# Patient Record
Sex: MALE | Race: ASIAN | Employment: OTHER | URBAN - METROPOLITAN AREA
[De-identification: names, ages, dates, MRNs, and addresses within clinical notes are randomized per-mention and may not be internally consistent; named-entity substitution may affect disease eponyms.]

---

## 2020-03-15 ENCOUNTER — APPOINTMENT (OUTPATIENT)
Dept: CT IMAGING | Age: 80
End: 2020-03-15
Payer: MEDICAID

## 2020-03-15 ENCOUNTER — HOSPITAL ENCOUNTER (EMERGENCY)
Age: 80
Discharge: HOME | End: 2020-03-15
Attending: EMERGENCY MEDICINE
Payer: MEDICAID

## 2020-03-15 VITALS
OXYGEN SATURATION: 94 % | RESPIRATION RATE: 16 BRPM | SYSTOLIC BLOOD PRESSURE: 119 MMHG | TEMPERATURE: 98.6 F | WEIGHT: 145 LBS | DIASTOLIC BLOOD PRESSURE: 63 MMHG | HEART RATE: 68 BPM

## 2020-03-15 LAB
A/G RATIO: 1 (ref 1.1–2.2)
ALBUMIN SERPL-MCNC: 4 G/DL (ref 3.4–5)
ALP BLD-CCNC: 102 U/L (ref 40–129)
ALT SERPL-CCNC: 24 U/L (ref 10–40)
ANION GAP SERPL CALCULATED.3IONS-SCNC: 14 MMOL/L (ref 3–16)
AST SERPL-CCNC: 35 U/L (ref 15–37)
BASOPHILS ABSOLUTE: 0.1 K/UL (ref 0–0.2)
BASOPHILS RELATIVE PERCENT: 1.1 %
BILIRUB SERPL-MCNC: 0.4 MG/DL (ref 0–1)
BILIRUBIN URINE: NEGATIVE
BLOOD, URINE: NEGATIVE
BUN BLDV-MCNC: 12 MG/DL (ref 7–20)
CALCIUM SERPL-MCNC: 9.5 MG/DL (ref 8.3–10.6)
CHLORIDE BLD-SCNC: 98 MMOL/L (ref 99–110)
CLARITY: CLEAR
CO2: 23 MMOL/L (ref 21–32)
COLOR: YELLOW
CREAT SERPL-MCNC: 0.8 MG/DL (ref 0.8–1.3)
EOSINOPHILS ABSOLUTE: 0 K/UL (ref 0–0.6)
EOSINOPHILS RELATIVE PERCENT: 0.5 %
GFR AFRICAN AMERICAN: >60
GFR NON-AFRICAN AMERICAN: >60
GLOBULIN: 4.2 G/DL
GLUCOSE BLD-MCNC: 123 MG/DL (ref 70–99)
GLUCOSE URINE: NEGATIVE MG/DL
HCT VFR BLD CALC: 37.7 % (ref 40.5–52.5)
HEMOGLOBIN: 12.7 G/DL (ref 13.5–17.5)
KETONES, URINE: NEGATIVE MG/DL
LEUKOCYTE ESTERASE, URINE: ABNORMAL
LYMPHOCYTES ABSOLUTE: 2.2 K/UL (ref 1–5.1)
LYMPHOCYTES RELATIVE PERCENT: 28.5 %
MCH RBC QN AUTO: 29.7 PG (ref 26–34)
MCHC RBC AUTO-ENTMCNC: 33.8 G/DL (ref 31–36)
MCV RBC AUTO: 87.8 FL (ref 80–100)
MICROSCOPIC EXAMINATION: YES
MONOCYTES ABSOLUTE: 0.7 K/UL (ref 0–1.3)
MONOCYTES RELATIVE PERCENT: 9.2 %
NEUTROPHILS ABSOLUTE: 4.8 K/UL (ref 1.7–7.7)
NEUTROPHILS RELATIVE PERCENT: 60.7 %
NITRITE, URINE: NEGATIVE
PDW BLD-RTO: 12.5 % (ref 12.4–15.4)
PH UA: 6 (ref 5–8)
PLATELET # BLD: 312 K/UL (ref 135–450)
PMV BLD AUTO: 7.5 FL (ref 5–10.5)
POTASSIUM REFLEX MAGNESIUM: 5.1 MMOL/L (ref 3.5–5.1)
PROTEIN UA: NEGATIVE MG/DL
RAPID INFLUENZA  B AGN: NEGATIVE
RAPID INFLUENZA A AGN: NEGATIVE
RBC # BLD: 4.29 M/UL (ref 4.2–5.9)
RBC UA: ABNORMAL /HPF (ref 0–4)
SODIUM BLD-SCNC: 135 MMOL/L (ref 136–145)
SPECIFIC GRAVITY UA: 1 (ref 1–1.03)
TOTAL PROTEIN: 8.2 G/DL (ref 6.4–8.2)
TROPONIN: <0.01 NG/ML
URINE TYPE: ABNORMAL
UROBILINOGEN, URINE: 0.2 E.U./DL
WBC # BLD: 7.9 K/UL (ref 4–11)
WBC UA: ABNORMAL /HPF (ref 0–5)

## 2020-03-15 PROCEDURE — 6360000002 HC RX W HCPCS: Performed by: EMERGENCY MEDICINE

## 2020-03-15 PROCEDURE — 93005 ELECTROCARDIOGRAM TRACING: CPT | Performed by: EMERGENCY MEDICINE

## 2020-03-15 PROCEDURE — 36415 COLL VENOUS BLD VENIPUNCTURE: CPT

## 2020-03-15 PROCEDURE — 81001 URINALYSIS AUTO W/SCOPE: CPT

## 2020-03-15 PROCEDURE — 74177 CT ABD & PELVIS W/CONTRAST: CPT

## 2020-03-15 PROCEDURE — 80053 COMPREHEN METABOLIC PANEL: CPT

## 2020-03-15 PROCEDURE — 85025 COMPLETE CBC W/AUTO DIFF WBC: CPT

## 2020-03-15 PROCEDURE — 99283 EMERGENCY DEPT VISIT LOW MDM: CPT

## 2020-03-15 PROCEDURE — 87804 INFLUENZA ASSAY W/OPTIC: CPT

## 2020-03-15 PROCEDURE — 96375 TX/PRO/DX INJ NEW DRUG ADDON: CPT

## 2020-03-15 PROCEDURE — 74176 CT ABD & PELVIS W/O CONTRAST: CPT

## 2020-03-15 PROCEDURE — 96374 THER/PROPH/DIAG INJ IV PUSH: CPT

## 2020-03-15 PROCEDURE — 84484 ASSAY OF TROPONIN QUANT: CPT

## 2020-03-15 PROCEDURE — 6360000004 HC RX CONTRAST MEDICATION: Performed by: EMERGENCY MEDICINE

## 2020-03-15 RX ORDER — LEVOTHYROXINE SODIUM 0.1 MG/1
100 TABLET ORAL DAILY
COMMUNITY

## 2020-03-15 RX ORDER — LISINOPRIL 20 MG/1
20 TABLET ORAL DAILY
Status: ON HOLD | COMMUNITY
End: 2022-07-05 | Stop reason: HOSPADM

## 2020-03-15 RX ORDER — IBUPROFEN 800 MG/1
800 TABLET ORAL EVERY 6 HOURS PRN
Status: ON HOLD | COMMUNITY
End: 2022-07-05 | Stop reason: HOSPADM

## 2020-03-15 RX ORDER — OMEPRAZOLE 40 MG/1
40 CAPSULE, DELAYED RELEASE ORAL DAILY
COMMUNITY

## 2020-03-15 RX ORDER — KETOROLAC TROMETHAMINE 30 MG/ML
15 INJECTION, SOLUTION INTRAMUSCULAR; INTRAVENOUS ONCE
Status: COMPLETED | OUTPATIENT
Start: 2020-03-15 | End: 2020-03-15

## 2020-03-15 RX ORDER — ONDANSETRON 4 MG/1
4 TABLET, FILM COATED ORAL DAILY PRN
Qty: 30 TABLET | Refills: 0 | Status: ON HOLD | OUTPATIENT
Start: 2020-03-15 | End: 2022-06-17

## 2020-03-15 RX ORDER — HYDROCHLOROTHIAZIDE 25 MG/1
25 TABLET ORAL DAILY
Status: ON HOLD | COMMUNITY
End: 2022-07-05 | Stop reason: HOSPADM

## 2020-03-15 RX ORDER — PROCHLORPERAZINE MALEATE 10 MG
10 TABLET ORAL EVERY 6 HOURS PRN
Qty: 120 TABLET | Refills: 0 | Status: SHIPPED | OUTPATIENT
Start: 2020-03-15

## 2020-03-15 RX ORDER — TRAMADOL HYDROCHLORIDE 50 MG/1
50 TABLET ORAL EVERY 6 HOURS PRN
Status: ON HOLD | COMMUNITY
End: 2022-07-05 | Stop reason: HOSPADM

## 2020-03-15 RX ORDER — FENTANYL CITRATE 50 UG/ML
25 INJECTION, SOLUTION INTRAMUSCULAR; INTRAVENOUS ONCE
Status: COMPLETED | OUTPATIENT
Start: 2020-03-15 | End: 2020-03-15

## 2020-03-15 RX ADMIN — FENTANYL CITRATE 25 MCG: 50 INJECTION, SOLUTION INTRAMUSCULAR; INTRAVENOUS at 15:40

## 2020-03-15 RX ADMIN — KETOROLAC TROMETHAMINE 15 MG: 30 INJECTION, SOLUTION INTRAMUSCULAR at 15:40

## 2020-03-15 RX ADMIN — IOPAMIDOL 75 ML: 755 INJECTION, SOLUTION INTRAVENOUS at 18:30

## 2020-03-15 SDOH — HEALTH STABILITY: MENTAL HEALTH: HOW OFTEN DO YOU HAVE A DRINK CONTAINING ALCOHOL?: NEVER

## 2020-03-15 ASSESSMENT — PAIN SCALES - GENERAL: PAINLEVEL_OUTOF10: 8

## 2020-03-15 ASSESSMENT — PAIN DESCRIPTION - PAIN TYPE: TYPE: ACUTE PAIN

## 2020-03-15 ASSESSMENT — PAIN DESCRIPTION - LOCATION: LOCATION: THROAT

## 2020-03-15 NOTE — ED PROVIDER NOTES
and Sexual Activity    Alcohol use: Never     Frequency: Never    Drug use: Never    Sexual activity: Not on file   Lifestyle    Physical activity     Days per week: Not on file     Minutes per session: Not on file    Stress: Not on file   Relationships    Social connections     Talks on phone: Not on file     Gets together: Not on file     Attends Hindu service: Not on file     Active member of club or organization: Not on file     Attends meetings of clubs or organizations: Not on file     Relationship status: Not on file    Intimate partner violence     Fear of current or ex partner: Not on file     Emotionally abused: Not on file     Physically abused: Not on file     Forced sexual activity: Not on file   Other Topics Concern    Not on file   Social History Narrative    Not on file     No current facility-administered medications for this encounter. Current Outpatient Medications   Medication Sig Dispense Refill    lisinopril (PRINIVIL;ZESTRIL) 20 MG tablet Take 20 mg by mouth daily      ibuprofen (ADVIL;MOTRIN) 800 MG tablet Take 800 mg by mouth every 6 hours as needed for Pain      hydroCHLOROthiazide (HYDRODIURIL) 25 MG tablet Take 25 mg by mouth daily      metFORMIN (GLUCOPHAGE) 500 MG tablet Take 500 mg by mouth daily (with breakfast)      levothyroxine (SYNTHROID) 100 MCG tablet Take 100 mcg by mouth Daily      omeprazole (PRILOSEC) 20 MG delayed release capsule Take 40 mg by mouth daily      traMADol (ULTRAM) 50 MG tablet Take 50 mg by mouth every 6 hours as needed for Pain.       prochlorperazine (COMPAZINE) 10 MG tablet Take 1 tablet by mouth every 6 hours as needed (pain, nausea) 120 tablet 0    ondansetron (ZOFRAN) 4 MG tablet Take 1 tablet by mouth daily as needed for Nausea or Vomiting 30 tablet 0     No Known Allergies    Nursing Notes Reviewed    Physical Exam:  Triage VS:    ED Triage Vitals [03/15/20 1344]   Enc Vitals Group      BP (!) 136/44      Pulse 71      Resp 16 Temp 98.6 °F (37 °C)      Temp Source Oral      SpO2 97 %      Weight 145 lb (65.8 kg)      Height       Head Circumference       Peak Flow       Pain Score       Pain Loc       Pain Edu? Excl. in [address]? My pulse ox interpretation is - normal    General appearance:  No acute distress. Skin:  Warm. Dry. No rash. Neck:  Trachea midline. Heart:  Regular rate and rhythm, normal S1 & S2.    Perfusion:  intact  Respiratory:  Lungs clear to auscultation bilaterally. Respirations nonlabored. Abdominal:  diffuse tenderness to palpation, no rebound guarding or rigidity, neg Rendon's sign, neg McBurney's point tenderness to palpation, normal bowel sounds, no masses, no organomegaly, no pulsatile masses  Back:  No CVA TTP  Extremity:    normal ROM   Neurological:  Alert and oriented times 3. I have reviewed and interpreted all of the currently available lab results from this visit (if applicable):  Results for orders placed or performed during the hospital encounter of 03/15/20   Rapid influenza A/B antigens   Result Value Ref Range    Rapid Influenza A Ag Negative Negative    Rapid Influenza B Ag Negative Negative   CBC Auto Differential   Result Value Ref Range    WBC 7.9 4.0 - 11.0 K/uL    RBC 4.29 4. 20 - 5.90 M/uL    Hemoglobin 12.7 (L) 13.5 - 17.5 g/dL    Hematocrit 37.7 (L) 40.5 - 52.5 %    MCV 87.8 80.0 - 100.0 fL    MCH 29.7 26.0 - 34.0 pg    MCHC 33.8 31.0 - 36.0 g/dL    RDW 12.5 12.4 - 15.4 %    Platelets 336 311 - 098 K/uL    MPV 7.5 5.0 - 10.5 fL    Neutrophils % 60.7 %    Lymphocytes % 28.5 %    Monocytes % 9.2 %    Eosinophils % 0.5 %    Basophils % 1.1 %    Neutrophils Absolute 4.8 1.7 - 7.7 K/uL    Lymphocytes Absolute 2.2 1.0 - 5.1 K/uL    Monocytes Absolute 0.7 0.0 - 1.3 K/uL    Eosinophils Absolute 0.0 0.0 - 0.6 K/uL    Basophils Absolute 0.1 0.0 - 0.2 K/uL   Comprehensive Metabolic Panel w/ Reflex to MG   Result Value Ref Range    Sodium 135 (L) 136 - 145 mmol/L    Potassium reflex Magnesium 5.1 3.5 - 5.1 mmol/L    Chloride 98 (L) 99 - 110 mmol/L    CO2 23 21 - 32 mmol/L    Anion Gap 14 3 - 16    Glucose 123 (H) 70 - 99 mg/dL    BUN 12 7 - 20 mg/dL    CREATININE 0.8 0.8 - 1.3 mg/dL    GFR Non-African American >60 >60    GFR African American >60 >60    Calcium 9.5 8.3 - 10.6 mg/dL    Total Protein 8.2 6.4 - 8.2 g/dL    Alb 4.0 3.4 - 5.0 g/dL    Albumin/Globulin Ratio 1.0 (L) 1.1 - 2.2    Total Bilirubin 0.4 0.0 - 1.0 mg/dL    Alkaline Phosphatase 102 40 - 129 U/L    ALT 24 10 - 40 U/L    AST 35 15 - 37 U/L    Globulin 4.2 g/dL   Troponin   Result Value Ref Range    Troponin <0.01 <0.01 ng/mL   Urinalysis, reflex to microscopic   Result Value Ref Range    Color, UA YELLOW Straw/Yellow    Clarity, UA Clear Clear    Glucose, Ur Negative Negative mg/dL    Bilirubin Urine Negative Negative    Ketones, Urine Negative Negative mg/dL    Specific Gravity, UA 1.005 1.005 - 1.030    Blood, Urine Negative Negative    pH, UA 6.0 5.0 - 8.0    Protein, UA Negative Negative mg/dL    Urobilinogen, Urine 0.2 <2.0 E.U./dL    Nitrite, Urine Negative Negative    Leukocyte Esterase, Urine SMALL (A) Negative    Microscopic Examination YES     Urine Type NotGiven    Microscopic Urinalysis   Result Value Ref Range    WBC, UA 6-10 (A) 0 - 5 /HPF    RBC, UA None seen 0 - 4 /HPF   EKG 12 Lead   Result Value Ref Range    Ventricular Rate 64 BPM    Atrial Rate 64 BPM    P-R Interval 164 ms    QRS Duration 80 ms    Q-T Interval 390 ms    QTc Calculation (Bazett) 402 ms    P Axis 52 degrees    R Axis 51 degrees    T Axis 44 degrees    Diagnosis Normal sinus rhythmNormal ECG       Radiographs (if obtained):  Radiologist's Report Reviewed:  No results found. EKG (if obtained): (All EKG's are interpreted by myself in the absence of a cardiologist)      MDM:  Patient here with abdominal pain.  I estimate there is LOW risk for an acute emergent intraabdominal process including, but not limited to, acute appendicitis, needed (pain, nausea), Disp-120 tablet, R-0Print      ondansetron (ZOFRAN) 4 MG tablet Take 1 tablet by mouth daily as needed for Nausea or Vomiting, Disp-30 tablet, R-0Print             Comment: Please note this report has been produced using speech recognition software and may contain errors related to that system including errors in grammar, punctuation, and spelling, as well as words and phrases that may be inappropriate. Efforts were made to edit the dictations.      Pranav Connell MD  89/12/67 3658

## 2020-03-16 LAB
EKG ATRIAL RATE: 64 BPM
EKG DIAGNOSIS: NORMAL
EKG P AXIS: 52 DEGREES
EKG P-R INTERVAL: 164 MS
EKG Q-T INTERVAL: 390 MS
EKG QRS DURATION: 80 MS
EKG QTC CALCULATION (BAZETT): 402 MS
EKG R AXIS: 51 DEGREES
EKG T AXIS: 44 DEGREES
EKG VENTRICULAR RATE: 64 BPM

## 2020-03-16 PROCEDURE — 93010 ELECTROCARDIOGRAM REPORT: CPT | Performed by: INTERNAL MEDICINE

## 2020-04-28 ENCOUNTER — HOSPITAL ENCOUNTER (OUTPATIENT)
Dept: MRI IMAGING | Age: 80
Discharge: HOME | DRG: 281 | End: 2020-04-28
Payer: COMMERCIAL

## 2020-04-28 ENCOUNTER — HOSPITAL ENCOUNTER (OUTPATIENT)
Age: 80
Discharge: HOME | DRG: 281 | End: 2020-04-28
Payer: COMMERCIAL

## 2020-04-28 PROCEDURE — 74183 MRI ABD W/O CNTR FLWD CNTR: CPT

## 2020-04-28 PROCEDURE — 2580000003 HC RX 258: Performed by: INTERNAL MEDICINE

## 2020-04-28 PROCEDURE — A9579 GAD-BASE MR CONTRAST NOS,1ML: HCPCS | Performed by: INTERNAL MEDICINE

## 2020-04-28 PROCEDURE — 6360000004 HC RX CONTRAST MEDICATION: Performed by: INTERNAL MEDICINE

## 2020-04-28 RX ORDER — SODIUM CHLORIDE 9 MG/ML
INJECTION, SOLUTION INTRAVENOUS ONCE
Status: COMPLETED | OUTPATIENT
Start: 2020-04-28 | End: 2020-04-28

## 2020-04-28 RX ADMIN — SODIUM CHLORIDE: 9 INJECTION, SOLUTION INTRAVENOUS at 12:49

## 2020-04-28 RX ADMIN — GADOTERIDOL 12 ML: 279.3 INJECTION, SOLUTION INTRAVENOUS at 12:49

## 2020-04-30 ENCOUNTER — APPOINTMENT (OUTPATIENT)
Dept: GENERAL RADIOLOGY | Age: 80
DRG: 281 | End: 2020-04-30
Payer: COMMERCIAL

## 2020-04-30 ENCOUNTER — APPOINTMENT (OUTPATIENT)
Dept: ULTRASOUND IMAGING | Age: 80
DRG: 281 | End: 2020-04-30
Payer: COMMERCIAL

## 2020-04-30 ENCOUNTER — HOSPITAL ENCOUNTER (INPATIENT)
Age: 80
LOS: 5 days | Discharge: HOME | DRG: 281 | End: 2020-05-05
Attending: EMERGENCY MEDICINE | Admitting: HOSPITALIST
Payer: COMMERCIAL

## 2020-04-30 ENCOUNTER — APPOINTMENT (OUTPATIENT)
Dept: CT IMAGING | Age: 80
DRG: 281 | End: 2020-04-30
Payer: COMMERCIAL

## 2020-04-30 PROBLEM — R11.2 INTRACTABLE VOMITING WITH NAUSEA: Status: ACTIVE | Noted: 2020-04-30

## 2020-04-30 LAB
A/G RATIO: 1.3 (ref 1.1–2.2)
ALBUMIN SERPL-MCNC: 4.6 G/DL (ref 3.4–5)
ALP BLD-CCNC: 103 U/L (ref 40–129)
ALT SERPL-CCNC: 17 U/L (ref 10–40)
ANION GAP SERPL CALCULATED.3IONS-SCNC: 16 MMOL/L (ref 3–16)
AST SERPL-CCNC: 24 U/L (ref 15–37)
BASOPHILS ABSOLUTE: 0.1 K/UL (ref 0–0.2)
BASOPHILS RELATIVE PERCENT: 0.5 %
BILIRUB SERPL-MCNC: 0.6 MG/DL (ref 0–1)
BILIRUBIN URINE: NEGATIVE
BLOOD, URINE: NEGATIVE
BUN BLDV-MCNC: 14 MG/DL (ref 7–20)
CALCIUM SERPL-MCNC: 9.4 MG/DL (ref 8.3–10.6)
CHLORIDE BLD-SCNC: 102 MMOL/L (ref 99–110)
CLARITY: ABNORMAL
CO2: 21 MMOL/L (ref 21–32)
COLOR: YELLOW
CREAT SERPL-MCNC: 0.6 MG/DL (ref 0.8–1.3)
EOSINOPHILS ABSOLUTE: 0.1 K/UL (ref 0–0.6)
EOSINOPHILS RELATIVE PERCENT: 0.5 %
EPITHELIAL CELLS, UA: 1 /HPF (ref 0–5)
GFR AFRICAN AMERICAN: >60
GFR NON-AFRICAN AMERICAN: >60
GLOBULIN: 3.6 G/DL
GLUCOSE BLD-MCNC: 117 MG/DL (ref 70–99)
GLUCOSE BLD-MCNC: 206 MG/DL (ref 70–99)
GLUCOSE URINE: 250 MG/DL
HCT VFR BLD CALC: 42.4 % (ref 40.5–52.5)
HEMOGLOBIN: 13.8 G/DL (ref 13.5–17.5)
HYALINE CASTS: 0 /LPF (ref 0–8)
KETONES, URINE: NEGATIVE MG/DL
LEUKOCYTE ESTERASE, URINE: NEGATIVE
LIPASE: 28 U/L (ref 13–60)
LYMPHOCYTES ABSOLUTE: 4.9 K/UL (ref 1–5.1)
LYMPHOCYTES RELATIVE PERCENT: 34.3 %
MCH RBC QN AUTO: 29 PG (ref 26–34)
MCHC RBC AUTO-ENTMCNC: 32.5 G/DL (ref 31–36)
MCV RBC AUTO: 89.4 FL (ref 80–100)
MICROSCOPIC EXAMINATION: YES
MONOCYTES ABSOLUTE: 0.6 K/UL (ref 0–1.3)
MONOCYTES RELATIVE PERCENT: 4.5 %
NEUTROPHILS ABSOLUTE: 8.6 K/UL (ref 1.7–7.7)
NEUTROPHILS RELATIVE PERCENT: 60.2 %
NITRITE, URINE: NEGATIVE
PDW BLD-RTO: 13.8 % (ref 12.4–15.4)
PERFORMED ON: ABNORMAL
PH UA: 6 (ref 5–8)
PLATELET # BLD: 255 K/UL (ref 135–450)
PMV BLD AUTO: 7.8 FL (ref 5–10.5)
POTASSIUM SERPL-SCNC: 3.1 MMOL/L (ref 3.5–5.1)
PRO-BNP: 179 PG/ML (ref 0–449)
PROTEIN UA: 30 MG/DL
RBC # BLD: 4.74 M/UL (ref 4.2–5.9)
RBC UA: 3 /HPF (ref 0–4)
SODIUM BLD-SCNC: 139 MMOL/L (ref 136–145)
SPECIFIC GRAVITY UA: 1.01 (ref 1–1.03)
TOTAL PROTEIN: 8.2 G/DL (ref 6.4–8.2)
TROPONIN: <0.01 NG/ML
URINE REFLEX TO CULTURE: ABNORMAL
URINE TYPE: ABNORMAL
UROBILINOGEN, URINE: 0.2 E.U./DL
WBC # BLD: 14.3 K/UL (ref 4–11)
WBC UA: 3 /HPF (ref 0–5)

## 2020-04-30 PROCEDURE — 96376 TX/PRO/DX INJ SAME DRUG ADON: CPT

## 2020-04-30 PROCEDURE — 71045 X-RAY EXAM CHEST 1 VIEW: CPT

## 2020-04-30 PROCEDURE — 85025 COMPLETE CBC W/AUTO DIFF WBC: CPT

## 2020-04-30 PROCEDURE — 2580000003 HC RX 258: Performed by: NURSE PRACTITIONER

## 2020-04-30 PROCEDURE — 1200000000 HC SEMI PRIVATE

## 2020-04-30 PROCEDURE — 83690 ASSAY OF LIPASE: CPT

## 2020-04-30 PROCEDURE — 93005 ELECTROCARDIOGRAM TRACING: CPT | Performed by: EMERGENCY MEDICINE

## 2020-04-30 PROCEDURE — 81001 URINALYSIS AUTO W/SCOPE: CPT

## 2020-04-30 PROCEDURE — 6360000002 HC RX W HCPCS: Performed by: NURSE PRACTITIONER

## 2020-04-30 PROCEDURE — 99291 CRITICAL CARE FIRST HOUR: CPT

## 2020-04-30 PROCEDURE — 6360000002 HC RX W HCPCS

## 2020-04-30 PROCEDURE — 84484 ASSAY OF TROPONIN QUANT: CPT

## 2020-04-30 PROCEDURE — 74177 CT ABD & PELVIS W/CONTRAST: CPT

## 2020-04-30 PROCEDURE — 36415 COLL VENOUS BLD VENIPUNCTURE: CPT

## 2020-04-30 PROCEDURE — 2580000003 HC RX 258: Performed by: HOSPITALIST

## 2020-04-30 PROCEDURE — 83880 ASSAY OF NATRIURETIC PEPTIDE: CPT

## 2020-04-30 PROCEDURE — 96375 TX/PRO/DX INJ NEW DRUG ADDON: CPT

## 2020-04-30 PROCEDURE — 96365 THER/PROPH/DIAG IV INF INIT: CPT

## 2020-04-30 PROCEDURE — 80053 COMPREHEN METABOLIC PANEL: CPT

## 2020-04-30 PROCEDURE — 76705 ECHO EXAM OF ABDOMEN: CPT

## 2020-04-30 PROCEDURE — 6360000004 HC RX CONTRAST MEDICATION: Performed by: NURSE PRACTITIONER

## 2020-04-30 PROCEDURE — 83036 HEMOGLOBIN GLYCOSYLATED A1C: CPT

## 2020-04-30 RX ORDER — MORPHINE SULFATE 2 MG/ML
2 INJECTION, SOLUTION INTRAMUSCULAR; INTRAVENOUS EVERY 4 HOURS PRN
Status: DISCONTINUED | OUTPATIENT
Start: 2020-04-30 | End: 2020-05-05 | Stop reason: HOSPADM

## 2020-04-30 RX ORDER — SODIUM CHLORIDE 0.9 % (FLUSH) 0.9 %
10 SYRINGE (ML) INJECTION EVERY 12 HOURS SCHEDULED
Status: DISCONTINUED | OUTPATIENT
Start: 2020-04-30 | End: 2020-05-05 | Stop reason: HOSPADM

## 2020-04-30 RX ORDER — DEXTROSE MONOHYDRATE 50 MG/ML
100 INJECTION, SOLUTION INTRAVENOUS PRN
Status: DISCONTINUED | OUTPATIENT
Start: 2020-04-30 | End: 2020-05-05 | Stop reason: HOSPADM

## 2020-04-30 RX ORDER — ONDANSETRON 2 MG/ML
4 INJECTION INTRAMUSCULAR; INTRAVENOUS EVERY 30 MIN PRN
Status: COMPLETED | OUTPATIENT
Start: 2020-04-30 | End: 2020-04-30

## 2020-04-30 RX ORDER — ACETAMINOPHEN 325 MG/1
650 TABLET ORAL EVERY 6 HOURS PRN
Status: DISCONTINUED | OUTPATIENT
Start: 2020-04-30 | End: 2020-05-05 | Stop reason: HOSPADM

## 2020-04-30 RX ORDER — ONDANSETRON 2 MG/ML
4 INJECTION INTRAMUSCULAR; INTRAVENOUS EVERY 6 HOURS PRN
Status: DISCONTINUED | OUTPATIENT
Start: 2020-04-30 | End: 2020-05-05 | Stop reason: HOSPADM

## 2020-04-30 RX ORDER — SODIUM CHLORIDE 0.9 % (FLUSH) 0.9 %
10 SYRINGE (ML) INJECTION PRN
Status: DISCONTINUED | OUTPATIENT
Start: 2020-04-30 | End: 2020-05-05 | Stop reason: HOSPADM

## 2020-04-30 RX ORDER — POTASSIUM CHLORIDE 20MEQ/15ML
40 LIQUID (ML) ORAL DAILY
Status: DISCONTINUED | OUTPATIENT
Start: 2020-04-30 | End: 2020-04-30

## 2020-04-30 RX ORDER — SODIUM CHLORIDE 9 MG/ML
INJECTION, SOLUTION INTRAVENOUS CONTINUOUS
Status: DISCONTINUED | OUTPATIENT
Start: 2020-04-30 | End: 2020-05-03

## 2020-04-30 RX ORDER — PROMETHAZINE HYDROCHLORIDE 25 MG/1
12.5 TABLET ORAL EVERY 6 HOURS PRN
Status: DISCONTINUED | OUTPATIENT
Start: 2020-04-30 | End: 2020-05-05 | Stop reason: HOSPADM

## 2020-04-30 RX ORDER — LISINOPRIL 20 MG/1
20 TABLET ORAL DAILY
Status: DISCONTINUED | OUTPATIENT
Start: 2020-04-30 | End: 2020-05-05 | Stop reason: HOSPADM

## 2020-04-30 RX ORDER — INSULIN LISPRO 100 [IU]/ML
0-6 INJECTION, SOLUTION INTRAVENOUS; SUBCUTANEOUS EVERY 4 HOURS
Status: DISCONTINUED | OUTPATIENT
Start: 2020-04-30 | End: 2020-05-03

## 2020-04-30 RX ORDER — POLYETHYLENE GLYCOL 3350 17 G/17G
17 POWDER, FOR SOLUTION ORAL DAILY PRN
Status: DISCONTINUED | OUTPATIENT
Start: 2020-04-30 | End: 2020-05-05 | Stop reason: HOSPADM

## 2020-04-30 RX ORDER — ACETAMINOPHEN 650 MG/1
650 SUPPOSITORY RECTAL EVERY 6 HOURS PRN
Status: DISCONTINUED | OUTPATIENT
Start: 2020-04-30 | End: 2020-05-05 | Stop reason: HOSPADM

## 2020-04-30 RX ORDER — ONDANSETRON 2 MG/ML
INJECTION INTRAMUSCULAR; INTRAVENOUS
Status: COMPLETED
Start: 2020-04-30 | End: 2020-04-30

## 2020-04-30 RX ORDER — DEXTROSE MONOHYDRATE 25 G/50ML
12.5 INJECTION, SOLUTION INTRAVENOUS PRN
Status: DISCONTINUED | OUTPATIENT
Start: 2020-04-30 | End: 2020-05-05 | Stop reason: HOSPADM

## 2020-04-30 RX ORDER — NICOTINE POLACRILEX 4 MG
15 LOZENGE BUCCAL PRN
Status: DISCONTINUED | OUTPATIENT
Start: 2020-04-30 | End: 2020-05-05 | Stop reason: HOSPADM

## 2020-04-30 RX ORDER — POTASSIUM CHLORIDE 7.45 MG/ML
10 INJECTION INTRAVENOUS ONCE
Status: COMPLETED | OUTPATIENT
Start: 2020-04-30 | End: 2020-04-30

## 2020-04-30 RX ORDER — LEVOTHYROXINE SODIUM 0.1 MG/1
100 TABLET ORAL DAILY
Status: DISCONTINUED | OUTPATIENT
Start: 2020-05-01 | End: 2020-05-05 | Stop reason: HOSPADM

## 2020-04-30 RX ORDER — 0.9 % SODIUM CHLORIDE 0.9 %
1000 INTRAVENOUS SOLUTION INTRAVENOUS ONCE
Status: COMPLETED | OUTPATIENT
Start: 2020-04-30 | End: 2020-04-30

## 2020-04-30 RX ADMIN — ONDANSETRON 4 MG: 2 INJECTION INTRAMUSCULAR; INTRAVENOUS at 15:55

## 2020-04-30 RX ADMIN — POTASSIUM CHLORIDE 10 MEQ: 7.46 INJECTION, SOLUTION INTRAVENOUS at 16:35

## 2020-04-30 RX ADMIN — IOPAMIDOL 75 ML: 755 INJECTION, SOLUTION INTRAVENOUS at 14:17

## 2020-04-30 RX ADMIN — ONDANSETRON 4 MG: 2 INJECTION INTRAMUSCULAR; INTRAVENOUS at 14:14

## 2020-04-30 RX ADMIN — SODIUM CHLORIDE: 9 INJECTION, SOLUTION INTRAVENOUS at 22:39

## 2020-04-30 RX ADMIN — SODIUM CHLORIDE 1000 ML: 9 INJECTION, SOLUTION INTRAVENOUS at 13:19

## 2020-04-30 RX ADMIN — ONDANSETRON 4 MG: 2 INJECTION INTRAMUSCULAR; INTRAVENOUS at 13:20

## 2020-04-30 RX ADMIN — ONDANSETRON 4 MG: 2 INJECTION INTRAMUSCULAR; INTRAVENOUS at 16:35

## 2020-04-30 RX ADMIN — Medication 10 ML: at 22:39

## 2020-04-30 ASSESSMENT — PAIN SCALES - GENERAL
PAINLEVEL_OUTOF10: 5
PAINLEVEL_OUTOF10: 2

## 2020-04-30 ASSESSMENT — ENCOUNTER SYMPTOMS
SHORTNESS OF BREATH: 0
VOMITING: 1
CHEST TIGHTNESS: 0
NAUSEA: 1
ABDOMINAL PAIN: 0
DIARRHEA: 1

## 2020-04-30 ASSESSMENT — PAIN DESCRIPTION - LOCATION
LOCATION: ABDOMEN
LOCATION: THROAT

## 2020-04-30 ASSESSMENT — PAIN DESCRIPTION - ORIENTATION: ORIENTATION: UPPER

## 2020-04-30 ASSESSMENT — PAIN DESCRIPTION - PAIN TYPE: TYPE: ACUTE PAIN

## 2020-04-30 NOTE — ED PROVIDER NOTES
cholecystitis. Patient given multiple doses of antiemetics and continues to have intractable nausea and vomiting. Will admit patient for inability to take oral intake      Patient Referrals:  No follow-up provider specified. Discharge Medications:  New Prescriptions    No medications on file       FINAL IMPRESSION  1. Intractable vomiting with nausea, unspecified vomiting type    2. Calculus of gallbladder and bile duct without cholecystitis or obstruction    3. Liver metastasis (HCC)        Blood pressure 132/60, pulse 65, temperature 96.9 °F (36.1 °C), temperature source Infrared, resp. rate 19, height 4' 8\" (1.422 m), weight 120 lb (54.4 kg), SpO2 95 %. For further details of [facility] emergency department encounter, please see documentation by advanced practice provider, Ulysses Mullins.         Nisha Calabrese MD  04/30/20 3213

## 2020-04-30 NOTE — H&P
release capsule Take 40 mg by mouth daily    Historical Provider, MD   traMADol (ULTRAM) 50 MG tablet Take 50 mg by mouth every 6 hours as needed for Pain. Historical Provider, MD   prochlorperazine (COMPAZINE) 10 MG tablet Take 1 tablet by mouth every 6 hours as needed (pain, nausea) 6/87/95   Collette Paschal, MD   ondansetron [facility]) 4 MG tablet Take 1 tablet by mouth daily as needed for Nausea or Vomiting 6/23/22   Collette Paschal, MD       Allergies:  Patient has no known allergies. Social History:      The patient currently lives at home with son    TOBACCO:   reports that he has never smoked. He does not have any smokeless tobacco history on file. ETOH:   reports no history of alcohol use. Family History:       Reviewed in detail and negative for DM, CAD, Cancer, CVA. Positive as follows:    History reviewed. No pertinent family history. REVIEW OF SYSTEMS:   Pertinent positives as noted in the HPI. All other systems reviewed and negative. PHYSICAL EXAM PERFORMED:    BP (!) 121/51   Pulse 64   Temp 96.9 °F (36.1 °C) (Infrared)   Resp 17   Ht 4' 8\" (1.422 m)   Wt 120 lb (54.4 kg)   SpO2 94%   BMI 26.90 kg/m²     General appearance:  No apparent distress, appears stated age and cooperative. HEENT:  Normal cephalic, atraumatic without obvious deformity. Pupils equal, round, and reactive to light. Extra ocular muscles intact. Conjunctivae/corneas clear. Oral mucous membranes moist  Neck: Supple, with full range of motion. No jugular venous distention. Trachea midline. Respiratory:  Normal respiratory effort. Clear to auscultation, bilaterally without Rales/Wheezes/Rhonchi. Cardiovascular:  Regular rate and rhythm with normal S1/S2 without murmurs, rubs or gallops. Abdomen: Soft, non-tender, non-distended with normal bowel sounds. No hepatopslenomegaly  Musculoskeletal:  Full range of motion without deformity.   Skin: Skin color, texture, turgor normal.  No rashes or lesions. Neurologic:  Neurovascularly intact without any focal sensory/motor deficits. Cranial nerves: II-XII intact, grossly non-focal.  Psychiatric:  Alert and orientedx3, thought content appropriate, normal insight  Capillary Refill: Brisk,< 3 seconds   Extremities: Peripheral Pulses +2 palpable, equal bilaterally,  No clubbing, cyanosis or edema bilaterally    Labs:     Recent Labs     04/30/20  1255   WBC 14.3*   HGB 13.8   HCT 42.4        Recent Labs     04/30/20  1255      K 3.1*      CO2 21   BUN 14   CREATININE 0.6*   CALCIUM 9.4     Recent Labs     04/30/20  1255   AST 24   ALT 17   BILITOT 0.6   ALKPHOS 103       Recent Labs     04/30/20  1255   TROPONINI <0.01       Urinalysis:      Lab Results   Component Value Date    NITRU Negative 04/30/2020    WBCUA 3 04/30/2020    RBCUA 3 04/30/2020    BLOODU Negative 04/30/2020    SPECGRAV 1.011 04/30/2020    GLUCOSEU 250 04/30/2020       Radiology:         US GALLBLADDER RUQ   Final Result   1. Cholelithiases without evidence of acute cholecystitis. 2. Indeterminate hypoechoic hepatic masses concerning for malignancy. 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 This could represent low-density ki metastasis. However,foregut   duplication cyst or bronchogenic cyst could also have this appearance given   its low-density      Dominant mass posteriorly in the right hepatic lobe is seen. Multiple   additional smaller masses are seen throughout the liver, which appear   hypervascular. .  Consider metastatic disease, such as carcinoid in the   appropriate clinical scenario      Probable stone in the distal common duct         XR CHEST PORTABLE   Final Result   Right upper lobe consolidation.   This may be due to

## 2020-04-30 NOTE — ED PROVIDER NOTES
[address]        Pt Name: Dontae Patel  MRN: [id number]  [place] 1940  Date of evaluation: 4/30/2020  Provider: SUSY Byrne CNP  PCP: No primary care provider on file. I have seen and evaluated this patient with my supervising physician Rosalio Holliday       Chief Complaint   Patient presents with    Emesis     emesis started  this am, SOB. napal interperter       HISTORY OF PRESENT ILLNESS   (Location, Timing/Onset, Context/Setting, Quality, Duration, Modifying Factors, Severity, Associated Signs and Symptoms)  Note limiting factors. Dontae Patel is a 78 y.o. male presents to the emergency department with acute nausea vomiting diarrhea and chest pain since this morning. He reports the chest pain is associated with vomiting. Reports that the chest pain is burning. Rates as 5/10, present after vomiting. He denies any mitigating or exacerbating factors. States that it is intractable. Denies abdominal pain. Recently diagnosed with liver mass. Translation provided through son at bedside, declined . Denies any headache, fever, lightheadedness, dizziness, visual disturbances. No neck or back pain. No shortness of breath, cough, or congestion. No abdominal pain, constipation, or dysuria. No rash. Nursing Notes were all reviewed and agreed with or any disagreements were addressed in the HPI. REVIEW OF SYSTEMS    (2-9 systems for level 4, 10 or more for level 5)     Review of Systems   Constitutional: Negative for activity change, chills and fever. Respiratory: Negative for chest tightness and shortness of breath. Cardiovascular: Positive for chest pain. Gastrointestinal: Positive for diarrhea, nausea and vomiting. Negative for abdominal pain. Genitourinary: Negative for dysuria. All other systems reviewed and are negative. Positives and Pertinent negatives as per HPI. Except as noted above in the ROS, all other systems were reviewed and negative. PAST MEDICAL HISTORY     Past Medical History:   Diagnosis Date    Asthma     Diabetes mellitus ([facility] [address]     Hypertension     Thyroid disease          SURGICAL HISTORY   History reviewed. No pertinent surgical history. [address]       Current Discharge Medication List      CONTINUE these medications which have NOT CHANGED    Details   lisinopril (PRINIVIL;ZESTRIL) 20 MG tablet Take 20 mg by mouth daily      ibuprofen (ADVIL;MOTRIN) 800 MG tablet Take 800 mg by mouth every 6 hours as needed for Pain      hydroCHLOROthiazide (HYDRODIURIL) 25 MG tablet Take 25 mg by mouth daily      metFORMIN (GLUCOPHAGE) 500 MG tablet Take 500 mg by mouth daily (with breakfast)      levothyroxine (SYNTHROID) 100 MCG tablet Take 100 mcg by mouth Daily      omeprazole (PRILOSEC) 20 MG delayed release capsule Take 40 mg by mouth daily      traMADol (ULTRAM) 50 MG tablet Take 50 mg by mouth every 6 hours as needed for Pain.      prochlorperazine (COMPAZINE) 10 MG tablet Take 1 tablet by mouth every 6 hours as needed (pain, nausea)  Qty: 120 tablet, Refills: 0      ondansetron (ZOFRAN) 4 MG tablet Take 1 tablet by mouth daily as needed for Nausea or Vomiting  Qty: 30 tablet, Refills: 0               ALLERGIES     Patient has no known allergies. FAMILYHISTORY     History reviewed. No pertinent family history. SOCIAL HISTORY       Social History     Tobacco Use    Smoking status: Never Smoker   Substance Use Topics    Alcohol use: Never     Frequency: Never    Drug use: Never       SCREENINGS             PHYSICAL EXAM    (up to 7 for level 4, 8 or more for level 5)     ED Triage Vitals [04/30/20 1235]   BP Temp Temp Source Pulse Resp SpO2 Height Weight   (!) 151/71 96.9 °F (36.1 °C) Infrared 52 24 95 % 4' 8\" (1.422 m) 120 lb (54.4 kg)       Physical Exam  Vitals signs and nursing note reviewed.    Constitutional:       Appearance: CARDIOTHORACIC SURGERY  IP CONSULT TO GI      EMERGENCY DEPARTMENT COURSE and DIFFERENTIAL DIAGNOSIS/MDM:   Vitals:    Vitals:    04/30/20 1700 04/30/20 1715 04/30/20 1730 04/30/20 1850   BP: (!) 129/55 (!) 123/56 132/60 139/83   Pulse: 58 55 65 64   Resp: 17 15 19 16   Temp:    97.6 °F (36.4 °C)   TempSrc:    Oral   SpO2: 93% 93% 95% 95%   Weight:       Height:           Patient was given the following medications:  Medications   sodium chloride flush 0.9 % injection 10 mL (has no administration in time range)   sodium chloride flush 0.9 % injection 10 mL (has no administration in time range)   acetaminophen (TYLENOL) tablet 650 mg (has no administration in time range)     Or   acetaminophen (TYLENOL) suppository 650 mg (has no administration in time range)   polyethylene glycol (GLYCOLAX) packet 17 g (has no administration in time range)   promethazine (PHENERGAN) tablet 12.5 mg (has no administration in time range)     Or   ondansetron (ZOFRAN) injection 4 mg (has no administration in time range)   enoxaparin (LOVENOX) injection 40 mg (has no administration in time range)   0.9 % sodium chloride infusion (has no administration in time range)   glucose (GLUTOSE) 40 % oral gel 15 g (has no administration in time range)   dextrose 50 % IV solution (has no administration in time range)   glucagon (rDNA) injection 1 mg (has no administration in time range)   dextrose 5 % solution (has no administration in time range)   insulin lispro (1 Unit Dial) 0-6 Units (has no administration in time range)   morphine (PF) injection 2 mg (has no administration in time range)   lisinopril (PRINIVIL;ZESTRIL) tablet 20 mg (has no administration in time range)   levothyroxine (SYNTHROID) tablet 100 mcg (has no administration in time range)   ondansetron (ZOFRAN) injection 4 mg (4 mg Intravenous Given 4/30/20 1414)   0.9 % sodium chloride bolus (0 mLs Intravenous Stopped 4/30/20 1435)   iopamidol (ISOVUE-370) 76 % injection 75 mL (75

## 2020-05-01 ENCOUNTER — APPOINTMENT (OUTPATIENT)
Dept: CT IMAGING | Age: 80
DRG: 281 | End: 2020-05-01
Payer: COMMERCIAL

## 2020-05-01 ENCOUNTER — APPOINTMENT (OUTPATIENT)
Dept: NUCLEAR MEDICINE | Age: 80
DRG: 281 | End: 2020-05-01
Payer: COMMERCIAL

## 2020-05-01 LAB
A/G RATIO: 1.2 (ref 1.1–2.2)
ALBUMIN SERPL-MCNC: 3.8 G/DL (ref 3.4–5)
ALP BLD-CCNC: 73 U/L (ref 40–129)
ALT SERPL-CCNC: 13 U/L (ref 10–40)
ANION GAP SERPL CALCULATED.3IONS-SCNC: 10 MMOL/L (ref 3–16)
APTT: 32.9 SEC (ref 24.2–36.2)
AST SERPL-CCNC: 19 U/L (ref 15–37)
BASOPHILS ABSOLUTE: 0 K/UL (ref 0–0.2)
BASOPHILS RELATIVE PERCENT: 0.5 %
BILIRUB SERPL-MCNC: 0.8 MG/DL (ref 0–1)
BUN BLDV-MCNC: 14 MG/DL (ref 7–20)
CALCIUM SERPL-MCNC: 9.1 MG/DL (ref 8.3–10.6)
CHLORIDE BLD-SCNC: 107 MMOL/L (ref 99–110)
CO2: 24 MMOL/L (ref 21–32)
CREAT SERPL-MCNC: 0.6 MG/DL (ref 0.8–1.3)
EKG ATRIAL RATE: 52 BPM
EKG DIAGNOSIS: NORMAL
EKG P AXIS: 54 DEGREES
EKG P-R INTERVAL: 174 MS
EKG Q-T INTERVAL: 472 MS
EKG QRS DURATION: 92 MS
EKG QTC CALCULATION (BAZETT): 438 MS
EKG R AXIS: 47 DEGREES
EKG T AXIS: 56 DEGREES
EKG VENTRICULAR RATE: 52 BPM
EOSINOPHILS ABSOLUTE: 0 K/UL (ref 0–0.6)
EOSINOPHILS RELATIVE PERCENT: 0.2 %
ESTIMATED AVERAGE GLUCOSE: 128.4 MG/DL
GFR AFRICAN AMERICAN: >60
GFR NON-AFRICAN AMERICAN: >60
GLOBULIN: 3.2 G/DL
GLUCOSE BLD-MCNC: 104 MG/DL (ref 70–99)
GLUCOSE BLD-MCNC: 110 MG/DL (ref 70–99)
GLUCOSE BLD-MCNC: 119 MG/DL (ref 70–99)
GLUCOSE BLD-MCNC: 124 MG/DL (ref 70–99)
GLUCOSE BLD-MCNC: 129 MG/DL (ref 70–99)
GLUCOSE BLD-MCNC: 173 MG/DL (ref 70–99)
HBA1C MFR BLD: 6.1 %
HCT VFR BLD CALC: 35.7 % (ref 40.5–52.5)
HEMOGLOBIN: 12 G/DL (ref 13.5–17.5)
INR BLD: 1.09 (ref 0.86–1.14)
LYMPHOCYTES ABSOLUTE: 2.3 K/UL (ref 1–5.1)
LYMPHOCYTES RELATIVE PERCENT: 26.2 %
MAGNESIUM: 1.9 MG/DL (ref 1.8–2.4)
MCH RBC QN AUTO: 29.3 PG (ref 26–34)
MCHC RBC AUTO-ENTMCNC: 33.6 G/DL (ref 31–36)
MCV RBC AUTO: 87.2 FL (ref 80–100)
MONOCYTES ABSOLUTE: 0.5 K/UL (ref 0–1.3)
MONOCYTES RELATIVE PERCENT: 6 %
NEUTROPHILS ABSOLUTE: 5.8 K/UL (ref 1.7–7.7)
NEUTROPHILS RELATIVE PERCENT: 67.1 %
PDW BLD-RTO: 13.9 % (ref 12.4–15.4)
PERFORMED ON: ABNORMAL
PHOSPHORUS: 3.1 MG/DL (ref 2.5–4.9)
PLATELET # BLD: 222 K/UL (ref 135–450)
PMV BLD AUTO: 7.7 FL (ref 5–10.5)
POTASSIUM SERPL-SCNC: 3.6 MMOL/L (ref 3.5–5.1)
PREALBUMIN: 18.2 MG/DL (ref 20–40)
PROTHROMBIN TIME: 12.7 SEC (ref 10–13.2)
RBC # BLD: 4.09 M/UL (ref 4.2–5.9)
SODIUM BLD-SCNC: 141 MMOL/L (ref 136–145)
TOTAL PROTEIN: 7 G/DL (ref 6.4–8.2)
TRANSFERRIN: 164 MG/DL (ref 200–360)
WBC # BLD: 8.6 K/UL (ref 4–11)

## 2020-05-01 PROCEDURE — 88341 IMHCHEM/IMCYTCHM EA ADD ANTB: CPT

## 2020-05-01 PROCEDURE — 6360000002 HC RX W HCPCS: Performed by: HOSPITALIST

## 2020-05-01 PROCEDURE — 82378 CARCINOEMBRYONIC ANTIGEN: CPT

## 2020-05-01 PROCEDURE — 85025 COMPLETE CBC W/AUTO DIFF WBC: CPT

## 2020-05-01 PROCEDURE — 88342 IMHCHEM/IMCYTCHM 1ST ANTB: CPT

## 2020-05-01 PROCEDURE — 86301 IMMUNOASSAY TUMOR CA 19-9: CPT

## 2020-05-01 PROCEDURE — 99254 IP/OBS CNSLTJ NEW/EST MOD 60: CPT | Performed by: SURGERY

## 2020-05-01 PROCEDURE — 80053 COMPREHEN METABOLIC PANEL: CPT

## 2020-05-01 PROCEDURE — 84466 ASSAY OF TRANSFERRIN: CPT

## 2020-05-01 PROCEDURE — 6370000000 HC RX 637 (ALT 250 FOR IP): Performed by: HOSPITALIST

## 2020-05-01 PROCEDURE — 84100 ASSAY OF PHOSPHORUS: CPT

## 2020-05-01 PROCEDURE — A9537 TC99M MEBROFENIN: HCPCS | Performed by: NURSE PRACTITIONER

## 2020-05-01 PROCEDURE — APPNB30 APP NON BILLABLE TIME 0-30 MINS: Performed by: NURSE PRACTITIONER

## 2020-05-01 PROCEDURE — 1200000000 HC SEMI PRIVATE

## 2020-05-01 PROCEDURE — 85730 THROMBOPLASTIN TIME PARTIAL: CPT

## 2020-05-01 PROCEDURE — 82105 ALPHA-FETOPROTEIN SERUM: CPT

## 2020-05-01 PROCEDURE — 2580000003 HC RX 258: Performed by: HOSPITALIST

## 2020-05-01 PROCEDURE — 78226 HEPATOBILIARY SYSTEM IMAGING: CPT

## 2020-05-01 PROCEDURE — 88307 TISSUE EXAM BY PATHOLOGIST: CPT

## 2020-05-01 PROCEDURE — 85610 PROTHROMBIN TIME: CPT

## 2020-05-01 PROCEDURE — 47000 NEEDLE BIOPSY OF LIVER PERQ: CPT

## 2020-05-01 PROCEDURE — 2580000003 HC RX 258: Performed by: NURSE PRACTITIONER

## 2020-05-01 PROCEDURE — 6360000002 HC RX W HCPCS: Performed by: RADIOLOGY

## 2020-05-01 PROCEDURE — 0FB13ZX EXCISION OF RIGHT LOBE LIVER, PERCUTANEOUS APPROACH, DIAGNOSTIC: ICD-10-PCS | Performed by: RADIOLOGY

## 2020-05-01 PROCEDURE — 3430000000 HC RX DIAGNOSTIC RADIOPHARMACEUTICAL: Performed by: NURSE PRACTITIONER

## 2020-05-01 PROCEDURE — 83735 ASSAY OF MAGNESIUM: CPT

## 2020-05-01 PROCEDURE — 93010 ELECTROCARDIOGRAM REPORT: CPT | Performed by: INTERNAL MEDICINE

## 2020-05-01 PROCEDURE — 88313 SPECIAL STAINS GROUP 2: CPT

## 2020-05-01 PROCEDURE — APPSS60 APP SPLIT SHARED TIME 46-60 MINUTES: Performed by: NURSE PRACTITIONER

## 2020-05-01 PROCEDURE — 36415 COLL VENOUS BLD VENIPUNCTURE: CPT

## 2020-05-01 PROCEDURE — 84134 ASSAY OF PREALBUMIN: CPT

## 2020-05-01 RX ORDER — FENTANYL CITRATE 50 UG/ML
INJECTION, SOLUTION INTRAMUSCULAR; INTRAVENOUS
Status: COMPLETED | OUTPATIENT
Start: 2020-05-01 | End: 2020-05-01

## 2020-05-01 RX ORDER — SODIUM CHLORIDE 0.9 % (FLUSH) 0.9 %
10 SYRINGE (ML) INJECTION PRN
Status: DISCONTINUED | OUTPATIENT
Start: 2020-05-01 | End: 2020-05-05 | Stop reason: HOSPADM

## 2020-05-01 RX ORDER — MIDAZOLAM HYDROCHLORIDE 1 MG/ML
INJECTION INTRAMUSCULAR; INTRAVENOUS
Status: COMPLETED | OUTPATIENT
Start: 2020-05-01 | End: 2020-05-01

## 2020-05-01 RX ADMIN — MORPHINE SULFATE 2 MG: 2 INJECTION, SOLUTION INTRAMUSCULAR; INTRAVENOUS at 05:51

## 2020-05-01 RX ADMIN — Medication 4.73 MILLICURIE: at 17:33

## 2020-05-01 RX ADMIN — MIDAZOLAM 0.5 MG: 1 INJECTION INTRAMUSCULAR; INTRAVENOUS at 11:41

## 2020-05-01 RX ADMIN — SODIUM CHLORIDE: 9 INJECTION, SOLUTION INTRAVENOUS at 23:09

## 2020-05-01 RX ADMIN — MORPHINE SULFATE 2 MG: 2 INJECTION, SOLUTION INTRAMUSCULAR; INTRAVENOUS at 09:56

## 2020-05-01 RX ADMIN — FENTANYL CITRATE 25 MCG: 50 INJECTION, SOLUTION INTRAMUSCULAR; INTRAVENOUS at 11:35

## 2020-05-01 RX ADMIN — MIDAZOLAM 0.5 MG: 1 INJECTION INTRAMUSCULAR; INTRAVENOUS at 11:35

## 2020-05-01 RX ADMIN — FENTANYL CITRATE 25 MCG: 50 INJECTION, SOLUTION INTRAMUSCULAR; INTRAVENOUS at 11:41

## 2020-05-01 RX ADMIN — Medication 10 ML: at 17:33

## 2020-05-01 RX ADMIN — INSULIN LISPRO 1 UNITS: 100 INJECTION, SOLUTION INTRAVENOUS; SUBCUTANEOUS at 23:09

## 2020-05-01 ASSESSMENT — PAIN DESCRIPTION - LOCATION
LOCATION: ABDOMEN
LOCATION: ABDOMEN

## 2020-05-01 ASSESSMENT — ENCOUNTER SYMPTOMS
NAUSEA: 1
EYE DISCHARGE: 0
BACK PAIN: 0
VOMITING: 1
EYE ITCHING: 0
APNEA: 0
DIARRHEA: 1
CHEST TIGHTNESS: 0
ABDOMINAL PAIN: 1

## 2020-05-01 ASSESSMENT — PAIN DESCRIPTION - PAIN TYPE
TYPE: ACUTE PAIN
TYPE: ACUTE PAIN

## 2020-05-01 ASSESSMENT — PAIN SCALES - GENERAL
PAINLEVEL_OUTOF10: 7
PAINLEVEL_OUTOF10: 5
PAINLEVEL_OUTOF10: 0

## 2020-05-01 ASSESSMENT — PAIN DESCRIPTION - ORIENTATION
ORIENTATION: UPPER
ORIENTATION: UPPER

## 2020-05-01 NOTE — PROGRESS NOTES
Shift assessment complete, see flowsheet. Patient in bed, no s/s of distress, respirations even and unlabored, VSS, admission completed using language line, denies any pain at this time and nausea is controlled, son updated over the phone. The care plan and education has been reviewed and mutually agreed upon with the patient. All needs attended. Fall precautions in place, call light within reach. Will continue to monitor.

## 2020-05-01 NOTE — PRE SEDATION
Sedation Pre-Procedure Note    Patient Name: Radha Ash   YOB: 1940  Room/Bed: 1ZX-8292/0827-65  Medical Record Number: [id number]  Date: 5/1/2020   Time: 11:13 AM       Indication:  Liver mass      Consent: I have discussed with the patient and/or the patient representative the indication, alternatives, and the possible risks and/or complications of the planned procedure and the anesthesia methods. The patient and/or patient representative appear to understand and agree to proceed.  present throughout exam.    Vital Signs:   Vitals:    05/01/20 0930   BP: 124/62   Pulse: 60   Resp: 16   Temp: 97.5 °F (36.4 °C)   SpO2: 94%       Past Medical History:   has a past medical history of Asthma, Diabetes mellitus ([address] Hypertension, and Thyroid disease. Past Surgical History:   has no past surgical history on file. Medications:   Scheduled Meds:    sodium chloride flush  10 mL Intravenous 2 times per day    enoxaparin  40 mg Subcutaneous Daily    insulin lispro  0-6 Units Subcutaneous Q4H    lisinopril  20 mg Oral Daily    levothyroxine  100 mcg Oral Daily     Continuous Infusions:    sodium chloride 100 mL/hr at 04/30/20 2239    dextrose       PRN Meds: sodium chloride flush, acetaminophen **OR** acetaminophen, polyethylene glycol, promethazine **OR** ondansetron, glucose, dextrose, glucagon (rDNA), dextrose, morphine  Home Meds:   Prior to Admission medications    Medication Sig Start Date End Date Taking?  Authorizing Provider   lisinopril (PRINIVIL;ZESTRIL) 20 MG tablet Take 20 mg by mouth daily   Yes Historical Provider, MD   hydroCHLOROthiazide (HYDRODIURIL) 25 MG tablet Take 25 mg by mouth daily   Yes Historical Provider, MD   metFORMIN (GLUCOPHAGE) 500 MG tablet Take 500 mg by mouth daily (with breakfast)   Yes Historical Provider, MD   levothyroxine (SYNTHROID) 100 MCG tablet Take 100 mcg by mouth Daily   Yes Historical Provider, MD   omeprazole (PRILOSEC) 20 MG delayed release capsule Take 40 mg by mouth daily   Yes Historical Provider, MD   ibuprofen (ADVIL;MOTRIN) 800 MG tablet Take 800 mg by mouth every 6 hours as needed for Pain    Historical Provider, MD   traMADol (ULTRAM) 50 MG tablet Take 50 mg by mouth every 6 hours as needed for Pain. Historical Provider, MD   prochlorperazine (COMPAZINE) 10 MG tablet Take 1 tablet by mouth every 6 hours as needed (pain, nausea) 8/75/53   Neel Maurer MD   ondansetron [facility]) 4 MG tablet Take 1 tablet by mouth daily as needed for Nausea or Vomiting 5/81/22   Neel Maurer MD     Coumadin Use Last 7 Days:  no  Antiplatelet drug therapy use last 7 days: no  Other anticoagulant use last 7 days: no  Additional Medication Information:        Pre-Sedation Documentation and Exam:   I have reviewed the patient's history and review of systems.     Mallampati Airway Assessment:  Mallampati Class II - (soft palate, fauces & uvula are visible)    Prior History of Anesthesia Complications:   none    ASA Classification:  Class 2 - A normal healthy patient with mild systemic disease    Sedation/ Anesthesia Plan:   intravenous sedation    Medications Planned:   midazolam (Versed) intravenously and fentanyl intravenously    Patient is an appropriate candidate for plan of sedation: yes    Electronically signed by Thresa Sicard, MD on 5/1/2020 at 11:13 AM

## 2020-05-01 NOTE — PROGRESS NOTES
CALCIUM 9.1 05/01/2020    PROT 7.0 05/01/2020    LABALBU 3.8 05/01/2020    BILITOT 0.8 05/01/2020    ALKPHOS 73 05/01/2020    AST 19 05/01/2020    ALT 13 05/01/2020    LABGLOM >60 05/01/2020    GFRAA >60 05/01/2020    AGRATIO 1.2 05/01/2020    GLOB 3.2 05/01/2020       No results found for: PTINR      IMAGING:       Mri Abdomen W Wo Contrast    Result Date: 4/28/2020  EXAMINATION: MRI OF THE ABDOMEN WITHOUT AND WITH CONTRAST, 4/28/2020 11:09 am TECHNIQUE: Multiplanar multisequence MRI of the abdomen was performed without and with the administration of intravenous contrast. COMPARISON: CT abdomen and pelvis March 15, 2020. HISTORY: ORDERING SYSTEM PROVIDED HISTORY: Hepatomegaly TECHNOLOGIST PROVIDED HISTORY: ESSENTIAL PATIENT HAVE DONE, PLEASE DO NOT RESCHEDULE Reason for exam:->Liver mass Specify organ?->Liver Reason for Exam: Liver mass  inj 12 ml prohance gfr over 60 on 3- Called [facility] for protocol  Pt. Maltese speaking could not understand english Used language line  Obtained best possible images Acuity: Acute Type of Exam: Unknown FINDINGS: Examination is suboptimal due to motion. The liver appears normal in contour with diffuse hepatic steatosis noted. No intrahepatic bile duct dilatation. The previously identified mass involving segments 6 and 7 of the liver is once again demonstrated measuring approximately 4.9 x 4.0 x 4.0  Cm which appears heterogeneously and progressively enhance on postcontrast imaging. This lesion is predominantly T2 hyperintense with central areas of T2 hypointensity. This lesion appears to have restricted diffusion on diffusion-weighted imaging. There is a capsule surrounding this mass. On arterial phase postcontrast imaging, multiple ill-defined arterial enhancing areas are identified largest seen within segment 4 of the liver which appear isointense on more delayed imaging. No definitive T2 correlate is seen. Hepatic and portal veins appear patent.  Gallbladder appears unremarkable. No CBD dilatation. Spleen, pancreas and adrenal glands all appear unremarkable. No enhancing renal mass or hydronephrosis. Abdominal aorta appears normal in caliber. No bulky lymphadenopathy or ascites. No pleural effusion. 1. The previously identified mass involving segments 6 and 7 of the liver is once again demonstrated measuring approximately 4.9 x 4.0 x 4.0 cm. MRI characteristics are indeterminate and tissue sampling is recommended. If no tissue sampling is performed, repeat MRI in 6 months is recommended. 2. Ill-defined arterial enhancement within both lobes of the liver without definitive T2 correlate or on delayed postcontrast imaging. Findings are felt to be perfusion in nature. Attention on follow-up is suggested. 3. Hepatic steatosis. Us Gallbladder Ruq    Result Date: 4/30/2020  EXAMINATION: RIGHT UPPER QUADRANT ULTRASOUND 4/30/2020 3:47 pm COMPARISON: 04/30/2020 HISTORY: ORDERING SYSTEM PROVIDED HISTORY: RUQ PAIN TECHNOLOGIST PROVIDED HISTORY: Reason for exam:->RUQ PAIN FINDINGS: LIVER: The liver is diffusely low in attenuation consistent with hepatic steatosis. There is no ductal dilatation. There are 2 hypoechoic masses, the larger of which measures 5.4 x 5.7 cm in the right hepatic lobe. BILIARY SYSTEM: Cholelithiases are present in the neck of the gallbladder. The gallbladder wall is within normal limits. The common bile duct measures 7 mm. RIGHT KIDNEY: The right kidney is unremarkable measuring 9.1 cm. There is no renal mass or hydronephrosis. PANCREAS:  Visualized portions of the pancreas are unremarkable. OTHER: No evidence of right upper quadrant ascites. 1. Cholelithiases without evidence of acute cholecystitis. 2. Indeterminate hypoechoic hepatic masses concerning for malignancy. Xr Chest Portable    Result Date: 4/30/2020  EXAMINATION: ONE XRAY VIEW OF THE CHEST 4/30/2020 1:55 pm COMPARISON: None.  HISTORY: ORDERING SYSTEM PROVIDED HISTORY: sob

## 2020-05-01 NOTE — PROGRESS NOTES
Shift assessment and AM vitals complete. Pt NPO held AM meds. Pain meds given. Pt not having n/v this AM. Went down for liver biopsy. The care plan and education has been reviewed and mutually agreed upon with the patient.

## 2020-05-01 NOTE — PROGRESS NOTES
[id number]     Recent Labs     05/01/20  0618   INR 1.09     Recent Labs     04/30/20  1255   TROPONINI <0.01       Assessment/Plan:    Active Hospital Problems    Diagnosis Date Noted    Intractable vomiting with nausea [R11.2] 04/30/2020     Nausea and vomiting. Clinically improving. Liver mass with concerns for mets  Cholelithiasis without acute cholecystitis  S/p CT guide liver biopsy; pending results  GI and GS on board  MRCP?   Monitor and replace lytes    5.8cm saccular aortic aneurysm  CT surgery consulted for possible intervention    PMH of DM, hypothyroidism, htn    Diet: Diet NPO Effective Now  Code Status: Full Code    Dispo - Chiara Ward MD

## 2020-05-02 LAB
CEA: 1.6 NG/ML (ref 0–5)
GLUCOSE BLD-MCNC: 101 MG/DL (ref 70–99)
GLUCOSE BLD-MCNC: 105 MG/DL (ref 70–99)
GLUCOSE BLD-MCNC: 134 MG/DL (ref 70–99)
GLUCOSE BLD-MCNC: 177 MG/DL (ref 70–99)
GLUCOSE BLD-MCNC: 189 MG/DL (ref 70–99)
GLUCOSE BLD-MCNC: 82 MG/DL (ref 70–99)
PERFORMED ON: ABNORMAL
PERFORMED ON: NORMAL

## 2020-05-02 PROCEDURE — 99232 SBSQ HOSP IP/OBS MODERATE 35: CPT | Performed by: SURGERY

## 2020-05-02 PROCEDURE — 6370000000 HC RX 637 (ALT 250 FOR IP): Performed by: HOSPITALIST

## 2020-05-02 PROCEDURE — 2580000003 HC RX 258: Performed by: HOSPITALIST

## 2020-05-02 PROCEDURE — 94760 N-INVAS EAR/PLS OXIMETRY 1: CPT

## 2020-05-02 PROCEDURE — 1200000000 HC SEMI PRIVATE

## 2020-05-02 PROCEDURE — 6360000002 HC RX W HCPCS: Performed by: HOSPITALIST

## 2020-05-02 RX ADMIN — LISINOPRIL 20 MG: 20 TABLET ORAL at 07:44

## 2020-05-02 RX ADMIN — INSULIN LISPRO 1 UNITS: 100 INJECTION, SOLUTION INTRAVENOUS; SUBCUTANEOUS at 22:52

## 2020-05-02 RX ADMIN — Medication 10 ML: at 07:45

## 2020-05-02 RX ADMIN — LEVOTHYROXINE SODIUM 100 MCG: 0.1 TABLET ORAL at 06:00

## 2020-05-02 RX ADMIN — SODIUM CHLORIDE: 9 INJECTION, SOLUTION INTRAVENOUS at 14:33

## 2020-05-02 RX ADMIN — MORPHINE SULFATE 2 MG: 2 INJECTION, SOLUTION INTRAMUSCULAR; INTRAVENOUS at 14:30

## 2020-05-02 RX ADMIN — ACETAMINOPHEN 650 MG: 325 TABLET, FILM COATED ORAL at 22:51

## 2020-05-02 RX ADMIN — ENOXAPARIN SODIUM 40 MG: 40 INJECTION SUBCUTANEOUS at 07:44

## 2020-05-02 RX ADMIN — INSULIN LISPRO 1 UNITS: 100 INJECTION, SOLUTION INTRAVENOUS; SUBCUTANEOUS at 11:31

## 2020-05-02 ASSESSMENT — PAIN SCALES - GENERAL
PAINLEVEL_OUTOF10: 0
PAINLEVEL_OUTOF10: 3
PAINLEVEL_OUTOF10: 8
PAINLEVEL_OUTOF10: 4

## 2020-05-02 ASSESSMENT — PAIN DESCRIPTION - LOCATION: LOCATION: ABDOMEN

## 2020-05-02 ASSESSMENT — PAIN DESCRIPTION - PAIN TYPE: TYPE: ACUTE PAIN

## 2020-05-02 NOTE — PROGRESS NOTES
Family brought fruit for patient, patient is on [place] diet, fruit labeled and placed in the refrigerator. Ravin Kramer

## 2020-05-02 NOTE — CONSULTS
Anyi Tsai     CC-abdominal pain    HPI: 78year old [place] male admitted with complaints of abdominal pain associated with nausea, vomiting, diarrhea. The consult was conducted via the language line. The patient is a poor historian. The pain is located in the RUQ and has been present to some degree over the last 10 weeks. Associated symptoms include chills, headache chest pain and a sore throat. No reports of fevers or urinary symptoms. Past Medical History:   Diagnosis Date    Asthma     Diabetes mellitus ([address]     Hypertension     Thyroid disease        History reviewed. No pertinent surgical history.     Social History     Socioeconomic History    Marital status:      Spouse name: Not on file    Number of children: Not on file    Years of education: Not on file    Highest education level: Not on file   Occupational History    Not on file   Social Needs    Financial resource strain: Not on file    Food insecurity     Worry: Not on file     Inability: Not on file    Transportation needs     Medical: Not on file     Non-medical: Not on file   Tobacco Use    Smoking status: Never Smoker   Substance and Sexual Activity    Alcohol use: Never     Frequency: Never    Drug use: Never    Sexual activity: Not on file   Lifestyle    Physical activity     Days per week: Not on file     Minutes per session: Not on file    Stress: Not on file   Relationships    Social connections     Talks on phone: Not on file     Gets together: Not on file     Attends Sabianist service: Not on file     Active member of club or organization: Not on file     Attends meetings of clubs or organizations: Not on file     Relationship status: Not on file    Intimate partner violence     Fear of current or ex partner: Not on file     Emotionally abused: Not on file     Physically abused: Not on file     Forced sexual activity: Not on file   Other Topics Concern    Not on file   Social History Narrative    Not on
Department of Cardiovascular & Thoracic Surgery  Consultation          PCP: No primary care provider on file. Referring Physician: Elzbieta Flannery    DIAGNOSIS:  5.8 cm in size saccular aneurysm    CHIEF COMPLAINT:   Intractable vomiting    History Obtained From:  electronic medical record    HISTORY OF PRESENT ILLNESS:      The patient is a 78 y.o. male presented to [facility] with intractable vomiting and some diarrhea. He speaks Hebrew language his son at the bedside helping for translation he woke up this morning after light breakfast suddenly started having nausea and vomiting. Patient also described lower abdominal pain. CT of abdomen showed multiple liver masses considering metastatic disease, small filling defect in CBD, right upper quadrant ultrasound showed cholelithiasis in the neck of gallbladder no acute cholecystitis, also a  5.8 cm in size saccular aneurysm. We were consulted regarding recommendations on ascending aortic aneurysm. Past Medical History:        Diagnosis Date    Asthma     Diabetes mellitus ([address]     Hypertension     Thyroid disease        Past Surgical History:    History reviewed. No pertinent surgical history. Home Medications:  Prior to Admission medications    Medication Sig Start Date End Date Taking?  Authorizing Provider   lisinopril (PRINIVIL;ZESTRIL) 20 MG tablet Take 20 mg by mouth daily   Yes Historical Provider, MD   hydroCHLOROthiazide (HYDRODIURIL) 25 MG tablet Take 25 mg by mouth daily   Yes Historical Provider, MD   metFORMIN (GLUCOPHAGE) 500 MG tablet Take 500 mg by mouth daily (with breakfast)   Yes Historical Provider, MD   levothyroxine (SYNTHROID) 100 MCG tablet Take 100 mcg by mouth Daily   Yes Historical Provider, MD   omeprazole (PRILOSEC) 20 MG delayed release capsule Take 40 mg by mouth daily   Yes Historical Provider, MD   ibuprofen (ADVIL;MOTRIN) 800 MG tablet Take 800 mg by mouth every 6 hours as needed for Pain    Historical Provider,
by 4.6 cm       Subtle additional areas of hypervascularity are also seen throughout the   right hepatic lobe and left hepatic lobe.  The largest area of   hypervascularity in left hepatic lobe measures 3.5 cm x 2.1 cm       No peripancreatic inflammatory change       There is a small filling defect suspected in the extrahepatic common duct   measuring 6 mm       GI/Bowel: Mild stool load is seen in the colon.  Scattered colonic   diverticula are seen.  No bowel obstruction.  A few colonic diverticula are   seen       Pelvis: Prominent fat is seen in the inguinal canals.  Trace fluid seen in   the inguinal canals.       Peritoneum/Retroperitoneum: Atherosclerotic change seen in aorta.  No   aneurysm. .  Mild-to-moderate atherosclerotic changes seen in the proximal SMA.       Bones/Soft Tissues: Spurring is seen in the spine           Impression   Within the chest, there is an irregular saccular aneurysm projecting off of   the aortic arch to the left, measuring up to 5.8 cm in size       Focal hypodense nodule is seen in the paramediastinal region on the right. This could represent low-density ki metastasis.  However,foregut   duplication cyst or bronchogenic cyst could also have this appearance given   its low-density       Dominant mass posteriorly in the right hepatic lobe is seen.  Multiple   additional smaller masses are seen throughout the liver, which appear   hypervascular. .  Consider metastatic disease, such as carcinoid in the   appropriate clinical scenario       Probable stone in the distal common duct                      Assessment:     Active Problems:    Intractable vomiting with nausea    Intractable vomiting  Resolved Problems:    * No resolved hospital problems. *    Acute RUQ pain, Nausea with Vomiting - now resolved. May be from symptomatic cholelithiasis vs viral illness. Liver masses - likely metastatic disease.   DiffDx includes adenoCa of gut origin, carcinoid, etc.  Now s/p CT-guided

## 2020-05-02 NOTE — PLAN OF CARE
Problem: Falls - Risk of:  Goal: Will remain free from falls  Description: Will remain free from falls  5/2/2020 0130 by Alem Fuchs RN  Outcome: Ongoing  5/1/2020 1850 by Dustin Kahn RN  Outcome: Ongoing  Goal: Absence of physical injury  Description: Absence of physical injury  5/2/2020 0130 by Alem Fuchs RN  Outcome: Ongoing  5/1/2020 1850 by Dustin Kahn RN  Outcome: Ongoing     Problem: Pain:  Goal: Pain level will decrease  Description: Pain level will decrease  5/2/2020 0130 by Alem Fuchs RN  Outcome: Ongoing  5/1/2020 1850 by Dustin Kahn RN  Outcome: Ongoing  Goal: Control of acute pain  Description: Control of acute pain  5/2/2020 0130 by Alem Fuchs RN  Outcome: Ongoing  5/1/2020 1850 by Dustin Kahn RN  Outcome: Ongoing  Goal: Control of chronic pain  Description: Control of chronic pain  5/2/2020 0130 by Alem Fuchs RN  Outcome: Ongoing  5/1/2020 1850 by Dustin Kahn RN  Outcome: Ongoing     Problem: Fluid Volume - Deficit:  Goal: Absence of fluid volume deficit signs and symptoms  Description: Absence of fluid volume deficit signs and symptoms  5/2/2020 0130 by Alem Fuchs RN  Outcome: Ongoing  5/1/2020 1850 by Dustin Kahn RN  Outcome: Ongoing  Goal: Electrolytes within specified parameters  Description: Electrolytes within specified parameters  5/2/2020 0130 by Alem Fuchs RN  Outcome: Ongoing  5/1/2020 1850 by Dustin Kahn RN  Outcome: Ongoing     Problem: Skin Integrity - Impaired:  Goal: Will show no infection signs and symptoms  Description: Will show no infection signs and symptoms  5/2/2020 0130 by Alem Fuchs RN  Outcome: Ongoing  5/1/2020 1850 by Dustin Kahn RN  Outcome: Ongoing  Goal: Absence of new skin breakdown  Description: Absence of new skin breakdown  5/2/2020 0130 by Alem Fuchs RN  Outcome: Ongoing  5/1/2020 1850 by Dustin Kahn RN  Outcome: Ongoing     Problem:
Problem: Falls - Risk of:  Goal: Will remain free from falls  Description: Will remain free from falls  Outcome: Ongoing  Goal: Absence of physical injury  Description: Absence of physical injury  Outcome: Ongoing     Problem: Pain:  Goal: Pain level will decrease  Description: Pain level will decrease  Outcome: Ongoing  Goal: Control of acute pain  Description: Control of acute pain  Outcome: Ongoing  Goal: Control of chronic pain  Description: Control of chronic pain  Outcome: Ongoing     Problem: Fluid Volume - Deficit:  Goal: Absence of fluid volume deficit signs and symptoms  Description: Absence of fluid volume deficit signs and symptoms  Outcome: Ongoing  Goal: Electrolytes within specified parameters  Description: Electrolytes within specified parameters  Outcome: Ongoing     Problem: Skin Integrity - Impaired:  Goal: Will show no infection signs and symptoms  Description: Will show no infection signs and symptoms  Outcome: Ongoing  Goal: Absence of new skin breakdown  Description: Absence of new skin breakdown  Outcome: Ongoing     Problem: Nausea/Vomiting:  Goal: Absence of nausea/vomiting  Description: Absence of nausea/vomiting  Outcome: Ongoing  Goal: Able to drink  Description: Able to drink  Outcome: Ongoing  Goal: Able to eat  Description: Able to eat  Outcome: Ongoing  Goal: Ability to achieve adequate nutritional intake will improve  Description: Ability to achieve adequate nutritional intake will improve  Outcome: Ongoing
Problem: Falls - Risk of:  Goal: Will remain free from falls  Description: Will remain free from falls  Outcome: Ongoing  Goal: Absence of physical injury  Description: Absence of physical injury  Outcome: Ongoing     Problem: Pain:  Goal: Pain level will decrease  Description: Pain level will decrease  Outcome: Ongoing  Goal: Control of acute pain  Description: Control of acute pain  Outcome: Ongoing  Goal: Control of chronic pain  Description: Control of chronic pain  Outcome: Ongoing     Problem: Fluid Volume - Deficit:  Goal: Absence of fluid volume deficit signs and symptoms  Description: Absence of fluid volume deficit signs and symptoms  Outcome: Ongoing  Goal: Electrolytes within specified parameters  Description: Electrolytes within specified parameters  Outcome: Ongoing     Problem: Skin Integrity - Impaired:  Goal: Will show no infection signs and symptoms  Description: Will show no infection signs and symptoms  Outcome: Ongoing  Goal: Absence of new skin breakdown  Description: Absence of new skin breakdown  Outcome: Ongoing     Problem: Nausea/Vomiting:  Goal: Absence of nausea/vomiting  Description: Absence of nausea/vomiting  Outcome: Ongoing  Goal: Able to drink  Description: Able to drink  Outcome: Ongoing  Goal: Able to eat  Description: Able to eat  Outcome: Ongoing  Goal: Ability to achieve adequate nutritional intake will improve  Description: Ability to achieve adequate nutritional intake will improve  Outcome: Ongoing     The care plan and education has been reviewed and mutually agreed upon with the patient.
Lovenox  8. Management of medical comorbid etiologies per primary team and consulting services    EDUCATION:  Educated patient on plan of care and disease process--all questions answered. Plans discussed with patient and nursing. Reviewed and discussed with Dr. Alexandru Storm consult to follow.       Signed:  SUSY Blankenship - CNP  5/1/2020 3:48 PM

## 2020-05-03 LAB
A/G RATIO: 1.2 (ref 1.1–2.2)
ALBUMIN SERPL-MCNC: 4 G/DL (ref 3.4–5)
ALP BLD-CCNC: 99 U/L (ref 40–129)
ALT SERPL-CCNC: 82 U/L (ref 10–40)
ANION GAP SERPL CALCULATED.3IONS-SCNC: 11 MMOL/L (ref 3–16)
AST SERPL-CCNC: 80 U/L (ref 15–37)
BILIRUB SERPL-MCNC: 0.8 MG/DL (ref 0–1)
BUN BLDV-MCNC: 6 MG/DL (ref 7–20)
CALCIUM SERPL-MCNC: 9.2 MG/DL (ref 8.3–10.6)
CHLORIDE BLD-SCNC: 103 MMOL/L (ref 99–110)
CO2: 25 MMOL/L (ref 21–32)
CREAT SERPL-MCNC: 0.5 MG/DL (ref 0.8–1.3)
GFR AFRICAN AMERICAN: >60
GFR NON-AFRICAN AMERICAN: >60
GLOBULIN: 3.3 G/DL
GLUCOSE BLD-MCNC: 101 MG/DL (ref 70–99)
GLUCOSE BLD-MCNC: 103 MG/DL (ref 70–99)
GLUCOSE BLD-MCNC: 112 MG/DL (ref 70–99)
GLUCOSE BLD-MCNC: 145 MG/DL (ref 70–99)
GLUCOSE BLD-MCNC: 150 MG/DL (ref 70–99)
GLUCOSE BLD-MCNC: 179 MG/DL (ref 70–99)
GLUCOSE BLD-MCNC: 210 MG/DL (ref 70–99)
PERFORMED ON: ABNORMAL
POTASSIUM SERPL-SCNC: 3.5 MMOL/L (ref 3.5–5.1)
SODIUM BLD-SCNC: 139 MMOL/L (ref 136–145)
TOTAL PROTEIN: 7.3 G/DL (ref 6.4–8.2)

## 2020-05-03 PROCEDURE — 80053 COMPREHEN METABOLIC PANEL: CPT

## 2020-05-03 PROCEDURE — 36415 COLL VENOUS BLD VENIPUNCTURE: CPT

## 2020-05-03 PROCEDURE — 6360000002 HC RX W HCPCS: Performed by: HOSPITALIST

## 2020-05-03 PROCEDURE — 6370000000 HC RX 637 (ALT 250 FOR IP): Performed by: HOSPITALIST

## 2020-05-03 PROCEDURE — 1200000000 HC SEMI PRIVATE

## 2020-05-03 RX ORDER — INSULIN LISPRO 100 [IU]/ML
0-6 INJECTION, SOLUTION INTRAVENOUS; SUBCUTANEOUS
Status: DISCONTINUED | OUTPATIENT
Start: 2020-05-03 | End: 2020-05-05 | Stop reason: HOSPADM

## 2020-05-03 RX ADMIN — INSULIN LISPRO 1 UNITS: 100 INJECTION, SOLUTION INTRAVENOUS; SUBCUTANEOUS at 23:57

## 2020-05-03 RX ADMIN — LEVOTHYROXINE SODIUM 100 MCG: 0.1 TABLET ORAL at 05:30

## 2020-05-03 RX ADMIN — LISINOPRIL 20 MG: 20 TABLET ORAL at 09:43

## 2020-05-03 RX ADMIN — ENOXAPARIN SODIUM 40 MG: 40 INJECTION SUBCUTANEOUS at 09:43

## 2020-05-03 RX ADMIN — INSULIN LISPRO 1 UNITS: 100 INJECTION, SOLUTION INTRAVENOUS; SUBCUTANEOUS at 12:17

## 2020-05-03 ASSESSMENT — PAIN SCALES - WONG BAKER
WONGBAKER_NUMERICALRESPONSE: 0
WONGBAKER_NUMERICALRESPONSE: 0

## 2020-05-03 ASSESSMENT — PAIN SCALES - GENERAL
PAINLEVEL_OUTOF10: 0
PAINLEVEL_OUTOF10: 0

## 2020-05-04 PROBLEM — R10.11 RUQ PAIN: Status: ACTIVE | Noted: 2020-05-04

## 2020-05-04 PROBLEM — I71.22 AORTIC ARCH ANEURYSM: Status: ACTIVE | Noted: 2020-05-04

## 2020-05-04 PROBLEM — R19.7 NAUSEA VOMITING AND DIARRHEA: Status: ACTIVE | Noted: 2020-05-04

## 2020-05-04 PROBLEM — R11.2 NAUSEA VOMITING AND DIARRHEA: Status: ACTIVE | Noted: 2020-05-04

## 2020-05-04 PROBLEM — E03.9 HYPOTHYROID: Status: ACTIVE | Noted: 2020-05-04

## 2020-05-04 PROBLEM — E11.9 DM2 (DIABETES MELLITUS, TYPE 2) (HCC): Status: ACTIVE | Noted: 2020-05-04

## 2020-05-04 PROBLEM — R19.7 DIARRHEA: Status: ACTIVE | Noted: 2020-05-04

## 2020-05-04 PROBLEM — K80.20 CHOLELITHIASIS: Status: ACTIVE | Noted: 2020-05-04

## 2020-05-04 PROBLEM — I10 HTN (HYPERTENSION): Status: ACTIVE | Noted: 2020-05-04

## 2020-05-04 LAB
GLUCOSE BLD-MCNC: 117 MG/DL (ref 70–99)
GLUCOSE BLD-MCNC: 131 MG/DL (ref 70–99)
GLUCOSE BLD-MCNC: 147 MG/DL (ref 70–99)
GLUCOSE BLD-MCNC: 173 MG/DL (ref 70–99)
PERFORMED ON: ABNORMAL

## 2020-05-04 PROCEDURE — 6360000002 HC RX W HCPCS: Performed by: HOSPITALIST

## 2020-05-04 PROCEDURE — 2580000003 HC RX 258: Performed by: HOSPITALIST

## 2020-05-04 PROCEDURE — 1200000000 HC SEMI PRIVATE

## 2020-05-04 PROCEDURE — 6360000002 HC RX W HCPCS: Performed by: INTERNAL MEDICINE

## 2020-05-04 PROCEDURE — 6370000000 HC RX 637 (ALT 250 FOR IP): Performed by: HOSPITALIST

## 2020-05-04 RX ORDER — KETOROLAC TROMETHAMINE 15 MG/ML
15 INJECTION, SOLUTION INTRAMUSCULAR; INTRAVENOUS EVERY 6 HOURS PRN
Status: DISCONTINUED | OUTPATIENT
Start: 2020-05-04 | End: 2020-05-05 | Stop reason: HOSPADM

## 2020-05-04 RX ADMIN — ACETAMINOPHEN 650 MG: 325 TABLET, FILM COATED ORAL at 17:26

## 2020-05-04 RX ADMIN — Medication 10 ML: at 08:46

## 2020-05-04 RX ADMIN — KETOROLAC TROMETHAMINE 15 MG: 15 INJECTION, SOLUTION INTRAMUSCULAR; INTRAVENOUS at 23:02

## 2020-05-04 RX ADMIN — Medication 10 ML: at 00:07

## 2020-05-04 RX ADMIN — Medication 10 ML: at 23:02

## 2020-05-04 RX ADMIN — ENOXAPARIN SODIUM 40 MG: 40 INJECTION SUBCUTANEOUS at 10:08

## 2020-05-04 RX ADMIN — INSULIN LISPRO 1 UNITS: 100 INJECTION, SOLUTION INTRAVENOUS; SUBCUTANEOUS at 17:05

## 2020-05-04 RX ADMIN — LEVOTHYROXINE SODIUM 100 MCG: 0.1 TABLET ORAL at 08:46

## 2020-05-04 RX ADMIN — INSULIN LISPRO 1 UNITS: 100 INJECTION, SOLUTION INTRAVENOUS; SUBCUTANEOUS at 12:39

## 2020-05-04 RX ADMIN — ACETAMINOPHEN 650 MG: 325 TABLET, FILM COATED ORAL at 03:33

## 2020-05-04 RX ADMIN — LISINOPRIL 20 MG: 20 TABLET ORAL at 08:46

## 2020-05-04 ASSESSMENT — PAIN SCALES - WONG BAKER
WONGBAKER_NUMERICALRESPONSE: 0

## 2020-05-04 ASSESSMENT — PAIN SCALES - GENERAL
PAINLEVEL_OUTOF10: 4
PAINLEVEL_OUTOF10: 3
PAINLEVEL_OUTOF10: 4
PAINLEVEL_OUTOF10: 0
PAINLEVEL_OUTOF10: 6

## 2020-05-04 NOTE — PROGRESS NOTES
Pt in bed up most of the night. Lavon in room and helped with translation. BP elevated this am. Perfectserve sent to MD. Awaiting response. Electronically signed by Ashok Soria RN on 5/4/2020 at 7:06 AM

## 2020-05-04 NOTE — PROGRESS NOTES
Shift assessment and AM vitals complete. AM meds given. Pt doing well, not complaining of n/v. Still having pain in Upper right quadrant. Pt is also having diarrhea, got stool sample, but will send down the third one to see if its cdiff since it has been more than three days since the admit date. The care plan and education has been reviewed and mutually agreed upon with the patient.

## 2020-05-05 VITALS
HEART RATE: 62 BPM | DIASTOLIC BLOOD PRESSURE: 68 MMHG | OXYGEN SATURATION: 96 % | TEMPERATURE: 97.4 F | SYSTOLIC BLOOD PRESSURE: 171 MMHG | BODY MASS INDEX: 23.56 KG/M2 | WEIGHT: 120 LBS | RESPIRATION RATE: 16 BRPM | HEIGHT: 60 IN

## 2020-05-05 PROBLEM — C22.0 HEPATOCELLULAR CARCINOMA (HCC): Status: ACTIVE | Noted: 2020-05-05

## 2020-05-05 LAB
ANION GAP SERPL CALCULATED.3IONS-SCNC: 10 MMOL/L (ref 3–16)
BASOPHILS ABSOLUTE: 0 K/UL (ref 0–0.2)
BASOPHILS RELATIVE PERCENT: 0.5 %
BUN BLDV-MCNC: 7 MG/DL (ref 7–20)
CALCIUM SERPL-MCNC: 9.4 MG/DL (ref 8.3–10.6)
CHLORIDE BLD-SCNC: 104 MMOL/L (ref 99–110)
CO2: 26 MMOL/L (ref 21–32)
CREAT SERPL-MCNC: 0.6 MG/DL (ref 0.8–1.3)
EOSINOPHILS ABSOLUTE: 0 K/UL (ref 0–0.6)
EOSINOPHILS RELATIVE PERCENT: 0.8 %
GFR AFRICAN AMERICAN: >60
GFR NON-AFRICAN AMERICAN: >60
GLUCOSE BLD-MCNC: 111 MG/DL (ref 70–99)
GLUCOSE BLD-MCNC: 134 MG/DL (ref 70–99)
GLUCOSE BLD-MCNC: 214 MG/DL (ref 70–99)
HCT VFR BLD CALC: 36.1 % (ref 40.5–52.5)
HEMOGLOBIN: 12.1 G/DL (ref 13.5–17.5)
LYMPHOCYTES ABSOLUTE: 2.1 K/UL (ref 1–5.1)
LYMPHOCYTES RELATIVE PERCENT: 38.1 %
MCH RBC QN AUTO: 28.9 PG (ref 26–34)
MCHC RBC AUTO-ENTMCNC: 33.5 G/DL (ref 31–36)
MCV RBC AUTO: 86.4 FL (ref 80–100)
MONOCYTES ABSOLUTE: 0.6 K/UL (ref 0–1.3)
MONOCYTES RELATIVE PERCENT: 11.9 %
NEUTROPHILS ABSOLUTE: 2.6 K/UL (ref 1.7–7.7)
NEUTROPHILS RELATIVE PERCENT: 48.7 %
PDW BLD-RTO: 13.8 % (ref 12.4–15.4)
PERFORMED ON: ABNORMAL
PERFORMED ON: ABNORMAL
PLATELET # BLD: 199 K/UL (ref 135–450)
PMV BLD AUTO: 7.1 FL (ref 5–10.5)
POTASSIUM SERPL-SCNC: 4.1 MMOL/L (ref 3.5–5.1)
RBC # BLD: 4.18 M/UL (ref 4.2–5.9)
SODIUM BLD-SCNC: 140 MMOL/L (ref 136–145)
WBC # BLD: 5.4 K/UL (ref 4–11)

## 2020-05-05 PROCEDURE — 94760 N-INVAS EAR/PLS OXIMETRY 1: CPT

## 2020-05-05 PROCEDURE — 6370000000 HC RX 637 (ALT 250 FOR IP): Performed by: HOSPITALIST

## 2020-05-05 PROCEDURE — APPNB30 APP NON BILLABLE TIME 0-30 MINS: Performed by: NURSE PRACTITIONER

## 2020-05-05 PROCEDURE — APPSS15 APP SPLIT SHARED TIME 0-15 MINUTES: Performed by: NURSE PRACTITIONER

## 2020-05-05 PROCEDURE — 80048 BASIC METABOLIC PNL TOTAL CA: CPT

## 2020-05-05 PROCEDURE — 2580000003 HC RX 258: Performed by: HOSPITALIST

## 2020-05-05 PROCEDURE — 85025 COMPLETE CBC W/AUTO DIFF WBC: CPT

## 2020-05-05 PROCEDURE — 99231 SBSQ HOSP IP/OBS SF/LOW 25: CPT | Performed by: SURGERY

## 2020-05-05 PROCEDURE — 6360000002 HC RX W HCPCS: Performed by: HOSPITALIST

## 2020-05-05 RX ORDER — AMLODIPINE BESYLATE 2.5 MG/1
2.5 TABLET ORAL DAILY
Qty: 30 TABLET | Refills: 0 | Status: ON HOLD | OUTPATIENT
Start: 2020-05-05 | End: 2022-06-21 | Stop reason: HOSPADM

## 2020-05-05 RX ADMIN — ACETAMINOPHEN 650 MG: 325 TABLET, FILM COATED ORAL at 10:33

## 2020-05-05 RX ADMIN — ENOXAPARIN SODIUM 40 MG: 40 INJECTION SUBCUTANEOUS at 10:14

## 2020-05-05 RX ADMIN — INSULIN LISPRO 2 UNITS: 100 INJECTION, SOLUTION INTRAVENOUS; SUBCUTANEOUS at 12:21

## 2020-05-05 RX ADMIN — LEVOTHYROXINE SODIUM 100 MCG: 0.1 TABLET ORAL at 06:36

## 2020-05-05 RX ADMIN — Medication 10 ML: at 10:15

## 2020-05-05 RX ADMIN — LISINOPRIL 20 MG: 20 TABLET ORAL at 10:14

## 2020-05-05 ASSESSMENT — PAIN SCALES - WONG BAKER
WONGBAKER_NUMERICALRESPONSE: 0

## 2020-05-05 ASSESSMENT — PAIN SCALES - GENERAL: PAINLEVEL_OUTOF10: 4

## 2020-05-05 NOTE — PROGRESS NOTES
[facility]    5/5/2020 12:04 AM        Name: Priscila Frias . Admitted: 4/30/2020  Primary Care Provider: No primary care provider on file. (Tel: None)    Brief Course:  79 yo M with history of hypothyroidism, HTN came to ER with intractable nausea with vomiting and diarrhea. Patient admitted as inpatient for further management. CT Ab/P in ED with liver masses with small filling defect in CBD. RUQ US with cholelithiasis in GB neck without acute cholecystitis. Followed by GI, Surgery, Oncology and IR. Had IR liver biopsy on 5/1. HIDA negative on 5/1 for acute cholecystitis. CT Surgery saw patient for 5.8 cm aortic arch aneurysm, recommend BP control and Cardiology ischemic evaluation. CC:  Nausea vomiting diarrhea    Subjective:  . Patient with some loose stool. No CP, SOB, HA or abdominal pain. Tolerating FLD for days.     Reviewed interval ancillary notes    Current Medications  ketorolac (TORADOL) injection 15 mg, Q6H PRN  insulin lispro (1 Unit Dial) 0-6 Units, 4x Daily AC & HS  sodium chloride flush 0.9 % injection 10 mL, PRN  sodium chloride flush 0.9 % injection 10 mL, 2 times per day  sodium chloride flush 0.9 % injection 10 mL, PRN  acetaminophen (TYLENOL) tablet 650 mg, Q6H PRN    Or  acetaminophen (TYLENOL) suppository 650 mg, Q6H PRN  polyethylene glycol (GLYCOLAX) packet 17 g, Daily PRN  promethazine (PHENERGAN) tablet 12.5 mg, Q6H PRN    Or  ondansetron (ZOFRAN) injection 4 mg, Q6H PRN  enoxaparin (LOVENOX) injection 40 mg, Daily  glucose (GLUTOSE) 40 % oral gel 15 g, PRN  dextrose 50 % IV solution, PRN  glucagon (rDNA) injection 1 mg, PRN  dextrose 5 % solution, PRN  morphine (PF) injection 2 mg, Q4H PRN  lisinopril (PRINIVIL;ZESTRIL) tablet 20 mg, Daily  levothyroxine (SYNTHROID) tablet 100 mcg, Daily        Objective:  BP (!) 163/72   Pulse 62   Temp 97.2 °F (36.2 °C)

## 2020-05-05 NOTE — CARE COORDINATION
Aware of pt's d/c order for today. Called son yesterday to complete pt's assessment-no return call. Chart reviewed-no needs noted on d/c. Pt has no d/c needs per CM.   Electronically signed by TODD Arguello on 5/5/2020 at 12:03 PM

## 2020-05-05 NOTE — DISCHARGE SUMMARY
normal turgor  Extremity exam shows brisk capillary refill. Peripheral pulses are palpable in lower extremities     Labs: For convenience and continuity at follow-up the following most recent labs are provided:    Lab Results   Component Value Date    WBC 5.4 05/05/2020    HGB 12.1 05/05/2020    HCT 36.1 05/05/2020    MCV 86.4 05/05/2020     05/05/2020     05/05/2020    K 4.1 05/05/2020    K 5.1 03/15/2020     05/05/2020    CO2 26 05/05/2020    BUN 7 05/05/2020    CREATININE 0.6 05/05/2020    CALCIUM 9.4 05/05/2020    PHOS 3.1 05/01/2020    ALKPHOS 99 05/03/2020    ALT 82 05/03/2020    AST 80 05/03/2020    BILITOT 0.8 05/03/2020    LABALBU 4.0 05/03/2020     Lab Results   Component Value Date    INR 1.09 05/01/2020       Radiology:  Mri Abdomen W Wo Contrast    Result Date: 4/28/2020  EXAMINATION: MRI OF THE ABDOMEN WITHOUT AND WITH CONTRAST, 4/28/2020 11:09 am TECHNIQUE: Multiplanar multisequence MRI of the abdomen was performed without and with the administration of intravenous contrast. COMPARISON: CT abdomen and pelvis March 15, 2020. HISTORY: ORDERING SYSTEM PROVIDED HISTORY: Hepatomegaly TECHNOLOGIST PROVIDED HISTORY: ESSENTIAL PATIENT HAVE DONE, PLEASE DO NOT RESCHEDULE Reason for exam:->Liver mass Specify organ?->Liver Reason for Exam: Liver mass  inj 12 ml prohance gfr over 60 on 3- Called [facility] for protocol  Pt. Serbian speaking could not understand english Used language line  Obtained best possible images Acuity: Acute Type of Exam: Unknown FINDINGS: Examination is suboptimal due to motion. The liver appears normal in contour with diffuse hepatic steatosis noted. No intrahepatic bile duct dilatation. The previously identified mass involving segments 6 and 7 of the liver is once again demonstrated measuring approximately 4.9 x 4.0 x 4.0  Cm which appears heterogeneously and progressively enhance on postcontrast imaging.  This lesion is predominantly T2 hyperintense with central TECHNOLOGIST PROVIDED HISTORY: Reason for exam:->liver mass Reason for Exam: Liver Mass TECHNIQUE: Dose modulation, iterative reconstruction, and/or weight based adjustment of the mA/kV was utilized to reduce the radiation dose to as low as reasonably achievable. CONTRAST: None SEDATION: 1 mg Versed and 50 mcg fentanyl were titrated intravenously for moderate sedation monitored under my direction. Total intraservice time of sedation was 15 minutes. The patient's vital signs were monitored throughout the procedure and recorded in the patient's medical record by the nurse. EBL: Less than 10 cc FLUOROSCOPY DOSE AND TYPE OR TIME AND EXPOSURES: No fluoroscopy. 0 minutes. DESCRIPTION OF PROCEDURE: Informed consent was obtained after a detailed explanation of the procedure including risks, benefits, and alternatives. Universal protocol was observed. An  translated for consent. He also stayed throughout the procedure. Preliminary CT again reveals a 4 cm mass in the posterior inferior aspect of the right lobe of the liver. A right posterolateral, intercostal approach was chosen. The skin was sterilely prepared and infiltrated with 1% lidocaine. A 17 gauge guiding needle was advanced to the periphery of the mass. 218 gauge core biopsies were taken, yielding is specimens 1-1.5 cm in length. These were submitted to the pathology assistant. An autologous blood patch was applied as the guiding needle was withdrawn. The patient tolerated the procedure well. FINDINGS: Please see above. Successful CT-guided biopsy of the right lobe of the liver. Ct Chest Abdomen Pelvis W Contrast    Result Date: 4/30/2020  EXAMINATION: CT OF THE CHEST, ABDOMEN, AND PELVIS WITH CONTRAST 4/30/2020 2:05 pm TECHNIQUE: CT of the chest, abdomen and pelvis was performed with the administration of intravenous contrast. Multiplanar reformatted images are provided for review.  Dose modulation, iterative reconstruction, and/or diverticula are seen Pelvis: Prominent fat is seen in the inguinal canals. Trace fluid seen in the inguinal canals. Peritoneum/Retroperitoneum: Atherosclerotic change seen in aorta. No aneurysm. .  Mild-to-moderate atherosclerotic changes seen in the proximal SMA. Bones/Soft Tissues: Spurring is seen in the spine     Within the chest, there is an irregular saccular aneurysm projecting off of the aortic arch to the left, measuring up to 5.8 cm in size Focal hypodense nodule is seen in the paramediastinal region on the right. This could represent low-density ki metastasis. However,foregut duplication cyst or bronchogenic cyst could also have this appearance given its low-density Dominant mass posteriorly in the right hepatic lobe is seen. Multiple additional smaller masses are seen throughout the liver, which appear hypervascular. .  Consider metastatic disease, such as carcinoid in the appropriate clinical scenario Probable stone in the distal common duct       The patient was seen and examined on day of discharge and this discharge summary is in conjunction with any daily progress note from day of discharge. Time Spent on discharge is 45 minutes  in the examination, evaluation, counseling and review of medications and discharge plan. Note that more than 30 minutes was spent in preparing discharge papers, discussing discharge with patient, medication review, etc.       Signed:    Andreina Palm MD   5/5/2020      Thank you No primary care provider on file. for the opportunity to be involved in this patient's care.  If you have any questions or concerns please feel free to contact me at [address] [place] [address]

## 2020-05-05 NOTE — PROGRESS NOTES
[facility]   Progress Note      SUBJECTIVE:      Events noted  Has some discomfort in the right upper quadrant  Is loose bowel movements. OBJECTIVE    Physical  VITALS:  BP (!) 163/72   Pulse 62   Temp 97.2 °F (36.2 °C) (Oral)   Resp 16   Ht 4' 8\" (1.422 m)   Wt 120 lb (54.4 kg)   SpO2 97%   BMI 26.90 kg/m²   TEMPERATURE:  Current - Temp: 97.2 °F (36.2 °C); Max - Temp  Av.4 °F (36.3 °C)  Min: 97 °F (36.1 °C)  Max: 97.9 °F (36.6 °C)  BLOOD PRESSURE RANGE:  Systolic (42OFJ), BMW:272 , Min:154 , JUANCHO:202   ; Diastolic (69AJC), USO:68, Min:64, Max:77    24HR INTAKE/OUTPUT:  No intake or output data in the 24 hours ending 20 2156    Conscious alert and oriented. Respiratory efforts are normal.  Abdomen is slightly distended. No tenderness noted. Extremities no edema.     Data  Labs:  General Labs:  CBC with Differential:    Lab Results   Component Value Date    WBC 8.6 2020    RBC 4.09 2020    HGB 12.0 2020    HCT 35.7 2020     2020    MCV 87.2 2020    MCH 29.3 2020    MCHC 33.6 2020    RDW 13.9 2020    LYMPHOPCT 26.2 2020    MONOPCT 6.0 2020    BASOPCT 0.5 2020    MONOSABS 0.5 2020    LYMPHSABS 2.3 2020    EOSABS 0.0 2020    BASOSABS 0.0 2020     BMP:    Lab Results   Component Value Date     2020    K 3.5 2020    K 5.1 03/15/2020     2020    CO2 25 2020    BUN 6 2020    LABALBU 4.0 2020    CREATININE 0.5 2020    CALCIUM 9.2 2020    GFRAA >60 2020    LABGLOM >60 2020    GLUCOSE 210 2020     Hepatic Function Panel:    Lab Results   Component Value Date    ALKPHOS 99 2020    ALT 82 2020    AST 80 2020    PROT 7.3 2020    BILITOT 0.8 2020    LABALBU 4.0 2020     LDH:  No results found for: LDH  PT/INR:    Lab Results   Component Value Date    PROTIME 12.7 2020    INR 1.09 05/01/2020     PTT:    Lab Results   Component Value Date    APTT 32.9 05/01/2020   [APTT    Current Medications  Current Facility-Administered Medications: insulin lispro (1 Unit Dial) 0-6 Units, 0-6 Units, Subcutaneous, 4x Daily AC & HS  sodium chloride flush 0.9 % injection 10 mL, 10 mL, Intravenous, PRN  sodium chloride flush 0.9 % injection 10 mL, 10 mL, Intravenous, 2 times per day  sodium chloride flush 0.9 % injection 10 mL, 10 mL, Intravenous, PRN  acetaminophen (TYLENOL) tablet 650 mg, 650 mg, Oral, Q6H PRN **OR** acetaminophen (TYLENOL) suppository 650 mg, 650 mg, Rectal, Q6H PRN  polyethylene glycol (GLYCOLAX) packet 17 g, 17 g, Oral, Daily PRN  promethazine (PHENERGAN) tablet 12.5 mg, 12.5 mg, Oral, Q6H PRN **OR** ondansetron (ZOFRAN) injection 4 mg, 4 mg, Intravenous, Q6H PRN  enoxaparin (LOVENOX) injection 40 mg, 40 mg, Subcutaneous, Daily  glucose (GLUTOSE) 40 % oral gel 15 g, 15 g, Oral, PRN  dextrose 50 % IV solution, 12.5 g, Intravenous, PRN  glucagon (rDNA) injection 1 mg, 1 mg, Intramuscular, PRN  dextrose 5 % solution, 100 mL/hr, Intravenous, PRN  morphine (PF) injection 2 mg, 2 mg, Intravenous, Q4H PRN  lisinopril (PRINIVIL;ZESTRIL) tablet 20 mg, 20 mg, Oral, Daily  levothyroxine (SYNTHROID) tablet 100 mcg, 100 mcg, Oral, Daily    ASSESSMENT AND PLAN    1. Liver masses:    -CT-guided biopsy done and report is pending  -CEA is 1.6  - Treatment recommendations after pathology is back.       Marycruz Romero MD

## 2020-05-05 NOTE — PROGRESS NOTES
Went over discharge instructions with pt. Son will be here to get him soon. He will follow up with oncology about biopsy results and cardiology. Pt understands the d/c instruction. I also updated son and went over d/c instructions with him also and he understands them as well.

## 2020-05-05 NOTE — PROGRESS NOTES
tolerated  5. Management of medical comorbid etiologies per primary team and consulting services    EDUCATION:  Educated patient on plan of care and disease process--all questions answered. Christie Charter is stable from surgical standpoint. Will follow as needed. Please call with questions or concerns. Thank you for allowing us to participate in [facility] care. Plans discussed with patient and nursing. Reviewed and discussed with Dr. Alexis Crawford. Signed:  SUSY Schwab CNP  5/5/2020 10:59 AM     Surg Staff:   Pt seen and examined with NP  See full note above  Minimal RUQ / flank pain.  Bx site looks fine, path pending  No acute surgical intervention or cholecystectomy needed  We will see prn, thanks  Please call if port / access needed    Meera Alexis

## 2020-05-05 NOTE — PROGRESS NOTES
motor deficits  Psych: Normal affect. Alert and oriented in time, place and person  Skin: Warm, dry, normal turgor    Labs and Tests:  CBC:   Recent Labs     05/05/20  0600   WBC 5.4   HGB 12.1*        BMP:    Recent Labs     05/03/20  1336 05/05/20  0600    140   K 3.5 4.1    104   CO2 25 26   BUN 6* 7   CREATININE 0.5* 0.6*   GLUCOSE 210* 111*     Hepatic:   Recent Labs     05/03/20  1336   AST 80*   ALT 82*   BILITOT 0.8   ALKPHOS 99       ASSESSMENT AND PLAN    Principal Problem:    Intractable vomiting with nausea  Active Problems:    Intractable vomiting    Nausea vomiting and diarrhea    Aortic arch aneurysm (HCC)    HTN (hypertension)    RUQ pain    Cholelithiasis    DM2 (diabetes mellitus, type 2) (HCC)    Hypothyroid    Diarrhea  Resolved Problems:    * No resolved hospital problems. *      1. Liver masses:     -CT-guided biopsy done and report is pending  -CEA is 1.6  - Treatment recommendations after pathology is back.     Diarrhea  - sample sent for c diff testing 5/4/2020, results pending      Gilma Alvarado [facility]  [facility]    Pathology was not back when patient seen this morning will follow up on results and see as outpatient if discharged

## 2020-05-06 LAB
AFP: 3.5 UG/L
CA 19-9: <1 U/ML (ref 0–35)

## 2020-05-06 NOTE — PROGRESS NOTES
Shift assessment and AM vitals complete. AM meds given. Pt doing well will go home today. The care plan and education has been reviewed and mutually agreed upon with the patient.

## 2020-05-15 ENCOUNTER — OFFICE VISIT (OUTPATIENT)
Dept: PULMONOLOGY | Age: 80
End: 2020-05-15
Payer: COMMERCIAL

## 2020-05-15 VITALS
OXYGEN SATURATION: 98 % | SYSTOLIC BLOOD PRESSURE: 137 MMHG | WEIGHT: 145 LBS | DIASTOLIC BLOOD PRESSURE: 68 MMHG | HEIGHT: 60 IN | TEMPERATURE: 97.8 F | RESPIRATION RATE: 18 BRPM | BODY MASS INDEX: 28.47 KG/M2 | HEART RATE: 74 BPM

## 2020-05-15 PROCEDURE — G8417 CALC BMI ABV UP PARAM F/U: HCPCS | Performed by: INTERNAL MEDICINE

## 2020-05-15 PROCEDURE — G8427 DOCREV CUR MEDS BY ELIG CLIN: HCPCS | Performed by: INTERNAL MEDICINE

## 2020-05-15 PROCEDURE — 99244 OFF/OP CNSLTJ NEW/EST MOD 40: CPT | Performed by: INTERNAL MEDICINE

## 2020-05-15 ASSESSMENT — ENCOUNTER SYMPTOMS
APNEA: 0
DIARRHEA: 0
BLOOD IN STOOL: 0
SINUS PRESSURE: 0
WHEEZING: 0
BACK PAIN: 0
CHEST TIGHTNESS: 0
STRIDOR: 0
SORE THROAT: 0
CONSTIPATION: 0
ABDOMINAL DISTENTION: 0
COUGH: 0
SHORTNESS OF BREATH: 0
CHOKING: 0
ANAL BLEEDING: 0
RHINORRHEA: 0
ABDOMINAL PAIN: 0
VOICE CHANGE: 0

## 2020-05-15 NOTE — PROGRESS NOTES
Sarkis Sanders    YOB: 1940     Date of Service:  5/15/2020     Chief Complaint   Patient presents with    Abnormal CT     NPV - pt referred by Dr Jolie Campa for his lung mass         HPI French  was used for the encounter. Patient has been accompanied by his son Vonda Bautista. Referred for consultation by Dr. Kris Altamirano for evaluation of lung mass. Patient was hospitalized between 4/30 and 5/5 for evaluation of abdominal pain, nausea vomiting and diarrhea. Patient was noted to have liver mass of the right hepatic lobe along with right upper paratracheal mass, which appeared cystic in nature. CT-guided liver biopsy showed hepatocellular carcinoma. Pulmonary consultation has been requested for evaluation of the paratracheal mass. Originally seen by Dr. Jolie Campa, and apparently the care is now being turned over to Dr. Caryn Cat. Patient states that he has some chest pain, particularly in the right subcostal area. Denies any shortness of breath or cough. States that he was a former smoker, quit 40 years ago-had been using an inhaler when he was in [place] moved to [place] in March 2020]. No history of TB or contacts. Originally from [place], moved to the [address] [place] [address] [address] in 2011. No history of significant alcohol consumption. Denies any loss of appetite or weight loss. No Known Allergies  No outpatient medications have been marked as taking for the 5/15/20 encounter (Office Visit) with Reema Henriquez MD.         There is no immunization history on file for this patient. Past Medical History:   Diagnosis Date    Asthma     Diabetes mellitus ([facility] [address]     Hypertension     Thyroid disease      No past surgical history on file. No family history on file. Review of Systems:  Review of Systems   Constitutional: Negative for activity change, appetite change, fatigue and fever.    HENT: Negative for congestion, ear discharge, ear pain, postnasal drip, rhinorrhea, sinus

## 2020-05-20 ENCOUNTER — HOSPITAL ENCOUNTER (OUTPATIENT)
Age: 80
Discharge: HOME | End: 2020-05-20
Payer: COMMERCIAL

## 2020-05-20 ENCOUNTER — HOSPITAL ENCOUNTER (OUTPATIENT)
Dept: MRI IMAGING | Age: 80
Discharge: HOME | End: 2020-05-20
Payer: COMMERCIAL

## 2020-05-20 ENCOUNTER — APPOINTMENT (OUTPATIENT)
Dept: MRI IMAGING | Age: 80
End: 2020-05-20
Payer: COMMERCIAL

## 2020-05-20 ENCOUNTER — TELEPHONE (OUTPATIENT)
Dept: INTERNAL MEDICINE CLINIC | Age: 80
End: 2020-05-20

## 2020-05-20 ENCOUNTER — HOSPITAL ENCOUNTER (OUTPATIENT)
Dept: PET IMAGING | Age: 80
Discharge: HOME | End: 2020-05-20
Payer: COMMERCIAL

## 2020-05-20 VITALS — BODY MASS INDEX: 23.56 KG/M2 | HEIGHT: 60 IN | WEIGHT: 120 LBS

## 2020-05-20 PROCEDURE — 6360000004 HC RX CONTRAST MEDICATION: Performed by: INTERNAL MEDICINE

## 2020-05-20 PROCEDURE — 2580000003 HC RX 258: Performed by: INTERNAL MEDICINE

## 2020-05-20 PROCEDURE — 78815 PET IMAGE W/CT SKULL-THIGH: CPT

## 2020-05-20 PROCEDURE — A9577 INJ MULTIHANCE: HCPCS | Performed by: INTERNAL MEDICINE

## 2020-05-20 PROCEDURE — A9552 F18 FDG: HCPCS | Performed by: INTERNAL MEDICINE

## 2020-05-20 PROCEDURE — 70553 MRI BRAIN STEM W/O & W/DYE: CPT

## 2020-05-20 PROCEDURE — 3430000000 HC RX DIAGNOSTIC RADIOPHARMACEUTICAL: Performed by: INTERNAL MEDICINE

## 2020-05-20 RX ORDER — FLUDEOXYGLUCOSE F 18 200 MCI/ML
14.84 INJECTION, SOLUTION INTRAVENOUS
Status: COMPLETED | OUTPATIENT
Start: 2020-05-20 | End: 2020-05-20

## 2020-05-20 RX ORDER — 0.9 % SODIUM CHLORIDE 0.9 %
10 VIAL (ML) INJECTION
Status: COMPLETED | OUTPATIENT
Start: 2020-05-20 | End: 2020-05-20

## 2020-05-20 RX ADMIN — GADOBENATE DIMEGLUMINE 11 ML: 529 INJECTION, SOLUTION INTRAVENOUS at 14:50

## 2020-05-20 RX ADMIN — Medication 10 ML: at 14:50

## 2020-05-20 RX ADMIN — FLUDEOXYGLUCOSE F 18 14.84 MILLICURIE: 200 INJECTION, SOLUTION INTRAVENOUS at 12:09

## 2020-06-02 ENCOUNTER — OFFICE VISIT (OUTPATIENT)
Dept: PULMONOLOGY | Age: 80
End: 2020-06-02
Payer: COMMERCIAL

## 2020-06-02 VITALS
WEIGHT: 145 LBS | RESPIRATION RATE: 18 BRPM | HEART RATE: 68 BPM | SYSTOLIC BLOOD PRESSURE: 118 MMHG | HEIGHT: 60 IN | OXYGEN SATURATION: 98 % | DIASTOLIC BLOOD PRESSURE: 68 MMHG | BODY MASS INDEX: 28.47 KG/M2

## 2020-06-02 PROCEDURE — 1123F ACP DISCUSS/DSCN MKR DOCD: CPT | Performed by: INTERNAL MEDICINE

## 2020-06-02 PROCEDURE — 4040F PNEUMOC VAC/ADMIN/RCVD: CPT | Performed by: INTERNAL MEDICINE

## 2020-06-02 PROCEDURE — 1036F TOBACCO NON-USER: CPT | Performed by: INTERNAL MEDICINE

## 2020-06-02 PROCEDURE — 99214 OFFICE O/P EST MOD 30 MIN: CPT | Performed by: INTERNAL MEDICINE

## 2020-06-02 PROCEDURE — 3023F SPIROM DOC REV: CPT | Performed by: INTERNAL MEDICINE

## 2020-06-02 PROCEDURE — G8427 DOCREV CUR MEDS BY ELIG CLIN: HCPCS | Performed by: INTERNAL MEDICINE

## 2020-06-02 PROCEDURE — 1111F DSCHRG MED/CURRENT MED MERGE: CPT | Performed by: INTERNAL MEDICINE

## 2020-06-02 PROCEDURE — G8926 SPIRO NO PERF OR DOC: HCPCS | Performed by: INTERNAL MEDICINE

## 2020-06-02 PROCEDURE — G8417 CALC BMI ABV UP PARAM F/U: HCPCS | Performed by: INTERNAL MEDICINE

## 2020-06-02 RX ORDER — BUDESONIDE AND FORMOTEROL FUMARATE DIHYDRATE 160; 4.5 UG/1; UG/1
2 AEROSOL RESPIRATORY (INHALATION) 2 TIMES DAILY
Qty: 3 INHALER | Refills: 1 | Status: SHIPPED | OUTPATIENT
Start: 2020-06-02

## 2020-06-02 RX ORDER — ALBUTEROL SULFATE 90 UG/1
2 AEROSOL, METERED RESPIRATORY (INHALATION) 4 TIMES DAILY PRN
Qty: 3 INHALER | Refills: 1 | Status: SHIPPED | OUTPATIENT
Start: 2020-06-02

## 2020-06-02 ASSESSMENT — ENCOUNTER SYMPTOMS
SORE THROAT: 0
DIARRHEA: 0
CONSTIPATION: 0
BACK PAIN: 0
CHEST TIGHTNESS: 0
VOMITING: 1
WHEEZING: 0
RHINORRHEA: 0
VOICE CHANGE: 0
BLOOD IN STOOL: 0
CHOKING: 0
SINUS PRESSURE: 0
NAUSEA: 1
SHORTNESS OF BREATH: 1
ABDOMINAL DISTENTION: 0
STRIDOR: 0
APNEA: 0
COUGH: 1
ANAL BLEEDING: 0
ABDOMINAL PAIN: 0

## 2020-06-02 NOTE — PROGRESS NOTES
weakness, light-headedness and numbness. Hematological: Negative for adenopathy. Does not bruise/bleed easily. Psychiatric/Behavioral: Negative for sleep disturbance. Vitals:    06/02/20 1602   BP: 118/68   Pulse: 68   Resp: 18   SpO2: 98%   Weight: 145 lb (65.8 kg)   Height: 5' (1.524 m)     Body mass index is 28.32 kg/m². Wt Readings from Last 3 Encounters:   06/02/20 145 lb (65.8 kg)   05/20/20 120 lb (54.4 kg)   05/15/20 145 lb (65.8 kg)     BP Readings from Last 3 Encounters:   06/02/20 118/68   05/15/20 137/68   05/05/20 (!) 171/68         Physical Exam  Constitutional:       General: He is not in acute distress. Appearance: He is well-developed. He is not diaphoretic. HENT:      Mouth/Throat:      Pharynx: No oropharyngeal exudate. Cardiovascular:      Rate and Rhythm: Normal rate and regular rhythm. Heart sounds: Normal heart sounds. No murmur. Pulmonary:      Effort: No respiratory distress. Breath sounds: Normal breath sounds. No wheezing or rales. Chest:      Chest wall: No tenderness. Abdominal:      General: There is distension. Palpations: There is no mass. Tenderness: There is no abdominal tenderness. There is no guarding or rebound. Musculoskeletal:         General: No swelling, tenderness or deformity. Skin:     Coloration: Skin is not pale. Findings: No erythema or rash. Neurological:      Mental Status: He is alert and oriented to person, place, and time. Cranial Nerves: No cranial nerve deficit. Motor: No abnormal muscle tone.       Coordination: Coordination normal.      Deep Tendon Reflexes: Reflexes normal.             Health Maintenance   Topic Date Due    TSH testing  1940    DTaP/Tdap/Td vaccine (1 - Tdap) 07/01/1959    Shingles Vaccine (1 of 2) 07/01/1990    Pneumococcal 65+ yrs at Risk Vaccine (1 of 2 - PCV13) 07/01/2005    Flu vaccine (Season Ended) 09/01/2020    Potassium monitoring  05/05/2021    Creatinine monitoring  05/05/2021    Hepatitis A vaccine  Aged Out    Hib vaccine  Aged Out    Meningococcal (ACWY) vaccine  Aged Out          Assessment/Plan:    Reviewed patient's PET CT scan which shows mildly hypermetabolic left pulmonary hilum and palatine tonsils. 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  Patient also has hypermetabolic right hepatic lobe mass-related to biopsy-proven hepatocellular carcinoma. I have recently discussed the case with Dr. Flavio Barlow, who plans to start systemic chemotherapy since patient is not a good surgical candidate. No role for lung biopsy at this point, will only monitor with repeat imaging in future, since this would not alter current plan with chemotherapy. Prior history of dyspnea ? COPD, previously on Symbicort which I have prescribed again along with albuterol inhaler. Former smoker-quit several decades ago. Will obtain a complete PFT study. Follow-up in 3 months.

## 2020-09-22 ENCOUNTER — OFFICE VISIT (OUTPATIENT)
Dept: PULMONOLOGY | Age: 80
End: 2020-09-22
Payer: COMMERCIAL

## 2020-09-22 VITALS — OXYGEN SATURATION: 97 % | DIASTOLIC BLOOD PRESSURE: 62 MMHG | SYSTOLIC BLOOD PRESSURE: 126 MMHG | HEART RATE: 88 BPM

## 2020-09-22 PROCEDURE — G8417 CALC BMI ABV UP PARAM F/U: HCPCS | Performed by: INTERNAL MEDICINE

## 2020-09-22 PROCEDURE — G8427 DOCREV CUR MEDS BY ELIG CLIN: HCPCS | Performed by: INTERNAL MEDICINE

## 2020-09-22 PROCEDURE — 3023F SPIROM DOC REV: CPT | Performed by: INTERNAL MEDICINE

## 2020-09-22 PROCEDURE — 1123F ACP DISCUSS/DSCN MKR DOCD: CPT | Performed by: INTERNAL MEDICINE

## 2020-09-22 PROCEDURE — G8926 SPIRO NO PERF OR DOC: HCPCS | Performed by: INTERNAL MEDICINE

## 2020-09-22 PROCEDURE — 1036F TOBACCO NON-USER: CPT | Performed by: INTERNAL MEDICINE

## 2020-09-22 PROCEDURE — 4040F PNEUMOC VAC/ADMIN/RCVD: CPT | Performed by: INTERNAL MEDICINE

## 2020-09-22 PROCEDURE — 90471 IMMUNIZATION ADMIN: CPT | Performed by: INTERNAL MEDICINE

## 2020-09-22 PROCEDURE — 90653 IIV ADJUVANT VACCINE IM: CPT | Performed by: INTERNAL MEDICINE

## 2020-09-22 PROCEDURE — 99214 OFFICE O/P EST MOD 30 MIN: CPT | Performed by: INTERNAL MEDICINE

## 2020-09-22 ASSESSMENT — ENCOUNTER SYMPTOMS
VOICE CHANGE: 0
BLOOD IN STOOL: 0
CONSTIPATION: 0
CHEST TIGHTNESS: 0
SINUS PRESSURE: 0
SHORTNESS OF BREATH: 1
STRIDOR: 0
APNEA: 0
ABDOMINAL DISTENTION: 0
SORE THROAT: 0
COUGH: 1
ABDOMINAL PAIN: 0
CHOKING: 0
RHINORRHEA: 0
DIARRHEA: 0
WHEEZING: 0
ANAL BLEEDING: 0

## 2020-09-22 NOTE — PROGRESS NOTES
Hakeem Moreland    YOB: 1940     Date of Service:  9/22/2020     Chief Complaint   Patient presents with    Shortness of Breath     3 month f.u. C/o H/a's cramp in calf R lef and stomach issues Seeing Dr Keaton Alexander not getting any chemo just medications         HPI Lao  was used for the encounter. Patient has been accompanied by his grandson to our office today. Continues to have some dyspnea and cough with mucoid phlegm-states that this is his normal.  Continues on Symbicort and Ventolin. No new issues noted. Continues on immunotherapy-Lenvatinib for hepatocellular carcinoma. No Known Allergies  Outpatient Medications Marked as Taking for the 9/22/20 encounter (Office Visit) with Kat Fletcher MD   Medication Sig Dispense Refill    Lenvatinib Mesylate (LENVATINIB, 8 MG DAILY DOSE, PO) Take by mouth           There is no immunization history on file for this patient. Past Medical History:   Diagnosis Date    Asthma     Diabetes mellitus ([facility] [address]     Hypertension     Thyroid disease      History reviewed. No pertinent surgical history. History reviewed. No pertinent family history. Review of Systems:  Review of Systems   Constitutional: Positive for fatigue. Negative for activity change, appetite change and fever. HENT: Negative for congestion, ear discharge, ear pain, postnasal drip, rhinorrhea, sinus pressure, sneezing, sore throat, tinnitus and voice change. Respiratory: Positive for cough and shortness of breath. Negative for apnea, choking, chest tightness, wheezing and stridor. Cardiovascular: Negative for chest pain, palpitations and leg swelling. Gastrointestinal: Negative for abdominal distention, abdominal pain, anal bleeding, blood in stool, constipation and diarrhea. Skin: Negative for pallor and rash. Allergic/Immunologic: Negative for environmental allergies.    Neurological: Negative for dizziness, tremors, seizures, syncope, speech difficulty, weakness, light-headedness, numbness and headaches. Hematological: Negative for adenopathy. Does not bruise/bleed easily. Psychiatric/Behavioral: Negative for sleep disturbance. Vitals:    09/22/20 1139   BP: 126/62   Pulse: 88   SpO2: 97%     No flowsheet data found. There is no height or weight on file to calculate BMI. Wt Readings from Last 3 Encounters:   06/02/20 145 lb (65.8 kg)   05/20/20 120 lb (54.4 kg)   05/15/20 145 lb (65.8 kg)     BP Readings from Last 3 Encounters:   09/22/20 126/62   06/02/20 118/68   05/15/20 137/68         Physical Exam  Constitutional:       General: He is not in acute distress. Appearance: He is well-developed. He is not diaphoretic. HENT:      Mouth/Throat:      Pharynx: No oropharyngeal exudate. Cardiovascular:      Rate and Rhythm: Normal rate and regular rhythm. Heart sounds: Normal heart sounds. No murmur. Pulmonary:      Effort: No respiratory distress. Breath sounds: Rales present. No wheezing. Chest:      Chest wall: No tenderness. Abdominal:      General: There is no distension. Palpations: There is no mass. Tenderness: There is no abdominal tenderness. There is no guarding or rebound. Musculoskeletal:         General: No swelling, tenderness or deformity. Skin:     Coloration: Skin is not pale. Findings: No erythema or rash. Neurological:      Mental Status: He is alert and oriented to person, place, and time. Cranial Nerves: No cranial nerve deficit. Motor: No abnormal muscle tone.       Coordination: Coordination normal.      Deep Tendon Reflexes: Reflexes normal.             Health Maintenance   Topic Date Due    TSH testing  1940    DTaP/Tdap/Td vaccine (1 - Tdap) 07/01/1959    Shingles Vaccine (1 of 2) 07/01/1990    Pneumococcal 65+ yrs at Risk Vaccine (1 of 2 - PCV13) 07/01/2005    Flu vaccine (1) 09/01/2020    Potassium monitoring  05/05/2021    Creatinine monitoring

## 2020-09-30 ENCOUNTER — OFFICE VISIT (OUTPATIENT)
Dept: PRIMARY CARE CLINIC | Age: 80
End: 2020-09-30
Payer: COMMERCIAL

## 2020-09-30 PROCEDURE — 99211 OFF/OP EST MAY X REQ PHY/QHP: CPT | Performed by: NURSE PRACTITIONER

## 2020-09-30 PROCEDURE — G8428 CUR MEDS NOT DOCUMENT: HCPCS | Performed by: NURSE PRACTITIONER

## 2020-09-30 PROCEDURE — G8417 CALC BMI ABV UP PARAM F/U: HCPCS | Performed by: NURSE PRACTITIONER

## 2020-09-30 NOTE — PATIENT INSTRUCTIONS

## 2020-10-01 ENCOUNTER — TELEPHONE (OUTPATIENT)
Dept: PULMONOLOGY | Age: 80
End: 2020-10-01

## 2020-10-01 NOTE — TELEPHONE ENCOUNTER
Pricila Salinas with [facility] called in stating they received the oxygen fast fax but the start date and oxygen concentrator box were left empty. Pricila Salinas asked if these could be fixed, initial and dated and sent back.     Pricila Salinas # [phone]

## 2020-10-02 LAB — SARS-COV-2, NAA: NOT DETECTED

## 2020-10-21 ENCOUNTER — HOSPITAL ENCOUNTER (OUTPATIENT)
Dept: ULTRASOUND IMAGING | Age: 80
Discharge: HOME | End: 2020-10-21
Payer: COMMERCIAL

## 2020-10-21 VITALS
DIASTOLIC BLOOD PRESSURE: 71 MMHG | OXYGEN SATURATION: 98 % | RESPIRATION RATE: 18 BRPM | SYSTOLIC BLOOD PRESSURE: 115 MMHG | TEMPERATURE: 98 F | HEART RATE: 74 BPM

## 2020-10-21 LAB
APTT: 36 SEC (ref 24.2–36.2)
GLUCOSE BLD-MCNC: 105 MG/DL (ref 70–99)
INR BLD: 0.91 (ref 0.86–1.14)
PERFORMED ON: ABNORMAL
PROTHROMBIN TIME: 10.5 SEC (ref 10–13.2)

## 2020-10-21 PROCEDURE — 36415 COLL VENOUS BLD VENIPUNCTURE: CPT

## 2020-10-21 PROCEDURE — 76705 ECHO EXAM OF ABDOMEN: CPT

## 2020-10-21 PROCEDURE — 85610 PROTHROMBIN TIME: CPT

## 2020-10-21 PROCEDURE — 85730 THROMBOPLASTIN TIME PARTIAL: CPT

## 2020-10-21 NOTE — PROGRESS NOTES
Pt to preop area from home. Pt speaks Uzbek. Uzbek  with pt at this time. Family in car. Consent obtained for ultrasound guided paracentesis.

## 2020-10-30 ENCOUNTER — HOSPITAL ENCOUNTER (OUTPATIENT)
Dept: CT IMAGING | Age: 80
Discharge: HOME | End: 2020-10-30
Payer: COMMERCIAL

## 2020-10-30 PROCEDURE — 74177 CT ABD & PELVIS W/CONTRAST: CPT

## 2020-10-30 PROCEDURE — 6360000004 HC RX CONTRAST MEDICATION: Performed by: INTERNAL MEDICINE

## 2020-10-30 RX ADMIN — IOPAMIDOL 75 ML: 755 INJECTION, SOLUTION INTRAVENOUS at 14:00

## 2020-10-30 RX ADMIN — IOHEXOL 50 ML: 240 INJECTION, SOLUTION INTRATHECAL; INTRAVASCULAR; INTRAVENOUS; ORAL at 14:00

## 2020-12-10 ENCOUNTER — OFFICE VISIT (OUTPATIENT)
Dept: PULMONOLOGY | Age: 80
End: 2020-12-10
Payer: COMMERCIAL

## 2020-12-10 VITALS
SYSTOLIC BLOOD PRESSURE: 174 MMHG | DIASTOLIC BLOOD PRESSURE: 79 MMHG | WEIGHT: 145 LBS | HEART RATE: 70 BPM | HEIGHT: 60 IN | BODY MASS INDEX: 28.47 KG/M2 | OXYGEN SATURATION: 99 % | RESPIRATION RATE: 18 BRPM

## 2020-12-10 PROCEDURE — 99214 OFFICE O/P EST MOD 30 MIN: CPT | Performed by: INTERNAL MEDICINE

## 2020-12-10 PROCEDURE — 3023F SPIROM DOC REV: CPT | Performed by: INTERNAL MEDICINE

## 2020-12-10 PROCEDURE — G8427 DOCREV CUR MEDS BY ELIG CLIN: HCPCS | Performed by: INTERNAL MEDICINE

## 2020-12-10 PROCEDURE — G8417 CALC BMI ABV UP PARAM F/U: HCPCS | Performed by: INTERNAL MEDICINE

## 2020-12-10 PROCEDURE — G8926 SPIRO NO PERF OR DOC: HCPCS | Performed by: INTERNAL MEDICINE

## 2020-12-10 PROCEDURE — 4040F PNEUMOC VAC/ADMIN/RCVD: CPT | Performed by: INTERNAL MEDICINE

## 2020-12-10 PROCEDURE — 1036F TOBACCO NON-USER: CPT | Performed by: INTERNAL MEDICINE

## 2020-12-10 PROCEDURE — G8484 FLU IMMUNIZE NO ADMIN: HCPCS | Performed by: INTERNAL MEDICINE

## 2020-12-10 PROCEDURE — 1123F ACP DISCUSS/DSCN MKR DOCD: CPT | Performed by: INTERNAL MEDICINE

## 2020-12-10 ASSESSMENT — ENCOUNTER SYMPTOMS
CONSTIPATION: 0
BACK PAIN: 0
COUGH: 0
RHINORRHEA: 0
BLOOD IN STOOL: 0
ABDOMINAL PAIN: 0
DIARRHEA: 0
CHOKING: 0
SINUS PRESSURE: 0
WHEEZING: 0
CHEST TIGHTNESS: 0
ANAL BLEEDING: 0
SORE THROAT: 0
STRIDOR: 0
ABDOMINAL DISTENTION: 0
SHORTNESS OF BREATH: 0
APNEA: 0
VOICE CHANGE: 0

## 2020-12-10 NOTE — PROGRESS NOTES
Hematological: Negative for adenopathy. Does not bruise/bleed easily. Psychiatric/Behavioral: Negative for sleep disturbance. Vitals:    12/10/20 1508 12/10/20 1513   BP: (!) 187/88 (!) 174/79   Pulse: 74 70   Resp: 18    SpO2: 99%    Weight: 145 lb (65.8 kg)    Height: 5' (1.524 m)      No flowsheet data found. Body mass index is 28.32 kg/m². Wt Readings from Last 3 Encounters:   12/10/20 145 lb (65.8 kg)   06/02/20 145 lb (65.8 kg)   05/20/20 120 lb (54.4 kg)     BP Readings from Last 3 Encounters:   12/10/20 (!) 174/79   10/21/20 115/71   09/22/20 126/62         Physical Exam  Constitutional:       General: He is not in acute distress. Appearance: He is well-developed. He is not diaphoretic. HENT:      Mouth/Throat:      Pharynx: No oropharyngeal exudate. Cardiovascular:      Rate and Rhythm: Normal rate and regular rhythm. Heart sounds: Normal heart sounds. No murmur. Pulmonary:      Effort: No respiratory distress. Breath sounds: Normal breath sounds. No wheezing or rales. Chest:      Chest wall: No tenderness. Abdominal:      General: There is distension. Palpations: There is no mass. Tenderness: There is no abdominal tenderness. There is no guarding or rebound. Musculoskeletal:         General: No swelling, tenderness or deformity. Skin:     Coloration: Skin is not pale. Findings: No erythema or rash. Neurological:      Mental Status: He is alert and oriented to person, place, and time. Cranial Nerves: No cranial nerve deficit. Motor: No abnormal muscle tone.       Coordination: Coordination normal.      Deep Tendon Reflexes: Reflexes normal.             Health Maintenance   Topic Date Due    TSH testing  1940    DTaP/Tdap/Td vaccine (1 - Tdap) 07/01/1959    Shingles Vaccine (1 of 2) 07/01/1990    Pneumococcal 65+ yrs at Risk Vaccine (1 of 2 - PCV13) 07/01/2005    Potassium monitoring  05/05/2021    Creatinine monitoring 05/05/2021    Flu vaccine  Completed    Hepatitis A vaccine  Aged Out    Hib vaccine  Aged Out    Meningococcal (ACWY) vaccine  Aged Out          Assessment/Plan:    Hepatocellular carcinoma with progressive disease, currently on immunotherapy under care of of Dr. Catrachita Lomeli. Patient's prior PET scan from May suggestive of several hypermetabolic lymph nodes in the hilum and mediastinum-has chronic fibrotic changes over the right lung. Repeat CT chest showed no significant changes of the lungs, but slight progression noted of the hypervascular right hepatic mass. Right paratracheal cystic area appears stable. No discrete lung nodules or lymphadenopathy was actually appreciated. Will continue to monitor only. Imaging as recommended by Dr. Catrachita Lomeli. No PFT available-patient possibly has COPD based on prior smoking history. Continue with Symbicort 160 and albuterol. Return in about 4 months (around 4/10/2021).

## 2021-02-03 ENCOUNTER — OFFICE VISIT (OUTPATIENT)
Dept: ENT CLINIC | Age: 81
End: 2021-02-03
Payer: COMMERCIAL

## 2021-02-03 VITALS
HEART RATE: 76 BPM | BODY MASS INDEX: 28.47 KG/M2 | DIASTOLIC BLOOD PRESSURE: 88 MMHG | SYSTOLIC BLOOD PRESSURE: 173 MMHG | TEMPERATURE: 96.8 F | WEIGHT: 145 LBS | RESPIRATION RATE: 14 BRPM | HEIGHT: 60 IN

## 2021-02-03 DIAGNOSIS — H93.13 SUBJECTIVE TINNITUS OF BOTH EARS: Primary | ICD-10-CM

## 2021-02-03 DIAGNOSIS — M26.629 TMJ SYNDROME: ICD-10-CM

## 2021-02-03 DIAGNOSIS — H92.03 OTALGIA OF BOTH EARS: ICD-10-CM

## 2021-02-03 DIAGNOSIS — H91.93 BILATERAL HEARING LOSS, UNSPECIFIED HEARING LOSS TYPE: ICD-10-CM

## 2021-02-03 DIAGNOSIS — R07.0 BURNING SENSATION OF THROAT: ICD-10-CM

## 2021-02-03 PROCEDURE — 4040F PNEUMOC VAC/ADMIN/RCVD: CPT | Performed by: OTOLARYNGOLOGY

## 2021-02-03 PROCEDURE — 1123F ACP DISCUSS/DSCN MKR DOCD: CPT | Performed by: OTOLARYNGOLOGY

## 2021-02-03 PROCEDURE — 99203 OFFICE O/P NEW LOW 30 MIN: CPT | Performed by: OTOLARYNGOLOGY

## 2021-02-03 PROCEDURE — G8427 DOCREV CUR MEDS BY ELIG CLIN: HCPCS | Performed by: OTOLARYNGOLOGY

## 2021-02-03 PROCEDURE — G8484 FLU IMMUNIZE NO ADMIN: HCPCS | Performed by: OTOLARYNGOLOGY

## 2021-02-03 PROCEDURE — 1036F TOBACCO NON-USER: CPT | Performed by: OTOLARYNGOLOGY

## 2021-02-03 PROCEDURE — G8417 CALC BMI ABV UP PARAM F/U: HCPCS | Performed by: OTOLARYNGOLOGY

## 2021-02-03 ASSESSMENT — ENCOUNTER SYMPTOMS
RHINORRHEA: 0
VOICE CHANGE: 0
TROUBLE SWALLOWING: 0
SORE THROAT: 0

## 2021-02-03 NOTE — PROGRESS NOTES
Right Sinus: No maxillary sinus tenderness or frontal sinus tenderness. Left Sinus: No maxillary sinus tenderness or frontal sinus tenderness. Mouth/Throat:      Lips: Pink. No lesions. Mouth: Mucous membranes are moist. No oral lesions. Tongue: No lesions. Palate: No mass and lesions. Pharynx: Oropharynx is clear. Uvula midline. No oropharyngeal exudate or posterior oropharyngeal erythema. Tonsils: No tonsillar exudate or tonsillar abscesses. Neck:      Musculoskeletal: Normal range of motion and neck supple. No neck rigidity or muscular tenderness. Thyroid: No thyroid mass, thyromegaly or thyroid tenderness. Lymphadenopathy:      Cervical: No cervical adenopathy. Neurological:      Mental Status: He is alert. IMPRESSION / Bryan Jones / Matt Summers:       Asif Berger was seen today for otalgia and tinnitus. Diagnoses and all orders for this visit:    Subjective tinnitus of both ears  -     [place], [place]. D., Audiology, [facility]    Bilateral hearing loss, unspecified hearing loss type  -     [address] [place], [place]. GERBER, Audiology, [facility]    Otalgia of both ears    Burning sensation of throat    TMJ syndrome         RECOMMENDATIONS/PLAN:      1. Audiogram.  2. Return in about 3 weeks (around 2/24/2021) for flexible fiberoptic laryngoscopy and follow up hearing test.  .        Patient Instructions   INSTRUCTIONS FOR TMJ (TEMPOROMANDIBULAR JOINT DISORDER)  · Eat a soft diet, with no tough meat for ten days and then prn TMJ pain. · Do not chew gum. · Heating Pad Therapy:  Use a heating pad on medium heat to the TMJ area for about 30 to 45 minutes, with a one minute break every five minutes, three times daily. · Take Ibuprofen as recommended. · If ear pain remains uncontrolled, return to office, and we will consider other measures, including possible CT or MRI scans, endoscopy of the throat and possible biopsies, and/or referral to a dental TMJ specialist oral surgeon for bite splint. ADVERSE AND SIDE EFFECTS OF MEDICATIONS:    Please be aware of the following uncommon but possible adverse reactions, side effects, and complications of the following medications, including, but not limited to: rash or other allergic reaction, interactions with other medications, nausea, headache, diarrhea, persistent symptoms, failure to improve, and the following:     Ibuprofen:  hyperacidity, gastrointestinal bleeding, stomach upset and/or ulcer,  heartburn, excessive or increased bleeding (usually with prolonged use).      ~~~>>> Also please read all information given to you by the pharmacist.           [address]    # and complexity of problems addressed:  39722 - Moderate  2 or more stable chronic illneses  1 undiagnosed NEW problem with uncertain prognosis      Amount and/or Complexity of Data to be Reviewed and Analyzed  39514 - Straightforward  no data or only 1 test or document reviewed or ordered    Risk of Complications and /or Morbidity or Mortality of Patient Management  43245- Low

## 2021-02-22 ENCOUNTER — HOSPITAL ENCOUNTER (OUTPATIENT)
Dept: CT IMAGING | Age: 81
Discharge: HOME | End: 2021-02-22
Payer: COMMERCIAL

## 2021-02-22 DIAGNOSIS — C22.0 HEPATOCELLULAR CARCINOMA (HCC): ICD-10-CM

## 2021-02-22 PROCEDURE — 6360000004 HC RX CONTRAST MEDICATION: Performed by: SURGERY

## 2021-02-22 PROCEDURE — 74177 CT ABD & PELVIS W/CONTRAST: CPT

## 2021-02-22 RX ADMIN — IOPAMIDOL 75 ML: 755 INJECTION, SOLUTION INTRAVENOUS at 18:12

## 2021-02-22 RX ADMIN — IOHEXOL 50 ML: 240 INJECTION, SOLUTION INTRATHECAL; INTRAVASCULAR; INTRAVENOUS; ORAL at 18:12

## 2021-07-02 ENCOUNTER — HOSPITAL ENCOUNTER (OUTPATIENT)
Dept: CT IMAGING | Age: 81
Discharge: HOME | End: 2021-07-02
Payer: MEDICAID

## 2021-07-02 DIAGNOSIS — C22.0 HEPATOCELLULAR CARCINOMA (HCC): ICD-10-CM

## 2021-07-02 PROCEDURE — 6360000004 HC RX CONTRAST MEDICATION: Performed by: NURSE PRACTITIONER

## 2021-07-02 PROCEDURE — 74177 CT ABD & PELVIS W/CONTRAST: CPT

## 2021-07-02 RX ADMIN — IOHEXOL 50 ML: 240 INJECTION, SOLUTION INTRATHECAL; INTRAVASCULAR; INTRAVENOUS; ORAL at 13:09

## 2021-07-02 RX ADMIN — IOPAMIDOL 75 ML: 755 INJECTION, SOLUTION INTRAVENOUS at 13:10

## 2021-12-27 ENCOUNTER — APPOINTMENT (OUTPATIENT)
Dept: GENERAL RADIOLOGY | Age: 81
End: 2021-12-27
Payer: MEDICAID

## 2021-12-27 ENCOUNTER — APPOINTMENT (OUTPATIENT)
Dept: CT IMAGING | Age: 81
End: 2021-12-27
Payer: MEDICAID

## 2021-12-27 ENCOUNTER — HOSPITAL ENCOUNTER (EMERGENCY)
Age: 81
Discharge: HOME | End: 2021-12-28
Payer: MEDICAID

## 2021-12-27 VITALS
TEMPERATURE: 98 F | DIASTOLIC BLOOD PRESSURE: 74 MMHG | RESPIRATION RATE: 21 BRPM | SYSTOLIC BLOOD PRESSURE: 152 MMHG | OXYGEN SATURATION: 94 % | HEART RATE: 77 BPM

## 2021-12-27 DIAGNOSIS — R10.10 PAIN OF UPPER ABDOMEN: Primary | ICD-10-CM

## 2021-12-27 DIAGNOSIS — R07.81 RIB PAIN ON LEFT SIDE: ICD-10-CM

## 2021-12-27 DIAGNOSIS — R11.2 NON-INTRACTABLE VOMITING WITH NAUSEA, UNSPECIFIED VOMITING TYPE: ICD-10-CM

## 2021-12-27 DIAGNOSIS — Z85.05 HISTORY OF HEPATOCELLULAR CARCINOMA: ICD-10-CM

## 2021-12-27 LAB
A/G RATIO: 0.8 (ref 1.1–2.2)
ALBUMIN SERPL-MCNC: 3.7 G/DL (ref 3.4–5)
ALP BLD-CCNC: 163 U/L (ref 40–129)
ALT SERPL-CCNC: 15 U/L (ref 10–40)
ANION GAP SERPL CALCULATED.3IONS-SCNC: 13 MMOL/L (ref 3–16)
AST SERPL-CCNC: 25 U/L (ref 15–37)
BASOPHILS ABSOLUTE: 0.1 K/UL (ref 0–0.2)
BASOPHILS RELATIVE PERCENT: 0.7 %
BILIRUB SERPL-MCNC: 0.4 MG/DL (ref 0–1)
BUN BLDV-MCNC: 19 MG/DL (ref 7–20)
CALCIUM SERPL-MCNC: 9.4 MG/DL (ref 8.3–10.6)
CHLORIDE BLD-SCNC: 103 MMOL/L (ref 99–110)
CO2: 22 MMOL/L (ref 21–32)
CREAT SERPL-MCNC: 0.9 MG/DL (ref 0.8–1.3)
EKG ATRIAL RATE: 65 BPM
EKG DIAGNOSIS: NORMAL
EKG P AXIS: 46 DEGREES
EKG P-R INTERVAL: 172 MS
EKG Q-T INTERVAL: 468 MS
EKG QRS DURATION: 88 MS
EKG QTC CALCULATION (BAZETT): 486 MS
EKG R AXIS: 42 DEGREES
EKG T AXIS: 48 DEGREES
EKG VENTRICULAR RATE: 65 BPM
EOSINOPHILS ABSOLUTE: 0 K/UL (ref 0–0.6)
EOSINOPHILS RELATIVE PERCENT: 0 %
GFR AFRICAN AMERICAN: >60
GFR NON-AFRICAN AMERICAN: >60
GLUCOSE BLD-MCNC: 113 MG/DL (ref 70–99)
HCT VFR BLD CALC: 38.5 % (ref 40.5–52.5)
HEMOGLOBIN: 12.7 G/DL (ref 13.5–17.5)
LIPASE: 22 U/L (ref 13–60)
LYMPHOCYTES ABSOLUTE: 1.1 K/UL (ref 1–5.1)
LYMPHOCYTES RELATIVE PERCENT: 8.8 %
MCH RBC QN AUTO: 27.1 PG (ref 26–34)
MCHC RBC AUTO-ENTMCNC: 32.9 G/DL (ref 31–36)
MCV RBC AUTO: 82.3 FL (ref 80–100)
MONOCYTES ABSOLUTE: 0.3 K/UL (ref 0–1.3)
MONOCYTES RELATIVE PERCENT: 2.7 %
NEUTROPHILS ABSOLUTE: 10.5 K/UL (ref 1.7–7.7)
NEUTROPHILS RELATIVE PERCENT: 87.8 %
PDW BLD-RTO: 14.6 % (ref 12.4–15.4)
PLATELET # BLD: 363 K/UL (ref 135–450)
PMV BLD AUTO: 6.5 FL (ref 5–10.5)
POTASSIUM REFLEX MAGNESIUM: 3.9 MMOL/L (ref 3.5–5.1)
POTASSIUM SERPL-SCNC: 3.9 MMOL/L (ref 3.5–5.1)
RBC # BLD: 4.68 M/UL (ref 4.2–5.9)
REASON FOR REJECTION: NORMAL
REJECTED TEST: NORMAL
SARS-COV-2, NAAT: NOT DETECTED
SODIUM BLD-SCNC: 138 MMOL/L (ref 136–145)
TOTAL PROTEIN: 8.4 G/DL (ref 6.4–8.2)
WBC # BLD: 12 K/UL (ref 4–11)

## 2021-12-27 PROCEDURE — 80053 COMPREHEN METABOLIC PANEL: CPT

## 2021-12-27 PROCEDURE — 74177 CT ABD & PELVIS W/CONTRAST: CPT

## 2021-12-27 PROCEDURE — 71101 X-RAY EXAM UNILAT RIBS/CHEST: CPT

## 2021-12-27 PROCEDURE — 85025 COMPLETE CBC W/AUTO DIFF WBC: CPT

## 2021-12-27 PROCEDURE — 70450 CT HEAD/BRAIN W/O DYE: CPT

## 2021-12-27 PROCEDURE — 36415 COLL VENOUS BLD VENIPUNCTURE: CPT

## 2021-12-27 PROCEDURE — 99284 EMERGENCY DEPT VISIT MOD MDM: CPT

## 2021-12-27 PROCEDURE — 93010 ELECTROCARDIOGRAM REPORT: CPT | Performed by: INTERNAL MEDICINE

## 2021-12-27 PROCEDURE — 6360000004 HC RX CONTRAST MEDICATION: Performed by: PHYSICIAN ASSISTANT

## 2021-12-27 PROCEDURE — 87635 SARS-COV-2 COVID-19 AMP PRB: CPT

## 2021-12-27 PROCEDURE — 83690 ASSAY OF LIPASE: CPT

## 2021-12-27 PROCEDURE — 93005 ELECTROCARDIOGRAM TRACING: CPT | Performed by: EMERGENCY MEDICINE

## 2021-12-27 PROCEDURE — 6360000002 HC RX W HCPCS: Performed by: PHYSICIAN ASSISTANT

## 2021-12-27 PROCEDURE — 96374 THER/PROPH/DIAG INJ IV PUSH: CPT

## 2021-12-27 RX ORDER — ONDANSETRON 2 MG/ML
4 INJECTION INTRAMUSCULAR; INTRAVENOUS ONCE
Status: COMPLETED | OUTPATIENT
Start: 2021-12-27 | End: 2021-12-27

## 2021-12-27 RX ADMIN — ONDANSETRON 4 MG: 2 INJECTION INTRAMUSCULAR; INTRAVENOUS at 23:10

## 2021-12-27 RX ADMIN — IOPAMIDOL 75 ML: 755 INJECTION, SOLUTION INTRAVENOUS at 22:00

## 2021-12-27 ASSESSMENT — ENCOUNTER SYMPTOMS
SHORTNESS OF BREATH: 0
VOMITING: 1
COUGH: 0
SORE THROAT: 0
EYE PAIN: 0
BACK PAIN: 0
ABDOMINAL PAIN: 1
NAUSEA: 1

## 2021-12-27 ASSESSMENT — PAIN DESCRIPTION - LOCATION: LOCATION: ABDOMEN

## 2021-12-27 ASSESSMENT — PAIN DESCRIPTION - PAIN TYPE: TYPE: ACUTE PAIN

## 2021-12-27 ASSESSMENT — PAIN SCALES - GENERAL: PAINLEVEL_OUTOF10: 2

## 2021-12-28 RX ORDER — ONDANSETRON 4 MG/1
4-8 TABLET, ORALLY DISINTEGRATING ORAL EVERY 12 HOURS PRN
Qty: 12 TABLET | Refills: 0 | Status: SHIPPED | OUTPATIENT
Start: 2021-12-28

## 2021-12-28 RX ORDER — TRAMADOL HYDROCHLORIDE 50 MG/1
50 TABLET ORAL EVERY 6 HOURS PRN
Qty: 12 TABLET | Refills: 0 | Status: SHIPPED | OUTPATIENT
Start: 2021-12-28 | End: 2021-12-31

## 2021-12-28 ASSESSMENT — HEART SCORE: ECG: 1

## 2021-12-28 NOTE — ED PROVIDER NOTES
**ADVANCED PRACTICE PROVIDER, I HAVE EVALUATED THIS PATIENT**        [address]      Pt Name: Rohini Gandhi  UZK:[id number]  [place] 1940  Date of evaluation: 12/27/2021  Provider: Rebekah Swanson PA-C      Chief Complaint:    Chief Complaint   Patient presents with    Concern For COVID-19     N/V, dizzy. Had vaccine         Nursing Notes, Past Medical Hx, Past Surgical Hx, Social Hx, Allergies, and Family Hx were all reviewed and agreed with or any disagreements were addressed in the HPI.    HPI: (Location, Duration, Timing, Severity, Quality, Assoc Sx, Context, Modifying factors)    Chief Complaint of complaint of headache for 2 weeks. Also complaining of left rib pain. And abdominal pain for the last 2 days. This is a  80 y.o. male who presents to emergency room with complaint of headache. Patient has a history of headaches. State the pain at this point is 2 out of 10 earlier it was 8 out of 10. He was given Tylenol his son says. The patient did give permission for his son to interpret for him. Patient developed nausea and vomiting today. Complaint of left side rib pain no injuries. No shortness of breath, no cough. The son does state that he is not concerned for Covid. The patient has been vaccinated to vaccine. Did not get the booster. No fevers or chills, no lightheaded or dizziness. Does complain of abdominal pain. History of liver cancer-in remission. Son also tells me that he is noticed some weight loss. No extremity weakness. No other complaints. He sees Dr. Juwan Lazcano for his hepatocellular carcinoma. And Dr. Magda Osorio primary care provider    PastMedical/Surgical History:      Diagnosis Date    Asthma     Diabetes mellitus ([facility] [address]     Hypertension     Thyroid disease      History reviewed. No pertinent surgical history.     Medications:  Previous Medications    ALBUTEROL SULFATE HFA (VENTOLIN HFA) 108 (90 BASE) MCG/ACT INHALER    Inhale 2 puffs into the lungs 4 times daily as needed for Wheezing    AMLODIPINE (NORVASC) 2.5 MG TABLET    Take 1 tablet by mouth daily    BUDESONIDE-FORMOTEROL (SYMBICORT) 160-4.5 MCG/ACT AERO    Inhale 2 puffs into the lungs 2 times daily    HYDROCHLOROTHIAZIDE (HYDRODIURIL) 25 MG TABLET    Take 25 mg by mouth daily    IBUPROFEN (ADVIL;MOTRIN) 800 MG TABLET    Take 800 mg by mouth every 6 hours as needed for Pain    LENVATINIB MESYLATE (LENVATINIB, 8 MG DAILY DOSE, PO)    Take by mouth    LEVOTHYROXINE (SYNTHROID) 100 MCG TABLET    Take 100 mcg by mouth Daily    LISINOPRIL (PRINIVIL;ZESTRIL) 20 MG TABLET    Take 20 mg by mouth daily    METFORMIN (GLUCOPHAGE) 500 MG TABLET    Take 500 mg by mouth daily (with breakfast)    OMEPRAZOLE (PRILOSEC) 20 MG DELAYED RELEASE CAPSULE    Take 40 mg by mouth daily    ONDANSETRON (ZOFRAN) 4 MG TABLET    Take 1 tablet by mouth daily as needed for Nausea or Vomiting    PROCHLORPERAZINE (COMPAZINE) 10 MG TABLET    Take 1 tablet by mouth every 6 hours as needed (pain, nausea)    TRAMADOL (ULTRAM) 50 MG TABLET    Take 50 mg by mouth every 6 hours as needed for Pain. Review of Systems:  (2-9 systems needed)  Review of Systems   Constitutional: Negative for chills and fever. HENT: Negative for congestion and sore throat. Eyes: Negative for pain and visual disturbance. Respiratory: Negative for cough and shortness of breath. Cardiovascular: Positive for chest pain. Negative for leg swelling. Gastrointestinal: Positive for abdominal pain, nausea and vomiting. Genitourinary: Negative for dysuria and frequency. Musculoskeletal: Negative for back pain and neck pain. Skin: Negative for rash and wound. Neurological: Positive for headaches. Negative for dizziness and light-headedness. \"Positives and Pertinent negatives as per HPI\"    Physical Exam:  Physical Exam  Vitals and nursing note reviewed.    Constitutional:       Appearance: He is well-developed. He is not diaphoretic. HENT:      Head: Normocephalic and atraumatic. Nose: Nose normal.      Mouth/Throat:      Mouth: Mucous membranes are moist.      Pharynx: Oropharynx is clear. No oropharyngeal exudate or posterior oropharyngeal erythema. Eyes:      General:         Right eye: No discharge. Left eye: No discharge. Conjunctiva/sclera: Conjunctivae normal.      Pupils: Pupils are equal, round, and reactive to light. Cardiovascular:      Rate and Rhythm: Normal rate and regular rhythm. Heart sounds: Normal heart sounds. No murmur heard. No friction rub. No gallop. Pulmonary:      Effort: Pulmonary effort is normal. No respiratory distress. Breath sounds: Normal breath sounds. No wheezing or rales. Chest:      Chest wall: No tenderness. Abdominal:      General: Abdomen is flat. Bowel sounds are normal. There is no distension. Palpations: Abdomen is soft. There is no mass. Tenderness: There is abdominal tenderness. There is no guarding or rebound. Musculoskeletal:         General: Normal range of motion. Cervical back: Normal range of motion and neck supple. Skin:     General: Skin is warm and dry. Neurological:      General: No focal deficit present. Mental Status: He is alert and oriented to person, place, and time.    Psychiatric:         Behavior: Behavior normal.         MEDICAL DECISION MAKING    Vitals:    Vitals:    12/27/21 1945 12/27/21 2045 12/27/21 2115 12/27/21 2310   BP: (!) 181/79 (!) 158/71 (!) 153/72 (!) 152/74   Pulse: 82 81 75 77   Resp: 25 17 19 21   Temp: 98 °F (36.7 °C)      SpO2: 96% 97% 94% 94%       LABS:  Labs Reviewed   CBC WITH AUTO DIFFERENTIAL - Abnormal; Notable for the following components:       Result Value    WBC 12.0 (*)     Hemoglobin 12.7 (*)     Hematocrit 38.5 (*)     Neutrophils Absolute 10.5 (*)     All other components within normal limits    Narrative:     Performed at:  [facility]) [facility]  [address] [facility] [place] [email or web address] [address]   Phone [phone]   BASIC METABOLIC PANEL - Abnormal; Notable for the following components:    Glucose 113 (*)     All other components within normal limits    Narrative:     Performed at:  [facility]  [address] [facility] [place] [email or web address] [address]   Phone [phone]   COMPREHENSIVE METABOLIC PANEL W/ REFLEX TO MG FOR LOW K - Abnormal; Notable for the following components: Total Protein 8.4 (*)     Albumin/Globulin Ratio 0.8 (*)     Alkaline Phosphatase 163 (*)     All other components within normal limits    Narrative:     Performed at:  [facility]  [address], [place] [address] [address]   Phone [phone], RAPID    Narrative:     Performed at:  [facility]  [address] [facility] [address] [address]   Phone [phone]   SPECIMEN REJECTION    Narrative:     ORDER WAS CANCELLED 12/27/2021 15:28, Rejected: Specimen unlabeled. Performed at:  [facility]  [address], [place] [email or web address] [address]   Phone [phone]   LIPASE    Narrative:     Performed at:  [facility]  [address], [place] [facility] [address] [address]   Phone [phone] of labs reviewed and were negative at this time or not returned at the time of this note. RADIOLOGY:   Non-plain film images such as CT, Ultrasound and MRI are read by the radiologist. Daryl Suh PA-C have directly visualized the radiologic plain film image(s) with the below findings:      Interpretation per the Radiologist below, if available at the time of this note:    CT ABDOMEN PELVIS W IV CONTRAST Additional Contrast? None   Preliminary Result   1. Stable to mildly increased 6.2 cm right hepatic mass.    2. New 1 cm liver lesion adjacent to the falciform ligament worrisome for   disease progression. 3. No evidence of metastatic disease elsewhere. 4. No acute infectious or inflammatory process in the abdomen or pelvis. 5. 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 Suggest continued follow-up to assure long-term   stability. Moderate cortical atrophy and minimal chronic microischemic disease in the   deep white matter with no acute intracranial abnormality seen. RECOMMENDATIONS:   Unavailable         XR RIBS LEFT INCLUDE CHEST (MIN 3 VIEWS)   Final Result   No acute abnormality. No results found. MEDICAL DECISION MAKING / ED COURSE:      PROCEDURES:   Procedures    None    Patient was given:  Medications   iopamidol (ISOVUE-370) 76 % injection 75 mL (75 mLs IntraVENous Given 12/27/21 2200)   ondansetron (ZOFRAN) injection 4 mg (4 mg IntraVENous Given 12/27/21 2310)       Emergency room course: Patient on exam throat is clear. Nonerythematous no exudate. Cardiovascular regular rate rhythm, lungs are clear. No wheeze rales or rhonchi noted. Patient has some mild tenderness along the left lateral ribs with palpation. No step-off or crepitus noted. No flail chest noted. Abdomen shows some mild upper abdominal tenderness with palpation. No rebound or guarding noted. Nondistended. No CVA or flank tenderness. Bilateral lower extremities show no edema. Patient has full range of motion all extremity. No other motor or sensory deficit noted alert oriented x4. Does not appear to be in acute distress. Patient heart score is 4. Lab result from today shows:  CBC with a white count 12.0, RBC 4.68, hemoglobin 12.7, hematocrit 38.5. BMP unremarkable.     LFTs shows elevated alkaline phosphatase at 163. Normal ALT and AST. Lipase 22.0. Rapid Covid is not detected. X-ray of ribs show no acute abnormalities    CT of head shows as above. Chronic subdural hygromas. Represent continual follow-up to ensure long-term stability    CT of the abdomen and pelvis with IV contrast shows stable to mildly increased 6.2 cm right hepatic mass. A new 1 cm liver lesion adjacent to the falciform ligament worrisome for disease progression. No evidence of metastatic disease elsewhere. No acute infectious or inflammatory process in the abdomen or pelvis. See above. Did discuss patient results with his niece who is now in the room instead of his son to help interpret. Discussed following up with Dr. Gene Blackman his hepatocellular carcinoma doctor. As well as his primary care physician. I will put him on Ultram for pain. Return emergency room for any worsening. He was okay with this plan he will be discharged stable condition. The patient tolerated their visit well. I evaluated the patient. The physician was available for consultation as needed. The patient and / or the family were informed of the results of any tests, a time was given to answer questions, a plan was proposed and they agreed with plan. CLINICAL IMPRESSION:  1. Pain of upper abdomen    2. History of hepatocellular carcinoma    3. Non-intractable vomiting with nausea, unspecified vomiting type    4.  Rib pain on left side        DISPOSITION  DISPOSITION Decision To Discharge 12/27/2021 11:56:03 PM      PATIENT REFERRED TO:  Mk House MD  [address]  [phone]    Call in 1 day      [facility]  [address] [facility]  [phone]  Call   If symptoms worsen      DISCHARGE MEDICATIONS:  New Prescriptions    ONDANSETRON (ZOFRAN ODT) 4 MG DISINTEGRATING TABLET    Take 1-2 tablets by mouth every 12 hours as needed for Nausea    TRAMADOL (ULTRAM) 50 MG TABLET    Take 1 tablet by mouth every 6 hours as needed for Pain (MAY TAKE 2 TABS EVERY 6 HOURS FOR SEVERE PAIN) for up to 3 days.        DISCONTINUED MEDICATIONS:  Discontinued Medications    No medications on file              (Please note the MDM and HPI sections of this note were completed with a voice recognition program.  Efforts were made to edit the dictations but occasionally words are mis-transcribed.)    Electronically signed, Joyce Woo PA-C,          Joyce Woo PA-C  12/28/21 7082

## 2021-12-28 NOTE — ED TRIAGE NOTES
Pt arrived to dept via private vehicle with his youngest son. Pt speaks Lithuanian and son translates. Son states that the pt has been c/o head and left rib cage x 1 week. Pt's son states that the pt started with N/V this am. Hx of liver cancer that the pt's son states is in remission after he was in treatment for a year. Pt awake, alert and oriented x 3. Skin warm and dry/normal color for ethnicity. Resp easy and unlabored. Pt placed in gown and on cardiac monitor. Call light in reach. Will continue to monitor.

## 2021-12-28 NOTE — ED NOTES
.Pt discharged at this time. Discharge instructions and medications reviewed,  Questions were answered. PT verbalized understanding.      Follow up appointments were discussed with family and patient      Vilma Feliciano RN  12/28/21 9842

## 2022-06-03 ENCOUNTER — HOSPITAL ENCOUNTER (OUTPATIENT)
Dept: CT IMAGING | Age: 82
Discharge: HOME | End: 2022-06-03
Payer: MEDICAID

## 2022-06-03 DIAGNOSIS — C22.0 CARCINOMA OF LIVER (HCC): ICD-10-CM

## 2022-06-03 PROCEDURE — 74176 CT ABD & PELVIS W/O CONTRAST: CPT

## 2022-06-17 ENCOUNTER — HOSPITAL ENCOUNTER (INPATIENT)
Age: 82
LOS: 4 days | Discharge: HOME | DRG: 045 | End: 2022-06-21
Attending: EMERGENCY MEDICINE | Admitting: FAMILY MEDICINE
Payer: MEDICAID

## 2022-06-17 ENCOUNTER — APPOINTMENT (OUTPATIENT)
Dept: CT IMAGING | Age: 82
DRG: 045 | End: 2022-06-17
Payer: MEDICAID

## 2022-06-17 ENCOUNTER — APPOINTMENT (OUTPATIENT)
Dept: GENERAL RADIOLOGY | Age: 82
DRG: 045 | End: 2022-06-17
Payer: MEDICAID

## 2022-06-17 DIAGNOSIS — R93.0 ABNORMAL COMPUTED TOMOGRAPHY ANGIOGRAPHY OF HEAD: ICD-10-CM

## 2022-06-17 DIAGNOSIS — R29.810 FACIAL DROOP: Primary | ICD-10-CM

## 2022-06-17 DIAGNOSIS — E78.5 HYPERLIPIDEMIA, UNSPECIFIED HYPERLIPIDEMIA TYPE: ICD-10-CM

## 2022-06-17 PROBLEM — R20.2 PARESTHESIA: Status: ACTIVE | Noted: 2022-06-17

## 2022-06-17 LAB
A/G RATIO: 1.1 (ref 1.1–2.2)
ALBUMIN SERPL-MCNC: 4.1 G/DL (ref 3.4–5)
ALP BLD-CCNC: 133 U/L (ref 40–129)
ALT SERPL-CCNC: 15 U/L (ref 10–40)
ANION GAP SERPL CALCULATED.3IONS-SCNC: 11 MMOL/L (ref 3–16)
AST SERPL-CCNC: 26 U/L (ref 15–37)
BACTERIA: NORMAL /HPF
BASOPHILS ABSOLUTE: 0.1 K/UL (ref 0–0.2)
BASOPHILS RELATIVE PERCENT: 1.1 %
BILIRUB SERPL-MCNC: 0.3 MG/DL (ref 0–1)
BILIRUBIN URINE: NEGATIVE
BLOOD, URINE: NEGATIVE
BUN BLDV-MCNC: 20 MG/DL (ref 7–20)
CALCIUM SERPL-MCNC: 8.9 MG/DL (ref 8.3–10.6)
CHLORIDE BLD-SCNC: 102 MMOL/L (ref 99–110)
CLARITY: CLEAR
CO2: 22 MMOL/L (ref 21–32)
COLOR: YELLOW
CREAT SERPL-MCNC: 1.3 MG/DL (ref 0.8–1.3)
EKG ATRIAL RATE: 69 BPM
EKG DIAGNOSIS: NORMAL
EKG P AXIS: 54 DEGREES
EKG P-R INTERVAL: 182 MS
EKG Q-T INTERVAL: 416 MS
EKG QRS DURATION: 78 MS
EKG QTC CALCULATION (BAZETT): 445 MS
EKG R AXIS: 41 DEGREES
EKG T AXIS: 51 DEGREES
EKG VENTRICULAR RATE: 69 BPM
EOSINOPHILS ABSOLUTE: 0.1 K/UL (ref 0–0.6)
EOSINOPHILS RELATIVE PERCENT: 1 %
EPITHELIAL CELLS, UA: 1 /HPF (ref 0–5)
GFR AFRICAN AMERICAN: >60
GFR NON-AFRICAN AMERICAN: 53
GLUCOSE BLD-MCNC: 113 MG/DL (ref 70–99)
GLUCOSE URINE: NEGATIVE MG/DL
HCT VFR BLD CALC: 39.9 % (ref 40.5–52.5)
HEMOGLOBIN: 13.3 G/DL (ref 13.5–17.5)
HYALINE CASTS: 0 /LPF (ref 0–8)
KETONES, URINE: NEGATIVE MG/DL
LEUKOCYTE ESTERASE, URINE: NEGATIVE
LYMPHOCYTES ABSOLUTE: 2.8 K/UL (ref 1–5.1)
LYMPHOCYTES RELATIVE PERCENT: 32.2 %
MCH RBC QN AUTO: 28.4 PG (ref 26–34)
MCHC RBC AUTO-ENTMCNC: 33.2 G/DL (ref 31–36)
MCV RBC AUTO: 85.7 FL (ref 80–100)
MICROSCOPIC EXAMINATION: YES
MONOCYTES ABSOLUTE: 0.5 K/UL (ref 0–1.3)
MONOCYTES RELATIVE PERCENT: 6.2 %
NEUTROPHILS ABSOLUTE: 5.2 K/UL (ref 1.7–7.7)
NEUTROPHILS RELATIVE PERCENT: 59.5 %
NITRITE, URINE: NEGATIVE
PDW BLD-RTO: 12.8 % (ref 12.4–15.4)
PH UA: 6.5 (ref 5–8)
PLATELET # BLD: 280 K/UL (ref 135–450)
PMV BLD AUTO: 6.7 FL (ref 5–10.5)
POTASSIUM REFLEX MAGNESIUM: 4.6 MMOL/L (ref 3.5–5.1)
PROTEIN UA: 100 MG/DL
RBC # BLD: 4.66 M/UL (ref 4.2–5.9)
RBC UA: 1 /HPF (ref 0–4)
SODIUM BLD-SCNC: 135 MMOL/L (ref 136–145)
SPECIFIC GRAVITY UA: 1.02 (ref 1–1.03)
TOTAL PROTEIN: 7.9 G/DL (ref 6.4–8.2)
TROPONIN: <0.01 NG/ML
URINE REFLEX TO CULTURE: ABNORMAL
URINE TYPE: ABNORMAL
UROBILINOGEN, URINE: 0.2 E.U./DL
WBC # BLD: 8.8 K/UL (ref 4–11)
WBC UA: 1 /HPF (ref 0–5)

## 2022-06-17 PROCEDURE — 85025 COMPLETE CBC W/AUTO DIFF WBC: CPT

## 2022-06-17 PROCEDURE — 36415 COLL VENOUS BLD VENIPUNCTURE: CPT

## 2022-06-17 PROCEDURE — 6360000004 HC RX CONTRAST MEDICATION: Performed by: PHYSICIAN ASSISTANT

## 2022-06-17 PROCEDURE — 99285 EMERGENCY DEPT VISIT HI MDM: CPT

## 2022-06-17 PROCEDURE — 6370000000 HC RX 637 (ALT 250 FOR IP): Performed by: PHYSICIAN ASSISTANT

## 2022-06-17 PROCEDURE — 70498 CT ANGIOGRAPHY NECK: CPT

## 2022-06-17 PROCEDURE — 93005 ELECTROCARDIOGRAM TRACING: CPT | Performed by: PHYSICIAN ASSISTANT

## 2022-06-17 PROCEDURE — 2060000000 HC ICU INTERMEDIATE R&B

## 2022-06-17 PROCEDURE — 93010 ELECTROCARDIOGRAM REPORT: CPT | Performed by: INTERNAL MEDICINE

## 2022-06-17 PROCEDURE — 6370000000 HC RX 637 (ALT 250 FOR IP): Performed by: FAMILY MEDICINE

## 2022-06-17 PROCEDURE — 81001 URINALYSIS AUTO W/SCOPE: CPT

## 2022-06-17 PROCEDURE — 70450 CT HEAD/BRAIN W/O DYE: CPT

## 2022-06-17 PROCEDURE — 80053 COMPREHEN METABOLIC PANEL: CPT

## 2022-06-17 PROCEDURE — 71045 X-RAY EXAM CHEST 1 VIEW: CPT

## 2022-06-17 PROCEDURE — 84484 ASSAY OF TROPONIN QUANT: CPT

## 2022-06-17 RX ORDER — LABETALOL HYDROCHLORIDE 5 MG/ML
10 INJECTION, SOLUTION INTRAVENOUS EVERY 10 MIN PRN
Status: DISCONTINUED | OUTPATIENT
Start: 2022-06-17 | End: 2022-06-21 | Stop reason: HOSPADM

## 2022-06-17 RX ORDER — ONDANSETRON 4 MG/1
4 TABLET, ORALLY DISINTEGRATING ORAL EVERY 8 HOURS PRN
Status: DISCONTINUED | OUTPATIENT
Start: 2022-06-17 | End: 2022-06-21 | Stop reason: HOSPADM

## 2022-06-17 RX ORDER — ASPIRIN 81 MG/1
81 TABLET ORAL DAILY
COMMUNITY

## 2022-06-17 RX ORDER — ONDANSETRON 2 MG/ML
4 INJECTION INTRAMUSCULAR; INTRAVENOUS EVERY 6 HOURS PRN
Status: DISCONTINUED | OUTPATIENT
Start: 2022-06-17 | End: 2022-06-21 | Stop reason: HOSPADM

## 2022-06-17 RX ORDER — ROSUVASTATIN CALCIUM 40 MG/1
40 TABLET, COATED ORAL NIGHTLY
Status: DISCONTINUED | OUTPATIENT
Start: 2022-06-17 | End: 2022-06-21 | Stop reason: HOSPADM

## 2022-06-17 RX ORDER — LEVOTHYROXINE SODIUM 0.1 MG/1
100 TABLET ORAL DAILY
Status: DISCONTINUED | OUTPATIENT
Start: 2022-06-18 | End: 2022-06-21 | Stop reason: HOSPADM

## 2022-06-17 RX ORDER — HYDROXYZINE HYDROCHLORIDE 25 MG/1
25 TABLET, FILM COATED ORAL 3 TIMES DAILY
Status: ON HOLD | COMMUNITY
End: 2022-07-05 | Stop reason: HOSPADM

## 2022-06-17 RX ORDER — AMLODIPINE BESYLATE 5 MG/1
5 TABLET ORAL DAILY
Status: DISCONTINUED | OUTPATIENT
Start: 2022-06-18 | End: 2022-06-21 | Stop reason: HOSPADM

## 2022-06-17 RX ORDER — TRAMADOL HYDROCHLORIDE 50 MG/1
50 TABLET ORAL EVERY 6 HOURS PRN
Status: DISCONTINUED | OUTPATIENT
Start: 2022-06-17 | End: 2022-06-17

## 2022-06-17 RX ORDER — ASPIRIN 300 MG/1
300 SUPPOSITORY RECTAL DAILY
Status: DISCONTINUED | OUTPATIENT
Start: 2022-06-18 | End: 2022-06-21 | Stop reason: HOSPADM

## 2022-06-17 RX ORDER — OMEPRAZOLE 10 MG/1
40 CAPSULE, DELAYED RELEASE ORAL DAILY
Status: DISCONTINUED | OUTPATIENT
Start: 2022-06-18 | End: 2022-06-17 | Stop reason: CLARIF

## 2022-06-17 RX ORDER — BUDESONIDE AND FORMOTEROL FUMARATE DIHYDRATE 160; 4.5 UG/1; UG/1
2 AEROSOL RESPIRATORY (INHALATION) 2 TIMES DAILY
Status: DISCONTINUED | OUTPATIENT
Start: 2022-06-17 | End: 2022-06-17 | Stop reason: CLARIF

## 2022-06-17 RX ORDER — HYDRALAZINE HYDROCHLORIDE 20 MG/ML
10 INJECTION INTRAMUSCULAR; INTRAVENOUS EVERY 4 HOURS PRN
Status: DISCONTINUED | OUTPATIENT
Start: 2022-06-17 | End: 2022-06-21 | Stop reason: HOSPADM

## 2022-06-17 RX ORDER — PANTOPRAZOLE SODIUM 40 MG/1
40 TABLET, DELAYED RELEASE ORAL
Status: DISCONTINUED | OUTPATIENT
Start: 2022-06-18 | End: 2022-06-21 | Stop reason: HOSPADM

## 2022-06-17 RX ORDER — ASPIRIN 81 MG/1
324 TABLET, CHEWABLE ORAL ONCE
Status: COMPLETED | OUTPATIENT
Start: 2022-06-17 | End: 2022-06-17

## 2022-06-17 RX ORDER — POLYETHYLENE GLYCOL 3350 17 G/17G
17 POWDER, FOR SOLUTION ORAL DAILY PRN
Status: DISCONTINUED | OUTPATIENT
Start: 2022-06-17 | End: 2022-06-21 | Stop reason: HOSPADM

## 2022-06-17 RX ORDER — ENOXAPARIN SODIUM 100 MG/ML
40 INJECTION SUBCUTANEOUS DAILY
Status: DISCONTINUED | OUTPATIENT
Start: 2022-06-18 | End: 2022-06-21 | Stop reason: HOSPADM

## 2022-06-17 RX ORDER — HYDROCHLOROTHIAZIDE 25 MG/1
25 TABLET ORAL DAILY
Status: DISCONTINUED | OUTPATIENT
Start: 2022-06-18 | End: 2022-06-21 | Stop reason: HOSPADM

## 2022-06-17 RX ORDER — ASPIRIN 81 MG/1
81 TABLET ORAL DAILY
Status: DISCONTINUED | OUTPATIENT
Start: 2022-06-18 | End: 2022-06-21 | Stop reason: HOSPADM

## 2022-06-17 RX ADMIN — ROSUVASTATIN CALCIUM 40 MG: 40 TABLET, FILM COATED ORAL at 23:51

## 2022-06-17 RX ADMIN — ASPIRIN 324 MG: 81 TABLET, CHEWABLE ORAL at 18:25

## 2022-06-17 RX ADMIN — IOPAMIDOL 75 ML: 755 INJECTION, SOLUTION INTRAVENOUS at 15:51

## 2022-06-17 ASSESSMENT — LIFESTYLE VARIABLES: HOW OFTEN DO YOU HAVE A DRINK CONTAINING ALCOHOL: NEVER

## 2022-06-17 ASSESSMENT — ENCOUNTER SYMPTOMS
CHEST TIGHTNESS: 0
PHOTOPHOBIA: 0
BACK PAIN: 0
ABDOMINAL PAIN: 0
COUGH: 0
SHORTNESS OF BREATH: 0
CONSTIPATION: 0
RESPIRATORY NEGATIVE: 1
DIARRHEA: 0
NAUSEA: 0
VOMITING: 0

## 2022-06-17 NOTE — ED PROVIDER NOTES
more for level 5)     Review of Systems   Constitutional: Negative for activity change, appetite change, chills, diaphoresis, fatigue and fever. Eyes: Negative for photophobia and visual disturbance. Respiratory: Negative. Negative for cough, chest tightness and shortness of breath. Cardiovascular: Negative. Negative for chest pain, palpitations and leg swelling. Gastrointestinal: Negative for abdominal pain, constipation, diarrhea, nausea and vomiting. Genitourinary: Negative for decreased urine volume, difficulty urinating, dysuria, flank pain, frequency, hematuria and urgency. Musculoskeletal: Negative for arthralgias, back pain, myalgias, neck pain and neck stiffness. Neurological: Positive for facial asymmetry, speech difficulty, weakness and numbness. Negative for dizziness, tremors, seizures, syncope, light-headedness and headaches. Positives and Pertinent negatives as per HPI. Except as noted above in the ROS, all other systems were reviewed and negative. PAST MEDICAL HISTORY     Past Medical History:   Diagnosis Date    Asthma     Diabetes mellitus ([facility] [address]     Hypertension     Thyroid disease          SURGICAL HISTORY   History reviewed. No pertinent surgical history.       CURRENTMEDICATIONS       Previous Medications    ALBUTEROL SULFATE HFA (VENTOLIN HFA) 108 (90 BASE) MCG/ACT INHALER    Inhale 2 puffs into the lungs 4 times daily as needed for Wheezing    AMLODIPINE (NORVASC) 2.5 MG TABLET    Take 1 tablet by mouth daily    BUDESONIDE-FORMOTEROL (SYMBICORT) 160-4.5 MCG/ACT AERO    Inhale 2 puffs into the lungs 2 times daily    HYDROCHLOROTHIAZIDE (HYDRODIURIL) 25 MG TABLET    Take 25 mg by mouth daily    IBUPROFEN (ADVIL;MOTRIN) 800 MG TABLET    Take 800 mg by mouth every 6 hours as needed for Pain    LENVATINIB MESYLATE (LENVATINIB, 8 MG DAILY DOSE, PO)    Take by mouth    LEVOTHYROXINE (SYNTHROID) 100 MCG TABLET    Take 100 mcg by mouth Daily    LISINOPRIL (PRINIVIL;ZESTRIL) 20 MG TABLET    Take 20 mg by mouth daily    METFORMIN (GLUCOPHAGE) 500 MG TABLET    Take 500 mg by mouth daily (with breakfast)    OMEPRAZOLE (PRILOSEC) 20 MG DELAYED RELEASE CAPSULE    Take 40 mg by mouth daily    ONDANSETRON (ZOFRAN ODT) 4 MG DISINTEGRATING TABLET    Take 1-2 tablets by mouth every 12 hours as needed for Nausea    ONDANSETRON (ZOFRAN) 4 MG TABLET    Take 1 tablet by mouth daily as needed for Nausea or Vomiting    PROCHLORPERAZINE (COMPAZINE) 10 MG TABLET    Take 1 tablet by mouth every 6 hours as needed (pain, nausea)    TRAMADOL (ULTRAM) 50 MG TABLET    Take 50 mg by mouth every 6 hours as needed for Pain. ALLERGIES     Patient has no known allergies. FAMILYHISTORY     History reviewed. No pertinent family history. SOCIAL HISTORY       Social History     Tobacco Use    Smoking status: Former Smoker     Packs/day: 1.00     Years: 15.00     Pack years: 15.00     Types: Cigarettes     Quit date: 1980     Years since quittin.4    Smokeless tobacco: Never Used    Tobacco comment: smoked 3 cigaretts a day   Vaping Use    Vaping Use: Never used   Substance Use Topics    Alcohol use: Never    Drug use: Never       SCREENINGS    [place] Coma Scale  Eye Opening: Spontaneous  Best Verbal Response: Oriented  Best Motor Response: Obeys commands  [place] Coma Scale Score: 15        PHYSICAL EXAM    (up to 7 for level 4, 8 or more for level 5)     ED Triage Vitals [22 1249]   BP Temp Temp src Heart Rate Resp SpO2 Height Weight   (!) 159/83 97.8 °F (36.6 °C) -- 78 16 97 % -- --       Physical Exam  Constitutional:       General: He is not in acute distress. Appearance: Normal appearance. He is well-developed. He is not ill-appearing, toxic-appearing or diaphoretic. HENT:      Head: Normocephalic and atraumatic. Right Ear: External ear normal.      Left Ear: External ear normal.   Eyes:      General:         Right eye: No discharge.          Left eye: No discharge. Extraocular Movements: Extraocular movements intact. Conjunctiva/sclera: Conjunctivae normal.      Pupils: Pupils are equal, round, and reactive to light. Comments: No nystagmus. Negative test of skew. Cardiovascular:      Rate and Rhythm: Normal rate and regular rhythm. Pulses: Normal pulses. Heart sounds: Normal heart sounds. No murmur heard. No friction rub. No gallop. Comments: 2+ radial pulses bilaterally. No pedal edema. No calf tenderness. No JVD. Pulmonary:      Effort: Pulmonary effort is normal. No respiratory distress. Breath sounds: Normal breath sounds. No stridor. No wheezing, rhonchi or rales. Chest:      Chest wall: No tenderness. Abdominal:      General: Abdomen is flat. Bowel sounds are normal. There is no distension. Palpations: Abdomen is soft. There is no mass. Tenderness: There is no abdominal tenderness. There is no right CVA tenderness, left CVA tenderness, guarding or rebound. Hernia: No hernia is present. Musculoskeletal:         General: Normal range of motion. Cervical back: Normal range of motion and neck supple. Skin:     General: Skin is warm and dry. Coloration: Skin is not pale. Findings: No erythema. Neurological:      General: No focal deficit present. Mental Status: He is alert and oriented to person, place, and time. GCS: GCS eye subscore is 4. GCS verbal subscore is 5. GCS motor subscore is 6. Cranial Nerves: Facial asymmetry present. No cranial nerve deficit or dysarthria. Sensory: Sensation is intact. No sensory deficit. Motor: Motor function is intact. No weakness. Coordination: Coordination is intact. Heel to [facility] Test normal.      Gait: Gait is intact. Gait normal.      Comments: Patient is alert and oriented. He appears to have right-sided facial droop and inability to wrinkle the right-sided forehead.   Able to close eyes bilaterally without any weakness to the right eye with comparison to the left. EOMs intact. PERRLA. Speech is clear. There is no drooling on exam.  He has sensation intact to light touch to the face bilaterally and also to the upper and lower extremities bilaterally. There is full range of motion strength to the upper and lower extremities throughout without any focal weakness or deficit. He is able to ambulate here in the ED with his cane. He has normal heel-to-shin. There is not appear to be any ataxia. Psychiatric:         Behavior: Behavior normal.         DIAGNOSTIC RESULTS   LABS:    Labs Reviewed   CBC WITH AUTO DIFFERENTIAL - Abnormal; Notable for the following components:       Result Value    Hemoglobin 13.3 (*)     Hematocrit 39.9 (*)     All other components within normal limits   COMPREHENSIVE METABOLIC PANEL W/ REFLEX TO MG FOR LOW K - Abnormal; Notable for the following components:    Sodium 135 (*)     Glucose 113 (*)     GFR Non-African American 53 (*)     Alkaline Phosphatase 133 (*)     All other components within normal limits   URINALYSIS WITH REFLEX TO CULTURE - Abnormal; Notable for the following components:    Protein,  (*)     All other components within normal limits   TROPONIN   MICROSCOPIC URINALYSIS       When ordered only abnormal lab results are displayed. All other labs were within normal range or not returned as of this dictation. EKG: When ordered, EKG's are interpreted by the Emergency Department Physician in the absence of a cardiologist.  Please see their note for interpretation of EKG. RADIOLOGY:   Non-plain film images such as CT, Ultrasound and MRI are read by the radiologist. Plain radiographic images are visualized and preliminarily interpreted by the ED Provider with the below findings:        Interpretation per the Radiologist below, if available at the time of this note:     [facility]   Final Result   1. No acute intracranial abnormality.    2. 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 75% stenosis in the cavernous/supraclinoid segment of the left internal   carotid artery. 4. Right dominance of the vertebral arteries with hypoplastic intracranial   left vertebral artery. Superimposed occlusion in the mid to distal   intracranial left vertebral artery. 5. 80% stenosis in the proximal V4 segment of right vertebral artery. 6. 80% stenosis in the proximal P2 segment of the left PCA. 50% stenosis in   the proximal P2 segment of the right PCA. 7. 95% stenosis in the proximal right internal carotid artery. 8. 85% stenosis in the proximal left internal carotid artery. 9. 40% stenosis in the proximal left common carotid artery. 10. Right dominance of the vertebral arteries with severe stenosis in the   proximal V1 segment of the left vertebral artery. 11. 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 12. 65% stenosis in the mid left subclavian artery. 13. 40% stenosis in the distal right subclavian artery. 14. Airspace opacity in the right upper lobe with volume loss, likely related   to chronic scarring, grossly stable since July 2, 2021. Mild pericardial   effusion. CT HEAD WO CONTRAST   Final Result   1. No acute intracranial abnormality. 2. 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 75% stenosis in the cavernous/supraclinoid segment of the left internal   carotid artery. 4. Right dominance of the vertebral arteries with hypoplastic intracranial   left vertebral artery. Superimposed occlusion in the mid to distal   intracranial left vertebral artery. 5. 80% stenosis in the proximal V4 segment of right vertebral artery. 6. 80% stenosis in the proximal P2 segment of the left PCA. 50% stenosis in   the proximal P2 segment of the right PCA. 7. 95% stenosis in the proximal right internal carotid artery.    8. 85% stenosis in the proximal left internal carotid artery. 9. 40% stenosis in the proximal left common carotid artery. 10. Right dominance of the vertebral arteries with severe stenosis in the   proximal V1 segment of the left vertebral artery. 11. 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 12. 65% stenosis in the mid left subclavian artery. 13. 40% stenosis in the distal right subclavian artery. 14. Airspace opacity in the right upper lobe with volume loss, likely related   to chronic scarring, grossly stable since July 2, 2021. Mild pericardial   effusion. XR CHEST PORTABLE   Final Result   Stable scarring right apex      No acute cardiopulmonary process           No results found. PROCEDURES   Unless otherwise noted below, none     Procedures    CRITICAL CARE TIME       CONSULTS:  None      EMERGENCY DEPARTMENT COURSE and DIFFERENTIAL DIAGNOSIS/MDM:   Vitals:    Vitals:    06/17/22 1715 06/17/22 1730 06/17/22 1745 06/17/22 1800   BP: (!) 161/76 138/74 (!) 164/75 (!) 172/79   Pulse: 70 68 72 72   Resp: 12 14 15 16   Temp:       SpO2: 94% 94% 94% 95%       Patient was given the following medications:  Medications   aspirin chewable tablet 324 mg (has no administration in time range)   iopamidol (ISOVUE-370) 76 % injection 75 mL (75 mLs IntraVENous Given 6/17/22 1551)         Is this patient to be included in the SEP-1 Core Measure due to severe sepsis or septic shock? No   Exclusion criteria - the patient is NOT to be included for SEP-1 Core Measure due to: Infection is not suspected    Patient is an 69-year-old male who presents to the ED with complaint of left arm and leg weakness/numbness. He states he feels like his left face is weak. Upon examination he appears to have a right-sided facial droop. Appears to not be able to wrinkle of the right-sided forehead.   Concern for potential Bell's palsy but he states the left side of his face feels weak and also his left arm and leg. On exam other than the facial droop there does not appear to be any focal deficit on his neuro exam.  He is able to ambulate here in the ED without any ataxia with his cane. IV was established and blood work obtained. Urinalysis showed no signs of infection. CBC showed normal white count and platelets. Hemoglobin 13.3. CMP relatively unremarkable. Troponin was normal.  EKG inter by attending. Chest x-ray relatively unremarkable. CT of the head and CTA of the head/neck ordered at this time. There is severe stenosis noted to the proximal M2 branch of the left MCA. There does appear to be superimposed occlusion to the mid to distal intracranial left vertebral artery. Stenosis noted to multiple vessels on CTA. Given history and physical examination believe patient would benefit from admission for further evaluation and treatment. Concern for facial droop that could been to be due to Bell's palsy but cannot rule out CVA at this time especially given patient's complaint of the left arm and leg symptoms. Was given aspirin here in the ED. Case discussed with hospital service who graciously agreed accept patient for admission at this time. Stroke alert was not called given his ongoing symptoms for the past 4 days prior to arrival.    FINAL IMPRESSION      1. Facial droop    2. Abnormal computed tomography angiography of head          DISPOSITION/PLAN   DISPOSITION Admitted 06/17/2022 06:07:26 PM      PATIENT REFERRED TO:  No follow-up provider specified.     DISCHARGE MEDICATIONS:  New Prescriptions    No medications on file       DISCONTINUED MEDICATIONS:  Discontinued Medications    No medications on file              (Please note that portions of this note were completed with a voice recognition program.  Efforts were made to edit the dictations but occasionally words are mis-transcribed.)    EDEN Velasquez (electronically signed)         EDEN Ramirez  06/17/22 1828

## 2022-06-17 NOTE — ED NOTES
Pt. Refused to use urinal, family states that he walks fine, pt. Ambulatory to restroom, gait slow but steady. Placed back into bed without incident.      Zbigniew Grove RN  06/17/22 1640

## 2022-06-17 NOTE — H&P
HOSPITALISTS HISTORY AND PHYSICAL    6/17/2022 6:43 PM    Patient Information:  Daljit iWn is a 80 y.o. male [id number]  PCP:  No primary care provider on file. (Tel: None )    Chief complaint:    Chief Complaint   Patient presents with    Numbness     patty  [phone], numbness to L side of face and body x3 days    *    History of Present Illness:  Norah Fung is a 80 y.o. male with h.o  HTN, DM , Hypothyroidism, presented with c/o left sided facial numbness and weakness. Grand daughter at bedside translated. Reports occasional cramps in legs. Facial weakness has resolved . Denies slurred speech or difficulty swallowing. Denies h./o CVA  . CT head and CTA head and neck showed no acute hemorrhage or infarct. Showed severe stenosis of Left MCA and multiple other high grade stenosis involving Right and Left ICA, PCA, and vertebral arteries. REVIEW OF SYSTEMS:   Constitutional: Negative for fever,chills or night sweats  ENT: Negative for rhinorrhea, epistaxis, hoarseness, sore throat. Respiratory: Negative for shortness of breath,wheezing  Cardiovascular: Negative for chest pain, palpitations   Gastrointestinal: Negative for nausea, vomiting, diarrhea  Genitourinary: Negative for polyuria, dysuria   Hematologic/Lymphatic: Negative for bleeding tendency, easy bruising  Musculoskeletal: Negative for myalgias   +Ve arthralgias, +ve numbess   Neurologic: Negative for confusion,dysarthria. Skin: Negative for itching,rash  Psychiatric: Negative for depression,anxiety, agitation. Endocrine: Negative for polydipsia,polyuria,heat /cold intolerance. Past Medical History:   has a past medical history of Asthma, Diabetes mellitus ([address] Hypertension, and Thyroid disease. Past Surgical History:   has no past surgical history on file.      Medications:  No current facility-administered medications on file prior to encounter. Current Outpatient Medications on File Prior to Encounter   Medication Sig Dispense Refill    ondansetron (ZOFRAN ODT) 4 MG disintegrating tablet Take 1-2 tablets by mouth every 12 hours as needed for Nausea 12 tablet 0    Lenvatinib Mesylate (LENVATINIB, 8 MG DAILY DOSE, PO) Take by mouth      budesonide-formoterol (SYMBICORT) 160-4.5 MCG/ACT AERO Inhale 2 puffs into the lungs 2 times daily 3 Inhaler 1    albuterol sulfate HFA (VENTOLIN HFA) 108 (90 Base) MCG/ACT inhaler Inhale 2 puffs into the lungs 4 times daily as needed for Wheezing 3 Inhaler 1    amLODIPine (NORVASC) 2.5 MG tablet Take 1 tablet by mouth daily 30 tablet 0    lisinopril (PRINIVIL;ZESTRIL) 20 MG tablet Take 20 mg by mouth daily      ibuprofen (ADVIL;MOTRIN) 800 MG tablet Take 800 mg by mouth every 6 hours as needed for Pain      hydroCHLOROthiazide (HYDRODIURIL) 25 MG tablet Take 25 mg by mouth daily      metFORMIN (GLUCOPHAGE) 500 MG tablet Take 500 mg by mouth daily (with breakfast)      levothyroxine (SYNTHROID) 100 MCG tablet Take 100 mcg by mouth Daily      omeprazole (PRILOSEC) 20 MG delayed release capsule Take 40 mg by mouth daily      traMADol (ULTRAM) 50 MG tablet Take 50 mg by mouth every 6 hours as needed for Pain.  prochlorperazine (COMPAZINE) 10 MG tablet Take 1 tablet by mouth every 6 hours as needed (pain, nausea) 120 tablet 0    ondansetron (ZOFRAN) 4 MG tablet Take 1 tablet by mouth daily as needed for Nausea or Vomiting 30 tablet 0       Allergies:  No Known Allergies     Social History:  Patient Lives    reports that he quit smoking about 42 years ago. His smoking use included cigarettes. He has a 15.00 pack-year smoking history. He has never used smokeless tobacco. He reports that he does not drink alcohol and does not use drugs. Family History:  family history is not on file.  , family history reviewed not pertinent to current admission    Physical Exam:  BP (!) 172/79 Pulse 72   Temp 97.8 °F (36.6 °C)   Resp 16   SpO2 95%     General appearance:  Appears comfortable. Well nourished  Eyes: Sclera clear, pupils equal  ENT: Moist mucus membranes, no thrush. Trachea midline. Cardiovascular: Regular rhythm, normal S1, S2. No murmur, gallop, rub. No edema in lower extremities  Respiratory: Clear to auscultation bilaterally, no wheeze, good inspiratory effort  Gastrointestinal: Abdomen soft, non-tender, not distended, normal bowel sounds  Musculoskeletal: No cyanosis in digits, neck supple  Neurology: Cranial nerves grossly intact. Alert and oriented in time, place and person. No speech or motor deficits  Psychiatry: Appropriate affect. Not agitated  Skin: Warm, dry, normal turgor, no rash  Brisk capillary refill, peripheral pulses palpable   Labs:  CBC:   Lab Results   Component Value Date    WBC 8.8 06/17/2022    RBC 4.66 06/17/2022    HGB 13.3 06/17/2022    HCT 39.9 06/17/2022    MCV 85.7 06/17/2022    MCH 28.4 06/17/2022    MCHC 33.2 06/17/2022    RDW 12.8 06/17/2022     06/17/2022    MPV 6.7 06/17/2022     BMP:    Lab Results   Component Value Date     06/17/2022    K 4.6 06/17/2022     06/17/2022    CO2 22 06/17/2022    BUN 20 06/17/2022    CREATININE 1.3 06/17/2022    CALCIUM 8.9 06/17/2022    GFRAA >60 06/17/2022    LABGLOM 53 06/17/2022    GLUCOSE 113 06/17/2022     CTA HEAD NECK W CONTRAST   Final Result   1. No acute intracranial abnormality. 2. 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 75% stenosis in the cavernous/supraclinoid segment of the left internal   carotid artery. 4. Right dominance of the vertebral arteries with hypoplastic intracranial   left vertebral artery. Superimposed occlusion in the mid to distal   intracranial left vertebral artery. 5. 80% stenosis in the proximal V4 segment of right vertebral artery. 6. 80% stenosis in the proximal P2 segment of the left PCA.   50% stenosis in   the proximal P2 segment of the right PCA. 7. 95% stenosis in the proximal right internal carotid artery. 8. 85% stenosis in the proximal left internal carotid artery. 9. 40% stenosis in the proximal left common carotid artery. 10. Right dominance of the vertebral arteries with severe stenosis in the   proximal V1 segment of the left vertebral artery. 11. 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 12. 65% stenosis in the mid left subclavian artery. 13. 40% stenosis in the distal right subclavian artery. 14. Airspace opacity in the right upper lobe with volume loss, likely related   to chronic scarring, grossly stable since July 2, 2021. Mild pericardial   effusion. CT HEAD WO CONTRAST   Final Result   1. No acute intracranial abnormality. 2. 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 75% stenosis in the cavernous/supraclinoid segment of the left internal   carotid artery. 4. Right dominance of the vertebral arteries with hypoplastic intracranial   left vertebral artery. Superimposed occlusion in the mid to distal   intracranial left vertebral artery. 5. 80% stenosis in the proximal V4 segment of right vertebral artery. 6. 80% stenosis in the proximal P2 segment of the left PCA. 50% stenosis in   the proximal P2 segment of the right PCA. 7. 95% stenosis in the proximal right internal carotid artery. 8. 85% stenosis in the proximal left internal carotid artery. 9. 40% stenosis in the proximal left common carotid artery. 10. Right dominance of the vertebral arteries with severe stenosis in the   proximal V1 segment of the left vertebral artery. 11. 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 12. 65% stenosis in the mid left subclavian artery. 13. 40% stenosis in the distal right subclavian artery.    14. Airspace opacity in the right upper lobe with volume loss, likely related   to chronic scarring, grossly stable since July 2, 2021. Mild pericardial   effusion. XR CHEST PORTABLE   Final Result   Stable scarring right apex      No acute cardiopulmonary process           Chest Xray:   EKG:    I visualized CXR images and EKG strips    Discussed case  with     Problem List  Principal Problem:    Paresthesia  Resolved Problems:    * No resolved hospital problems. *        Assessment/Plan:   PAresthesias and facial droop admit to r/out CVA/ TIA  The following medications, tests and consults have been ordered;  - CT of the head is negative for acute findings   -MRI of the brain, EChocardiogram pending  -  A1C, lipid panel  Aspirin has been given  Started on Statins  - Keep NPO Speech and language therapy and PT/OT consulted   - Neurology consult in place  Permissive HTN       DVT prophylaxis   Code status   Diet   IV access   Moser Catheter    Admit as inpatien. I anticipate hospitalization spanning more than two midnights for investigation and treatment of the above medically necessary diagnoses. Please note that some part of this chart was generated using Dragon dictation software. Although every effort was made to ensure the accuracy of this automated transcription, some errors in transcription may have occurred inadvertently. If you may need any clarification, please do not hesitate to contact me through [facility].        Clarissa Caal MD    6/17/2022 6:43 PM

## 2022-06-17 NOTE — ED PROVIDER NOTES
This patient was seen by the Mid-Level Provider. I have seen and examined the patient, agree with the workup, evaluation, management and diagnosis. Care plan has been discussed. My assessment reveals an 79-year-old male who presents with numbness. This is an 79-year-old Marika male who is a poor historian who presents with numbness of the left side of his body. Patient stated symptoms for 3 or 4 days. He denies any other complaints. He denies any headaches. He denies any fevers or chills. Radiology results:    CTA HEAD NECK W CONTRAST   Final Result   1. No acute intracranial abnormality. 2. 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 75% stenosis in the cavernous/supraclinoid segment of the left internal   carotid artery. 4. Right dominance of the vertebral arteries with hypoplastic intracranial   left vertebral artery. Superimposed occlusion in the mid to distal   intracranial left vertebral artery. 5. 80% stenosis in the proximal V4 segment of right vertebral artery. 6. 80% stenosis in the proximal P2 segment of the left PCA. 50% stenosis in   the proximal P2 segment of the right PCA. 7. 95% stenosis in the proximal right internal carotid artery. 8. 85% stenosis in the proximal left internal carotid artery. 9. 40% stenosis in the proximal left common carotid artery. 10. Right dominance of the vertebral arteries with severe stenosis in the   proximal V1 segment of the left vertebral artery. 11. 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 12. 65% stenosis in the mid left subclavian artery. 13. 40% stenosis in the distal right subclavian artery. 14. Airspace opacity in the right upper lobe with volume loss, likely related   to chronic scarring, grossly stable since July 2, 2021. Mild pericardial   effusion. CT HEAD WO CONTRAST   Final Result   1.  No acute intracranial abnormality. 2. 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 75% stenosis in the cavernous/supraclinoid segment of the left internal   carotid artery. 4. Right dominance of the vertebral arteries with hypoplastic intracranial   left vertebral artery. Superimposed occlusion in the mid to distal   intracranial left vertebral artery. 5. 80% stenosis in the proximal V4 segment of right vertebral artery. 6. 80% stenosis in the proximal P2 segment of the left PCA. 50% stenosis in   the proximal P2 segment of the right PCA. 7. 95% stenosis in the proximal right internal carotid artery. 8. 85% stenosis in the proximal left internal carotid artery. 9. 40% stenosis in the proximal left common carotid artery. 10. Right dominance of the vertebral arteries with severe stenosis in the   proximal V1 segment of the left vertebral artery. 11. 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 12. 65% stenosis in the mid left subclavian artery. 13. 40% stenosis in the distal right subclavian artery. 14. Airspace opacity in the right upper lobe with volume loss, likely related   to chronic scarring, grossly stable since July 2, 2021. Mild pericardial   effusion.          XR CHEST PORTABLE   Final Result   Stable scarring right apex      No acute cardiopulmonary process               LABS:    Labs Reviewed   CBC WITH AUTO DIFFERENTIAL - Abnormal; Notable for the following components:       Result Value    Hemoglobin 13.3 (*)     Hematocrit 39.9 (*)     All other components within normal limits   COMPREHENSIVE METABOLIC PANEL W/ REFLEX TO MG FOR LOW K - Abnormal; Notable for the following components:    Sodium 135 (*)     Glucose 113 (*)     GFR Non-African American 53 (*)     Alkaline Phosphatase 133 (*)     All other components within normal limits   URINALYSIS WITH REFLEX TO CULTURE - Abnormal; Notable for the following components:    Protein,  (*)     All other components within normal limits   TROPONIN   MICROSCOPIC URINALYSIS           EKG:    Sinus rhythm at a rate of 69 beats a minute with no acute ST elevations or depressions or pathologic Q waves. Normal normal axis. Exam:    Well-nourished male in no acute distress. Interestingly, the patient appeared to have some mild weakness of the right side of his mouth and possibly forehead. He had good strength otherwise. He had no other focal motor or sensory deficits throughout. Medical decision makin-year-old male who presents with a complaint of numbness of the left side of his arm and leg. However, the patient's objective work-up is more consistent with his of possible weakness of the right side of his face. This appears to be more consistent with a possible Bell's palsy given the forehead involvement as well. However, the patient's work-up did include a stroke work-up and a stroke team was consulted. His work-up did show some occlusion of his left vertebral artery. However, this is 3 to 4 days out. We felt it was necessary to admit the patient for further care, MRI and further work-up. He will be admitted in stable condition. He does not have a new LVO eligible for treatment at this time. FINAL IMPRESSION:    1. Facial droop    2. Abnormal computed tomography angiography of head      Critical care time: 45 minutes of critical care time spent with this patient.      Ivone Montes MD  [id number]

## 2022-06-17 NOTE — ED NOTES
Patient states he started feeling weak on 6/15, saw his PCP who placed him on aspirin and told him to go to the ER if things gets worse.  Patient complaints of weakness and L side feeling numb x3 days     Cecelia Arshad RN  06/17/22 0371

## 2022-06-18 ENCOUNTER — APPOINTMENT (OUTPATIENT)
Dept: MRI IMAGING | Age: 82
DRG: 045 | End: 2022-06-18
Payer: MEDICAID

## 2022-06-18 LAB
ANION GAP SERPL CALCULATED.3IONS-SCNC: 8 MMOL/L (ref 3–16)
BUN BLDV-MCNC: 19 MG/DL (ref 7–20)
CALCIUM SERPL-MCNC: 9.3 MG/DL (ref 8.3–10.6)
CHLORIDE BLD-SCNC: 106 MMOL/L (ref 99–110)
CHOLESTEROL, TOTAL: 243 MG/DL (ref 0–199)
CO2: 24 MMOL/L (ref 21–32)
CREAT SERPL-MCNC: 1.3 MG/DL (ref 0.8–1.3)
ESTIMATED AVERAGE GLUCOSE: 128.4 MG/DL
GFR AFRICAN AMERICAN: >60
GFR NON-AFRICAN AMERICAN: 53
GLUCOSE BLD-MCNC: 123 MG/DL (ref 70–99)
GLUCOSE BLD-MCNC: 140 MG/DL (ref 70–99)
GLUCOSE BLD-MCNC: 179 MG/DL (ref 70–99)
GLUCOSE BLD-MCNC: 195 MG/DL (ref 70–99)
GLUCOSE BLD-MCNC: 387 MG/DL (ref 70–99)
GLUCOSE BLD-MCNC: 61 MG/DL (ref 70–99)
GLUCOSE BLD-MCNC: 63 MG/DL (ref 70–99)
GLUCOSE BLD-MCNC: 84 MG/DL (ref 70–99)
GLUCOSE BLD-MCNC: 89 MG/DL (ref 70–99)
HBA1C MFR BLD: 6.1 %
HCT VFR BLD CALC: 37.9 % (ref 40.5–52.5)
HDLC SERPL-MCNC: 42 MG/DL (ref 40–60)
HEMOGLOBIN: 12.5 G/DL (ref 13.5–17.5)
LDL CHOLESTEROL CALCULATED: 159 MG/DL
LV EF: 70 %
LVEF MODALITY: NORMAL
MCH RBC QN AUTO: 28.3 PG (ref 26–34)
MCHC RBC AUTO-ENTMCNC: 32.9 G/DL (ref 31–36)
MCV RBC AUTO: 86 FL (ref 80–100)
PDW BLD-RTO: 13 % (ref 12.4–15.4)
PERFORMED ON: ABNORMAL
PERFORMED ON: NORMAL
PLATELET # BLD: 275 K/UL (ref 135–450)
PMV BLD AUTO: 6.6 FL (ref 5–10.5)
POTASSIUM SERPL-SCNC: 4.7 MMOL/L (ref 3.5–5.1)
RBC # BLD: 4.4 M/UL (ref 4.2–5.9)
SODIUM BLD-SCNC: 138 MMOL/L (ref 136–145)
TRIGL SERPL-MCNC: 208 MG/DL (ref 0–150)
VLDLC SERPL CALC-MCNC: 42 MG/DL
WBC # BLD: 7.5 K/UL (ref 4–11)

## 2022-06-18 PROCEDURE — 6370000000 HC RX 637 (ALT 250 FOR IP): Performed by: FAMILY MEDICINE

## 2022-06-18 PROCEDURE — 70551 MRI BRAIN STEM W/O DYE: CPT

## 2022-06-18 PROCEDURE — 80048 BASIC METABOLIC PNL TOTAL CA: CPT

## 2022-06-18 PROCEDURE — 83036 HEMOGLOBIN GLYCOSYLATED A1C: CPT

## 2022-06-18 PROCEDURE — 92610 EVALUATE SWALLOWING FUNCTION: CPT

## 2022-06-18 PROCEDURE — 36415 COLL VENOUS BLD VENIPUNCTURE: CPT

## 2022-06-18 PROCEDURE — 99253 IP/OBS CNSLTJ NEW/EST LOW 45: CPT | Performed by: STUDENT IN AN ORGANIZED HEALTH CARE EDUCATION/TRAINING PROGRAM

## 2022-06-18 PROCEDURE — 97166 OT EVAL MOD COMPLEX 45 MIN: CPT

## 2022-06-18 PROCEDURE — 97116 GAIT TRAINING THERAPY: CPT

## 2022-06-18 PROCEDURE — 80061 LIPID PANEL: CPT

## 2022-06-18 PROCEDURE — 97530 THERAPEUTIC ACTIVITIES: CPT

## 2022-06-18 PROCEDURE — 85027 COMPLETE CBC AUTOMATED: CPT

## 2022-06-18 PROCEDURE — 93306 TTE W/DOPPLER COMPLETE: CPT

## 2022-06-18 PROCEDURE — 94761 N-INVAS EAR/PLS OXIMETRY MLT: CPT

## 2022-06-18 PROCEDURE — 97162 PT EVAL MOD COMPLEX 30 MIN: CPT

## 2022-06-18 PROCEDURE — 6370000000 HC RX 637 (ALT 250 FOR IP): Performed by: NURSE PRACTITIONER

## 2022-06-18 PROCEDURE — 94640 AIRWAY INHALATION TREATMENT: CPT

## 2022-06-18 PROCEDURE — 99222 1ST HOSP IP/OBS MODERATE 55: CPT | Performed by: NURSE PRACTITIONER

## 2022-06-18 PROCEDURE — 6360000002 HC RX W HCPCS: Performed by: FAMILY MEDICINE

## 2022-06-18 PROCEDURE — 2060000000 HC ICU INTERMEDIATE R&B

## 2022-06-18 PROCEDURE — 92523 SPEECH SOUND LANG COMPREHEN: CPT

## 2022-06-18 RX ORDER — ALBUTEROL SULFATE 2.5 MG/3ML
2.5 SOLUTION RESPIRATORY (INHALATION) EVERY 4 HOURS PRN
Status: DISCONTINUED | OUTPATIENT
Start: 2022-06-18 | End: 2022-06-19

## 2022-06-18 RX ORDER — INSULIN LISPRO 100 [IU]/ML
0-12 INJECTION, SOLUTION INTRAVENOUS; SUBCUTANEOUS
Status: DISCONTINUED | OUTPATIENT
Start: 2022-06-18 | End: 2022-06-19

## 2022-06-18 RX ORDER — INSULIN LISPRO 100 [IU]/ML
0-6 INJECTION, SOLUTION INTRAVENOUS; SUBCUTANEOUS NIGHTLY
Status: DISCONTINUED | OUTPATIENT
Start: 2022-06-18 | End: 2022-06-19

## 2022-06-18 RX ORDER — DEXTROSE MONOHYDRATE 50 MG/ML
100 INJECTION, SOLUTION INTRAVENOUS PRN
Status: DISCONTINUED | OUTPATIENT
Start: 2022-06-18 | End: 2022-06-21 | Stop reason: HOSPADM

## 2022-06-18 RX ORDER — NICOTINE POLACRILEX 4 MG
15 LOZENGE BUCCAL
Status: COMPLETED | OUTPATIENT
Start: 2022-06-18 | End: 2022-06-18

## 2022-06-18 RX ADMIN — Medication 2 PUFF: at 19:56

## 2022-06-18 RX ADMIN — ENOXAPARIN SODIUM 40 MG: 100 INJECTION SUBCUTANEOUS at 09:01

## 2022-06-18 RX ADMIN — DEXTROSE 15 G: 15 GEL ORAL at 17:05

## 2022-06-18 RX ADMIN — AMLODIPINE BESYLATE 5 MG: 5 TABLET ORAL at 09:01

## 2022-06-18 RX ADMIN — INSULIN LISPRO 10 UNITS: 100 INJECTION, SOLUTION INTRAVENOUS; SUBCUTANEOUS at 12:33

## 2022-06-18 RX ADMIN — ASPIRIN 81 MG: 81 TABLET, COATED ORAL at 09:01

## 2022-06-18 RX ADMIN — ROSUVASTATIN CALCIUM 40 MG: 40 TABLET, FILM COATED ORAL at 20:32

## 2022-06-18 RX ADMIN — HYDROCHLOROTHIAZIDE 25 MG: 25 TABLET ORAL at 09:01

## 2022-06-18 RX ADMIN — Medication 2 PUFF: at 08:43

## 2022-06-18 RX ADMIN — PANTOPRAZOLE SODIUM 40 MG: 40 TABLET, DELAYED RELEASE ORAL at 05:54

## 2022-06-18 RX ADMIN — LEVOTHYROXINE SODIUM 100 MCG: 0.1 TABLET ORAL at 05:54

## 2022-06-18 ASSESSMENT — ENCOUNTER SYMPTOMS: SHORTNESS OF BREATH: 0

## 2022-06-18 NOTE — PLAN OF CARE
Problem: Discharge Planning  Goal: Discharge to home or other facility with appropriate resources  Outcome: Progressing  Flowsheets  Taken 6/18/2022 0058  Discharge to home or other facility with appropriate resources: Identify barriers to discharge with patient and caregiver  Taken 6/17/2022 2318  Discharge to home or other facility with appropriate resources: Identify barriers to discharge with patient and caregiver

## 2022-06-18 NOTE — CONSULTS
In patient Neurology consult        [facility]      Yesenia Cardoza MD      Nick Party  1940    Date of Service: 2022    Referring Physician: Polo Tabares MD    Most of the history was obtained from detailed chart reviewing and discussion with the patient's nurse. The patient is currently confused and unable to provide me with accurate history. Reason for the consult and CC:  CVA symptoms    HPI:   The patient is a 80 y.o. male, with a past medical history of hypertension, diabetes type 2, hypothyroidism, who presented to [facility] with left-sided facial numbness and weakness for the past 3 days. Granddaughter at bedside provided as . Granddaughter notes that patient is confused, unable to answer questioning. He is able to follow simple commands. He tells me that he cannot smile when asked. Mild right facial droop is noted. He mainly keeps his eyes closed during exam. He denies fever, chills, headache, dizziness, vision changes, dysarthria, dysphagia, tinnitus. No family history of stroke. Surgical history: Reviewed.   No relevant surgical history for his current symptoms    Past Medical History:   Diagnosis Date    Asthma     Diabetes mellitus ([address]     Hypertension     Thyroid disease        Social History     Tobacco Use    Smoking status: Former Smoker     Packs/day: 1.00     Years: 15.00     Pack years: 15.00     Types: Cigarettes     Quit date: 1980     Years since quittin.4    Smokeless tobacco: Never Used    Tobacco comment: smoked 3 cigaretts a day   Vaping Use    Vaping Use: Never used   Substance Use Topics    Alcohol use: Never    Drug use: Never     No Known Allergies  Current Facility-Administered Medications   Medication Dose Route Frequency Provider Last Rate Last Admin    amLODIPine (NORVASC) tablet 5 mg  5 mg Oral Daily Marlen Arriaza MD   5 mg at 22 0901    hydroCHLOROthiazide (HYDRODIURIL) tablet 25 mg  25 mg Oral Daily Lindsey Nagy MD   25 mg at 06/18/22 0901    levothyroxine (SYNTHROID) tablet 100 mcg  100 mcg Oral Daily Lindsey Nagy MD   100 mcg at 06/18/22 0554    ondansetron (ZOFRAN-ODT) disintegrating tablet 4 mg  4 mg Oral Q8H PRN Lindsey Nagy MD        Or    ondansetron (ZOFRAN) injection 4 mg  4 mg IntraVENous Q6H PRN Lindsey Nagy MD        polyethylene glycol (GLYCOLAX) packet 17 g  17 g Oral Daily PRN Lindsey Nagy MD        enoxaparin (LOVENOX) injection 40 mg  40 mg SubCUTAneous Daily Marlen Arriaza MD   40 mg at 06/18/22 0901    aspirin EC tablet 81 mg  81 mg Oral Daily Lindsey Nagy MD   81 mg at 06/18/22 0901    Or    aspirin suppository 300 mg  300 mg Rectal Daily Lindsey Nagy MD        rosuvastatin (CRESTOR) tablet 40 mg  40 mg Oral Nightly Lindsey Nagy MD   40 mg at 06/17/22 2351    labetalol (NORMODYNE;TRANDATE) injection 10 mg  10 mg IntraVENous Q10 Min PRN Lindsey Nagy MD        hydrALAZINE (APRESOLINE) injection 10 mg  10 mg IntraVENous Q4H PRN Lindsey Nagy MD        mometasone-formoterol (DULERA) 200-5 MCG/ACT inhaler 2 puff  2 puff Inhalation BID Lindsey Nagy MD   2 puff at 06/18/22 0843    pantoprazole (PROTONIX) tablet 40 mg  40 mg Oral QAM AC Lindsey Nagy MD   40 mg at 06/18/22 0554       ROS: 10-14 system review, reviewed with patient's family and/or nurse was unremarkable except mentioned in HPI. Constitutional:   Vitals:    06/18/22 0008 06/18/22 0415 06/18/22 0730 06/18/22 0844   BP:  (!) 143/85 (!) 144/89    Pulse:  77 70    Resp:  18 18 18   Temp:  97.9 °F (36.6 °C) 98.2 °F (36.8 °C)    TempSrc:  Oral Oral    SpO2:  94% 90% 90%   Weight: 128 lb (58.1 kg)      Height: 4' 9\" (1.448 m)        General appearance: Awake, although keeps his eyes closed majority of exam.    Eye: Examination of conjunctiva and lids: No eye lid drooping, no redness or abnormal conjunctival coloration.   Examination of pupil and irises: Pupil round, regular and reactive bilaterally direct and consensual. Iris is symmetric. Neck: Neck is supple. No thyromegaly  Cardiovascular: No lower leg edema with good pulsation. No carotid bruit  Neurological: Cranial nerves: Pupil round regular and reactive, extraocular muscle intact, no gaze preference, uvula midline, tongue is midline. RIGHT facial droop  DTRs: Symmetric 2+ throughout arms and legs  Sensation: No sensory deficit or abnormal sensation  Plantars: Flexor bilaterally. Musculoskeletal:  The patient can move all 4 extremities, generalized weakness with poor effort. No apparent focal weakness. Normal tone and range of motion. Psychiatric: Poor judgment and insight. Poor orientation, waxing and waning. Easily distracted. Labile affect. Respiratory: Respiratory effort: Normal.  Palpation: No abnormal deformities. Skin: Inspection of the skin: Normal , no abnormal lesion. Palpation: No palpable nodules  ENT: No abnormalities with nasal mucosa. No abnormalities with oropharynx and palate is symmetric    Data:  LABS:   Lab Results   Component Value Date     06/18/2022    K 4.7 06/18/2022    K 4.6 06/17/2022     06/18/2022    CO2 24 06/18/2022    BUN 19 06/18/2022    CREATININE 1.3 06/18/2022    GFRAA >60 06/18/2022    LABGLOM 53 06/18/2022    GLUCOSE 84 06/18/2022    PHOS 3.1 05/01/2020    MG 1.90 05/01/2020    CALCIUM 9.3 06/18/2022     Lab Results   Component Value Date    WBC 7.5 06/18/2022    RBC 4.40 06/18/2022    HGB 12.5 06/18/2022    HCT 37.9 06/18/2022    MCV 86.0 06/18/2022    RDW 13.0 06/18/2022     06/18/2022     Lab Results   Component Value Date    INR 0.91 10/21/2020    PROTIME 10.5 10/21/2020       Neuroimaging were independently reviewed by me. Reviewed notes from different physicians  Reviewed lab and blood testing    Impression:    Acute right facial droop, expressive aphasia - likely due to left MCA stroke. CT of the head was negative.   CTA of head and neck showed significant stenosis of both carotid arteries. Severe stenosis was also noticed in the M2 branch of the left MCA. Occlusion of the left vertebral artery. Symptomatic left ICA stenosis 85% -vascular consulted and following  Asymptomatic right ICA stenosis - 95%  Hypertension  Diabetes type 2    Recommendation:     MRI of brain and ECHO ordered. Monitor on tele. Continue ASA, statin. Continue home BP meds. F/u A1c, lipid panel. Improved glycemic control. SSI coverage while inpatient. PT/OT/SLP  Agree with vascular consult. Will wait for MRI for further recs regarding intervention. Thank you for referring such patient. If you have any questions regarding my consult note, please don't hesitate to call me. Bunny Silveira, ISSAC    Attending Supervising [de-identified] Attestation Statement   I reviewed and agree with the findings and plan as documented in NP consult note  Patient was admitted for acute right-sided weakness and facial droop concerning for new ischemic stroke. Could be thromboembolic from left ICA stenosis. Awaiting further work-up  Aspirin  We will follow    Electronically signed by Jami Schrader MD on 6/20/22 at 2:19 PM EDT      This dictation was generated by voice recognition computer software.  Although all attempts are made to edit the dictation for accuracy, there may be errors in the  transcription that are not intended

## 2022-06-18 NOTE — CONSULTS
[facility]  Consultation Note    Chief Complaint / Reason for Consultation  ICA stenosis    History of Present Illness  Patient is a 80 y.o. male presenting with aphasia and subjective right facial and right sided weakness. However patient's granddaughter also states that the patient was experiencing some left sided weakness as well. Patient does not speak [place]. Interpretation was conducted via video  and granddaughter. The symptoms had been ongoing for a couple of days with some resolution. Patient is quite frail. General weakness in both lower extremities has also been an issue for some time. Underwent CT/CTA in ER which demonstrated no acute abnormalities but severe bilateral proximal ICA stenosis, atretic left VA and some subclavian disease as well as a stable saccular aortic arch aneurysm. Granddaughter unsure if patient would like to proceed with surgical interventions. Review of Systems  Review of Systems   Constitutional: Negative for chills and fever. HENT:        No neck surgery   Eyes: Negative for visual disturbance. Respiratory: Negative for shortness of breath. Cardiovascular: Negative for chest pain. Musculoskeletal: Positive for gait problem. Skin: Negative for wound. Neurological: Positive for facial asymmetry, speech difficulty and weakness. Hematological: Does not bruise/bleed easily. Psychiatric/Behavioral: Positive for confusion and decreased concentration. Past Medical History:   Diagnosis Date    Asthma     Diabetes mellitus ([facility] [address]     Hypertension     Thyroid disease        History reviewed. No pertinent surgical history.     No Known Allergies    Social History     Socioeconomic History    Marital status:      Spouse name: Not on file    Number of children: Not on file    Years of education: Not on file    Highest education level: Not on file   Occupational History    Not on file   Tobacco Use    Smoking status: Former Smoker     Packs/day: 1.00     Years: 15.00     Pack years: 15.00     Types: Cigarettes     Quit date: 1980     Years since quittin.4    Smokeless tobacco: Never Used    Tobacco comment: smoked 3 cigaretts a day   Vaping Use    Vaping Use: Never used   Substance and Sexual Activity    Alcohol use: Never    Drug use: Never    Sexual activity: Not on file   Other Topics Concern    Not on file   Social History Narrative    Not on file     Social Determinants of Health     Financial Resource Strain:     Difficulty of Paying Living Expenses: Not on file   Food Insecurity:     Worried About Running Out of Food in the Last Year: Not on file    Mati of Food in the Last Year: Not on file   Transportation Needs:     Lack of Transportation (Medical): Not on file    Lack of Transportation (Non-Medical): Not on file   Physical Activity:     Days of Exercise per Week: Not on file    Minutes of Exercise per Session: Not on file   Stress:     Feeling of Stress : Not on file   Social Connections:     Frequency of Communication with Friends and Family: Not on file    Frequency of Social Gatherings with Friends and Family: Not on file    Attends Congregational Services: Not on file    Active Member of [address] [place] [address] or Organizations: Not on file    Attends Club or Organization Meetings: Not on file    Marital Status: Not on file   Intimate Partner Violence:     Fear of Current or Ex-Partner: Not on file    Emotionally Abused: Not on file    Physically Abused: Not on file    Sexually Abused: Not on file   Housing Stability:     Unable to Pay for Housing in the Last Year: Not on file    Number of [place] in the Last Year: Not on file    Unstable Housing in the Last Year: Not on file       History reviewed.  No pertinent family history.  - No history of bleeding or clotting disorders    Vital Signs  Vitals:    22 0008 22 0415 22 0730 22 0844   BP:  (!) 143/85 (!) 144/89    Pulse:  77 70    Resp:  18 18 18   Temp:  97.9 °F (36.6 °C) 98.2 °F (36.8 °C)    TempSrc:  Oral Oral    SpO2:  94% 90% 90%   Weight: 128 lb (58.1 kg)      Height: 4' 9\" (1.448 m)          Physical Examination  General: No acute distress, frail and fatigued  Psychiatric: quiet, somnolent  Head/Eyes/Ears/Nose/Throat:  Atraumatic, hearing intact maybe some mild right sided facial droop, eyes closed  Neck:  supple  Chest/Lungs: no tachypnea, retractions or cyanosis noted  Cardiac:  Regular rate and rhythm  Abdomen: soft, nontender  Extremities: warm and well perfused  - bilateral upper extremity motorsensory intact  - bilateral lower extremity motorsensory intact  Vascular exam:  - Radial: palp bilaterally  Skin: no wounds  Neuro: CN grossly intact    Labs  Lab Results   Component Value Date    WBC 7.5 06/18/2022    HGB 12.5 06/18/2022    HCT 37.9 06/18/2022    MCV 86.0 06/18/2022     06/18/2022     Lab Results   Component Value Date     06/18/2022    K 4.7 06/18/2022    K 4.6 06/17/2022     06/18/2022    CO2 24 06/18/2022    PHOS 3.1 05/01/2020    BUN 19 06/18/2022    CREATININE 1.3 06/18/2022      No results found for: GLU    Imaging: CTA reviewed, soft plaque in both proximal ICAs, significant thrombus in left CCA as well appreciated, see full report for details    Assessment:   Symptomatic left ICA stenosis      Plan:  Patient is a 80 y.o. male presenting with signs suggestive of symptomatic carotid stenosis. Continue with routine stroke workup including MRI and Echocardiogram. Patient's granddaughter and patient hopefully to discuss if desiring intervention. Recommend aspirin and statin therapy. Will likely follow up Monday once all testing has been completed. Please call with any questions or concerns. Thank you for the consultation.     London Arambula DO, KAUSHIK, VU  [facility]  6/18/2022  10:00 AM     Greater than 55 minutes of my time was spent counseling the patient/family about the condition's pathology and proposed/planned treatment as well as reviewing radiologic imaging and other relevant patient data.

## 2022-06-18 NOTE — PROGRESS NOTES
[facility] [facility]   Phone: [phone]    Occupational Therapy    [x] Initial Evaluation            [] Daily Treatment Note         [] Discharge Summary      Patient: Bobby Bonilla   : 1940   MRN: [id number]   Date of Service:  2022    Admitting Diagnosis:  Paresthesia  Current Admission Summary: 80 y.o. male presenting with aphasia and subjective right facial and right sided weakness. However patient's granddaughter also states that the patient was experiencing some left sided weakness as well. Patient does not speak [place]. Interpretation was conducted via video  and granddaughter. The symptoms had been ongoing for a couple of days with some resolution. Patient is quite frail. General weakness in both lower extremities has also been an issue for some time. Underwent CT/CTA in ER which demonstrated no acute abnormalities but severe bilateral proximal ICA stenosis, atretic left VA and some subclavian disease as well as a stable saccular aortic arch aneurysm. Granddaughter unsure if patient would like to proceed with surgical interventions. Past Medical History:  has a past medical history of Asthma, Diabetes mellitus ([address] Hypertension, and Thyroid disease. Past Surgical History:  has no past surgical history on file. Discharge Recommendations: Bobby Bonilla scored a 14/24 on the AM-PAC ADL Inpatient form. Current research shows that an AM-PAC score of 18 or greater is typically associated with a discharge to the patient's home setting. Based on the patient's AM-PAC score, and their current ADL deficits, it is recommended that the patient have 2-3 sessions per week of Occupational Therapy at d/c to increase the patient's independence. At this time, this patient demonstrates the endurance and safety to discharge home with [address] [place] [address] (home vs OP services) and a follow up treatment frequency of 2-3x/wk.    Please see assessment section for further patient specific details. If patient discharges prior to next session this note will serve as a discharge summary. Please see below for the latest assessment towards goals. HOME HEALTH CARE: LEVEL 3 SAFETY     - Initial home health evaluation to occur within 24-48 hours, in patient home   - Therapy evaluations in home within 24-48 hours of discharge; including DME and home safety   - Frontload therapy 5 days, then 3x a week   - Therapy to evaluate if patient has [address] needs for personal care   -  evaluation within 24-48 hours, includes evaluation of resources and insurance to determine [place], [place], LTC, and Medicaid options       DME Required For Discharge: shower chair with back    Precautions/Restrictions: low fall risk, . Comment: C-Diff r/o  Weight Bearing Restrictions: no restrictions  [] Right Upper Extremity  [] Left Upper Extremity [] Right Lower Extremity  [] Left Lower Extremity     Required Braces/Orthotics: no braces required   [] Right  [] Left  Positional Restrictions:no positional restrictions    Pre-Admission Information   Lives With: family                Type of Home: house  Home Layout: one level  Home Access: level entry  Bathroom Layout: tub/shower unit  Toilet Height: standard height  Bathroom Equipment: . Comment: None. Home Equipment: single point cane  Transfer Assistance: Independent without use of device  Ambulation Assistance:Independent without use of device  ADL Assistance: independent with all ADL's  IADL Assistance: independent with homemaking tasks  Active :        []? Yes            [x]? No  Current Employment: retired. Occupation: Unknown. Hobbies: None.   Recent Falls: Unable to determine     Examination   Vision:   Vision Gross Assessment: WFL  Hearing:   hard of hearing (intermittent difficulty with interpretor)  Coordination Testing:   Coordination and Movement Description: (L) UE, gross motor impairments, ataxia, decreased speed, decreased accuracy (Patient holding LUE in extension, writhing movements throughout, verbalizes no N/T, no pain)  ROM:   (B) UE AROM WFL  Strength:   (B) UE strength grossly WFL    Decision Making: medium complexity  Clinical Presentation: unstable      Subjective  General: Patient supine in bed, agreeable to evaluation with maximal cues and use of interpretor  Pain: 0/10  Pain Interventions: not applicable        Activities of Daily Living  Basic Activities of Daily Living  General Comments: Attempted to direct patient to self care, unable. Granddaughter and interpretor encourage as well (inconsistent responses verbally towards interpretor, unable to be understood at times) Patient able to don hat when provided setup  Instrumental Activities of Daily Living  No IADL completed on this date. Functional Mobility  Bed Mobility  Supine to Sit: minimal assistance, . Comment increased time, maximal cues  Sit to Supine: moderate assistance  Scooting: contact guard assistance, minimal assistance  Comments:  Transfers  Sit to stand transfer:minimal assistance  Stand to sit transfer: minimal assistance  Comments:  Functional Mobility:  Functional Mobility: single point cane. contact guard assistance.   Activity: Increased time, shuffling gait (~15 feet, unable to direct patient towards functional tasks due to cognition and language barrier) Attempted RW, patient declined with gestures    Other Therapeutic Interventions    Functional Outcomes  AM-PAC Inpatient Daily Activity Raw Score: 14    Cognition  Overall Cognitive Status: Impaired  Following Commands: inconsistently follows commands  Memory: decreased recall of biographical information, decreased recall of recent events, decreased short term memory  Safety Judgement: decreased awareness of need for assistance, decreased awareness of need for safety  Problem Solving: assistance required to generate solutions, assistance required to implement solutions, decreased awareness of errors, assistance required to identify errors made, assistance required to correct errors made  Initiation: requires cues for some  Sequencing: requires cues for some  Orientation:    oriented to person  Command Following:   impaired     Education  Barriers To Learning: cognition and language  Patient Education: patient educated on goals, OT role and benefits, plan of care, discharge recommendations  Learning Assessment:  patient will require reinforcement due to cognitive deficits would benefit from family as co-learners    Assessment  Activity Tolerance: Tolerated fair (language, cognitive limitations)  Impairments Requiring Therapeutic Intervention: decreased functional mobility, decreased ADL status, decreased cognition, decreased endurance, decreased balance, decreased posture  Prognosis: fair  Clinical Assessment: Patient presents with the above deficits, which are below baseline, and would benefit from continued skilled OT to address  Safety Interventions: patient left in bed, bed alarm in place, call light within reach, patient at risk for falls and nurse notified    Plan  Frequency: 5-7 x/week  Current Treatment Recommendations: balance training, functional mobility training, transfer training, endurance training, patient/caregiver education, ADL/self-care training, safety education and equipment evaluation/education    Goals  Patient Goals:  To return home   Short Term Goals:  Time Frame: Upon discharge  Patient will complete lower body ADL at minimal assistance   Patient will complete toileting at minimal assistance   Patient will complete grooming at minimal assistance   Patient will complete functional transfers at stand by assistance   Patient will complete functional mobility at stand by assistance   Patient will complete bed mobility at supervision     Therapy Session Time     Individual Group Co-treatment   Time In    1202   Time Out    1240   Minutes    38        Timed Code Treatment Minutes:   23  Total Treatment Minutes:  38       Electronically Signed By: Sheila Curiel OTR/L [id number]

## 2022-06-18 NOTE — PROGRESS NOTES
Facility/Department: [address]  SLP Clinical Swallow Evaluation and Speech Language Cognitive Assessment     Patient: Obi Cleveland   : 1940   MRN: [id number]      Evaluation Date: 2022      Admitting Dx: Paresthesia [R20.2]  Facial droop [R29.810]  Abnormal computed tomography angiography of head [R93.0]  Pain: Unable to state                                  H&P:   Obi Cleveland is a 80 y.o. male with h.o  HTN, DM , Hypothyroidism, presented with c/o left sided facial numbness and weakness. Grand daughter at bedside translated. Reports occasional cramps in legs. Facial weakness has resolved . Denies slurred speech or difficulty swallowing. Denies h./o CVA  . CT head and CTA head and neck showed no acute hemorrhage or infarct. Showed severe stenosis of Left MCA and multiple other high grade stenosis involving Right and Left ICA, PCA, and vertebral arteries. Imaging:  Chest X-ray:  2022  Stable scarring right apex       No acute cardiopulmonary process         MRI: 2022   1. 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  No   evidence of hemorrhagic transformation. 2. Involutional parenchymal changes with mild microvascular ischemic disease. History/Prior Level of Function:   [facility]   Prior Dysphagia History: none noted upon chart review   Prior Speech History: none noted upon chart review   Reason for referral: SLP evaluation orders received due to CVA protocol . DYSPHAGIA BEDSIDE SWALLOW EVALUATION   Dysphagia Impressions/Dysphagia Diagnosis: Oropharyngeal Dysphagia   Pt with moderted right labial weakness and asymmetry. His responses to commands were limited. He had moderate drooling on the right side. His granddaughter reported worse speech and more coughing today. He accepted small amount of liquid at room temperature, all consistencies attempted, puree (applesauce) and textured puree (steamed white rice).    Pt demonstrated overt signs of premature bolus loss, limited oral control, anterior bolus loss, and pocketing in the right sulcus. Timing and strength of laryngeal elevation was moderately reduced. Cough response was weak and increased work of breathing was assessed as trials progressed. Pt presents at high risk for aspiration. Recommend dysphagia puree and moderately thick liquids with strict precautions and close pulmonary monitoring. Pt may benefit from instrumental assessment pending ongoing tolerance.      Recommended Diet and Intervention:  Diet Solids Recommendation:  Dysphagia I Puree  Liquid Consistency Recommendation:  Moderately (honey) thick liquids  Recommended form of Meds: Crushed as able in puree       Recommend completion of Modified Barium Swallow study to further assess pharyngeal phase of swallow     Dysphagia Therapeutic Intervention:  Bolus Control Exercise, Pharyngeal Exercise, Diet Tolerance Monitoring , Oral Motor Exercises , Patient/Family Education , Therapeutic Trials with SLP , Instrumental assessment of swallow function (Modified Barium Swallow Study)     Compensatory Swallowing Strategies:  Check for pocketing of food R, Upright as possible with all PO intake , No straws , Assist Feed , Small bites/sips , Eat/feed slowly    Oral Mechanism Exam:  []WFL []Mild   [x] Moderate  [x]Severe  []To be assessed  Labial Symmetry , Labial Strength, Labial Sensation , Labial Coordination , Lingual Symmetry , Lingual Strength , Lingual Sensation , Lingual Coordination     SHORT TERM DYSPHAGIA GOALS  Pt will functionally tolerate recommended diet with no overt clinical s/s of aspiration   Pt will demonstrate understanding of aspiration risk and precautions via education/demonstration with occasional prompting  Pt will advance to least restrictive diet as indicated   If clinical s/s of aspiration/penetration continue to be noted, Pt will participate in Modified Barium Swallow Study     Patient Positioning: Upright in bed SPEECH LANGUAGE COGNITIVE ASSESSMENT:     Speech Diagnosis:   Expressive Aphasia , Receptive Aphasia ,  Dysarthria , Apraxia of Speech , Cognitive-Linguistic Deficits     Impressions:   Evaluation was limited secondary to language barrier. Phone  services were utilized (no access to video interpretation) in combination with family report. Pt with right labial weakness and incoordination that impacted speech intelligibility (limited intelligibility per phone  report). Pt demonstrated some/limited communication with his granddaughter that was present. Pt with limited comprehension of environment and recognition of family members. Pt disoriented to self, place, time and situation. He was able to follow single step commands given visual cues. He was unable to complete automatic speech tasks (unknown error pattern, unintelligible per phone ).      COMPREHENSION  Auditory Comprehension: Moderate   Impaired Basic questions  Impaired Complex questions  Impaired Two step commands  Impaired Multi-step commands    EXPRESSION  Verbal Expression: Moderate   Impaired Automatic Speech  Impaired Confrontational Naming  Impaired Thought Organization   Comment:  Difficult to characterize errors due to unintelligible speech in primary language        MOTOR SPEECH  Motor Speech: Severe   Apraxia: Verbal , Inconsistent errors , Numerous attempts at the word and Visible/audible searching (groping)  Dysarthria: Decreased rate , Blended word boundaries , Imprecise articulation  and Decreased breath support    Decreased speech intelligibility       COGNITION    Overall Orientation : Moderate    Disoriented to time   Disoriented to situation   Disoriented to place   Disoriented to self     Attention: Unable to assess           Memory: Unable to assess         Problem Solving: Unable to assess         Safety/Judgement: Unable to assess         GOALS:  Short Term Speech/Language/Cognitive Goals:   Pt will improve speech intelligibility in connected speech via graded tasks to 80%   Pt will improve auditory processing/comprehension of commands and questions to 80%, via graded tasks   Pt will improve orientation and short term recall via graded tasks to 80%  Pt will improve verbal expression for functional expression via graded tasks to 80%    Plan of care: 3-5 times a week during acute care stay     Discharge Recommendations:  Recommend ongoing SLP for speech and dysphagia therapy upon discharge from hospital     EDUCATION:   Provided education regarding role of SLP, results of assessment, recommendations and general speech pathology plan of care. [] Pt verbalized understanding and agreement   [x] Pt requires ongoing learning   [x] No evidence of comprehension  - limited     If patient discharges prior to next visit, this note will serve as discharge. Time code minutes: 0    Total Time:  35     Electronically signed by:    Anali Guo M.A.  [facility]-SLP DORIAN [id number]  Speech-Language Pathologist   6/18/2022 2:21 PM

## 2022-06-18 NOTE — PROGRESS NOTES
[facility] [facility]   Phone: [phone]    Physical Therapy    [x] Initial Evaluation            [] Daily Treatment Note         [] Discharge Summary      Patient: Bobby Bonilla   : 1940   MRN: [id number]   Date of Service:  2022  Admitting Diagnosis: Paresthesia     Current Admission Summary: Bobby Bonilla is a 80 y.o. male with past medical history of asthma, diabetes, hypertension and thyroid disease who presents to the ED with complaint of numbness and facial weakness. Patient speaks [place] and  was utilized for history and physical examination. Currently for the past 3 days he states he has had some weakness to his left-sided face with associated drooling and weakness/numbness to the left arm and left leg. Apparently saw PCP a couple days ago was prescribed aspirin. He states he was told to come to the ED if symptoms worsen. Came to the ED for further evaluation and treatment. He denies any sick contacts or recent illness. Denies any headache, visual changes, other numbness/tingling, lightheadedness/dizziness at this time. States feels like he slurred speech. Has had drooling. Denies chest pain, shortness of breath, palpitations, blood thinners, abdominal pain, nausea/vomiting, urinary symptoms or changes in bowel movement. Denies fever or chills. Denies rashes or lesions. Denies any falls or injuries. Past Medical History:  has a past medical history of Asthma, Diabetes mellitus ([address] Hypertension, and Thyroid disease. Past Surgical History:  has no past surgical history on file. Discharge Recommendations: Bobby Bonilla scored a 18/24 on the AM-PAC short mobility form. Current research shows that an AM-PAC score of 18 or greater is typically associated with a discharge to the patient's home setting.  Based on the patient's AM-PAC score and their current functional mobility deficits, it is recommended that the patient have 2-3 sessions per week of Physical Therapy at d/c to increase the patient's independence. At this time, this patient demonstrates the endurance and safety to discharge home with home health PT and a follow up treatment frequency of 2-3x/wk. Please see assessment section for further patient specific details. If patient discharges prior to next session this note will serve as a discharge summary. Please see below for the latest assessment towards goals. HOME HEALTH CARE: LEVEL 3 SAFETY  - Initial home health evaluation to occur within 24-48 hours, in patient home   - Therapy evaluations in home within 24-48 hours of discharge; including DME and home safety   - Frontload therapy 5 days, then 3x a week   - Therapy to evaluate if patient has [address] needs for personal care   -  evaluation within 24-48 hours, includes evaluation of resources and insurance to determine AL, [place], LTC, and Medicaid options       DME Required For Discharge: no DME required at discharge  Precautions/Restrictions: high fall risk  Weight Bearing Restrictions: no restrictions  [] Right Upper Extremity  [] Left Upper Extremity [] Right Lower Extremity  [] Left Lower Extremity     Required Braces/Orthotics: no braces required   [] Right  [] Left  Positional Restrictions:no positional restrictions    Pre-Admission Information   Lives With: family    Type of Home: house  Home Layout: one level  Home Access: level entry  Bathroom Layout: tub/shower unit  Toilet Height: standard height  Bathroom Equipment: . Comment: None. Home Equipment: single point cane  Transfer Assistance: Independent without use of device  Ambulation Assistance:Independent without use of device  ADL Assistance: independent with all ADL's  IADL Assistance: independent with homemaking tasks  Active :        [] Yes  [x] No  Current Employment: retired. Occupation: Unknown. Hobbies: None. Recent Falls: Denies.     **Above information taken from patient's granddaughter as patient demonstrates cognitive deficits when using . Examination   Vision:   Vision Gross Assessment: WFL  Hearing:   WFL  Observation:   General Observation:  Patient supine in bed w/ granddaughter present. Posture:   WFL  Sensation:   WFL  Proprioception:    WFL  Tone:   Normotonic  Coordination Testing:   WFL    ROM:   (B) LE AROM WFL  Strength:   (B) LE strength grossly WFL  Decision Making: medium complexity  Clinical Presentation: evolving      Subjective  General: Patient speaks [place].  service utilized. Per , patient not always making sense. Pain: 0/10  Pain Interventions: repositioned        Functional Mobility  Bed Mobility  Supine to Sit: minimal assistance  Sit to Supine: moderate assistance  Comments:  Transfers  Sit to stand transfer: contact guard assistance  Stand to sit transfer: contact guard assistance  Comments:  Gait belt donned. Patient used straight cane for balance. Ambulation  Surface:level surface  Assistive Device: single point cane  Assistance: contact guard assistance  Distance: 15'  Gait Mechanics: shuffling, unsteady  Comments:  Patient trialed RW for a second but reported he didn't feel it helped him and would rather use cane. Stair Mobility  Stair mobility not completed on this date. Comments:  Wheelchair Mobility:  No w/c mobility completed on this date.   Comments:  Balance  Static Sitting Balance: good: independent with functional balance in unsupported position  Dynamic Sitting Balance: fair (+): maintains balance at SBA/supervision without use of UE support  Static Standing Balance: fair (-): maintains balance at CGA with use of UE support  Dynamic Standing Balance: fair (-): maintains balance at CGA with use of UE support  Comments:    Other Therapeutic Interventions    Functional Outcomes  AM-PAC Inpatient Mobility Raw Score : 18              Cognition  Overall Cognitive Status: Impaired  Arousal/Alterness: appropriate responses to stimuli  Following Commands: follows one step commands with repetition, follows one step commands with increased time, follows one step commands consistently, inconsistently follows commands  Attention Span: appears intact  Memory: decreased recall of biographical information, decreased recall of recent events, decreased short term memory  Safety Judgement: decreased awareness of need for assistance, decreased awareness of need for safety  Problem Solving: assistance required to generate solutions, assistance required to implement solutions  Insights: decreased awareness of deficits  Initiation: requires cues for some  Sequencing: requires cues for some  Orientation:    oriented to person, disoriented to place, disoriented to time  and disoriented to situation  Command Following:   impaired    Education  Barriers To Learning: cognition, language and cultural  Patient Education: patient educated on goals, PT role and benefits, plan of care, general safety, functional mobility training, orientation, family education, transfer training, discharge recommendations  Learning Assessment:  patient will require reinforcement due to cognitive deficits    Assessment  Activity Tolerance: Patient attempting to lay back down after ambulation for unknown reasons. Impairments Requiring Therapeutic Intervention: decreased functional mobility, decreased ADL status, decreased safety awareness, decreased cognition, decreased endurance, decreased balance  Prognosis: good  Clinical Assessment: Per family, patient was MOD I at home with all mobility and daily tasks and even would walk independently in the neighborhood with his cane. Presently, patient requires assistance for bed mobility, transfers, and ambulation and family reports his walking is not his normal.  Patient demonstrates balance/ambulation deficits and would benefit from ongoing therapy at d/c. This was discussed w/ family present.   Safety Interventions: patient left in bed, bed alarm in place, call light within reach, gait belt, patient at risk for falls, nurse notified and family/caregiver present    Plan  Frequency: 5-7 x/week  Current Treatment Recommendations: strengthening, ROM, balance training, functional mobility training, transfer training, gait training, patient/caregiver education, safety education and equipment evaluation/education    Goals  Patient Goals: None verbalized. Short Term Goals:  Time Frame: Discharge.   Patient will complete bed mobility at modified independent   Patient will complete transfers at [facility]   Patient will ambulate 50 ft with use of single point cane at [facility]    Therapy Session Time      Individual Group Co-treatment   Time In     1202   Time Out     1240   Minutes     38     Timed Code Treatment Minutes:  23 minutes  Total Treatment Minutes:  38 minutes       Electronically Signed By: Eloise Gallegos PT, DPT, [id number] [id number]

## 2022-06-18 NOTE — ED NOTES
Handoff report received from Yong Castanon, [facility], [address] [place] [address]  06/17/22 Shane Villarreal

## 2022-06-18 NOTE — PROGRESS NOTES
[facility]    6/18/2022 10:49 AM        Name: Americo Garcia . Admitted: 6/17/2022  Primary Care Provider: No primary care provider on file. (Tel: None)      Chief Complaint   Patient presents with    Numbness     neplai  [phone], numbness to L side of face and body x3 days     Brief History: Patient is an 81 yo [place] speaking male with hx COPD, DM2, HTN, thyroid disease. He presented to ER with left sided facial weakness and numbness. CT head with no acute findings. CTA head/neck with multiple high grade stenosis in multiple vessels. He was admitted to r/o CVA. Neuro and vascular consulted. Subjective:  Resting on stretcher. Just returned from testing. Family acting as . Patient offers little input, resting with eyes closed, follows commands. Offers no complaints, family concerned he is having difficulty swallowing. Speech eval pending.      Reviewed interval ancillary notes    Current Medications  amLODIPine (NORVASC) tablet 5 mg, Daily  hydroCHLOROthiazide (HYDRODIURIL) tablet 25 mg, Daily  levothyroxine (SYNTHROID) tablet 100 mcg, Daily  ondansetron (ZOFRAN-ODT) disintegrating tablet 4 mg, Q8H PRN   Or  ondansetron (ZOFRAN) injection 4 mg, Q6H PRN  polyethylene glycol (GLYCOLAX) packet 17 g, Daily PRN  enoxaparin (LOVENOX) injection 40 mg, Daily  aspirin EC tablet 81 mg, Daily   Or  aspirin suppository 300 mg, Daily  rosuvastatin (CRESTOR) tablet 40 mg, Nightly  labetalol (NORMODYNE;TRANDATE) injection 10 mg, Q10 Min PRN  hydrALAZINE (APRESOLINE) injection 10 mg, Q4H PRN  mometasone-formoterol (DULERA) 200-5 MCG/ACT inhaler 2 puff, BID  pantoprazole (PROTONIX) tablet 40 mg, QAM AC        Objective:  BP (!) 144/89   Pulse 70   Temp 98.2 °F (36.8 °C) (Oral)   Resp 18   Ht 4' 9\" (1.448 m)   Wt 128 lb (58.1 kg)   SpO2 90%   BMI 27.70 kg/m²   No intake or output data in the 24 hours ending 06/18/22 1049   Wt Readings from Last 3 Encounters:   06/18/22 128 lb (58.1 kg)   02/03/21 145 lb (65.8 kg)   12/10/20 145 lb (65.8 kg)       General appearance:  Appears comfortable  ENT: Moist oral mucosa. Cardiovascular: Regular rhythm, normal S1, S2. No murmur. No edema in lower extremities  Respiratory: Not using accessory muscles. Good inspiratory effort. Clear to auscultation bilaterally, no wheeze or crackles. GI: Abdomen soft, no tenderness, not distended, normal bowel sounds  Musculoskeletal: No cyanosis in digits, neck supple  Neurology: Right facial droop, moves all extremities  Skin: Warm, dry    Labs and Tests:  CBC:   Recent Labs     06/17/22  1348 06/18/22  0541   WBC 8.8 7.5   HGB 13.3* 12.5*    275     BMP:    Recent Labs     06/17/22  1348 06/18/22  0541   * 138   K 4.6 4.7    106   CO2 22 24   BUN 20 19   CREATININE 1.3 1.3   GLUCOSE 113* 84     Hepatic:   Recent Labs     06/17/22  1348   AST 26   ALT 15   BILITOT 0.3   ALKPHOS 133*       CXR 6/17/2022:  Stable scarring right apex       No acute cardiopulmonary process     CT Head / CTA Head and Neck 6/17/2022:  1. No acute intracranial abnormality. 2. 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 75% stenosis in the cavernous/supraclinoid segment of the left internal   carotid artery. 4. Right dominance of the vertebral arteries with hypoplastic intracranial   left vertebral artery.  Superimposed occlusion in the mid to distal   intracranial left vertebral artery. 5. 80% stenosis in the proximal V4 segment of right vertebral artery. 6. 80% stenosis in the proximal P2 segment of the left PCA.  50% stenosis in   the proximal P2 segment of the right PCA. 7. 95% stenosis in the proximal right internal carotid artery. 8. 85% stenosis in the proximal left internal carotid artery. 9. 40% stenosis in the proximal left common carotid artery.    10. Right dominance of the vertebral arteries with severe stenosis in the   proximal V1 segment of the left vertebral artery. 11. 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 12. 65% stenosis in the mid left subclavian artery. 13. 40% stenosis in the distal right subclavian artery. 14. Airspace opacity in the right upper lobe with volume loss, likely related   to chronic scarring, grossly stable since July 2, 2021.  Mild pericardial   effusion. Problem List  Principal Problem:    Paresthesia  Resolved Problems:    * No resolved hospital problems. *       Assessment & Plan:   1. Right facial droop and paresthesia. CT head with no acute findings. CTA head/neck with multiple high grade stenosis in multiple vessels. MRI brain results pending. A1c and lipid results pending. Neuro evaluated, recommend continue asa and statin. 2. Symptomatic left ICA stenosis. Evaluated by vascular, family and patient to discuss if intervention desired. Continue asa and statin. Vascular will follow up on Monday. 3. DM2. A1c results pending. Takes metformin at home, held on admission. Being covered with medium correction scale. Monitor BG values. 4. COPD. Stable, not in exacerbation. Continue Dulera. Add prn albuterol. 5. Hypothyroidism. Check TSH level. Continue levothyroxine. 6. Anemia. Hgb 13.3 on presentation appears baseline. Diet: ADULT DIET;  Regular; 4 carb choices (60 gm/meal)  Code:Full Code  DVT PPX: enoxaparin      SUSY Bermeo - CNP   6/18/2022 10:49 AM

## 2022-06-18 NOTE — PROGRESS NOTES
0900-Patient has water coming out right side of mouth when attempting to take oral medication. Rahul Saab at bedside states patient seems to be \"getting worse\" pertaining to strength and speech. Neuro consult ordered      1000-Patient off unit to Echo and MRI    1115-patient back from testing. Daughter at bedside. Bed in low position,call light in reach. Family instructed to all for assistance    1300-Speech at bedside. Patient did poorly . Diet changed to dysphagia pureed with thickened liquids. Speech discussed with granddaughter and was instructed that family must show any food from home to RN before patient can have      1630-Pt BG 61, Thickened OJ given. Granddaughter was attempting to give and RN saw all of OJ running down his neck and chest. Instructed gdaughter to have patient sit completely straight up in bed and allow him to do himself if able. Pt was able to drink without it spilling out of his mouth this way    1700-Glucose gel administered.   1800-BG now 123

## 2022-06-18 NOTE — PROGRESS NOTES
Pt admitted for stroke rule out.  [id number] interpreted admission questions. Pt exhibits some confusion as confirmed via orientation questions and per family in room. MRI checklist completed with patient and granddaughter. Granddaughter (23) signed checklist as patient has some confusion. Granddaughter is listed as emergency contact. Swallow screen completed/[passed and diet advanced. All questions answered. Oriented patient and family to the room.

## 2022-06-19 LAB
ANION GAP SERPL CALCULATED.3IONS-SCNC: 10 MMOL/L (ref 3–16)
BUN BLDV-MCNC: 21 MG/DL (ref 7–20)
CALCIUM SERPL-MCNC: 9.1 MG/DL (ref 8.3–10.6)
CHLORIDE BLD-SCNC: 103 MMOL/L (ref 99–110)
CO2: 23 MMOL/L (ref 21–32)
CREAT SERPL-MCNC: 1.4 MG/DL (ref 0.8–1.3)
GFR AFRICAN AMERICAN: 59
GFR NON-AFRICAN AMERICAN: 49
GLUCOSE BLD-MCNC: 138 MG/DL (ref 70–99)
GLUCOSE BLD-MCNC: 142 MG/DL (ref 70–99)
GLUCOSE BLD-MCNC: 145 MG/DL (ref 70–99)
GLUCOSE BLD-MCNC: 149 MG/DL (ref 70–99)
GLUCOSE BLD-MCNC: 212 MG/DL (ref 70–99)
HCT VFR BLD CALC: 38.7 % (ref 40.5–52.5)
HEMOGLOBIN: 12.7 G/DL (ref 13.5–17.5)
MCH RBC QN AUTO: 28.4 PG (ref 26–34)
MCHC RBC AUTO-ENTMCNC: 33 G/DL (ref 31–36)
MCV RBC AUTO: 86.2 FL (ref 80–100)
PDW BLD-RTO: 13.1 % (ref 12.4–15.4)
PERFORMED ON: ABNORMAL
PLATELET # BLD: 305 K/UL (ref 135–450)
PMV BLD AUTO: 6.6 FL (ref 5–10.5)
POTASSIUM SERPL-SCNC: 4.7 MMOL/L (ref 3.5–5.1)
RBC # BLD: 4.49 M/UL (ref 4.2–5.9)
SODIUM BLD-SCNC: 136 MMOL/L (ref 136–145)
TSH REFLEX: 1.69 UIU/ML (ref 0.27–4.2)
WBC # BLD: 9.9 K/UL (ref 4–11)

## 2022-06-19 PROCEDURE — 97530 THERAPEUTIC ACTIVITIES: CPT

## 2022-06-19 PROCEDURE — 6360000002 HC RX W HCPCS: Performed by: FAMILY MEDICINE

## 2022-06-19 PROCEDURE — 84443 ASSAY THYROID STIM HORMONE: CPT

## 2022-06-19 PROCEDURE — 6360000002 HC RX W HCPCS: Performed by: INTERNAL MEDICINE

## 2022-06-19 PROCEDURE — 94640 AIRWAY INHALATION TREATMENT: CPT

## 2022-06-19 PROCEDURE — 36415 COLL VENOUS BLD VENIPUNCTURE: CPT

## 2022-06-19 PROCEDURE — 92526 ORAL FUNCTION THERAPY: CPT

## 2022-06-19 PROCEDURE — 2060000000 HC ICU INTERMEDIATE R&B

## 2022-06-19 PROCEDURE — 80048 BASIC METABOLIC PNL TOTAL CA: CPT

## 2022-06-19 PROCEDURE — 6370000000 HC RX 637 (ALT 250 FOR IP): Performed by: NURSE PRACTITIONER

## 2022-06-19 PROCEDURE — 85027 COMPLETE CBC AUTOMATED: CPT

## 2022-06-19 PROCEDURE — 97535 SELF CARE MNGMENT TRAINING: CPT

## 2022-06-19 PROCEDURE — 6370000000 HC RX 637 (ALT 250 FOR IP): Performed by: FAMILY MEDICINE

## 2022-06-19 PROCEDURE — 94761 N-INVAS EAR/PLS OXIMETRY MLT: CPT

## 2022-06-19 PROCEDURE — 97116 GAIT TRAINING THERAPY: CPT

## 2022-06-19 RX ORDER — ALBUTEROL SULFATE 2.5 MG/3ML
2.5 SOLUTION RESPIRATORY (INHALATION) EVERY 4 HOURS PRN
Status: DISCONTINUED | OUTPATIENT
Start: 2022-06-19 | End: 2022-06-21 | Stop reason: HOSPADM

## 2022-06-19 RX ORDER — ALBUTEROL SULFATE 2.5 MG/3ML
2.5 SOLUTION RESPIRATORY (INHALATION) 2 TIMES DAILY
Status: DISCONTINUED | OUTPATIENT
Start: 2022-06-19 | End: 2022-06-21 | Stop reason: HOSPADM

## 2022-06-19 RX ORDER — ALBUTEROL SULFATE 2.5 MG/3ML
2.5 SOLUTION RESPIRATORY (INHALATION) 4 TIMES DAILY
Status: DISCONTINUED | OUTPATIENT
Start: 2022-06-19 | End: 2022-06-19

## 2022-06-19 RX ORDER — INSULIN LISPRO 100 [IU]/ML
0-3 INJECTION, SOLUTION INTRAVENOUS; SUBCUTANEOUS NIGHTLY
Status: DISCONTINUED | OUTPATIENT
Start: 2022-06-19 | End: 2022-06-21 | Stop reason: HOSPADM

## 2022-06-19 RX ORDER — INSULIN LISPRO 100 [IU]/ML
0-6 INJECTION, SOLUTION INTRAVENOUS; SUBCUTANEOUS
Status: DISCONTINUED | OUTPATIENT
Start: 2022-06-19 | End: 2022-06-21 | Stop reason: HOSPADM

## 2022-06-19 RX ADMIN — Medication 2 PUFF: at 19:36

## 2022-06-19 RX ADMIN — Medication 2 PUFF: at 08:22

## 2022-06-19 RX ADMIN — INSULIN LISPRO 1 UNITS: 100 INJECTION, SOLUTION INTRAVENOUS; SUBCUTANEOUS at 20:47

## 2022-06-19 RX ADMIN — ENOXAPARIN SODIUM 40 MG: 100 INJECTION SUBCUTANEOUS at 09:34

## 2022-06-19 RX ADMIN — INSULIN LISPRO 2 UNITS: 100 INJECTION, SOLUTION INTRAVENOUS; SUBCUTANEOUS at 11:48

## 2022-06-19 RX ADMIN — LEVOTHYROXINE SODIUM 100 MCG: 0.1 TABLET ORAL at 06:28

## 2022-06-19 RX ADMIN — PANTOPRAZOLE SODIUM 40 MG: 40 TABLET, DELAYED RELEASE ORAL at 06:28

## 2022-06-19 RX ADMIN — ASPIRIN 81 MG: 81 TABLET, COATED ORAL at 09:34

## 2022-06-19 RX ADMIN — HYDROCHLOROTHIAZIDE 25 MG: 25 TABLET ORAL at 09:34

## 2022-06-19 RX ADMIN — AMLODIPINE BESYLATE 5 MG: 5 TABLET ORAL at 09:34

## 2022-06-19 RX ADMIN — ROSUVASTATIN CALCIUM 40 MG: 40 TABLET, FILM COATED ORAL at 20:47

## 2022-06-19 RX ADMIN — ALBUTEROL SULFATE 2.5 MG: 2.5 SOLUTION RESPIRATORY (INHALATION) at 19:36

## 2022-06-19 NOTE — PROGRESS NOTES
06/19/22 0933   RT Protocol   History Pulmonary Disease 2   Respiratory pattern 0   Breath sounds 4   Cough 0   Bronchodilator Assessment Score 6

## 2022-06-19 NOTE — PLAN OF CARE
Problem: Discharge Planning  Goal: Discharge to home or other facility with appropriate resources  Outcome: Progressing  Flowsheets (Taken 6/18/2022 2030)  Discharge to home or other facility with appropriate resources: Identify barriers to discharge with patient and caregiver     Problem: Skin/Tissue Integrity  Goal: Absence of new skin breakdown  Description: 1. Monitor for areas of redness and/or skin breakdown  2. Assess vascular access sites hourly  3. Every 4-6 hours minimum:  Change oxygen saturation probe site  4. Every 4-6 hours:  If on nasal continuous positive airway pressure, respiratory therapy assess nares and determine need for appliance change or resting period.   Outcome: Progressing     Problem: ABCDS Injury Assessment  Goal: Absence of physical injury  Outcome: Progressing  Flowsheets (Taken 6/18/2022 1715 by Primitivo Sneed RN)  Absence of Physical Injury: Implement safety measures based on patient assessment     Problem: Safety - Adult  Goal: Free from fall injury  Outcome: Progressing

## 2022-06-19 NOTE — RT PROTOCOL NOTE
RT Inhaler-Nebulizer Bronchodilator Protocol Note    There is a bronchodilator order in the chart from a provider indicating to follow the RT Bronchodilator Protocol and there is an Initiate RT Inhaler-Nebulizer Bronchodilator Protocol order as well (see protocol at bottom of note). CXR Findings:  XR CHEST PORTABLE    Result Date: 6/17/2022  Stable scarring right apex No acute cardiopulmonary process       The findings from the last RT Protocol Assessment were as follows:   History Pulmonary Disease: Chronic pulmonary disease  Respiratory Pattern: Regular pattern and RR 12-20 bpm  Breath Sounds: Intermittent or unilateral wheezes  Cough: Strong, spontaneous, non-productive  Indication for Bronchodilator Therapy: Decreased or absent breath sounds  Bronchodilator Assessment Score: 6    Aerosolized bronchodilator medication orders have been revised according to the RT Inhaler-Nebulizer Bronchodilator Protocol below. Respiratory Therapist to perform RT Therapy Protocol Assessment initially then follow the protocol. Repeat RT Therapy Protocol Assessment PRN for score 0-3 or on second treatment, BID, and PRN for scores above 3. No Indications - adjust the frequency to every 6 hours PRN wheezing or bronchospasm, if no treatments needed after 48 hours then discontinue using Per Protocol order mode. If indication present, adjust the RT bronchodilator orders based on the Bronchodilator Assessment Score as indicated below. Use Inhaler orders unless patient has one or more of the following: on home nebulizer, not able to hold breath for 10 seconds, is not alert and oriented, cannot activate and use MDI correctly, or respiratory rate 25 breaths per minute or more, then use the equivalent nebulizer order(s) with same Frequency and PRN reasons based on the score. If a patient is on this medication at home then do not decrease Frequency below that used at home.     0-3 - enter or revise RT bronchodilator order(s) to equivalent RT Bronchodilator order with Frequency of every 4 hours PRN for wheezing or increased work of breathing using Per Protocol order mode. 4-6 - enter or revise RT Bronchodilator order(s) to two equivalent RT bronchodilator orders with one order with BID Frequency and one order with Frequency of every 4 hours PRN wheezing or increased work of breathing using Per Protocol order mode. 7-10 - enter or revise RT Bronchodilator order(s) to two equivalent RT bronchodilator orders with one order with TID Frequency and one order with Frequency of every 4 hours PRN wheezing or increased work of breathing using Per Protocol order mode. 11-13 - enter or revise RT Bronchodilator order(s) to one equivalent RT bronchodilator order with QID Frequency and an Albuterol order with Frequency of every 4 hours PRN wheezing or increased work of breathing using Per Protocol order mode. Greater than 13 - enter or revise RT Bronchodilator order(s) to one equivalent RT bronchodilator order with every 4 hours Frequency and an Albuterol order with Frequency of every 2 hours PRN wheezing or increased work of breathing using Per Protocol order mode. RT to enter RT Home Evaluation for COPD & MDI Assessment order using Per Protocol order mode.     Electronically signed by Jesse Fields RCP on 6/19/2022 at 9:33 AM

## 2022-06-19 NOTE — RT PROTOCOL NOTE
RT Inhaler-Nebulizer Bronchodilator Protocol Note    There is a bronchodilator order in the chart from a provider indicating to follow the RT Bronchodilator Protocol and there is an Initiate RT Inhaler-Nebulizer Bronchodilator Protocol order as well (see protocol at bottom of note). CXR Findings:  XR CHEST PORTABLE    Result Date: 6/17/2022  Stable scarring right apex No acute cardiopulmonary process       The findings from the last RT Protocol Assessment were as follows:   History Pulmonary Disease: Smoker 15 pack years or more  Respiratory Pattern: Regular pattern and RR 12-20 bpm  Breath Sounds: Clear breath sounds  Cough: Strong, spontaneous, non-productive  Indication for Bronchodilator Therapy: Decreased or absent breath sounds  Bronchodilator Assessment Score: 1    Aerosolized bronchodilator medication orders have been revised according to the RT Inhaler-Nebulizer Bronchodilator Protocol below. Respiratory Therapist to perform RT Therapy Protocol Assessment initially then follow the protocol. Repeat RT Therapy Protocol Assessment PRN for score 0-3 or on second treatment, BID, and PRN for scores above 3. No Indications - adjust the frequency to every 6 hours PRN wheezing or bronchospasm, if no treatments needed after 48 hours then discontinue using Per Protocol order mode. If indication present, adjust the RT bronchodilator orders based on the Bronchodilator Assessment Score as indicated below. Use Inhaler orders unless patient has one or more of the following: on home nebulizer, not able to hold breath for 10 seconds, is not alert and oriented, cannot activate and use MDI correctly, or respiratory rate 25 breaths per minute or more, then use the equivalent nebulizer order(s) with same Frequency and PRN reasons based on the score. If a patient is on this medication at home then do not decrease Frequency below that used at home.     0-3 - enter or revise RT bronchodilator order(s) to equivalent RT Bronchodilator order with Frequency of every 4 hours PRN for wheezing or increased work of breathing using Per Protocol order mode. 4-6 - enter or revise RT Bronchodilator order(s) to two equivalent RT bronchodilator orders with one order with BID Frequency and one order with Frequency of every 4 hours PRN wheezing or increased work of breathing using Per Protocol order mode. 7-10 - enter or revise RT Bronchodilator order(s) to two equivalent RT bronchodilator orders with one order with TID Frequency and one order with Frequency of every 4 hours PRN wheezing or increased work of breathing using Per Protocol order mode. 11-13 - enter or revise RT Bronchodilator order(s) to one equivalent RT bronchodilator order with QID Frequency and an Albuterol order with Frequency of every 4 hours PRN wheezing or increased work of breathing using Per Protocol order mode. Greater than 13 - enter or revise RT Bronchodilator order(s) to one equivalent RT bronchodilator order with every 4 hours Frequency and an Albuterol order with Frequency of every 2 hours PRN wheezing or increased work of breathing using Per Protocol order mode. RT to enter RT Home Evaluation for COPD & MDI Assessment order using Per Protocol order mode.     Electronically signed by iMchael Pardo RCP on 6/18/2022 at 9:10 PM

## 2022-06-19 NOTE — PROGRESS NOTES
Using [facility] , NIHSS was assessed with score of 10. Stroke alert called. Dr. Leyla Alcantar assessed and determined no additional intervention at this time. MRI completed this morning.

## 2022-06-19 NOTE — PROGRESS NOTES
Stroke alert was called on this patient earlier. Patient admitted with neurological symptoms to rule out CVA. Reported that on presentation she had had left-sided weakness with facial droop. According to the granddaughter at bedside, this early afternoon patient had new right-sided weakness. This was addressed during the day by day provider. RN taking care of the patient called stroke alert on the patient as right-sided symptoms were new to her. However this seemed to have been ongoing during the day and have been addressed. On my evaluation, patient with no drift, normal , normal strength of both upper and lower extremities. No exhibited facial droop that was appreciable by myself. I doubt new stroke at this time. Will continue to monitor. No further testing required at this time.

## 2022-06-19 NOTE — PROGRESS NOTES
[facility]    6/19/2022 8:49 AM        Name: Ximena Corcoran . Admitted: 6/17/2022  Primary Care Provider: No primary care provider on file. (Tel: None)      Chief Complaint   Patient presents with    Numbness     neplai  [phone], numbness to L side of face and body x3 days     Brief History: Patient is an 79 yo [place] speaking male with hx COPD, DM2, HTN, thyroid disease. He presented to ER with left sided facial weakness and numbness. CT head with no acute findings. CTA head/neck with multiple high grade stenosis in multiple vessels. He was admitted to r/o CVA. Neuro and vascular consulted. Subjective:  Resting in bed, grand daughter at bedside acting as [place] . Patient more conversant today. Offers no new complaints other than being hungry, he is on dysphagia diet. Continue to note right facial droop. Moves all extremities, mild weakness to LLE. Denieschest pain, shortness of breath, HA, dizziness.       Reviewed interval ancillary notes    Current Medications  insulin lispro (HUMALOG) injection vial 0-12 Units, TID WC  insulin lispro (HUMALOG) injection vial 0-6 Units, Nightly  dextrose bolus 10% 125 mL, PRN   Or  dextrose bolus 10% 250 mL, PRN  glucagon (rDNA) injection 1 mg, PRN  dextrose 5 % solution, PRN  albuterol (PROVENTIL) nebulizer solution 2.5 mg, Q4H PRN  amLODIPine (NORVASC) tablet 5 mg, Daily  hydroCHLOROthiazide (HYDRODIURIL) tablet 25 mg, Daily  levothyroxine (SYNTHROID) tablet 100 mcg, Daily  ondansetron (ZOFRAN-ODT) disintegrating tablet 4 mg, Q8H PRN   Or  ondansetron (ZOFRAN) injection 4 mg, Q6H PRN  polyethylene glycol (GLYCOLAX) packet 17 g, Daily PRN  enoxaparin (LOVENOX) injection 40 mg, Daily  aspirin EC tablet 81 mg, Daily   Or  aspirin suppository 300 mg, Daily  rosuvastatin (CRESTOR) tablet 40 mg, Nightly  labetalol (NORMODYNE;TRANDATE) injection 10 mg, Q10 Min PRN  hydrALAZINE (APRESOLINE) injection 10 mg, Q4H PRN  mometasone-formoterol (DULERA) 200-5 MCG/ACT inhaler 2 puff, BID  pantoprazole (PROTONIX) tablet 40 mg, QAM AC        Objective:  BP (!) 154/84   Pulse 82   Temp 97.7 °F (36.5 °C) (Axillary)   Resp 16   Ht 4' 9\" (1.448 m)   Wt 126 lb 12.8 oz (57.5 kg)   SpO2 93%   BMI 27.44 kg/m²     Intake/Output Summary (Last 24 hours) at 6/19/2022 0849  Last data filed at 6/18/2022 1830  Gross per 24 hour   Intake 720 ml   Output --   Net 720 ml      Wt Readings from Last 3 Encounters:   06/19/22 126 lb 12.8 oz (57.5 kg)   02/03/21 145 lb (65.8 kg)   12/10/20 145 lb (65.8 kg)     General:  Awake, alert, oriented in NAD  Skin:  Warm and dry. No unusual bruising or rash  Neck:  Supple. No JVD appreciated  Chest:  Normal effort. Scattered wheezes on exam  Cardiovascular:  RRR, normal S1/S2, no murmur/gallop/rub  Abdomen:  Soft, nontender, +bowel sounds  Extremities:  No edema  Neurological: Right facial droop, mild weakness left leg  Psychological: Normal mood and affect      Labs and Tests:  CBC:   Recent Labs     06/17/22  1348 06/18/22  0541 06/19/22  0510   WBC 8.8 7.5 9.9   HGB 13.3* 12.5* 12.7*    275 305     BMP:    Recent Labs     06/17/22  1348 06/18/22  0541 06/19/22  0510   * 138 136   K 4.6 4.7 4.7    106 103   CO2 22 24 23   BUN 20 19 21*   CREATININE 1.3 1.3 1.4*   GLUCOSE 113* 84 138*     Hepatic:   Recent Labs     06/17/22  1348   AST 26   ALT 15   BILITOT 0.3   ALKPHOS 133*     Results for Lonne Skiff (MRN [id number]) as of 6/19/2022 09:12   Ref. Range 6/18/2022 16:50 6/18/2022 17:55 6/18/2022 20:11 6/18/2022 21:13 6/19/2022 07:48   POC Glucose Latest Ref Range: 70 - 99 mg/dl 63 (L) 123 (H) 179 (H) 140 (H) 145 (H)       CXR 6/17/2022:  Stable scarring right apex       No acute cardiopulmonary process     CT Head / CTA Head and Neck 6/17/2022:  1. No acute intracranial abnormality.    2. 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 75% stenosis in the cavernous/supraclinoid segment of the left internal   carotid artery. 4. Right dominance of the vertebral arteries with hypoplastic intracranial   left vertebral artery.  Superimposed occlusion in the mid to distal   intracranial left vertebral artery. 5. 80% stenosis in the proximal V4 segment of right vertebral artery. 6. 80% stenosis in the proximal P2 segment of the left PCA.  50% stenosis in   the proximal P2 segment of the right PCA. 7. 95% stenosis in the proximal right internal carotid artery. 8. 85% stenosis in the proximal left internal carotid artery. 9. 40% stenosis in the proximal left common carotid artery. 10. Right dominance of the vertebral arteries with severe stenosis in the   proximal V1 segment of the left vertebral artery. 11. 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 12. 65% stenosis in the mid left subclavian artery. 13. 40% stenosis in the distal right subclavian artery. 14. Airspace opacity in the right upper lobe with volume loss, likely related   to chronic scarring, grossly stable since July 2, 2021.  Mild pericardial   effusion. MRI Brain 6/18/2022:  1. 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  No   evidence of hemorrhagic transformation. 2. Involutional parenchymal changes with mild microvascular ischemic disease. Problem List  Principal Problem:    Paresthesia  Active Problems:    Facial droop  Resolved Problems:    * No resolved hospital problems. *       Assessment & Plan:   1. Acute CVA. Presented with facial droop and paresthesias. CT head with no acute findings. CTA head/neck with multiple high grade stenosis in multiple vessels. MRI brain shows acute infarcts of left corona and subinsular regions, no evidence of hemorrhagic transformation. A1c 6.1 and lipid with , .  Continue asa and statin. Neuro on board. 2. Symptomatic left ICA stenosis. MRI shows acute infarcts of left corona and subinsular regions. Evaluated by vascular, family and patient to discuss if intervention desired. Continue asa and statin. Vascular on board. 3. DM2. A1c 6.1. Takes metformin at home, held on admission. Being covered with medium correction scale. BG yesterday dropped to 63 and he was given glucose. Transition to low dose correction, continue to follow. 4. COPD. Stable, not in exacerbation. Continue Dulera. Few wheezes on exam. Change albuterol to scheduled and prn.   5. Hypothyroidism. Check TSH level. Continue levothyroxine. 6. Anemia. Hgb 13.3 on presentation appears baseline. Stable. Disposition: PT/OT evaluated and recommend home with home care. Will await family decision regarding left CEA intervention. Diet: ADULT DIET; Dysphagia - Pureed;  Moderately Thick (Honey)  Code:Full Code  DVT PPX: enoxaparin      Álvaro Bryant, SUSY - CNP   6/19/2022 8:49 AM

## 2022-06-19 NOTE — PROGRESS NOTES
[facility]   Phone: [phone]    Physical Therapy    [] Initial Evaluation            [x] Daily Treatment Note         [] Discharge Summary      Patient: Shameka Liz   : 1940   MRN: [id number]   Date of Service:  2022  Admitting Diagnosis: Paresthesia     Current Admission Summary: Shameka Liz is a 80 y.o. male with past medical history of asthma, diabetes, hypertension and thyroid disease who presents to the ED with complaint of numbness and facial weakness. Patient speaks [place] and  was utilized for history and physical examination. Currently for the past 3 days he states he has had some weakness to his left-sided face with associated drooling and weakness/numbness to the left arm and left leg. Apparently saw PCP a couple days ago was prescribed aspirin. He states he was told to come to the ED if symptoms worsen. Came to the ED for further evaluation and treatment. He denies any sick contacts or recent illness. Denies any headache, visual changes, other numbness/tingling, lightheadedness/dizziness at this time. States feels like he slurred speech. Has had drooling. Denies chest pain, shortness of breath, palpitations, blood thinners, abdominal pain, nausea/vomiting, urinary symptoms or changes in bowel movement. Denies fever or chills. Denies rashes or lesions. Denies any falls or injuries. Past Medical History:  has a past medical history of Asthma, Diabetes mellitus ([address] Hypertension, and Thyroid disease. Past Surgical History:  has no past surgical history on file. Discharge Recommendations: Shameka Liz scored a 19/24 on the AM-PAC short mobility form. Current research shows that an AM-PAC score of 18 or greater is typically associated with a discharge to the patient's home setting.  Based on the patient's AM-PAC score and their current functional mobility deficits, it is recommended that the patient have 2-3 sessions per week of Physical Therapy at d/c to increase the patient's independence. At this time, this patient demonstrates the endurance and safety to discharge home with home health PT and a follow up treatment frequency of 2-3x/wk. Please see assessment section for further patient specific details. If patient discharges prior to next session this note will serve as a discharge summary. Please see below for the latest assessment towards goals. HOME HEALTH CARE: LEVEL 1 STANDARD  - Initial home health evaluation to occur within 24-48 hours, in patient home   - Therapy to evaluate with goal of regaining prior level of functioning   - Therapy to evaluate if patient has [address] needs for personal care      DME Required For Discharge: no DME required at discharge  Precautions/Restrictions: high fall risk  Weight Bearing Restrictions: no restrictions  [] Right Upper Extremity  [] Left Upper Extremity [] Right Lower Extremity  [] Left Lower Extremity     Required Braces/Orthotics: no braces required   [] Right  [] Left  Positional Restrictions:no positional restrictions    Pre-Admission Information   Lives With: family    Type of Home: house  Home Layout: one level  Home Access: level entry  Bathroom Layout: tub/shower unit  Toilet Height: standard height  Bathroom Equipment: . Comment: None. Home Equipment: single point cane  Transfer Assistance: Independent without use of device  Ambulation Assistance:Independent without use of device  ADL Assistance: independent with all ADL's  IADL Assistance: independent with homemaking tasks  Active :        [] Yes  [x] No  Current Employment: retired. Occupation: Unknown. Hobbies: None. Recent Falls: Denies. **Above information taken from patient's granddaughter as patient demonstrates cognitive deficits when using .     Examination   Vision:   Vision Gross Assessment: WFL  Hearing:   WFL  Observation:   General Observation:  Patient supine in bed w/ granddaughter present. Posture:   WFL  Sensation:   WFL  Proprioception:    WFL  Tone:   Normotonic  Coordination Testing:   WFL    ROM:   (B) LE AROM WFL  Strength:   (B) LE strength grossly WFL  Decision Making: medium complexity  Clinical Presentation: evolving      Subjective  General: Patient speaks [place]. Granddaughter present to interpret. Used granddaughter to interpret rather than  this session and  last session had a difficult time understanding patient d/t aphasia while granddaughter seemed to comprehend. She reports he is understandable some of the time but not at his normal.  Pain: 0/10  Pain Interventions: repositioned        Functional Mobility  Bed Mobility  Supine to Sit: supervision  Sit to Supine: supervision  Comments:  Increased time to complete w/ use of bedrail and elevated HOB. Patient crawled into bed moving forward onto knees and then laying on his stomach. Transfers  Sit to stand transfer: contact guard assistance  Stand to sit transfer: contact guard assistance  Comments:  Gait belt donned. Patient used straight cane for balance in left hand but appeared to hold cane off ground for a majority of the time. Trudell Organ appears too tall for patient. Ambulation  Surface:level surface  Assistive Device: single point cane  Assistance: contact guard assistance  Distance: 120', 80'  Gait Mechanics: shuffling, unsteady  Comments:  Patient holds cane off ground in left hand, would benefit from having cane lowered but patient does not appear open to adjustment. Stair Mobility  Stair mobility not completed on this date. Comments:  Wheelchair Mobility:  No w/c mobility completed on this date.   Comments:  Balance  Static Sitting Balance: good: independent with functional balance in unsupported position  Dynamic Sitting Balance: fair (+): maintains balance at SBA/supervision without use of UE support  Static Standing Balance: fair (-): maintains balance at CGA with use of UE support  Dynamic Standing Balance: fair (-): maintains balance at CGA with use of UE support  Comments:    Other Therapeutic Interventions    Functional Outcomes  AM-PAC Inpatient Mobility Raw Score : 19              Cognition  Overall Cognitive Status: Impaired  Arousal/Alterness: appropriate responses to stimuli  Following Commands: follows one step commands with repetition, follows one step commands with increased time, follows one step commands consistently, inconsistently follows commands  Attention Span: appears intact  Memory: decreased recall of biographical information, decreased recall of recent events, decreased short term memory  Safety Judgement: decreased awareness of need for assistance, decreased awareness of need for safety  Problem Solving: assistance required to generate solutions, assistance required to implement solutions  Insights: decreased awareness of deficits  Initiation: requires cues for some  Sequencing: requires cues for some  Orientation:    oriented to person, disoriented to place, disoriented to time  and disoriented to situation  Command Following:   impaired    Education  Barriers To Learning: cognition, language and cultural  Patient Education: patient educated on goals, PT role and benefits, plan of care, general safety, functional mobility training, orientation, family education, transfer training, discharge recommendations  Learning Assessment:  patient will require reinforcement due to cognitive deficits    Assessment  Activity Tolerance: Patient fatigues with long distance ambulation. Impairments Requiring Therapeutic Intervention: decreased functional mobility, decreased ADL status, decreased safety awareness, decreased cognition, decreased endurance, decreased balance  Prognosis: good  Clinical Assessment: Patient demonstrates balance and ambulation deficits as well as impaired safety awareness, cognitive deficits, and impulsivity.   He is in need of 24 hour assistance and would benefit from continued therapy at d/c. Patient has multiple family members at home to assist him. Safety Interventions: patient left in bed, bed alarm in place, call light within reach, gait belt, patient at risk for falls, nurse notified and family/caregiver present    Plan  Frequency: 5-7 x/week  Current Treatment Recommendations: strengthening, ROM, balance training, functional mobility training, transfer training, gait training, patient/caregiver education, safety education and equipment evaluation/education    Goals  Patient Goals: None verbalized. Short Term Goals:  Time Frame: Discharge. Patient will complete bed mobility at modified independent   Patient will complete transfers at [facility]   Patient will ambulate 50 ft with use of single point cane at modified independent   No goals met this session.   Therapy Session Time      Individual Group Co-treatment   Time In     1318   Time Out     1344   Minutes     26     Timed Code Treatment Minutes:  26 minutes  Total Treatment Minutes:  26 minutes       Electronically Signed By: Nancie Paul, PT, DPT, [id number]

## 2022-06-19 NOTE — PROGRESS NOTES
[facility] [facility]   Phone: [phone]    Occupational Therapy    [] Initial Evaluation            [x] Daily Treatment Note         [] Discharge Summary      Patient: Atif Lewis   : 1940   MRN: [id number]   Date of Service:  2022    Admitting Diagnosis:  Paresthesia  Current Admission Summary: 80 y.o. male presenting with aphasia and subjective right facial and right sided weakness. However patient's granddaughter also states that the patient was experiencing some left sided weakness as well. Patient does not speak [place]. Interpretation was conducted via video  and granddaughter. The symptoms had been ongoing for a couple of days with some resolution. Patient is quite frail. General weakness in both lower extremities has also been an issue for some time. Underwent CT/CTA in ER which demonstrated no acute abnormalities but severe bilateral proximal ICA stenosis, atretic left VA and some subclavian disease as well as a stable saccular aortic arch aneurysm. Granddaughter unsure if patient would like to proceed with surgical interventions. Past Medical History:  has a past medical history of Asthma, Diabetes mellitus ([address] Hypertension, and Thyroid disease. Past Surgical History:  has no past surgical history on file. Discharge Recommendations: Atif Lewis scored a 13/24 on the AM-PAC ADL Inpatient form. Current research shows that an AM-PAC score of 18 or greater is typically associated with a discharge to the patient's home setting. Based on the patient's AM-PAC score, and their current ADL deficits, it is recommended that the patient have 2-3 sessions per week of Occupational Therapy at d/c to increase the patient's independence. At this time, this patient demonstrates the endurance and safety to discharge home with [address] [place] [address] (home vs OP services) and a follow up treatment frequency of 2-3x/wk.    Please see assessment section for further patient specific details. If patient discharges prior to next session this note will serve as a discharge summary. Please see below for the latest assessment towards goals. HOME HEALTH CARE: LEVEL 1 STANDARD    - Initial home health evaluation to occur within 24-48 hours, in patient home   - Therapy to evaluate with goal of regaining prior level of functioning   - Therapy to evaluate if patient has [address] needs for personal care      DME Required For Discharge: shower chair with back    Precautions/Restrictions: low fall risk, . Comment: C-Diff r/o  Weight Bearing Restrictions: no restrictions  [] Right Upper Extremity  [] Left Upper Extremity [] Right Lower Extremity  [] Left Lower Extremity     Required Braces/Orthotics: no braces required   [] Right  [] Left  Positional Restrictions:no positional restrictions    Pre-Admission Information   Lives With: family                Type of Home: house  Home Layout: one level  Home Access: level entry  Bathroom Layout: tub/shower unit  Toilet Height: standard height  Bathroom Equipment: . Comment: None. Home Equipment: single point cane  Transfer Assistance: Independent without use of device  Ambulation Assistance:Independent without use of device  ADL Assistance: independent with all ADL's  IADL Assistance: independent with homemaking tasks  Active :        []? Yes            [x]? No  Current Employment: retired. Occupation: Unknown. Hobbies: None. Recent Falls: Unable to determine       Subjective  General: Patient supine in bed, agreeable to OT/PT cotx. Patient consented to use of granddaughter present in room to interpret d/t aphasia and  unable to translate. Pt denies pn.    Pain: 0/10  Pain Interventions: not applicable        Activities of Daily Living  Basic Activities of Daily Living  Toileting: contact guard assistance.   Equipment: none  General Comments: Pt preferred male therapist for CGA during toileting vs this female therapist Patient able to don hat when provided setup  Instrumental Activities of Daily Living  No IADL completed on this date. Functional Mobility  Bed Mobility  Supine to Sit: supervision  Sit to Supine: supervision  Scooting: moderate assistance  Comments: Pt demonstrated impulsivity during supine>sit, crawling into bed on hands and knees and curling into fetal position at lower half of bed, requiring ModA to scoot up in bed d/t language barrier. Transfers  Sit to stand transfer:contact guard assistance  Stand to sit transfer: contact guard assistance  Comments:  Functional Mobility:  Functional Mobility: single point cane. contact guard assistance. Activity: Increased time, shuffling gait (~120 feet before seated rest break in viramontes, then ambulated remaining 80 feet back to room. Use of gestures needed for direction d/t language barrier.      Other Therapeutic Interventions    Functional Outcomes    Cognition  Overall Cognitive Status: Impaired  Following Commands: inconsistently follows commands  Memory: decreased recall of biographical information, decreased recall of recent events, decreased short term memory  Safety Judgement: decreased awareness of need for assistance, decreased awareness of need for safety  Problem Solving: assistance required to generate solutions, assistance required to implement solutions, decreased awareness of errors, assistance required to identify errors made, assistance required to correct errors made  Initiation: requires cues for some  Sequencing: requires cues for some  Orientation:    oriented to person Comment: Difficult to assess d/t language barrier  Command Following:   impaired     Education  Barriers To Learning: cognition and language  Patient Education: patient educated on goals, OT role and benefits, plan of care, discharge recommendations  Learning Assessment:  patient will require reinforcement due to cognitive deficits would benefit from family as co-learners    Assessment  Activity Tolerance: Tolerated fair (language, cognitive limitations)  Impairments Requiring Therapeutic Intervention: decreased functional mobility, decreased ADL status, decreased cognition, decreased endurance, decreased balance, decreased posture  Prognosis: fair  Clinical Assessment: Patient presents with the above deficits, which are below baseline, Pt tolerated treatment well this date, however cognition, endurance and safety awareness are still below baseline. Pt would benefit from continued skilled OT treatment to facilitate return to PLOF. Safety Interventions: patient left in bed, bed alarm in place, call light within reach, patient at risk for falls and nurse notified    Plan  Frequency: 5-7 x/week  Current Treatment Recommendations: balance training, functional mobility training, transfer training, endurance training, patient/caregiver education, ADL/self-care training, safety education and equipment evaluation/education    Goals  Patient Goals:  To return home   Short Term Goals:  Time Frame: Upon discharge Continue Goals (6/19)  Patient will complete lower body ADL at minimal assistance   Patient will complete toileting at minimal assistance   Patient will complete grooming at minimal assistance   Patient will complete functional transfers at stand by assistance   Patient will complete functional mobility at stand by assistance   Patient will complete bed mobility at supervision     Therapy Session Time     Individual Group Co-treatment   Time In    1318   Time Out    1344   Minutes    26        Timed Code Treatment Minutes:   26 Minutes  Total Treatment Minutes:  26 Minutes       Electronically Signed By: CRUZ [id number]

## 2022-06-19 NOTE — PROGRESS NOTES
Facility/Department: [address]  Dysphagia Treatment and SLP/cognitive treatment    Patient: Melinda Louis   : 1940   MRN: [id number]      Evaluation Date: 2022      Admitting Dx: Paresthesia [R20.2]  Facial droop [R29.810]  Abnormal computed tomography angiography of head [R93.0]  Pain: Unable to state                                  H&P:   Melinda Louis is a 80 y.o. male with h.o  HTN, DM , Hypothyroidism, presented with c/o left sided facial numbness and weakness. Grand daughter at bedside translated. Reports occasional cramps in legs. Facial weakness has resolved . Denies slurred speech or difficulty swallowing. Denies h./o CVA  . CT head and CTA head and neck showed no acute hemorrhage or infarct. Showed severe stenosis of Left MCA and multiple other high grade stenosis involving Right and Left ICA, PCA, and vertebral arteries. Imaging:  Chest X-ray:  2022  Stable scarring right apex       No acute cardiopulmonary process         MRI: 2022   1. 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  No   evidence of hemorrhagic transformation. 2. Involutional parenchymal changes with mild microvascular ischemic disease. 2022: SLP and BRYANNA evaluations completed and diet was initiated    History/Prior Level of Function:   [facility]   Prior Dysphagia History: none noted upon chart review   Prior Speech History: none noted upon chart review   Reason for referral: SLP evaluation orders received due to CVA protocol . DYSPHAGIA TREATMENT  Dysphagia Impressions/Dysphagia Diagnosis: Oropharyngeal Dysphagia   1. Pt was awake in bed with  family members. Pt oriented to self and agreeable to po trials. Pt [place] and per RN has not been tolerating  due to reports of difficult to understand pt. Family at bedside.     2. Residual Oropharyngeal dysphagia with right oral motor muscular weakness which can impact labial seal and bolus control/manipulation A-P; delayed initiation of swallow and potential reduced laryngeal rom/pharyngeal clearance. · Pt did achieve labial seal for cup presentations without anterior loss  · Pt can be impulsive with cup presentations  · No overt clinical s/s post swallow of moderately thick liquids  · Tsp presentations of puree tolerated with incomplete clearance of tsp and minimal coating of residue right labial.  · Lingual mashing A-P of puree  · No post swallow overt clinical s/s swallow  · Treatment trial for right labial/buccal/lingual rom/strengthening (against resistance); clearance swallows. Concern for right intra oral sensory deficits. Max models/tactile cues. Recommend   · continue dysphagia puree and moderately thick liquids with strict precautions and close pulmonary monitoring. ·  Pt may benefit from instrumental assessment pending ongoing tolerance.      Recommended Diet and Intervention:  Diet Solids Recommendation:  Dysphagia I Puree  Liquid Consistency Recommendation:  Moderately (honey) thick liquids  Recommended form of Meds: Crushed as able in puree       Recommend completion of Modified Barium Swallow study to further assess pharyngeal phase of swallow     Dysphagia Therapeutic Intervention:  Bolus Control Exercise, Pharyngeal Exercise, Diet Tolerance Monitoring , Oral Motor Exercises , Patient/Family Education , Therapeutic Trials with SLP , Instrumental assessment of swallow function (Modified Barium Swallow Study)     Compensatory Swallowing Strategies:  Check for pocketing of food R, Upright as possible with all PO intake , No straws , Assist Feed , Small bites/sips , Eat/feed slowly    Oral Mechanism Exam:  []WFL []Mild   [x] Moderate  [x]Severe  []To be assessed  Labial Symmetry , Labial Strength, Labial Sensation , Labial Coordination , Lingual Symmetry , Lingual Strength , Lingual Sensation , Lingual Coordination     SHORT TERM DYSPHAGIA GOALS  Pt will functionally tolerate dysphagia puree with ;moderately thick liquid diet with no overt clinical s/s of aspiration : continue goal  Pt will demonstrate understanding of aspiration risk and precautions via education/demonstration with occasional prompting: continue goal  Pt will advance to least restrictive diet as indicated : held today 6/19/2022  If clinical s/s of aspiration/penetration continue to be noted, Pt will participate in Modified Barium Swallow Study : recommended    Patient Positioning: Upright in bed       SPEECH LANGUAGE COGNITIVE TREATMENT:     Speech Diagnosis:   Expressive Aphasia , Receptive Aphasia ,  Dysarthria , Apraxia of Speech , Cognitive-Linguistic Deficits     · Not targeted directly in therapy this date/time    GOALS:  Short Term Speech/Language/Cognitive Goals:   Pt will improve speech intelligibility in connected speech via graded tasks to 80% not targeted 6/19/2022  Pt will improve auditory processing/comprehension of commands and questions to 80%, via graded tasks not targeted 6/19/2022  Pt will improve orientation and short term recall via graded tasks to 80%not targeted 6/19/2022  Pt will improve verbal expression for functional expression via graded tasks to 80% not targeted 6/19/2022      Plan of care: 3-5 times a week during acute care stay     Discharge Recommendations:  Recommend ongoing SLP for speech and dysphagia therapy upon discharge from hospital     EDUCATION:   Provided education regarding role of SLP, results of assessment, recommendations and general speech pathology plan of care. [] Pt verbalized understanding and agreement   [x] Pt requires ongoing learning   [x] No evidence of comprehension  - concern for comp     If patient discharges prior to next visit, this note will serve as discharge. Time code minutes: 0    Total Time:  20    Electronically signed by: Carli Arnold MS,CCC,SLP [id number]  Speech and Language Pathologist  6/19/2022 12:00 PM

## 2022-06-20 ENCOUNTER — APPOINTMENT (OUTPATIENT)
Dept: GENERAL RADIOLOGY | Age: 82
DRG: 045 | End: 2022-06-20
Payer: MEDICAID

## 2022-06-20 LAB
ANION GAP SERPL CALCULATED.3IONS-SCNC: 13 MMOL/L (ref 3–16)
BUN BLDV-MCNC: 24 MG/DL (ref 7–20)
CALCIUM SERPL-MCNC: 9.2 MG/DL (ref 8.3–10.6)
CHLORIDE BLD-SCNC: 104 MMOL/L (ref 99–110)
CO2: 20 MMOL/L (ref 21–32)
CREAT SERPL-MCNC: 1.2 MG/DL (ref 0.8–1.3)
GFR AFRICAN AMERICAN: >60
GFR NON-AFRICAN AMERICAN: 58
GLUCOSE BLD-MCNC: 110 MG/DL (ref 70–99)
GLUCOSE BLD-MCNC: 126 MG/DL (ref 70–99)
GLUCOSE BLD-MCNC: 141 MG/DL (ref 70–99)
GLUCOSE BLD-MCNC: 175 MG/DL (ref 70–99)
GLUCOSE BLD-MCNC: 204 MG/DL (ref 70–99)
HCT VFR BLD CALC: 39.3 % (ref 40.5–52.5)
HEMOGLOBIN: 12.8 G/DL (ref 13.5–17.5)
MCH RBC QN AUTO: 28.3 PG (ref 26–34)
MCHC RBC AUTO-ENTMCNC: 32.7 G/DL (ref 31–36)
MCV RBC AUTO: 86.5 FL (ref 80–100)
PDW BLD-RTO: 12.9 % (ref 12.4–15.4)
PERFORMED ON: ABNORMAL
PLATELET # BLD: 286 K/UL (ref 135–450)
PMV BLD AUTO: 6.6 FL (ref 5–10.5)
POTASSIUM SERPL-SCNC: 4.3 MMOL/L (ref 3.5–5.1)
RBC # BLD: 4.54 M/UL (ref 4.2–5.9)
SODIUM BLD-SCNC: 137 MMOL/L (ref 136–145)
WBC # BLD: 9.8 K/UL (ref 4–11)

## 2022-06-20 PROCEDURE — 85027 COMPLETE CBC AUTOMATED: CPT

## 2022-06-20 PROCEDURE — 6360000002 HC RX W HCPCS: Performed by: FAMILY MEDICINE

## 2022-06-20 PROCEDURE — 36415 COLL VENOUS BLD VENIPUNCTURE: CPT

## 2022-06-20 PROCEDURE — 93880 EXTRACRANIAL BILAT STUDY: CPT

## 2022-06-20 PROCEDURE — 80048 BASIC METABOLIC PNL TOTAL CA: CPT

## 2022-06-20 PROCEDURE — 74230 X-RAY XM SWLNG FUNCJ C+: CPT

## 2022-06-20 PROCEDURE — 94640 AIRWAY INHALATION TREATMENT: CPT

## 2022-06-20 PROCEDURE — 6360000002 HC RX W HCPCS: Performed by: INTERNAL MEDICINE

## 2022-06-20 PROCEDURE — 92526 ORAL FUNCTION THERAPY: CPT

## 2022-06-20 PROCEDURE — 94761 N-INVAS EAR/PLS OXIMETRY MLT: CPT

## 2022-06-20 PROCEDURE — 6370000000 HC RX 637 (ALT 250 FOR IP): Performed by: NURSE PRACTITIONER

## 2022-06-20 PROCEDURE — 99232 SBSQ HOSP IP/OBS MODERATE 35: CPT | Performed by: SURGERY

## 2022-06-20 PROCEDURE — 92611 MOTION FLUOROSCOPY/SWALLOW: CPT

## 2022-06-20 PROCEDURE — 6370000000 HC RX 637 (ALT 250 FOR IP): Performed by: FAMILY MEDICINE

## 2022-06-20 PROCEDURE — 2060000000 HC ICU INTERMEDIATE R&B

## 2022-06-20 RX ORDER — POLYVINYL ALCOHOL 14 MG/ML
1 SOLUTION/ DROPS OPHTHALMIC PRN
Status: DISCONTINUED | OUTPATIENT
Start: 2022-06-20 | End: 2022-06-21 | Stop reason: HOSPADM

## 2022-06-20 RX ADMIN — ENOXAPARIN SODIUM 40 MG: 100 INJECTION SUBCUTANEOUS at 08:31

## 2022-06-20 RX ADMIN — INSULIN LISPRO 1 UNITS: 100 INJECTION, SOLUTION INTRAVENOUS; SUBCUTANEOUS at 21:50

## 2022-06-20 RX ADMIN — ALBUTEROL SULFATE 2.5 MG: 2.5 SOLUTION RESPIRATORY (INHALATION) at 20:45

## 2022-06-20 RX ADMIN — HYDROCHLOROTHIAZIDE 25 MG: 25 TABLET ORAL at 08:24

## 2022-06-20 RX ADMIN — PANTOPRAZOLE SODIUM 40 MG: 40 TABLET, DELAYED RELEASE ORAL at 08:24

## 2022-06-20 RX ADMIN — ASPIRIN 81 MG: 81 TABLET, COATED ORAL at 08:24

## 2022-06-20 RX ADMIN — AMLODIPINE BESYLATE 5 MG: 5 TABLET ORAL at 08:24

## 2022-06-20 RX ADMIN — ALBUTEROL SULFATE 2.5 MG: 2.5 SOLUTION RESPIRATORY (INHALATION) at 08:26

## 2022-06-20 RX ADMIN — ROSUVASTATIN CALCIUM 40 MG: 40 TABLET, FILM COATED ORAL at 21:49

## 2022-06-20 RX ADMIN — Medication 2 PUFF: at 20:45

## 2022-06-20 RX ADMIN — LEVOTHYROXINE SODIUM 100 MCG: 0.1 TABLET ORAL at 08:24

## 2022-06-20 RX ADMIN — Medication 2 PUFF: at 08:25

## 2022-06-20 RX ADMIN — POLYVINYL ALCOHOL 1 DROP: 14 SOLUTION/ DROPS OPHTHALMIC at 21:49

## 2022-06-20 NOTE — PROGRESS NOTES
Physical Therapy  Rickie Tsai  PT treatment attempted this afternoon. Pt is currently off the floor for MBS per nursing. Will follow up as schedule allows.   Thanks, Carmela Jennings, PT

## 2022-06-20 NOTE — PROCEDURES
Facility/Department: [address]  MODIFIED BARIUM SWALLOW EVALUATION    Patient: Atif Lewis   : 1940   MRN: [id number]      Evaluation Date: 2022   Admitting Diagnosis: Paresthesia [R20.2]  Facial droop [R29.810]  Abnormal computed tomography angiography of head [R93.0]  Treatment Diagnosis: Dysphagia   Pain: denies                          Radiologist: Dr. Velma Song   Date of Evaluation: 2022  Type of Study: Modified Barium Swallowing Study (MBS)  Diet Prior to Study:  Dysphagia I Pureed with Moderately Thick (honey) Liquids      Impression:  Modified Barium Swallow evaluation completed on 2022. Video interpretor was used for the study. Patient presents with oropharyngeal dysphagia secondary to impaired mastication, intermittent oral holding, impaired bolus propulsion, pharyngeal pooling with delayed swallow initiation and decreased laryngeal elevation complicated by new CVA resulting in observed one episode of self clearing laryngeal penetration with thin liquids and inability to masticate solids. With soft solid pt demonstrated minimal anterior mastication with eventual expulsion of unchewed bolus. With puree pt demonstrated prolonged/disorganized A-P propulsion for oral clearing. One episode of laryngeal penetration was noted with thin liquids, no aspiration was viewed. Pt was able to self feed with minimal assistance. Recommend Dysphagia I Pureed with thin liquids, meds with puree or crushed as able with puree, assistance with intake and oral care following meals. Aspiration/Penetration Risk:  Minimal     Recommendations:    Diet Level:   Dysphagia I Puree  with Thin liquids   Medication: Meds in puree  or Crushed as able in puree     Strategies:  Alternate solids/liquids , Check for pocketing of food L, Check for pocketing of food R, Upright as possible with all PO intake , Small bites/sips , Eat/feed slowly, Remain upright 30-45 min     Treatments: Ongoing dysphagia therapy with

## 2022-06-20 NOTE — PROGRESS NOTES
VASCULAR    Seen in f/u from L hemispheric TIA and carotid disease. Through granddaughter as  pt feels nearly back to baseline. No weakness or aphasia. VSS afeb  Neuro - symmetric with lateralizing weakness. Per GD speech baseline. 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 Also L MCA stenosis which may have been causative. 2) Asymptomatic ADAMA stenosis (>80%)   Continue maximal medical therapy. Will get CDS to better define LICA stenosis by standard velocity criteria. Will follow and discuss options/plans with family as we gather more info.      Gia Chaparro

## 2022-06-20 NOTE — PROGRESS NOTES
Occupational [facility]    Patient currently off the floor for testing. Will attempt later as schedule allows. Thank you,  Carissa Slade.  [address]

## 2022-06-20 NOTE — PROGRESS NOTES
Came several times to see this patient, went down for different testing  Discussed with family  Reviewed his MRI which showed left hemispheric CVA. Will follow tomorrow regarding final recommendation for his left ICA stenosis.

## 2022-06-20 NOTE — PROGRESS NOTES
Facility/Department: [address]  Speech Language Pathology   Dysphagia Treatment Note    Patient: Brenda Velasco   : 1940   MRN: [id number]      Evaluation Date: 2022      Admitting Dx: Paresthesia [R20.2]  Facial droop [R29.810]  Abnormal computed tomography angiography of head [R93.0]  Treatment Diagnosis: Oropharyngeal Dysphagia   Pain: Denies                                              Diet and Treatment Recommendations 2022:  Diet Solids Recommendation:  Dysphagia I Puree  Liquid Consistency Recommendation:  Moderately (honey) thick liquids  Recommended form of Meds: Meds in puree  or Crushed as able in puree      Recommend completion of Modified Barium Swallow study to further assess pharyngeal phase of swallow     Compensatory strategies: Alternate solids/liquids , Upright as possible with all PO intake , Swallow 2 times per bite , Eat/feed slowly, Remain upright 30-45 min     Assessment of Texture Tolerance:  Diet level prior to treatment: Dysphagia I Puree , Moderately (honey) thick liquids   Tolerance of Current Diet Level: RN reported that pt has demonstrated intermittent coughing, appeared to tolerate meds with puree, RN reported family may     Impressions: Pt was positioned Upright in bed , awake and alert. Currently on room air. Trials of thin liquids and puree  were provided to assess swallow function. Family was present to assist with translation. They reported pt has been coughing. Continues to demonstrate slurred speech. Limited trials revealed prolonged A-P propulsion, questionably delayed swallow initiation and suspected minimally decreased laryngeal elevation. As trials progressed pt demonstrated weak/congested coughing, unclear if this was due to penetration/aspiration vs uncleared pharyngeal residue. Recommend completion of Modified Barium Swallow to further assess pharyngeal phase of swallow.  Pt demonstrates increased risk for aspiration due to cognitive state and new CVA. Based on today's assessment recommend Dysphagia I Puree  with Moderately (honey) thick liquids , Meds in puree  or Crushed as able in puree . Dysphagia Goals:   Pt will functionally tolerate recommended diet with no overt clinical s/s of aspiration (Ongoing 06/20/22)  Pt will demonstrate understanding of aspiration risk and precautions via education/demonstration with occasional prompting (Ongoing 06/20/22)  Pt will advance to least restrictive diet as indicated (Ongoing 06/20/22)  If clinical s/s of aspiration/penetration continue to be noted, Pt will participate in Modified Barium Swallow Study (Ongoing 06/20/22)    Plan:   3-5 times per week during acute care stay. Patient/Family Education:  Provided education regarding role of SLP, recommendations and general speech pathology plan of care. [] Pt verbalized understanding and agreement   [x] Pt requires ongoing learning   [x] No evidence of comprehension     Discharge Recommendations:    Recommend ongoing SLP for dysphagia therapy upon discharge from hospital     Timed Code Treatment:  0 minutes     Total Treatment Time: 15 minutes     If patient discharges prior to next session this note will serve as a discharge summary.        Signature: Edgardo Hand [address]  Speech Language Pathologist

## 2022-06-20 NOTE — DISCHARGE INSTR - COC
Continuity of Care Form    Patient Name: Austin Wheatley   :  1940  MRN:  [id number]    Admit date:  2022  Discharge date:  ***    Code Status Order: Full Code   Advance Directives:      Admitting Physician:  Narendra Garcia MD  PCP: No primary care provider on file. Discharging Nurse: [facility] Unit/Room#: 5UN-[id number]  Discharging Unit Phone Number: ***    Emergency Contact:   Extended Emergency Contact Information  Primary Emergency Contact: [address], [facility] Phone: [phone]  Mobile Phone: [phone]  Relation: Grandchild  Secondary Emergency Contact: Allegra Botello Phone: [phone]  Relation: Child    Past Surgical History:  History reviewed. No pertinent surgical history.     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    HTN (hypertension) I10    RUQ pain R10.11    Cholelithiasis K80.20    DM2 (diabetes mellitus, type 2) ([facility] [address] E11.9    Hypothyroid E03.9    Diarrhea R19.7    Hepatocellular carcinoma ([facility] [address] C22.0    Paresthesia R20.2    Facial droop R29.810       Isolation/Infection:   Isolation            No Isolation          Patient Infection Status       Infection Onset Added Last Indicated Last Indicated By Review Planned Expiration Resolved Resolved By    None active    Resolved    COVID-19 (Rule Out) 21 COVID-19, Rapid (Ordered)   21 Rule-Out Test Resulted    C-diff Rule Out 20 Clostridium difficile toxin/antigen (Ordered)   22 Lorri Swift RN    From previous admission in             Nurse Assessment:  Last Vital Signs: BP (!) 156/78   Pulse 88   Temp 97.7 °F (36.5 °C) (Oral)   Resp 16   Ht 4' 9\" (1.448 m)   Wt 126 lb 12.8 oz (57.5 kg)   SpO2 92%   BMI 27.44 kg/m²     Last documented pain score (0-10 scale):    Last Weight:   Wt Readings from Last 1 Encounters:   22 126 lb 12.8 oz (57.5 kg)     Mental Status:  {IP PT MENTAL STATUS:77101}    IV Access:  { MERON IV ACCESS:066410317}    Nursing Mobility/ADLs:  Walking   {CHP DME GOWF:692312935}  Transfer  {CHP DME QJQH:823017942}  Bathing  {CHP DME ZMEL:944112451}  Dressing  {CHP DME ODMS:728129338}  Toileting  {CHP DME MHWT:095654376}  Feeding  {CHP DME YOJU:375301233}  Med Admin  {CHP DME PKRN:014269881}  Med Delivery   { MERON MED Delivery:170337936}    Wound Care Documentation and Therapy:        Elimination:  Continence: Bowel: {YES / HS:66552}  Bladder: {YES / LT:81304}  Urinary Catheter: {Urinary Catheter:896806183}   Colostomy/Ileostomy/Ileal Conduit: {YES / }       Date of Last BM: ***  No intake or output data in the 24 hours ending 22 0922  No intake/output data recorded.     Safety Concerns:     [address] [employer] Safety Concerns:635996795}    Impairments/Disabilities:      [address] [employer] Impairments/Disabilities:411506388}    Nutrition Therapy:  Current Nutrition Therapy:   [address] [employer] Diet List:743817128}    Routes of Feeding: {CHP DME Other Feedings:771369579}  Liquids: {Slp liquid thickness:16568}  Daily Fluid Restriction: {CHP DME Yes amt example:042747480}  Last Modified Barium Swallow with Video (Video Swallowing Test): {Done Not Done CPDT:793959053}    Treatments at the Time of Hospital Discharge:   Respiratory Treatments: ***  Oxygen Therapy:  {Therapy; copd oxygen:54427}  Ventilator:    { CC Vent YJTD:816302302}    Rehab Therapies: {THERAPEUTIC INTERVENTION:4913488241}  Weight Bearing Status/Restrictions: [address] Sarah  Weight Bearin}  Other Medical Equipment (for information only, NOT a DME order):  {EQUIPMENT:347884103}  Other Treatments: ***    Patient's personal belongings (please select all that are sent with patient):  {SANKET DME Belongings:921087539}    RN SIGNATURE:  {Esignature:986172684}    CASE MANAGEMENT/SOCIAL WORK SECTION    Inpatient Status Date: ***    Readmission Risk Assessment Score:  Readmission Risk              Risk of Unplanned Readmission:  17           Discharging to Facility/ Agency   Name:   Address:  Phone:  Fax:    Dialysis Facility (if applicable)   Name:  Address:  Dialysis Schedule:  Phone:  Fax:    / signature: {Esignature:913145354}    PHYSICIAN SECTION    Prognosis: Fair    Condition at Discharge: Stable    Rehab Potential (if transferring to Rehab): N/A    Recommended Labs or Other Treatments After Discharge: PT/OT/ST/Skilled nursing    Physician Certification: I certify the above information and transfer of Maxi Simms  is necessary for the continuing treatment of the diagnosis listed and that he requires Home Care for greater 30 days.      Update Admission H&P: Changes in H&P as follows - imaging studies revealed acute CVA    PHYSICIAN SIGNATURE:  Electronically signed by SUSY Ryder CNP on 6/20/22 at 9:23 AM EDT

## 2022-06-20 NOTE — PLAN OF CARE
Problem: Discharge Planning  Goal: Discharge to home or other facility with appropriate resources  Outcome: Progressing     Problem: Skin/Tissue Integrity  Goal: Absence of new skin breakdown  Description: 1. Monitor for areas of redness and/or skin breakdown  2. Assess vascular access sites hourly  3. Every 4-6 hours minimum:  Change oxygen saturation probe site  4. Every 4-6 hours:  If on nasal continuous positive airway pressure, respiratory therapy assess nares and determine need for appliance change or resting period.   Outcome: Progressing     Problem: ABCDS Injury Assessment  Goal: Absence of physical injury  Outcome: Progressing  Flowsheets (Taken 6/19/2022 1541 by Cielo Horn RN)  Absence of Physical Injury: Implement safety measures based on patient assessment     Problem: Safety - Adult  Goal: Free from fall injury  Outcome: Progressing  Flowsheets (Taken 6/19/2022 1541 by Cielo Horn RN)  Free From Fall Injury:   Instruct family/caregiver on patient safety   Based on caregiver fall risk screen, instruct family/caregiver to ask for assistance with transferring infant if caregiver noted to have fall risk factors

## 2022-06-20 NOTE — PROGRESS NOTES
[facility] PROGRESS NOTE    6/20/2022 9:02 AM        Name: Matteo Reyes . Admitted: 6/17/2022  Primary Care Provider: No primary care provider on file. (Tel: None)      Chief Complaint   Patient presents with    Numbness     neplai  [phone], numbness to L side of face and body x3 days     Brief History: Patient is an 81 yo [place] speaking male with hx COPD, DM2, HTN, thyroid disease. He presented to ER with left sided facial weakness and numbness. CT head with no acute findings. CTA head/neck with multiple high grade stenosis in multiple vessels. He was admitted to r/o CVA. Neuro and vascular consulted. Subjective:  Resting in bed, grand daughter at bedside acting as [place] . Overall improved but per grand daughter speech and gait not back to baseline yet. Offers no new complaints. Denies chest pain, shortness of breath, HA, dizziness.  .      Reviewed interval ancillary notes    Current Medications  insulin lispro (HUMALOG) injection vial 0-6 Units, TID WC  insulin lispro (HUMALOG) injection vial 0-3 Units, Nightly  albuterol (PROVENTIL) nebulizer solution 2.5 mg, BID  albuterol (PROVENTIL) nebulizer solution 2.5 mg, Q4H PRN  dextrose bolus 10% 125 mL, PRN   Or  dextrose bolus 10% 250 mL, PRN  glucagon (rDNA) injection 1 mg, PRN  dextrose 5 % solution, PRN  amLODIPine (NORVASC) tablet 5 mg, Daily  hydroCHLOROthiazide (HYDRODIURIL) tablet 25 mg, Daily  levothyroxine (SYNTHROID) tablet 100 mcg, Daily  ondansetron (ZOFRAN-ODT) disintegrating tablet 4 mg, Q8H PRN   Or  ondansetron (ZOFRAN) injection 4 mg, Q6H PRN  polyethylene glycol (GLYCOLAX) packet 17 g, Daily PRN  enoxaparin (LOVENOX) injection 40 mg, Daily  aspirin EC tablet 81 mg, Daily   Or  aspirin suppository 300 mg, Daily  rosuvastatin (CRESTOR) tablet 40 mg, Nightly  labetalol (NORMODYNE;TRANDATE) injection 10 mg, Q10 Min PRN  hydrALAZINE (APRESOLINE) injection 10 mg, Q4H PRN  mometasone-formoterol (DULERA) 200-5 MCG/ACT inhaler 2 puff, BID  pantoprazole (PROTONIX) tablet 40 mg, QAM AC        Objective:  BP (!) 156/78   Pulse 88   Temp 97.7 °F (36.5 °C) (Oral)   Resp 16   Ht 4' 9\" (1.448 m)   Wt 126 lb 12.8 oz (57.5 kg)   SpO2 92%   BMI 27.44 kg/m²   No intake or output data in the 24 hours ending 06/20/22 0902   Wt Readings from Last 3 Encounters:   06/19/22 126 lb 12.8 oz (57.5 kg)   02/03/21 145 lb (65.8 kg)   12/10/20 145 lb (65.8 kg)     General:  Awake, alert, oriented in NAD  Skin:  Warm and dry. No unusual bruising or rash  Neck:  Supple. No JVD appreciated  Chest:  Normal effort. Clear to auscultation, no wheezes/rhonchi/rales  Cardiovascular:  RRR, normal S1/S2, no murmur/gallop/rub  Abdomen:  Soft, nontender, +bowel sounds  Extremities:  No edema  Neurological: Weakness in LLE  Psychological: Normal mood and affect      Labs and Tests:  CBC:   Recent Labs     06/18/22  0541 06/19/22  0510 06/20/22  0536   WBC 7.5 9.9 9.8   HGB 12.5* 12.7* 12.8*    305 286     BMP:    Recent Labs     06/18/22  0541 06/19/22  0510 06/20/22  0536    136 137   K 4.7 4.7 4.3    103 104   CO2 24 23 20*   BUN 19 21* 24*   CREATININE 1.3 1.4* 1.2   GLUCOSE 84 138* 126*     Hepatic:   Recent Labs     06/17/22  1348   AST 26   ALT 15   BILITOT 0.3   ALKPHOS 133*       Results for Pramod Jon (MRN [id number]) as of 6/20/2022 09:11   Ref. Range 6/19/2022 07:48 6/19/2022 11:26 6/19/2022 16:13 6/19/2022 20:34 6/20/2022 07:22   POC Glucose Latest Ref Range: 70 - 99 mg/dl 145 (H) 212 (H) 149 (H) 142 (H) 110 (H)       CXR 6/17/2022:  Stable scarring right apex       No acute cardiopulmonary process     CT Head / CTA Head and Neck 6/17/2022:  1. No acute intracranial abnormality.    2. 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 75% stenosis in the cavernous/supraclinoid segment of the left internal   carotid artery. 4. Right dominance of the vertebral arteries with hypoplastic intracranial   left vertebral artery.  Superimposed occlusion in the mid to distal   intracranial left vertebral artery. 5. 80% stenosis in the proximal V4 segment of right vertebral artery. 6. 80% stenosis in the proximal P2 segment of the left PCA.  50% stenosis in   the proximal P2 segment of the right PCA. 7. 95% stenosis in the proximal right internal carotid artery. 8. 85% stenosis in the proximal left internal carotid artery. 9. 40% stenosis in the proximal left common carotid artery. 10. Right dominance of the vertebral arteries with severe stenosis in the   proximal V1 segment of the left vertebral artery. 11. 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 12. 65% stenosis in the mid left subclavian artery. 13. 40% stenosis in the distal right subclavian artery. 14. Airspace opacity in the right upper lobe with volume loss, likely related   to chronic scarring, grossly stable since July 2, 2021.  Mild pericardial   effusion. MRI Brain 6/18/2022:  1. 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  No   evidence of hemorrhagic transformation. 2. Involutional parenchymal changes with mild microvascular ischemic disease. Problem List  Principal Problem:    Paresthesia  Active Problems:    Facial droop  Resolved Problems:    * No resolved hospital problems. *       Assessment & Plan:   1. Acute CVA. Presented with facial droop and paresthesias. CT head with no acute findings. CTA head/neck with multiple high grade stenosis in multiple vessels. MRI brain shows acute infarcts of left corona and subinsular regions, no evidence of hemorrhagic transformation. A1c 6.1 and lipid shows , . Continue asa and statin. Neuro on board. 2. Symptomatic left ICA stenosis.  MRI shows acute infarcts of left corona and subinsular regions. Evaluated by vascular, significant LICA stenosis, L MCA stenosis suspected to be causative vessel and asymptomatic ADAMA stenosis. Carotid doppler studies pending to better define LICA stenosis. Continue asa and statin. Vascular on board. 3. DM2. A1c 6.1. Takes metformin at home, held on admission. Being covered with low correction scale. BG values controlled. 4. COPD. Stable, not in exacerbation. Continue Dulera, albuterol. 5. Hypothyroidism. TSH 1.69. Continue levothyroxine. 6. Anemia. Hgb 13.3 on presentation appears baseline. Stable. Disposition: PT/OT evaluated and recommend continued therapy with home care to address ADL and functional mobility deficits. [address] daughter agreeable. Orders placed 6/20. Diet: ADULT DIET; Dysphagia - Pureed;  Moderately Thick (Honey)  Code:Full Code  DVT PPX: enoxaparin      SUSY Miner - CNP   6/20/2022 9:02 AM

## 2022-06-20 NOTE — PROGRESS NOTES
Per policy and protocol for stroke pt can only have 1 assessment a shift at this point and patient is hard to get to follow directions at time this may be due to his confusion.

## 2022-06-21 VITALS
OXYGEN SATURATION: 94 % | HEART RATE: 94 BPM | RESPIRATION RATE: 18 BRPM | WEIGHT: 126.8 LBS | HEIGHT: 60 IN | TEMPERATURE: 98 F | DIASTOLIC BLOOD PRESSURE: 72 MMHG | SYSTOLIC BLOOD PRESSURE: 148 MMHG | BODY MASS INDEX: 24.9 KG/M2

## 2022-06-21 LAB
ANION GAP SERPL CALCULATED.3IONS-SCNC: 12 MMOL/L (ref 3–16)
BUN BLDV-MCNC: 32 MG/DL (ref 7–20)
CALCIUM SERPL-MCNC: 9.4 MG/DL (ref 8.3–10.6)
CHLORIDE BLD-SCNC: 103 MMOL/L (ref 99–110)
CO2: 24 MMOL/L (ref 21–32)
CREAT SERPL-MCNC: 1.3 MG/DL (ref 0.8–1.3)
GFR AFRICAN AMERICAN: >60
GFR NON-AFRICAN AMERICAN: 53
GLUCOSE BLD-MCNC: 133 MG/DL (ref 70–99)
GLUCOSE BLD-MCNC: 134 MG/DL (ref 70–99)
GLUCOSE BLD-MCNC: 178 MG/DL (ref 70–99)
HCT VFR BLD CALC: 41.7 % (ref 40.5–52.5)
HEMOGLOBIN: 13.5 G/DL (ref 13.5–17.5)
MCH RBC QN AUTO: 27.9 PG (ref 26–34)
MCHC RBC AUTO-ENTMCNC: 32.4 G/DL (ref 31–36)
MCV RBC AUTO: 86.2 FL (ref 80–100)
PDW BLD-RTO: 13.1 % (ref 12.4–15.4)
PERFORMED ON: ABNORMAL
PERFORMED ON: ABNORMAL
PLATELET # BLD: 331 K/UL (ref 135–450)
PMV BLD AUTO: 6.6 FL (ref 5–10.5)
POTASSIUM SERPL-SCNC: 4.5 MMOL/L (ref 3.5–5.1)
RBC # BLD: 4.84 M/UL (ref 4.2–5.9)
SODIUM BLD-SCNC: 139 MMOL/L (ref 136–145)
WBC # BLD: 12.1 K/UL (ref 4–11)

## 2022-06-21 PROCEDURE — 94761 N-INVAS EAR/PLS OXIMETRY MLT: CPT

## 2022-06-21 PROCEDURE — 92526 ORAL FUNCTION THERAPY: CPT

## 2022-06-21 PROCEDURE — 6360000002 HC RX W HCPCS: Performed by: FAMILY MEDICINE

## 2022-06-21 PROCEDURE — 97116 GAIT TRAINING THERAPY: CPT

## 2022-06-21 PROCEDURE — 36415 COLL VENOUS BLD VENIPUNCTURE: CPT

## 2022-06-21 PROCEDURE — 99232 SBSQ HOSP IP/OBS MODERATE 35: CPT | Performed by: SURGERY

## 2022-06-21 PROCEDURE — 94640 AIRWAY INHALATION TREATMENT: CPT

## 2022-06-21 PROCEDURE — 97530 THERAPEUTIC ACTIVITIES: CPT

## 2022-06-21 PROCEDURE — 80048 BASIC METABOLIC PNL TOTAL CA: CPT

## 2022-06-21 PROCEDURE — 85027 COMPLETE CBC AUTOMATED: CPT

## 2022-06-21 PROCEDURE — 6370000000 HC RX 637 (ALT 250 FOR IP): Performed by: FAMILY MEDICINE

## 2022-06-21 PROCEDURE — 6360000002 HC RX W HCPCS: Performed by: INTERNAL MEDICINE

## 2022-06-21 PROCEDURE — 99233 SBSQ HOSP IP/OBS HIGH 50: CPT | Performed by: PSYCHIATRY & NEUROLOGY

## 2022-06-21 RX ORDER — ROSUVASTATIN CALCIUM 40 MG/1
40 TABLET, COATED ORAL NIGHTLY
Qty: 30 TABLET | Refills: 3 | Status: SHIPPED | OUTPATIENT
Start: 2022-06-21

## 2022-06-21 RX ORDER — AMLODIPINE BESYLATE 5 MG/1
5 TABLET ORAL DAILY
Qty: 30 TABLET | Refills: 3 | Status: SHIPPED | OUTPATIENT
Start: 2022-06-22

## 2022-06-21 RX ADMIN — ASPIRIN 81 MG: 81 TABLET, COATED ORAL at 09:44

## 2022-06-21 RX ADMIN — AMLODIPINE BESYLATE 5 MG: 5 TABLET ORAL at 09:44

## 2022-06-21 RX ADMIN — ENOXAPARIN SODIUM 40 MG: 100 INJECTION SUBCUTANEOUS at 09:44

## 2022-06-21 RX ADMIN — LEVOTHYROXINE SODIUM 100 MCG: 0.1 TABLET ORAL at 08:20

## 2022-06-21 RX ADMIN — PANTOPRAZOLE SODIUM 40 MG: 40 TABLET, DELAYED RELEASE ORAL at 08:20

## 2022-06-21 RX ADMIN — ALBUTEROL SULFATE 2.5 MG: 2.5 SOLUTION RESPIRATORY (INHALATION) at 08:04

## 2022-06-21 RX ADMIN — Medication 2 PUFF: at 08:04

## 2022-06-21 RX ADMIN — HYDROCHLOROTHIAZIDE 25 MG: 25 TABLET ORAL at 09:44

## 2022-06-21 NOTE — PROGRESS NOTES
Facility/Department: [address]  Speech Language Pathology   Dysphagia Treatment Note    Patient: Bobby Bonilla   : 1940   MRN: [id number]      Evaluation Date: 2022      Admitting Dx: Paresthesia [R20.2]  Facial droop [R29.810]  Abnormal computed tomography angiography of head [R93.0]  Treatment Diagnosis: Oropharyngeal Dysphagia   Pain: Did not state                                              Diet and Treatment Recommendations 2022:  Diet Solids Recommendation:  Dysphagia I Puree  Liquid Consistency Recommendation: Thin liquids  Recommended form of Meds: Meds in puree           Compensatory strategies: Alternate solids/liquids , Check for pocketing of food L, Check for pocketing of food R, Upright as possible with all PO intake , Small bites/sips , Eat/feed slowly, Remain upright 30-45 min     Modified Barium Swallow   \"Modified Barium Swallow evaluation completed on 2022. Video interpretor was used for the study. Patient presents with oropharyngeal dysphagia secondary to impaired mastication, intermittent oral holding, impaired bolus propulsion, pharyngeal pooling with delayed swallow initiation and decreased laryngeal elevation complicated by new CVA resulting in observed one episode of self clearing laryngeal penetration with thin liquids and inability to masticate solids. With soft solid pt demonstrated minimal anterior mastication with eventual expulsion of unchewed bolus. With puree pt demonstrated prolonged/disorganized A-P propulsion for oral clearing. One episode of laryngeal penetration was noted with thin liquids, no aspiration was viewed. Pt was able to self feed with minimal assistance. Recommend Dysphagia I Pureed with thin liquids, meds with puree or crushed as able with puree, assistance with intake and oral care following meals. \"    Assessment of Texture Tolerance:  Diet level prior to treatment: Dysphagia I Puree , Thin liquids   Tolerance of Current Diet Level:Per chart, no noted difficulty with current diet level  RN reported pt appears to be tolerating current diet level     Impressions: Pt was positioned Upright in bed , awake and alert. Currently on room air. Trials of thin liquids and puree  were provided to assess swallow function. Pt was able to self feed thin liquids and puree. Minimal anterior loss was noted. Pharyngeal pooling was suspected with delayed swallow initiation. No overt clinical s/s of aspiration/penetration assessed this date. Mildly increased oral motor movement noted however, pt declined all offered solid PO trials. Pt demonstrates increased risk for aspiration due to recent CVA. Based on today's assessment recommend Dysphagia I Puree  with Thin liquids , Meds in puree . Dysphagia Goals:   Pt will functionally tolerate recommended diet level with use of recommended compensatory strategies with no s/s of aspiration/penetration (ongoing 6/21/2022)    Pt/family will demonstrate understanding of results Modified Barium Swallow Study, risk for aspiration, rationale for diet level and compensatory strategies (ongoing 6/21/2022)   Pt will demonstrate improved sensory motor function via targeted exercises and appropriate treatment modalities (ongoing 6/21/2022)     Plan:   3-5 times per week during acute care stay. Patient/Family Education:  Provided education regarding role of SLP, recommendations and general speech pathology plan of care. [] Pt verbalized understanding and agreement   [x] Pt requires ongoing learning   [x] No evidence of comprehension     Discharge Recommendations:    Recommend ongoing SLP for dysphagia therapy upon discharge from hospital     Timed Code Treatment: 0 minutes     Total Treatment Time: 15 minutes     If patient discharges prior to next session this note will serve as a discharge summary.      Marina Menendez, [facility]  Speech Language Pathologist

## 2022-06-21 NOTE — CARE COORDINATION
The Plan for Transition of Care is related to the following treatment goals: home care    The Patient and/or patient representative Brennon maldonado [phone]  was provided with a choice of provider and agrees   with the discharge plan. [x] Yes [] No    Freedom of choice list was provided with basic dialogue that supports the patient's individualized plan of care/goals, treatment preferences and shares the quality data associated with the providers. [x] Yes [] No  Multiple referrals initiated in search of available home care.

## 2022-06-21 NOTE — PLAN OF CARE
Problem: Discharge Planning  Goal: Discharge to home or other facility with appropriate resources  6/21/2022 0116 by Luis Reynolds RN  Outcome: Progressing  6/20/2022 1836 by Joey Schrader RN  Outcome: Progressing     Problem: Skin/Tissue Integrity  Goal: Absence of new skin breakdown  Description: 1. Monitor for areas of redness and/or skin breakdown  2. Assess vascular access sites hourly  3. Every 4-6 hours minimum:  Change oxygen saturation probe site  4. Every 4-6 hours:  If on nasal continuous positive airway pressure, respiratory therapy assess nares and determine need for appliance change or resting period.   6/21/2022 0116 by Luis Reynolds RN  Outcome: Progressing  6/20/2022 1836 by Joey Schrader RN  Outcome: Progressing     Problem: ABCDS Injury Assessment  Goal: Absence of physical injury  6/21/2022 0116 by Luis Reynolds RN  Outcome: Progressing  6/20/2022 1836 by Joey Schrader RN  Outcome: Progressing     Problem: Safety - Adult  Goal: Free from fall injury  6/21/2022 0116 by Luis Reynolds RN  Outcome: Progressing  6/20/2022 1836 by Joey Schrader RN  Outcome: Progressing     Problem: Chronic Conditions and Co-morbidities  Goal: Patient's chronic conditions and co-morbidity symptoms are monitored and maintained or improved  6/21/2022 0116 by Luis Reynolds RN  Outcome: Progressing  6/20/2022 1836 by Joey Schrader RN  Outcome: Progressing

## 2022-06-21 NOTE — PROGRESS NOTES
[facility]   Phone: [phone]    Physical Therapy    [] Initial Evaluation            [x] Daily Treatment Note         [] Discharge Summary      Patient: Austin Wheatley   : 1940   MRN: [id number]   Date of Service:  2022  Admitting Diagnosis: Paresthesia     Current Admission Summary: Austin Wheatley is a 80 y.o. male with past medical history of asthma, diabetes, hypertension and thyroid disease who presents to the ED with complaint of numbness and facial weakness. Patient speaks English and  was utilized for history and physical examination. Currently for the past 3 days he states he has had some weakness to his left-sided face with associated drooling and weakness/numbness to the left arm and left leg. Apparently saw PCP a couple days ago was prescribed aspirin. He states he was told to come to the ED if symptoms worsen. Came to the ED for further evaluation and treatment. He denies any sick contacts or recent illness. Denies any headache, visual changes, other numbness/tingling, lightheadedness/dizziness at this time. States feels like he slurred speech. Has had drooling. Denies chest pain, shortness of breath, palpitations, blood thinners, abdominal pain, nausea/vomiting, urinary symptoms or changes in bowel movement. Denies fever or chills. Denies rashes or lesions. Denies any falls or injuries. Past Medical History:  has a past medical history of Asthma, Diabetes mellitus ([address] Hypertension, and Thyroid disease. Past Surgical History:  has no past surgical history on file. Discharge Recommendations: Austin Wheatley scored a 19/24 on the AM-PAC short mobility form. Current research shows that an AM-PAC score of 18 or greater is typically associated with a discharge to the patient's home setting.  Based on the patient's AM-PAC score and their current functional mobility deficits, it is recommended that the patient have 2-3 sessions per week of Physical Therapy at d/c to increase the patient's independence. At this time, this patient demonstrates the endurance and safety to discharge home with home health PT and a follow up treatment frequency of 2-3x/wk. Please see assessment section for further patient specific details. If patient discharges prior to next session this note will serve as a discharge summary. Please see below for the latest assessment towards goals. HOME HEALTH CARE: LEVEL 1 STANDARD  - Initial home health evaluation to occur within 24-48 hours, in patient home   - Therapy to evaluate with goal of regaining prior level of functioning   - Therapy to evaluate if patient has [id number] needs for personal care      DME Required For Discharge: no DME required at discharge  Precautions/Restrictions: high fall risk  Weight Bearing Restrictions: no restrictions  [] Right Upper Extremity  [] Left Upper Extremity [] Right Lower Extremity  [] Left Lower Extremity     Required Braces/Orthotics: no braces required   [] Right  [] Left  Positional Restrictions:no positional restrictions    Pre-Admission Information   Lives With: family    Type of Home: house  Home Layout: one level  Home Access: level entry  Bathroom Layout: tub/shower unit  Toilet Height: standard height  Bathroom Equipment: . Comment: None. Home Equipment: single point cane  Transfer Assistance: Independent without use of device  Ambulation Assistance:Independent without use of device  ADL Assistance: independent with all ADL's  IADL Assistance: independent with homemaking tasks  Active :        [] Yes  [x] No  Current Employment: retired. Occupation: Unknown. Hobbies: None. Recent Falls: Denies. **Above information taken from patient's granddaughter as patient demonstrates cognitive deficits when using . Subjective  General: Patient speaks [place].   Grandson present to interpret; 2 other family members present but did not speak [place]. He reports he is understandable some of the time but not at his normal.  Pain: 0/10  Pain Interventions: repositioned        Functional Mobility  Bed Mobility  Supine to Sit: Independent  Sit to Supine: Independent  Comments:  Increased time to complete w/ use of bedrail and elevated HOB. Transfers  Sit to stand transfer: supervision  Stand to sit transfer: supervision  Comments:  Gait belt donned. Patient used straight cane for balance in right hand but appeared to hold cane off ground for a majority of the time. [employer] appears too tall for patient. Ambulation  Surface:level surface  Assistive Device: single point cane  Assistance: stand by assistance  Distance: 120'  Gait Mechanics: decreased stride length and bilateral foot clearance in addition to decreased gait velocity  Comments:  Patient holds cane off ground in left hand, would benefit from having cane lowered but patient does not appear open to adjustment. Stair Mobility  Stair mobility not completed on this date. Comments:  Wheelchair Mobility:  No w/c mobility completed on this date.   Comments:  Balance  Static Sitting Balance: good: independent with functional balance in unsupported position  Dynamic Sitting Balance: fair (+): maintains balance at SBA/supervision without use of UE support  Static Standing Balance: fair (-): maintains balance at CGA with use of UE support  Dynamic Standing Balance: fair (-): maintains balance at CGA with use of UE support  Comments:    Other Therapeutic Interventions    Functional Outcomes  AM-PAC Inpatient Mobility Raw Score : 19              Cognition  Overall Cognitive Status: Impaired  Arousal/Alterness: appropriate responses to stimuli  Following Commands: follows one step commands with repetition, follows one step commands with increased time, follows one step commands consistently, inconsistently follows commands  Attention Span: appears intact  Memory: decreased recall of biographical information, decreased recall of recent events, decreased short term memory  Safety Judgement: decreased awareness of need for assistance, decreased awareness of need for safety  Problem Solving: assistance required to generate solutions, assistance required to implement solutions  Insights: decreased awareness of deficits  Initiation: requires cues for some  Sequencing: requires cues for some  Orientation:    oriented to person, disoriented to place, disoriented to time  and disoriented to situation  Command Following:   impaired    Education  Barriers To Learning: cognition, language and cultural  Patient Education: patient educated on goals, PT role and benefits, plan of care, general safety, functional mobility training, orientation, family education, transfer training, discharge recommendations  Learning Assessment:  patient will require reinforcement due to cognitive deficits    Assessment  Activity Tolerance: Patient fatigues with long distance ambulation. Impairments Requiring Therapeutic Intervention: decreased functional mobility, decreased ADL status, decreased safety awareness, decreased cognition, decreased endurance, decreased balance  Prognosis: good  Clinical Assessment: Patient demonstrates balance and ambulation deficits as well as impaired safety awareness, cognitive deficits, and impulsivity. He is in need of 24 hour assistance and would benefit from continued therapy at d/c. Patient has multiple family members at home to assist him. Safety Interventions: patient left in bed, bed alarm in place, call light within reach, gait belt, patient at risk for falls, nurse notified and family/caregiver present    Plan  Frequency: 5-7 x/week  Current Treatment Recommendations: strengthening, ROM, balance training, functional mobility training, transfer training, gait training, patient/caregiver education, safety education and equipment evaluation/education    Goals  Patient Goals: None verbalized. Short Term Goals:  Time Frame: Discharge.   Patient will complete bed mobility at modified independent - goal met 6/21/22  Patient will complete transfers at modified independent   Patient will ambulate 50 ft with use of single point cane at modified independent     Therapy Session Time      Individual Group Co-treatment   Time In     1318   Time Out     1344   Minutes     26     Timed Code Treatment Minutes:  26 minutes  Total Treatment Minutes:  26 minutes       Electronically Signed By: Kev Chavez PT

## 2022-06-21 NOTE — PROGRESS NOTES
Celia Grant  Neurology Follow-up  [facility] Neurology    Date of Service: 6/21/2022    Subjective:   CC: Follow up today regarding: Acute ischemic stroke    Events noted. Chart and lab reviewed. The patient was seen today discussed in detail with the patient through  the results of his MRI and CTA. Seen by vascular plan for conservative approach and follow-up outpatient. No new symptoms today. No chest pain or dysphagia. The same mild right-sided weakness. No headache or neck pain. Other review of system was unremarkable. ROS : A 10-12 system review obtained and updated today and is unremarkable except as mentioned  in my interval history.      Family history: Reviewed the patient, noncontributory    Past Medical History:   Diagnosis Date    Asthma     Diabetes mellitus ([address]     Hypertension     Thyroid disease      Current Facility-Administered Medications   Medication Dose Route Frequency Provider Last Rate Last Admin    polyvinyl alcohol (LIQUIFILM TEARS) 1.4 % ophthalmic solution 1 drop  1 drop Both Eyes PRN SUSY Guzmán CNP   1 drop at 06/20/22 2149    insulin lispro (HUMALOG) injection vial 0-6 Units  0-6 Units SubCUTAneous TID WC SUSY Knox CNP   2 Units at 06/19/22 1148    insulin lispro (HUMALOG) injection vial 0-3 Units  0-3 Units SubCUTAneous Nightly SUSY Knox CNP   1 Units at 06/20/22 2150    albuterol (PROVENTIL) nebulizer solution 2.5 mg  2.5 mg Nebulization BID Cornelius Saldivar MD   2.5 mg at 06/21/22 0804    albuterol (PROVENTIL) nebulizer solution 2.5 mg  2.5 mg Nebulization Q4H PRN Cornelius Saldivar MD        dextrose bolus 10% 125 mL  125 mL IntraVENous PRN SUSY Knox CNP        Or    dextrose bolus 10% 250 mL  250 mL IntraVENous PRN SUSY Knox CNP        glucagon (rDNA) injection 1 mg  1 mg IntraMUSCular PRN SUSY Knox CNP        dextrose 5 % solution  100 mL/hr IntraVENous PRN Dorise Sor, APRN - CNP        amLODIPine (NORVASC) tablet 5 mg  5 mg Oral Daily Marlen Arriaza MD   5 mg at 06/21/22 0944    hydroCHLOROthiazide (HYDRODIURIL) tablet 25 mg  25 mg Oral Daily Jacki Mata MD   25 mg at 06/21/22 0944    levothyroxine (SYNTHROID) tablet 100 mcg  100 mcg Oral Daily Jacki Mata MD   100 mcg at 06/21/22 0820    ondansetron (ZOFRAN-ODT) disintegrating tablet 4 mg  4 mg Oral Q8H PRN Jacki Mata MD        Or    ondansetron (ZOFRAN) injection 4 mg  4 mg IntraVENous Q6H PRN Jacki Mata MD        polyethylene glycol (GLYCOLAX) packet 17 g  17 g Oral Daily PRN Jacki Mata MD        enoxaparin (LOVENOX) injection 40 mg  40 mg SubCUTAneous Daily Marlen Arriaza MD   40 mg at 06/21/22 0944    aspirin EC tablet 81 mg  81 mg Oral Daily Jacki Mata MD   81 mg at 06/21/22 8494    Or    aspirin suppository 300 mg  300 mg Rectal Daily Jacki Mata MD        rosuvastatin (CRESTOR) tablet 40 mg  40 mg Oral Nightly Jacki Mata MD   40 mg at 06/20/22 2149    labetalol (NORMODYNE;TRANDATE) injection 10 mg  10 mg IntraVENous Q10 Min PRN Jacki Mata MD        hydrALAZINE (APRESOLINE) injection 10 mg  10 mg IntraVENous Q4H PRN Jacki Mata MD        mometasone-formoterol (DULERA) 200-5 MCG/ACT inhaler 2 puff  2 puff Inhalation BID Jacki Mata MD   2 puff at 06/21/22 0804    pantoprazole (PROTONIX) tablet 40 mg  40 mg Oral QAM AC Jacki Mata MD   40 mg at 06/21/22 0820     No Known Allergies   reports that he quit smoking about 42 years ago. His smoking use included cigarettes. He has a 15.00 pack-year smoking history. He has never used smokeless tobacco. He reports that he does not drink alcohol and does not use drugs.        Objective:  Exam:   Constitutional:   Vitals:    06/21/22 0700 06/21/22 0815 06/21/22 0940 06/21/22 1145   BP: 129/79  (!) 145/83 (!) 148/72   Pulse: 91 91 89 94   Resp: 16 18  18   Temp: 97.9 °F (36.6 °C)   98 °F (36.7 °C)   TempSrc: Oral   Oral   SpO2: 92% 93%  94%   Weight:       Height:         General appearance:  Normal development and appear in no acute distress. Mental Status:   Oriented to person, place, problem, and time. Memory: Poor immediate recall. Intact remote memory  Normal attention span and concentration. Language: intact naming, repeating and fluency   Poor fund of Knowledge. Cranial Nerves:   II: Visual fields: Full. Pupils: equal, round, reactive to light  III,IV,VI: Extra Ocular Movements are intact. No nystagmus  V: Facial sensation is intact  VII: Facial strength and movements: Right facial asymmetry IX: Palate elevation is symmetric  XI: Shoulder shrug is intact  XII: Tongue movements are normal  Musculoskeletal: 5/5 in in the left-sided mild right-sided weakness 4/5   Normal tone  Normal sensation  No tremors    Data:  LABS:   Lab Results   Component Value Date     06/21/2022    K 4.5 06/21/2022    K 4.6 06/17/2022     06/21/2022    CO2 24 06/21/2022    BUN 32 06/21/2022    CREATININE 1.3 06/21/2022    GFRAA >60 06/21/2022    LABGLOM 53 06/21/2022    GLUCOSE 134 06/21/2022    PHOS 3.1 05/01/2020    MG 1.90 05/01/2020    CALCIUM 9.4 06/21/2022     Lab Results   Component Value Date    WBC 12.1 06/21/2022    RBC 4.84 06/21/2022    HGB 13.5 06/21/2022    HCT 41.7 06/21/2022    MCV 86.2 06/21/2022    RDW 13.1 06/21/2022     06/21/2022     Lab Results   Component Value Date    INR 0.91 10/21/2020    PROTIME 10.5 10/21/2020       Neuroimaging was independently reviewed by me and discussed results with the patient  I reviewed blood testing and other test results and discussed results with the patient      Impression:  Acute right side weakness secondary to ischemic left hemispheric CVA.   Could be thromboembolic ICA stenosis  Right ICA severe stenosis  Hypertension  Diabetes        Recommendation  Seen by vascular plan for conservative measures and follow-up outpatient for possible right CEA  Advised the patient to reconsider his left ICA stenosis since he is symptomatic. He voiced understanding and he will follow-up earlier if new symptoms arise. Discussed risk of strokes with ICA stenosis  Discussed risk of low blood pressure and need to monitor blood pressure at home  Continue aspirin  Statin  Blood sugar control closely with insulin sliding scale  Continue current blood pressure medications  PT, OT and speech  Can be discharged from neurology once medically stable     MDM: High due to high risk for stroke recurrence with bilateral ICA stenosis. Zen Haji MD   [phone]      This dictation was generated by voice recognition computer software. Although all attempts are made to edit the dictation for accuracy, there may be errors in the transcription that are not intended.

## 2022-06-21 NOTE — PROGRESS NOTES
CLINICAL PHARMACY NOTE: MEDS TO BEDS    Total # of Prescriptions Filled: 2   The following medications were delivered to the patient:  · Rosuvastatin 40 mg  · Amlodipine 5 mg    Additional Documentation:    Delivered to Patient grandson=Signed  Ok to be delivered per [facility] Desean Matthews CPhT

## 2022-06-21 NOTE — PROGRESS NOTES
VASCULAR    Seen for aphasia, MRI documented L CVA and carotid stenoses. son at bedside - interpreted and understands. VSS afeb  Continued speech difficulties per GS - garbled & difficult to understand. Motor without apparent defects. Carotid duplex reviewed personally - good images; LICA 12-98% by velocity/ratio criteria  Carotid Right Measurements   +---------------+----+----+-----+----+   ! Location       !PSV ! EDV ! Angle! RI  !   +---------------+----+----+-----+----+   ! Prox CCA       !69. 3!10. 3! 60   !0.85!   +---------------+----+----+-----+----+   ! Mid CCA       [id number]. 9!16. 5! 60   !0.83!   +---------------+----+----+-----+----+   ! Dist CCA       !68.6!12. 6! 60   !0.82! +---------------+----+----+-----+----+   ! Prox ICA       !29. 4!6.66! 60   !0.77!   +---------------+----+----+-----+----+   ! Mid ICA        !576 !260 ! 60   !0.55!   +---------------+----+----+-----+----+   ! Dist ICA       !125 !41. 8!22   !0.67!   +---------------+----+----+-----+----+   ! Prox ECA       !169 !    !60   !    !   +---------------+----+----+-----+----+   ! Vertebral      !118 !    !60   !    !   +---------------+----+----+-----+----+   ! Prox Subclavian! 124 !    !60   !    !   +---------------+----+----+-----+----+         - There is antegrade vertebral flow noted on the right side.         - Additional Measurements:ICAPSV/CCAPSV 6.07. ICAEDV/CCAEDV 25.24.       Carotid Left Measurements   +---------------+----+----+-----+----+   ! Location       !PSV ! EDV ! Angle! RI  !   +---------------+----+----+-----+----+   ! Prox CCA       !107 !17. 2! 60   !0.84!   +---------------+----+----+-----+----+   ! Mid CCA       [id number]. 2!13. 7! 60   !0.85!   +---------------+----+----+-----+----+   ! Dist CCA       !54. 4!8.56!53   !0.84!   +---------------+----+----+-----+----+   ! Prox ICA       !76. 2!22. 1! 60   !0.71! +---------------+----+----+-----+----+   ! Mid ICA        !227 !70. 2! 60   !0.69!   +---------------+----+----+-----+----+   ! Dist ICA       ! 60. 6! 14  !0    !0.77!   +---------------+----+----+-----+----+   ! Prox ECA       !122 !    !60   !    !   +---------------+----+----+-----+----+   ! Vertebral      !47. 4!    !60   !    !   +---------------+----+----+-----+----+   ! Prox Subclavian!94.9!    !60   !    !   +---------------+----+----+-----+----+         - There is antegrade vertebral flow noted on the left side.         - Additional Measurements:ICAPSV/CCAPSV 2.44. ICAEDV/CCAEDV 4.08.           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 Chantale recurrent symptoms would warrant reconsideration. Consider R CEA in the future electively for long term stroke prevention due to severity after recovery. Agree with maximal medical therapy - ASA + statin and good BP control. Discussed in detail with grandson - he understands. All questions answered. Will follow.         Donnalee Moritz

## 2022-06-21 NOTE — PLAN OF CARE
Problem: Discharge Planning  Goal: Discharge to home or other facility with appropriate resources  6/21/2022 1029 by Pennie Joseph RN  Outcome: Progressing     Problem: Skin/Tissue Integrity  Goal: Absence of new skin breakdown  Description: 1. Monitor for areas of redness and/or skin breakdown  2. Assess vascular access sites hourly  3. Every 4-6 hours minimum:  Change oxygen saturation probe site  4. Every 4-6 hours:  If on nasal continuous positive airway pressure, respiratory therapy assess nares and determine need for appliance change or resting period.   6/21/2022 1029 by Pennie Joseph RN  Outcome: Progressing     Problem: Safety - Adult  Goal: Free from fall injury  6/21/2022 1029 by Pennie Joseph RN  Outcome: Progressing     Problem: Chronic Conditions and Co-morbidities  Goal: Patient's chronic conditions and co-morbidity symptoms are monitored and maintained or improved  6/21/2022 1029 by Pennie Joseph RN  Outcome: Progressing

## 2022-06-21 NOTE — PROGRESS NOTES
[address] [address] [address]   Phone: [phone]    Occupational Therapy    [] Initial Evaluation            [x] Daily Treatment Note         [] Discharge Summary      Patient: Shameka Liz   : 1940   MRN: [id number]   Date of Service:  2022    Admitting Diagnosis:  Paresthesia  Current Admission Summary: 80 y.o. male presenting with aphasia and subjective right facial and right sided weakness. However patient's granddaughter also states that the patient was experiencing some left sided weakness as well. Patient does not speak [place] Alfred[place]. Interpretation was conducted via video  and granddaughter. The symptoms had been ongoing for a couple of days with some resolution. Patient is quite frail. General weakness in both lower extremities has also been an issue for some time. Underwent CT/CTA in ER which demonstrated no acute abnormalities but severe bilateral proximal ICA stenosis, atretic left VA and some subclavian disease as well as a stable saccular aortic arch aneurysm. Granddaughter unsure if patient would like to proceed with surgical interventions. Past Medical History:  has a past medical history of Asthma, Diabetes mellitus ([address] Hypertension, and Thyroid disease. Past Surgical History:  has no past surgical history on file. Discharge Recommendations: Shameka Liz scored a 19/24 on the AM-PAC ADL Inpatient form. Current research shows that an AM-PAC score of 18 or greater is typically associated with a discharge to the patient's home setting. Based on the patient's AM-PAC score, and their current ADL deficits, it is recommended that the patient have 2-3 sessions per week of Occupational Therapy at d/c to increase the patient's independence. At this time, this patient demonstrates the endurance and safety to discharge home with [facility] (home vs OP services) and a follow up treatment frequency of 2-3x/wk.    Please see assessment section for further patient specific details. If patient discharges prior to next session this note will serve as a discharge summary. Please see below for the latest assessment towards goals. HOME HEALTH CARE: LEVEL 1 STANDARD    - Initial home health evaluation to occur within 24-48 hours, in patient home   - Therapy to evaluate with goal of regaining prior level of functioning   - Therapy to evaluate if patient has [id number] needs for personal care      DME Required For Discharge: shower chair with back    Precautions/Restrictions: low fall risk  Weight Bearing Restrictions: no restrictions  [] Right Upper Extremity  [] Left Upper Extremity [] Right Lower Extremity  [] Left Lower Extremity     Required Braces/Orthotics: no braces required   [] Right  [] Left  Positional Restrictions:no positional restrictions    Pre-Admission Information   Lives With: family                Type of Home: house  Home Layout: one level  Home Access: level entry  Bathroom Layout: tub/shower unit  Toilet Height: standard height  Bathroom Equipment: . Comment: None. Home Equipment: single point cane  Transfer Assistance: Independent without use of device  Ambulation Assistance:Independent without use of device  ADL Assistance: independent with all ADL's  IADL Assistance: independent with homemaking tasks  Active :        []? Yes            [x]? No  Current Employment: retired. Occupation: Unknown. Hobbies: None. Recent Falls: Unable to determine       Subjective  General: Patient supine in bed, agreeable to OT/PT cotx. Patient consented to use of grandson present in room to interpret d/t aphasia and  unable to translate. Pt denies pn.    Pain: 0/10  Pain Interventions: not applicable        Activities of Daily Living  Basic Activities of Daily Living  LB dressing: donning socks while sitting side of bed   Instrumental Activities of Daily Living  No IADL completed on this date.     Functional Mobility  Bed Mobility  Supine to Sit: Independent  Sit to Supine: Independent  Scooting: Independent    Transfers  Sit to stand transfer:stand by assistance  Stand to sit transfer: stand by assistance  Bed / Chair Transfer: stand by assistance. Mobility technique: ambulating. Equipment utilized: no device- hand held assistance per pt's request  Comments:  Functional Mobility:  Functional Mobility: single point cane. SBA . Activity: Increased time, shuffling gait ~150ft  Other Therapeutic Interventions    Functional Outcomes    Cognition  Overall Cognitive Status: Impaired  Following Commands: inconsistently follows commands  Memory: decreased recall of biographical information, decreased recall of recent events, decreased short term memory  Safety Judgement: decreased awareness of need for assistance, decreased awareness of need for safety  Problem Solving: assistance required to generate solutions, assistance required to implement solutions, decreased awareness of errors, assistance required to identify errors made, assistance required to correct errors made  Initiation: requires cues for some  Sequencing: requires cues for some  Orientation:    oriented to person   Command Following:   impaired     Education  Barriers To Learning: cognition and language  Patient Education: patient educated on goals, OT role and benefits, plan of care, discharge recommendations  Learning Assessment:  patient will require reinforcement due to cognitive deficits would benefit from family as co-learners    Assessment  Activity Tolerance:  Tolerated fair (language, cognitive limitations)  Impairments Requiring Therapeutic Intervention: decreased functional mobility, decreased ADL status, decreased cognition, decreased endurance, decreased balance, decreased posture  Prognosis: fair  Clinical Assessment: Patient presents with the above deficits, which are below baseline, Pt tolerated treatment well this date, however cognition, endurance and safety awareness are

## 2022-06-21 NOTE — DISCHARGE SUMMARY
Hospital Medicine Discharge Summary    Patient ID: Austin Wheatley      Patient's PCP: No primary care provider on file. Admit Date: 6/17/2022     Discharge Date: 6/21/2022      Admitting Physician: Narendra Garcia MD     Discharge Physician: SUSY Joshi - CNP     Discharge Diagnoses  Acute CVA  Symptomatic left ICA stenosis  Right ICA stenosis  Type 2 diabetes  COPD   hypothyroidism  Anemia      Hospital Course: Patient is an 79 yo [place] speaking male with hx COPD, DM2, HTN, thyroid disease. He presented to ER with left sided facial weakness and numbness. CT head with no acute findings. CTA head/neck with multiple high grade stenosis in multiple vessels. He was admitted to r/o CVA. Neuro and vascular consulted.        1. Acute CVA. Presented with facial droop and paresthesias. CT head with no acute findings. CTA head/neck with multiple high grade stenosis in multiple vessels. MRI brain shows acute infarcts of left corona and subinsular regions, no evidence of hemorrhagic transformation. A1c 6.1 and lipid shows , . Continue asa and statin. Neuro on board. 2. Symptomatic left ICA stenosis. MRI shows acute infarcts of left corona and subinsular regions. Evaluated by vascular, significant LICA stenosis, L MCA stenosis suspected to be causative vessel and asymptomatic ADAMA stenosis. Vascular noted no further intervention for left carotid stenosis, can consider right CEA in future electively. Follow-up outpatient. Continue asa and statin. 3. DM2. A1c 6.1. Takes metformin at home, resume  4. COPD. Stable, not in exacerbation. Continue Dulera, albuterol. 5. Hypothyroidism. TSH 1.69. Continue levothyroxine. 6. Anemia. Hgb 13.3 on presentation appears baseline. Stable.         Patient stated they felt well and wanted to go home. Reviewed plan of care with patient and son who provided translation, verbalized understanding and agreement.   Denied further questions or MRI brain without contrast   Final Result   1. 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 No   evidence of hemorrhagic transformation. 2. Involutional parenchymal changes with mild microvascular ischemic disease. CTA HEAD NECK W CONTRAST   Final Result   1. No acute intracranial abnormality. 2. 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 75% stenosis in the cavernous/supraclinoid segment of the left internal   carotid artery. 4. Right dominance of the vertebral arteries with hypoplastic intracranial   left vertebral artery. Superimposed occlusion in the mid to distal   intracranial left vertebral artery. 5. 80% stenosis in the proximal V4 segment of right vertebral artery. 6. 80% stenosis in the proximal P2 segment of the left PCA. 50% stenosis in   the proximal P2 segment of the right PCA. 7. 95% stenosis in the proximal right internal carotid artery. 8. 85% stenosis in the proximal left internal carotid artery. 9. 40% stenosis in the proximal left common carotid artery. 10. Right dominance of the vertebral arteries with severe stenosis in the   proximal V1 segment of the left vertebral artery. 11. 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 12. 65% stenosis in the mid left subclavian artery. 13. 40% stenosis in the distal right subclavian artery. 14. Airspace opacity in the right upper lobe with volume loss, likely related   to chronic scarring, grossly stable since July 2, 2021. Mild pericardial   effusion. CT HEAD WO CONTRAST   Final Result   1. No acute intracranial abnormality. 2. 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 75% stenosis in the cavernous/supraclinoid segment of the left internal   carotid artery. 4. Right dominance of the vertebral arteries with hypoplastic intracranial   left vertebral artery. Details   aspirin 81 MG EC tablet Take 81 mg by mouth dailyHistorical Med      hydrOXYzine HCl (ATARAX) 25 MG tablet Take 25 mg by mouth 3 times dailyHistorical Med      ondansetron (ZOFRAN ODT) 4 MG disintegrating tablet Take 1-2 tablets by mouth every 12 hours as needed for Nausea, Disp-12 tablet, R-0Print      Lenvatinib Mesylate (LENVATINIB, 8 MG DAILY DOSE, PO) Take by mouthHistorical Med      budesonide-formoterol (SYMBICORT) 160-4.5 MCG/ACT AERO Inhale 2 puffs into the lungs 2 times daily, Disp-3 Inhaler, R-1Normal      albuterol sulfate HFA (VENTOLIN HFA) 108 (90 Base) MCG/ACT inhaler Inhale 2 puffs into the lungs 4 times daily as needed for Wheezing, Disp-3 Inhaler, R-1Normal      lisinopril (PRINIVIL;ZESTRIL) 20 MG tablet Take 20 mg by mouth dailyHistorical Med      ibuprofen (ADVIL;MOTRIN) 800 MG tablet Take 800 mg by mouth every 6 hours as needed for PainHistorical Med      hydroCHLOROthiazide (HYDRODIURIL) 25 MG tablet Take 25 mg by mouth dailyHistorical Med      metFORMIN (GLUCOPHAGE) 500 MG tablet Take 500 mg by mouth daily (with breakfast) Historical Med      levothyroxine (SYNTHROID) 100 MCG tablet Take 100 mcg by mouth DailyHistorical Med      omeprazole (PRILOSEC) 20 MG delayed release capsule Take 40 mg by mouth dailyHistorical Med      traMADol (ULTRAM) 50 MG tablet Take 50 mg by mouth every 6 hours as needed for Pain. Historical Med      prochlorperazine (COMPAZINE) 10 MG tablet Take 1 tablet by mouth every 6 hours as needed (pain, nausea), Disp-120 tablet, R-0Print             Time Spent on discharge is more than 30 minutes in the examination, evaluation, counseling and review of medications and discharge plan. Signed: SUSY Bush CNP   6/21/2022    The patient was seen and examined on day of discharge and this discharge summary is in conjunction with any daily progress note from day of discharge. Thank you No primary care provider on file.  for the opportunity to be involved in this patient's care. If you have any questions or concerns please feel free to contact me at [phone]. NOTE:  This report was transcribed using voice recognition software. Every effort was made to ensure accuracy; however, inadvertent computerized transcription errors may be present.

## 2022-06-21 NOTE — PROGRESS NOTES
Data- discharge order received, pt verbalized agreement to discharge, disposition to previous residence, no needs for HHC/DME. Action- discharge instructions prepared and given to patient and family member, pt verbalized understanding. Medication information packet given r/t NEW and/or CHANGED prescriptions emphasizing name/purpose/side effects, pt verbalized understanding. Discharge instruction summary: Diet- carb control, Activity- as tolerated, No primary care doctor on file. List of PCP given to patient, f/u appointment within 1 week, prescription medications filled at outpatient pharmacy. 1. WEIGHT: Admit Weight: 129 lb 12.8 oz (58.9 kg) (06/17/22 2315)        Today  Weight: 126 lb 12.8 oz (57.5 kg) (06/19/22 0813)       2. O2 SAT.: SpO2: 94 % (06/21/22 1145)    Response- Pt belongings gathered, IV removed. Disposition is home (no HHC/DME needs), transported with personal belingings, taken to lobby via w/c, no complications.

## 2022-06-22 ENCOUNTER — APPOINTMENT (OUTPATIENT)
Dept: CT IMAGING | Age: 82
DRG: 720 | End: 2022-06-22
Payer: MEDICAID

## 2022-06-22 ENCOUNTER — APPOINTMENT (OUTPATIENT)
Dept: GENERAL RADIOLOGY | Age: 82
DRG: 720 | End: 2022-06-22
Payer: MEDICAID

## 2022-06-22 ENCOUNTER — HOSPITAL ENCOUNTER (INPATIENT)
Age: 82
LOS: 13 days | Discharge: HOSPICE | DRG: 720 | End: 2022-07-05
Attending: EMERGENCY MEDICINE | Admitting: INTERNAL MEDICINE
Payer: MEDICAID

## 2022-06-22 DIAGNOSIS — R55 SYNCOPE AND COLLAPSE: Primary | ICD-10-CM

## 2022-06-22 DIAGNOSIS — J96.00 ACUTE RESPIRATORY FAILURE, UNSPECIFIED WHETHER WITH HYPOXIA OR HYPERCAPNIA (HCC): ICD-10-CM

## 2022-06-22 DIAGNOSIS — J69.0 ASPIRATION PNEUMONIA, UNSPECIFIED ASPIRATION PNEUMONIA TYPE, UNSPECIFIED LATERALITY, UNSPECIFIED PART OF LUNG (HCC): ICD-10-CM

## 2022-06-22 DIAGNOSIS — R65.20 SEVERE SEPSIS (HCC): ICD-10-CM

## 2022-06-22 DIAGNOSIS — Z51.5 HOSPICE CARE PATIENT: ICD-10-CM

## 2022-06-22 DIAGNOSIS — A41.9 SEVERE SEPSIS (HCC): ICD-10-CM

## 2022-06-22 DIAGNOSIS — Z86.73 HISTORY OF CVA (CEREBROVASCULAR ACCIDENT): ICD-10-CM

## 2022-06-22 DIAGNOSIS — G93.40 ACUTE ENCEPHALOPATHY: ICD-10-CM

## 2022-06-22 PROBLEM — I63.9 ACUTE CEREBROVASCULAR ACCIDENT (CVA) (HCC): Status: ACTIVE | Noted: 2022-06-22

## 2022-06-22 PROBLEM — R41.82 AMS (ALTERED MENTAL STATUS): Status: ACTIVE | Noted: 2022-06-22

## 2022-06-22 LAB
A/G RATIO: 1.2 (ref 1.1–2.2)
ALBUMIN SERPL-MCNC: 4.4 G/DL (ref 3.4–5)
ALP BLD-CCNC: 150 U/L (ref 40–129)
ALT SERPL-CCNC: 30 U/L (ref 10–40)
ANION GAP SERPL CALCULATED.3IONS-SCNC: 19 MMOL/L (ref 3–16)
AST SERPL-CCNC: 63 U/L (ref 15–37)
BACTERIA: ABNORMAL /HPF
BASE EXCESS ARTERIAL: -4.6 MMOL/L (ref -3–3)
BASE EXCESS ARTERIAL: -5.1 MMOL/L (ref -3–3)
BILIRUB SERPL-MCNC: 0.9 MG/DL (ref 0–1)
BILIRUBIN URINE: NEGATIVE
BLOOD, URINE: ABNORMAL
BUN BLDV-MCNC: 28 MG/DL (ref 7–20)
CALCIUM SERPL-MCNC: 9.6 MG/DL (ref 8.3–10.6)
CARBOXYHEMOGLOBIN ARTERIAL: 0 % (ref 0–1.5)
CARBOXYHEMOGLOBIN ARTERIAL: 0 % (ref 0–1.5)
CHLORIDE BLD-SCNC: 98 MMOL/L (ref 99–110)
CLARITY: CLEAR
CO2: 20 MMOL/L (ref 21–32)
COLOR: YELLOW
COMMENT UA: ABNORMAL
CREAT SERPL-MCNC: 1.3 MG/DL (ref 0.8–1.3)
EPITHELIAL CELLS, UA: 1 /HPF (ref 0–5)
GFR AFRICAN AMERICAN: >60
GFR NON-AFRICAN AMERICAN: 53
GLUCOSE BLD-MCNC: 184 MG/DL (ref 70–99)
GLUCOSE BLD-MCNC: 254 MG/DL (ref 70–99)
GLUCOSE BLD-MCNC: 261 MG/DL (ref 70–99)
GLUCOSE URINE: NEGATIVE MG/DL
HCO3 ARTERIAL: 21.4 MMOL/L (ref 21–29)
HCO3 ARTERIAL: 21.7 MMOL/L (ref 21–29)
HEMOGLOBIN, ART, EXTENDED: 13.4 G/DL (ref 13.5–17.5)
HEMOGLOBIN, ART, EXTENDED: 14.4 G/DL (ref 13.5–17.5)
HYALINE CASTS: ABNORMAL /LPF (ref 0–2)
KETONES, URINE: NEGATIVE MG/DL
LACTIC ACID, SEPSIS: 2.5 MMOL/L (ref 0.4–1.9)
LACTIC ACID, SEPSIS: 3.9 MMOL/L (ref 0.4–1.9)
LACTIC ACID: 2.3 MMOL/L (ref 0.4–2)
LEUKOCYTE ESTERASE, URINE: NEGATIVE
MAGNESIUM: 2.2 MG/DL (ref 1.8–2.4)
METHEMOGLOBIN ARTERIAL: 0.5 %
METHEMOGLOBIN ARTERIAL: 0.7 %
MICROSCOPIC EXAMINATION: YES
NITRITE, URINE: NEGATIVE
O2 SAT, ARTERIAL: 95.5 %
O2 SAT, ARTERIAL: 98.5 %
O2 THERAPY: ABNORMAL
O2 THERAPY: ABNORMAL
PCO2 ARTERIAL: 42 MMHG (ref 35–45)
PCO2 ARTERIAL: 45.7 MMHG (ref 35–45)
PERFORMED ON: ABNORMAL
PERFORMED ON: ABNORMAL
PH ARTERIAL: 7.28 (ref 7.35–7.45)
PH ARTERIAL: 7.32 (ref 7.35–7.45)
PH UA: 6.5 (ref 5–8)
PO2 ARTERIAL: 94.8 MMHG (ref 75–108)
PO2 ARTERIAL: >488 MMHG (ref 75–108)
POTASSIUM REFLEX MAGNESIUM: 3.7 MMOL/L (ref 3.5–5.1)
PRO-BNP: 91 PG/ML (ref 0–449)
PROCALCITONIN: 2.25 NG/ML (ref 0–0.15)
PROTEIN UA: >=1000 MG/DL
RBC UA: 2 /HPF (ref 0–4)
REASON FOR REJECTION: NORMAL
REJECTED TEST: NORMAL
SODIUM BLD-SCNC: 137 MMOL/L (ref 136–145)
SPECIFIC GRAVITY UA: 1.02 (ref 1–1.03)
TCO2 ARTERIAL: 22.7 MMOL/L
TCO2 ARTERIAL: 23.1 MMOL/L
TOTAL PROTEIN: 8 G/DL (ref 6.4–8.2)
TROPONIN: <0.01 NG/ML
URINE REFLEX TO CULTURE: ABNORMAL
URINE TYPE: ABNORMAL
UROBILINOGEN, URINE: 1 E.U./DL
WBC UA: 2 /HPF (ref 0–5)

## 2022-06-22 PROCEDURE — 81001 URINALYSIS AUTO W/SCOPE: CPT

## 2022-06-22 PROCEDURE — 96376 TX/PRO/DX INJ SAME DRUG ADON: CPT

## 2022-06-22 PROCEDURE — 71045 X-RAY EXAM CHEST 1 VIEW: CPT

## 2022-06-22 PROCEDURE — 2500000003 HC RX 250 WO HCPCS: Performed by: INTERNAL MEDICINE

## 2022-06-22 PROCEDURE — 87040 BLOOD CULTURE FOR BACTERIA: CPT

## 2022-06-22 PROCEDURE — 6370000000 HC RX 637 (ALT 250 FOR IP): Performed by: INTERNAL MEDICINE

## 2022-06-22 PROCEDURE — 5A1955Z RESPIRATORY VENTILATION, GREATER THAN 96 CONSECUTIVE HOURS: ICD-10-PCS | Performed by: EMERGENCY MEDICINE

## 2022-06-22 PROCEDURE — 71260 CT THORAX DX C+: CPT

## 2022-06-22 PROCEDURE — 84145 PROCALCITONIN (PCT): CPT

## 2022-06-22 PROCEDURE — 2580000003 HC RX 258: Performed by: EMERGENCY MEDICINE

## 2022-06-22 PROCEDURE — 6360000002 HC RX W HCPCS: Performed by: EMERGENCY MEDICINE

## 2022-06-22 PROCEDURE — 85025 COMPLETE CBC W/AUTO DIFF WBC: CPT

## 2022-06-22 PROCEDURE — 94002 VENT MGMT INPAT INIT DAY: CPT

## 2022-06-22 PROCEDURE — 93005 ELECTROCARDIOGRAM TRACING: CPT | Performed by: EMERGENCY MEDICINE

## 2022-06-22 PROCEDURE — 94640 AIRWAY INHALATION TREATMENT: CPT

## 2022-06-22 PROCEDURE — 6360000002 HC RX W HCPCS: Performed by: INTERNAL MEDICINE

## 2022-06-22 PROCEDURE — 6360000004 HC RX CONTRAST MEDICATION: Performed by: EMERGENCY MEDICINE

## 2022-06-22 PROCEDURE — 6370000000 HC RX 637 (ALT 250 FOR IP): Performed by: EMERGENCY MEDICINE

## 2022-06-22 PROCEDURE — 96374 THER/PROPH/DIAG INJ IV PUSH: CPT

## 2022-06-22 PROCEDURE — 99285 EMERGENCY DEPT VISIT HI MDM: CPT

## 2022-06-22 PROCEDURE — 6360000002 HC RX W HCPCS: Performed by: NURSE PRACTITIONER

## 2022-06-22 PROCEDURE — 83880 ASSAY OF NATRIURETIC PEPTIDE: CPT

## 2022-06-22 PROCEDURE — 83735 ASSAY OF MAGNESIUM: CPT

## 2022-06-22 PROCEDURE — 94761 N-INVAS EAR/PLS OXIMETRY MLT: CPT

## 2022-06-22 PROCEDURE — 2580000003 HC RX 258: Performed by: INTERNAL MEDICINE

## 2022-06-22 PROCEDURE — 2000000000 HC ICU R&B

## 2022-06-22 PROCEDURE — 80053 COMPREHEN METABOLIC PANEL: CPT

## 2022-06-22 PROCEDURE — 70450 CT HEAD/BRAIN W/O DYE: CPT

## 2022-06-22 PROCEDURE — 83605 ASSAY OF LACTIC ACID: CPT

## 2022-06-22 PROCEDURE — 2500000003 HC RX 250 WO HCPCS: Performed by: EMERGENCY MEDICINE

## 2022-06-22 PROCEDURE — 0BH17EZ INSERTION OF ENDOTRACHEAL AIRWAY INTO TRACHEA, VIA NATURAL OR ARTIFICIAL OPENING: ICD-10-PCS | Performed by: EMERGENCY MEDICINE

## 2022-06-22 PROCEDURE — 82803 BLOOD GASES ANY COMBINATION: CPT

## 2022-06-22 PROCEDURE — 6360000002 HC RX W HCPCS: Performed by: PHYSICIAN ASSISTANT

## 2022-06-22 PROCEDURE — 36600 WITHDRAWAL OF ARTERIAL BLOOD: CPT

## 2022-06-22 PROCEDURE — 84484 ASSAY OF TROPONIN QUANT: CPT

## 2022-06-22 PROCEDURE — 36415 COLL VENOUS BLD VENIPUNCTURE: CPT

## 2022-06-22 PROCEDURE — 96375 TX/PRO/DX INJ NEW DRUG ADDON: CPT

## 2022-06-22 PROCEDURE — 2700000000 HC OXYGEN THERAPY PER DAY

## 2022-06-22 PROCEDURE — 51701 INSERT BLADDER CATHETER: CPT

## 2022-06-22 RX ORDER — IPRATROPIUM BROMIDE AND ALBUTEROL SULFATE 2.5; .5 MG/3ML; MG/3ML
1 SOLUTION RESPIRATORY (INHALATION) ONCE
Status: COMPLETED | OUTPATIENT
Start: 2022-06-22 | End: 2022-06-22

## 2022-06-22 RX ORDER — MIDAZOLAM HYDROCHLORIDE 1 MG/ML
2 INJECTION INTRAMUSCULAR; INTRAVENOUS ONCE
Status: COMPLETED | OUTPATIENT
Start: 2022-06-22 | End: 2022-06-22

## 2022-06-22 RX ORDER — METHYLPREDNISOLONE SODIUM SUCCINATE 125 MG/2ML
60 INJECTION, POWDER, LYOPHILIZED, FOR SOLUTION INTRAMUSCULAR; INTRAVENOUS ONCE
Status: COMPLETED | OUTPATIENT
Start: 2022-06-22 | End: 2022-06-22

## 2022-06-22 RX ORDER — MAGNESIUM SULFATE IN WATER 40 MG/ML
2000 INJECTION, SOLUTION INTRAVENOUS PRN
Status: DISCONTINUED | OUTPATIENT
Start: 2022-06-22 | End: 2022-06-23

## 2022-06-22 RX ORDER — SODIUM CHLORIDE 0.9 % (FLUSH) 0.9 %
10 SYRINGE (ML) INJECTION PRN
Status: DISCONTINUED | OUTPATIENT
Start: 2022-06-22 | End: 2022-07-05 | Stop reason: HOSPADM

## 2022-06-22 RX ORDER — LEVETIRACETAM 10 MG/ML
1000 INJECTION INTRAVASCULAR ONCE
Status: COMPLETED | OUTPATIENT
Start: 2022-06-22 | End: 2022-06-22

## 2022-06-22 RX ORDER — HEPARIN SODIUM 5000 [USP'U]/ML
5000 INJECTION, SOLUTION INTRAVENOUS; SUBCUTANEOUS EVERY 8 HOURS SCHEDULED
Status: DISCONTINUED | OUTPATIENT
Start: 2022-06-22 | End: 2022-06-22

## 2022-06-22 RX ORDER — ROCURONIUM BROMIDE 10 MG/ML
60 INJECTION, SOLUTION INTRAVENOUS ONCE
Status: COMPLETED | OUTPATIENT
Start: 2022-06-22 | End: 2022-06-22

## 2022-06-22 RX ORDER — PROPOFOL 10 MG/ML
5-50 INJECTION, EMULSION INTRAVENOUS CONTINUOUS
Status: DISCONTINUED | OUTPATIENT
Start: 2022-06-22 | End: 2022-06-23

## 2022-06-22 RX ORDER — ONDANSETRON 2 MG/ML
4 INJECTION INTRAMUSCULAR; INTRAVENOUS EVERY 6 HOURS PRN
Status: DISCONTINUED | OUTPATIENT
Start: 2022-06-22 | End: 2022-07-05 | Stop reason: HOSPADM

## 2022-06-22 RX ORDER — SODIUM CHLORIDE 0.9 % (FLUSH) 0.9 %
10 SYRINGE (ML) INJECTION EVERY 12 HOURS SCHEDULED
Status: DISCONTINUED | OUTPATIENT
Start: 2022-06-22 | End: 2022-07-05 | Stop reason: HOSPADM

## 2022-06-22 RX ORDER — SODIUM CHLORIDE, SODIUM LACTATE, POTASSIUM CHLORIDE, AND CALCIUM CHLORIDE .6; .31; .03; .02 G/100ML; G/100ML; G/100ML; G/100ML
30 INJECTION, SOLUTION INTRAVENOUS ONCE
Status: COMPLETED | OUTPATIENT
Start: 2022-06-22 | End: 2022-06-22

## 2022-06-22 RX ORDER — ETOMIDATE 2 MG/ML
20 INJECTION INTRAVENOUS ONCE
Status: COMPLETED | OUTPATIENT
Start: 2022-06-22 | End: 2022-06-22

## 2022-06-22 RX ORDER — PROMETHAZINE HYDROCHLORIDE 25 MG/1
12.5 TABLET ORAL EVERY 6 HOURS PRN
Status: DISCONTINUED | OUTPATIENT
Start: 2022-06-22 | End: 2022-07-05 | Stop reason: HOSPADM

## 2022-06-22 RX ORDER — ACETAMINOPHEN 325 MG/1
650 TABLET ORAL EVERY 6 HOURS PRN
Status: DISCONTINUED | OUTPATIENT
Start: 2022-06-22 | End: 2022-07-05 | Stop reason: HOSPADM

## 2022-06-22 RX ORDER — ENOXAPARIN SODIUM 100 MG/ML
40 INJECTION SUBCUTANEOUS NIGHTLY
Status: DISCONTINUED | OUTPATIENT
Start: 2022-06-22 | End: 2022-06-23

## 2022-06-22 RX ORDER — ACETAMINOPHEN 650 MG/1
650 SUPPOSITORY RECTAL EVERY 6 HOURS PRN
Status: DISCONTINUED | OUTPATIENT
Start: 2022-06-22 | End: 2022-07-05 | Stop reason: HOSPADM

## 2022-06-22 RX ORDER — FENTANYL CITRATE-0.9 % NACL/PF 10 MCG/ML
25-200 PLASTIC BAG, INJECTION (ML) INTRAVENOUS CONTINUOUS
Status: DISCONTINUED | OUTPATIENT
Start: 2022-06-22 | End: 2022-06-23

## 2022-06-22 RX ORDER — MIDAZOLAM HYDROCHLORIDE 1 MG/ML
1-10 INJECTION, SOLUTION INTRAVENOUS CONTINUOUS
Status: DISCONTINUED | OUTPATIENT
Start: 2022-06-22 | End: 2022-06-23

## 2022-06-22 RX ORDER — SODIUM CHLORIDE 9 MG/ML
INJECTION, SOLUTION INTRAVENOUS PRN
Status: DISCONTINUED | OUTPATIENT
Start: 2022-06-22 | End: 2022-07-05 | Stop reason: HOSPADM

## 2022-06-22 RX ORDER — POLYETHYLENE GLYCOL 3350 17 G/17G
17 POWDER, FOR SOLUTION ORAL DAILY PRN
Status: DISCONTINUED | OUTPATIENT
Start: 2022-06-22 | End: 2022-07-05 | Stop reason: HOSPADM

## 2022-06-22 RX ORDER — METRONIDAZOLE 500 MG/100ML
500 INJECTION, SOLUTION INTRAVENOUS EVERY 8 HOURS
Status: DISCONTINUED | OUTPATIENT
Start: 2022-06-22 | End: 2022-06-26

## 2022-06-22 RX ADMIN — ETOMIDATE 20 MG: 2 INJECTION INTRAVENOUS at 14:30

## 2022-06-22 RX ADMIN — MIDAZOLAM HYDROCHLORIDE 2 MG/HR: 1 INJECTION, SOLUTION INTRAVENOUS at 15:03

## 2022-06-22 RX ADMIN — PROPOFOL 20 MCG/KG/MIN: 10 INJECTION, EMULSION INTRAVENOUS at 18:52

## 2022-06-22 RX ADMIN — MIDAZOLAM 2 MG: 1 INJECTION INTRAMUSCULAR; INTRAVENOUS at 14:34

## 2022-06-22 RX ADMIN — IOPAMIDOL 75 ML: 755 INJECTION, SOLUTION INTRAVENOUS at 17:47

## 2022-06-22 RX ADMIN — PIPERACILLIN AND TAZOBACTAM 4500 MG: 4; .5 INJECTION, POWDER, LYOPHILIZED, FOR SOLUTION INTRAVENOUS at 17:00

## 2022-06-22 RX ADMIN — Medication 25 MCG/HR: at 14:58

## 2022-06-22 RX ADMIN — SODIUM CHLORIDE, POTASSIUM CHLORIDE, SODIUM LACTATE AND CALCIUM CHLORIDE 1731 ML: 600; 310; 30; 20 INJECTION, SOLUTION INTRAVENOUS at 15:55

## 2022-06-22 RX ADMIN — METRONIDAZOLE 500 MG: 500 INJECTION, SOLUTION INTRAVENOUS at 22:25

## 2022-06-22 RX ADMIN — LEVETIRACETAM 1000 MG: 10 INJECTION INTRAVENOUS at 16:53

## 2022-06-22 RX ADMIN — IPRATROPIUM BROMIDE AND ALBUTEROL SULFATE 1 AMPULE: .5; 3 SOLUTION RESPIRATORY (INHALATION) at 15:13

## 2022-06-22 RX ADMIN — VANCOMYCIN HYDROCHLORIDE 1000 MG: 1 INJECTION, POWDER, LYOPHILIZED, FOR SOLUTION INTRAVENOUS at 23:25

## 2022-06-22 RX ADMIN — Medication 10 MCG/MIN: at 15:51

## 2022-06-22 RX ADMIN — ROCURONIUM BROMIDE 60 MG: 10 INJECTION INTRAVENOUS at 14:30

## 2022-06-22 RX ADMIN — ENOXAPARIN SODIUM 40 MG: 100 INJECTION SUBCUTANEOUS at 20:31

## 2022-06-22 RX ADMIN — METHYLPREDNISOLONE SODIUM SUCCINATE 60 MG: 125 INJECTION, POWDER, FOR SOLUTION INTRAMUSCULAR; INTRAVENOUS at 15:30

## 2022-06-22 RX ADMIN — SODIUM CHLORIDE, PRESERVATIVE FREE 10 ML: 5 INJECTION INTRAVENOUS at 20:45

## 2022-06-22 RX ADMIN — ACETAMINOPHEN 650 MG: 325 TABLET ORAL at 20:31

## 2022-06-22 ASSESSMENT — PULMONARY FUNCTION TESTS
PIF_VALUE: 33
PIF_VALUE: 24
PIF_VALUE: 23
PIF_VALUE: 23
PIF_VALUE: 14
PIF_VALUE: 13
PIF_VALUE: 23
PIF_VALUE: 23
PIF_VALUE: 24
PIF_VALUE: 24
PIF_VALUE: 14
PIF_VALUE: 23
PIF_VALUE: 23
PIF_VALUE: 22
PIF_VALUE: 14
PIF_VALUE: 23
PIF_VALUE: 24
PIF_VALUE: 25
PIF_VALUE: 24
PIF_VALUE: 23
PIF_VALUE: 24
PIF_VALUE: 18
PIF_VALUE: 23
PIF_VALUE: 11
PIF_VALUE: 24
PIF_VALUE: 22
PIF_VALUE: 22
PIF_VALUE: 24
PIF_VALUE: 23

## 2022-06-22 NOTE — PROGRESS NOTES
Pt arrived to the floor around 1800. [facility] Pt intubated with fentanyl, versed, and levo infusing. Order obtained from SUSY Edwards to initiate propofol. Pt and pt's family speaks [place]. Pt's grandson able to speak english and was given update at bedside. All questions answered.

## 2022-06-22 NOTE — ED NOTES
ABG sent to lab. Lab called RN to state that they had \"Errors\" with the ABG but they wouldn't state what that meant. Lab asked to draw another ABG. Will do as soon as possible.

## 2022-06-22 NOTE — PROGRESS NOTES
Clinical Pharmacy Note  Subcutaneous Anticoagulant Adjustment     Heparin has been adjusted to Lovenox 40 mg daily based on [facility] policy. Recent Labs     06/21/22  0614 06/22/22  1422   CREATININE 1.3 1.3     Recent Labs     06/22/22  1422   HGB 13.5   HCT 40.8        Estimated Creatinine Clearance: 30 mL/min (based on SCr of 1.3 mg/dL). Pharmacist Review of Appropriate Use and Automatic Dose Adjustment of Subcutaneous Anticoagulants (Adult)    The guidance below is to provide initial recommendations for dosing. If recommended dose does not align well with patient's current clinical picture, communications with the care team will occur to determine most appropriate medication and dose. TABLE 1. ENOXAPARIN ROUTINE PROPHYLAXIS DOSING (Medically ill, routine surgery)   Patient Weight (kg)     50.9 and below 51 - 100.9 101 - 150.9 151 - 174.9 175 or greater         Estimated CrCl  (ml/min) 30 or greater   30 mg SUBQ daily   40 mg SUBQ daily 30 mg SUBQ BID  40 mg SUBQ BID 60mg SUBQ BID      15-29 UFH 5000 units SUBQ BID   30 mg SUBQ daily 30 mg SUBQ daily 40 mg SUBQ daily   60 mg SUBQ daily      Less than 15 or Dialysis UFH 5000 units SUBQ BID   UFH 5000 units SUBQ TID UFH 7500 units SUBQ TID       TABLE 2. ENOXAPARIN TREATMENT DOSING   (Based on 1mg/kg BID for DVT/PE/AFib)   Patient Weight (kg)     50.9 and below .9 151-189.9 190 or greater         Estimated CrCl  (ml/min) 30 or greater Recommend [facility] standardized UFH infusion, apixaban or rivaroxaban 1mg/kg SUBQ BID 1mg/kg SUBQ BID if anti-Xa levels are feasible per institution. Alternatively,  recommend switch to [facility] standardized UFH infusion     Recommend switch to [facility] standardized UFH infusion. 15-29 Recommend [facility] standardized UFH infusion or apixaban 1mg/kg SUBQ daily Recommend switch to [facility] standardized UFH infusion     Less than 15 or Dialysis Recommend switch to [facility] standardized UFH infusion.      [address] [facility] 6/22/2022 6:29 PM

## 2022-06-22 NOTE — ED NOTES
MD at bedside. Pt desat. Suctioned. Dr. Alma Rosa Gr at bedside.       Marti Palacios, RN  06/22/22 1818

## 2022-06-23 ENCOUNTER — APPOINTMENT (OUTPATIENT)
Dept: MRI IMAGING | Age: 82
DRG: 720 | End: 2022-06-23
Payer: MEDICAID

## 2022-06-23 LAB
A/G RATIO: 1.1 (ref 1.1–2.2)
ALBUMIN SERPL-MCNC: 3.3 G/DL (ref 3.4–5)
ALP BLD-CCNC: 122 U/L (ref 40–129)
ALT SERPL-CCNC: 67 U/L (ref 10–40)
ANION GAP SERPL CALCULATED.3IONS-SCNC: 15 MMOL/L (ref 3–16)
ANISOCYTOSIS: ABNORMAL
AST SERPL-CCNC: 110 U/L (ref 15–37)
ATYPICAL LYMPHOCYTE RELATIVE PERCENT: 2 % (ref 0–6)
BANDED NEUTROPHILS RELATIVE PERCENT: 2 % (ref 0–7)
BASE EXCESS ARTERIAL: -2.6 MMOL/L (ref -3–3)
BASOPHILS ABSOLUTE: 0 K/UL (ref 0–0.2)
BASOPHILS RELATIVE PERCENT: 0 %
BILIRUB SERPL-MCNC: 1.1 MG/DL (ref 0–1)
BUN BLDV-MCNC: 30 MG/DL (ref 7–20)
CALCIUM SERPL-MCNC: 8.6 MG/DL (ref 8.3–10.6)
CARBOXYHEMOGLOBIN ARTERIAL: 0 % (ref 0–1.5)
CHLORIDE BLD-SCNC: 99 MMOL/L (ref 99–110)
CO2: 20 MMOL/L (ref 21–32)
CREAT SERPL-MCNC: 1.8 MG/DL (ref 0.8–1.3)
CREATININE URINE: <4.2 MG/DL (ref 39–259)
EKG ATRIAL RATE: 68 BPM
EKG DIAGNOSIS: NORMAL
EKG P AXIS: 60 DEGREES
EKG P-R INTERVAL: 162 MS
EKG Q-T INTERVAL: 428 MS
EKG QRS DURATION: 82 MS
EKG QTC CALCULATION (BAZETT): 455 MS
EKG R AXIS: 44 DEGREES
EKG T AXIS: 25 DEGREES
EKG VENTRICULAR RATE: 68 BPM
EOSINOPHILS ABSOLUTE: 0 K/UL (ref 0–0.6)
EOSINOPHILS RELATIVE PERCENT: 0 %
GFR AFRICAN AMERICAN: 44
GFR NON-AFRICAN AMERICAN: 36
GLUCOSE BLD-MCNC: 101 MG/DL (ref 70–99)
GLUCOSE BLD-MCNC: 147 MG/DL (ref 70–99)
GLUCOSE BLD-MCNC: 176 MG/DL (ref 70–99)
GLUCOSE BLD-MCNC: 217 MG/DL (ref 70–99)
GLUCOSE BLD-MCNC: 233 MG/DL (ref 70–99)
GLUCOSE BLD-MCNC: 93 MG/DL (ref 70–99)
HCO3 ARTERIAL: 21.8 MMOL/L (ref 21–29)
HCT VFR BLD CALC: 40.8 % (ref 40.5–52.5)
HEMATOLOGY PATH CONSULT: NORMAL
HEMATOLOGY PATH CONSULT: YES
HEMOGLOBIN, ART, EXTENDED: 12.9 G/DL (ref 13.5–17.5)
HEMOGLOBIN: 13.5 G/DL (ref 13.5–17.5)
LACTIC ACID: 1.1 MMOL/L (ref 0.4–2)
LYMPHOCYTES ABSOLUTE: 5.3 K/UL (ref 1–5.1)
LYMPHOCYTES RELATIVE PERCENT: 29 %
MAGNESIUM: 1.8 MG/DL (ref 1.8–2.4)
MCH RBC QN AUTO: 28.3 PG (ref 26–34)
MCHC RBC AUTO-ENTMCNC: 33.2 G/DL (ref 31–36)
MCV RBC AUTO: 85.5 FL (ref 80–100)
METHEMOGLOBIN ARTERIAL: 0.6 %
MONOCYTES ABSOLUTE: 1.7 K/UL (ref 0–1.3)
MONOCYTES RELATIVE PERCENT: 10 %
MRSA SCREEN RT-PCR: NORMAL
NEUTROPHILS ABSOLUTE: 10.1 K/UL (ref 1.7–7.7)
NEUTROPHILS RELATIVE PERCENT: 57 %
O2 SAT, ARTERIAL: 97.4 %
O2 THERAPY: ABNORMAL
PCO2 ARTERIAL: 35.9 MMHG (ref 35–45)
PDW BLD-RTO: 12.7 % (ref 12.4–15.4)
PERFORMED ON: ABNORMAL
PERFORMED ON: NORMAL
PH ARTERIAL: 7.39 (ref 7.35–7.45)
PHOSPHORUS: 3.4 MG/DL (ref 2.5–4.9)
PLATELET # BLD: 334 K/UL (ref 135–450)
PLATELET SLIDE REVIEW: ADEQUATE
PMV BLD AUTO: 7.1 FL (ref 5–10.5)
PO2 ARTERIAL: 124 MMHG (ref 75–108)
POTASSIUM REFLEX MAGNESIUM: 4.6 MMOL/L (ref 3.5–5.1)
PROTEIN PROTEIN: 0.22 G/DL
PROTEIN PROTEIN: 223 MG/DL
PROTEIN PROTEIN: <4 MG/DL
PROTEIN/CREAT RATIO: ABNORMAL MG/DL
RBC # BLD: 4.77 M/UL (ref 4.2–5.9)
SLIDE REVIEW: ABNORMAL
SMUDGE CELLS: PRESENT
SODIUM BLD-SCNC: 134 MMOL/L (ref 136–145)
TCO2 ARTERIAL: 22.9 MMOL/L
TOTAL PROTEIN: 6.2 G/DL (ref 6.4–8.2)
TROPONIN: <0.01 NG/ML
TROPONIN: <0.01 NG/ML
VANCOMYCIN RANDOM: 12.9 UG/ML
WBC # BLD: 17.1 K/UL (ref 4–11)

## 2022-06-23 PROCEDURE — 70551 MRI BRAIN STEM W/O DYE: CPT

## 2022-06-23 PROCEDURE — 70544 MR ANGIOGRAPHY HEAD W/O DYE: CPT

## 2022-06-23 PROCEDURE — 6370000000 HC RX 637 (ALT 250 FOR IP): Performed by: INTERNAL MEDICINE

## 2022-06-23 PROCEDURE — 84156 ASSAY OF PROTEIN URINE: CPT

## 2022-06-23 PROCEDURE — 82803 BLOOD GASES ANY COMBINATION: CPT

## 2022-06-23 PROCEDURE — 36415 COLL VENOUS BLD VENIPUNCTURE: CPT

## 2022-06-23 PROCEDURE — 6370000000 HC RX 637 (ALT 250 FOR IP): Performed by: NURSE PRACTITIONER

## 2022-06-23 PROCEDURE — 36600 WITHDRAWAL OF ARTERIAL BLOOD: CPT

## 2022-06-23 PROCEDURE — 80053 COMPREHEN METABOLIC PANEL: CPT

## 2022-06-23 PROCEDURE — 6360000002 HC RX W HCPCS: Performed by: INTERNAL MEDICINE

## 2022-06-23 PROCEDURE — 70547 MR ANGIOGRAPHY NECK W/O DYE: CPT

## 2022-06-23 PROCEDURE — 2500000003 HC RX 250 WO HCPCS: Performed by: INTERNAL MEDICINE

## 2022-06-23 PROCEDURE — 2580000003 HC RX 258: Performed by: INTERNAL MEDICINE

## 2022-06-23 PROCEDURE — 84166 PROTEIN E-PHORESIS/URINE/CSF: CPT

## 2022-06-23 PROCEDURE — 94761 N-INVAS EAR/PLS OXIMETRY MLT: CPT

## 2022-06-23 PROCEDURE — 84484 ASSAY OF TROPONIN QUANT: CPT

## 2022-06-23 PROCEDURE — 87205 SMEAR GRAM STAIN: CPT

## 2022-06-23 PROCEDURE — 84100 ASSAY OF PHOSPHORUS: CPT

## 2022-06-23 PROCEDURE — C9113 INJ PANTOPRAZOLE SODIUM, VIA: HCPCS | Performed by: NURSE PRACTITIONER

## 2022-06-23 PROCEDURE — 6360000002 HC RX W HCPCS: Performed by: NURSE PRACTITIONER

## 2022-06-23 PROCEDURE — 95819 EEG AWAKE AND ASLEEP: CPT

## 2022-06-23 PROCEDURE — 2700000000 HC OXYGEN THERAPY PER DAY

## 2022-06-23 PROCEDURE — 82570 ASSAY OF URINE CREATININE: CPT

## 2022-06-23 PROCEDURE — APPNB15 APP NON BILLABLE TIME 0-15 MINS: Performed by: NURSE PRACTITIONER

## 2022-06-23 PROCEDURE — 94003 VENT MGMT INPAT SUBQ DAY: CPT

## 2022-06-23 PROCEDURE — 2000000000 HC ICU R&B

## 2022-06-23 PROCEDURE — 80202 ASSAY OF VANCOMYCIN: CPT

## 2022-06-23 PROCEDURE — 83735 ASSAY OF MAGNESIUM: CPT

## 2022-06-23 PROCEDURE — 2580000003 HC RX 258: Performed by: NURSE PRACTITIONER

## 2022-06-23 PROCEDURE — 83605 ASSAY OF LACTIC ACID: CPT

## 2022-06-23 PROCEDURE — 99291 CRITICAL CARE FIRST HOUR: CPT | Performed by: INTERNAL MEDICINE

## 2022-06-23 PROCEDURE — 87070 CULTURE OTHR SPECIMN AEROBIC: CPT

## 2022-06-23 PROCEDURE — 87641 MR-STAPH DNA AMP PROBE: CPT

## 2022-06-23 PROCEDURE — 87186 SC STD MICRODIL/AGAR DIL: CPT

## 2022-06-23 PROCEDURE — 93010 ELECTROCARDIOGRAM REPORT: CPT | Performed by: INTERNAL MEDICINE

## 2022-06-23 PROCEDURE — 87077 CULTURE AEROBIC IDENTIFY: CPT

## 2022-06-23 RX ORDER — INSULIN LISPRO 100 [IU]/ML
0-12 INJECTION, SOLUTION INTRAVENOUS; SUBCUTANEOUS
Status: DISCONTINUED | OUTPATIENT
Start: 2022-06-23 | End: 2022-06-23

## 2022-06-23 RX ORDER — INSULIN LISPRO 100 [IU]/ML
0-18 INJECTION, SOLUTION INTRAVENOUS; SUBCUTANEOUS EVERY 4 HOURS
Status: DISCONTINUED | OUTPATIENT
Start: 2022-06-23 | End: 2022-07-02

## 2022-06-23 RX ORDER — MAGNESIUM SULFATE IN WATER 40 MG/ML
2000 INJECTION, SOLUTION INTRAVENOUS ONCE
Status: COMPLETED | OUTPATIENT
Start: 2022-06-23 | End: 2022-06-23

## 2022-06-23 RX ORDER — FENTANYL CITRATE-0.9 % NACL/PF 10 MCG/ML
25-200 PLASTIC BAG, INJECTION (ML) INTRAVENOUS CONTINUOUS
Status: DISCONTINUED | OUTPATIENT
Start: 2022-06-23 | End: 2022-06-23

## 2022-06-23 RX ORDER — DEXTROSE MONOHYDRATE 50 MG/ML
100 INJECTION, SOLUTION INTRAVENOUS PRN
Status: DISCONTINUED | OUTPATIENT
Start: 2022-06-23 | End: 2022-07-05 | Stop reason: HOSPADM

## 2022-06-23 RX ORDER — CHLORHEXIDINE GLUCONATE 0.12 MG/ML
15 RINSE ORAL 2 TIMES DAILY
Status: DISCONTINUED | OUTPATIENT
Start: 2022-06-23 | End: 2022-06-27

## 2022-06-23 RX ORDER — ASPIRIN 300 MG/1
300 SUPPOSITORY RECTAL DAILY
Status: DISCONTINUED | OUTPATIENT
Start: 2022-06-23 | End: 2022-07-05 | Stop reason: HOSPADM

## 2022-06-23 RX ORDER — SODIUM CHLORIDE 9 MG/ML
INJECTION, SOLUTION INTRAVENOUS CONTINUOUS
Status: DISCONTINUED | OUTPATIENT
Start: 2022-06-23 | End: 2022-06-25

## 2022-06-23 RX ORDER — ENOXAPARIN SODIUM 100 MG/ML
30 INJECTION SUBCUTANEOUS NIGHTLY
Status: DISCONTINUED | OUTPATIENT
Start: 2022-06-23 | End: 2022-06-26

## 2022-06-23 RX ORDER — PROPOFOL 10 MG/ML
5-50 INJECTION, EMULSION INTRAVENOUS CONTINUOUS
Status: DISCONTINUED | OUTPATIENT
Start: 2022-06-23 | End: 2022-06-23

## 2022-06-23 RX ADMIN — METRONIDAZOLE 500 MG: 500 INJECTION, SOLUTION INTRAVENOUS at 22:52

## 2022-06-23 RX ADMIN — METRONIDAZOLE 500 MG: 500 INJECTION, SOLUTION INTRAVENOUS at 06:36

## 2022-06-23 RX ADMIN — INSULIN LISPRO 4 UNITS: 100 INJECTION, SOLUTION INTRAVENOUS; SUBCUTANEOUS at 04:23

## 2022-06-23 RX ADMIN — CHLORHEXIDINE GLUCONATE 0.12% ORAL RINSE 15 ML: 1.2 LIQUID ORAL at 08:56

## 2022-06-23 RX ADMIN — SODIUM CHLORIDE, PRESERVATIVE FREE 10 ML: 5 INJECTION INTRAVENOUS at 08:52

## 2022-06-23 RX ADMIN — INSULIN LISPRO 3 UNITS: 100 INJECTION, SOLUTION INTRAVENOUS; SUBCUTANEOUS at 16:56

## 2022-06-23 RX ADMIN — INSULIN LISPRO 6 UNITS: 100 INJECTION, SOLUTION INTRAVENOUS; SUBCUTANEOUS at 00:15

## 2022-06-23 RX ADMIN — CEFEPIME HYDROCHLORIDE 2000 MG: 2 INJECTION, POWDER, FOR SOLUTION INTRAVENOUS at 00:28

## 2022-06-23 RX ADMIN — Medication 40 MG: at 08:56

## 2022-06-23 RX ADMIN — SODIUM CHLORIDE: 9 INJECTION, SOLUTION INTRAVENOUS at 15:13

## 2022-06-23 RX ADMIN — CHLORHEXIDINE GLUCONATE 0.12% ORAL RINSE 15 ML: 1.2 LIQUID ORAL at 20:39

## 2022-06-23 RX ADMIN — METRONIDAZOLE 500 MG: 500 INJECTION, SOLUTION INTRAVENOUS at 15:10

## 2022-06-23 RX ADMIN — SODIUM CHLORIDE, PRESERVATIVE FREE 10 ML: 5 INJECTION INTRAVENOUS at 20:40

## 2022-06-23 RX ADMIN — CEFEPIME HYDROCHLORIDE 1000 MG: 1 INJECTION, POWDER, FOR SOLUTION INTRAMUSCULAR; INTRAVENOUS at 12:07

## 2022-06-23 RX ADMIN — ENOXAPARIN SODIUM 30 MG: 100 INJECTION SUBCUTANEOUS at 20:39

## 2022-06-23 RX ADMIN — MAGNESIUM SULFATE HEPTAHYDRATE 2000 MG: 40 INJECTION, SOLUTION INTRAVENOUS at 08:55

## 2022-06-23 RX ADMIN — INSULIN LISPRO 3 UNITS: 100 INJECTION, SOLUTION INTRAVENOUS; SUBCUTANEOUS at 08:55

## 2022-06-23 RX ADMIN — ASPIRIN 300 MG: 300 SUPPOSITORY RECTAL at 12:08

## 2022-06-23 ASSESSMENT — PULMONARY FUNCTION TESTS
PIF_VALUE: 27
PIF_VALUE: 23
PIF_VALUE: 24
PIF_VALUE: 22
PIF_VALUE: 23
PIF_VALUE: 24
PIF_VALUE: 23
PIF_VALUE: 22
PIF_VALUE: 24
PIF_VALUE: 20
PIF_VALUE: 25
PIF_VALUE: 22
PIF_VALUE: 24
PIF_VALUE: 20
PIF_VALUE: 25
PIF_VALUE: 23
PIF_VALUE: 24
PIF_VALUE: 25
PIF_VALUE: 22
PIF_VALUE: 23
PIF_VALUE: 34
PIF_VALUE: 24
PIF_VALUE: 22
PIF_VALUE: 22
PIF_VALUE: 23
PIF_VALUE: 22
PIF_VALUE: 23
PIF_VALUE: 26
PIF_VALUE: 24
PIF_VALUE: 22
PIF_VALUE: 25
PIF_VALUE: 24
PIF_VALUE: 24
PIF_VALUE: 23
PIF_VALUE: 22
PIF_VALUE: 27
PIF_VALUE: 24
PIF_VALUE: 22
PIF_VALUE: 24
PIF_VALUE: 23
PIF_VALUE: 24
PIF_VALUE: 23
PIF_VALUE: 24
PIF_VALUE: 22
PIF_VALUE: 23
PIF_VALUE: 23
PIF_VALUE: 24
PIF_VALUE: 25
PIF_VALUE: 20
PIF_VALUE: 22
PIF_VALUE: 24
PIF_VALUE: 28
PIF_VALUE: 22
PIF_VALUE: 23
PIF_VALUE: 22
PIF_VALUE: 23
PIF_VALUE: 22
PIF_VALUE: 25
PIF_VALUE: 24
PIF_VALUE: 25
PIF_VALUE: 26
PIF_VALUE: 23
PIF_VALUE: 22
PIF_VALUE: 27
PIF_VALUE: 24
PIF_VALUE: 22
PIF_VALUE: 24
PIF_VALUE: 26
PIF_VALUE: 24
PIF_VALUE: 22
PIF_VALUE: 33
PIF_VALUE: 23
PIF_VALUE: 24
PIF_VALUE: 32
PIF_VALUE: 26
PIF_VALUE: 24
PIF_VALUE: 24
PIF_VALUE: 22
PIF_VALUE: 24
PIF_VALUE: 25
PIF_VALUE: 24
PIF_VALUE: 23
PIF_VALUE: 24
PIF_VALUE: 23
PIF_VALUE: 22
PIF_VALUE: 24
PIF_VALUE: 20
PIF_VALUE: 23
PIF_VALUE: 27
PIF_VALUE: 24
PIF_VALUE: 22
PIF_VALUE: 23
PIF_VALUE: 23
PIF_VALUE: 24
PIF_VALUE: 20
PIF_VALUE: 22

## 2022-06-23 NOTE — CONSULTS
Patient Name: Felisha Casarez                                                    Primary Physician: Remberto Faustin MD  Admitting Dx: Syncope and collapse [R55]  Acute respiratory failure, unspecified whether with hypoxia or hypercapnia ([facility] [address] [J96.00]  Cerebrovascular accident (CVA), unspecified mechanism ([facility] [I63.9]  AMS (altered mental status) [R41.82]  Acute cerebrovascular accident (CVA) [facility]) [I63.9]    Nephrology [facility]  [email or web address]                                          Assessment / Plan: LUL w/ CKD? · Baseline SCr of 0.7 in 2020 and then 0.9 in 2021 and comes in now at 1.3 with rapid increse to 1.8 on 6/23/22. CT w/ contrast for PE done on 6/22/22 and likely account for recent LUL as well. · Etiology is ATN with hypotension and SBP still in 90s  · LIkely CKD related to DM and HTN since 2012. · Zosyn and Vanc ordered but now Zosyn has been discontinued with combination being nephrotoxic now on Cefepime. · Prevoius CT Abd / Pelvis with conrast showed hepatic mass with no nmets, moderate stenosis of SMA and 50% narrowing. No mention of renal arteries or stenosis. CT abd without contrast on 6/3/22 showed normal kidneys and no hydro, mass, or stones. · Patient started on IVF NS at 100 cc/hr given contrast and hypotension. Prefer plasmalyte, but no available in [employer]. · NO need for immunologic workup at this time or repeat imaging. · Urine did show significant proteinuria and will check Pr/Cr ratio with previous SPEP -ve. Please call our office at [phone] or [facility] with any questions or contact me directly. Subjective:     CC / Reason for Consult:  LUL    HPI / PMHx:  This is a consult for Felisha Chaneyjimmyelliott  requested by Remberto Faustin MD for the reason of  LUL . Felisha Casarez is a 80 y.o. male admitted for Syncope with Dyspena and CVA w/ PMHx of Liver Mass, T2DM, HTN, Asthma, Thyroid Dz.    Patient was found unresponsive at home and is not able to provide history due to condition, obtained from patient's chart. Patient was recently discharged from the rehab facility after he was diagnosed with CVA. Patient was covered in vomiting on arrival of EMS. Patient admitted with SCr of 1.3 on 6/17/22 and previous SCr of 0.9 on 12/27/21. Has been fairly stable since admission until bumped to 1.8 on 6/23/22. SCr was 0.7 on 3/18/20. He has been hypotensive since admission but has dropped into the 60C systolic for a period with Levophed ordered. I thank Dr. Edwige Guerra MD for consulting [facility] Nephrology in the care of your patient. Please call with any questions or concerns. [phone]. Demetrius Velarde    Objective:     SocHx: Quit smoking 1980.  15 pk years. Denies alcohol use. FamHx: Nx hx of kidney disease    Current Medications:  Scheduled Medications:  chlorhexidine, 15 mL, BID  pantoprazole (PROTONIX) 40 mg injection, 40 mg, Daily  insulin lispro, 0-18 Units, Q4H  cefepime, 1,000 mg, Q12H  enoxaparin, 30 mg, Nightly  aspirin, 300 mg, Daily  sodium chloride flush, 10 mL, 2 times per day  metroNIDAZOLE, 500 mg, Q8H  vancomycin (VANCOCIN) intermittent dosing (placeholder), , RX Placeholder       IV Meds:  dextrose  norepinephrine, Last Rate: 3 mcg/min (06/23/22 0931)  sodium chloride       PRN Medications:  glucose, 4 tablet, PRN  dextrose bolus, 125 mL, PRN   Or  dextrose bolus, 250 mL, PRN  glucagon (rDNA), 1 mg, PRN  dextrose, 100 mL/hr, PRN  sodium chloride flush, 10 mL, PRN  sodium chloride, , PRN  promethazine, 12.5 mg, Q6H PRN   Or  ondansetron, 4 mg, Q6H PRN  polyethylene glycol, 17 g, Daily PRN  acetaminophen, 650 mg, Q6H PRN   Or  acetaminophen, 650 mg, Q6H PRN        Allergies: Patient has no known allergies.     ROS: Positives in BOLD / Unable to provide     GEN:   fevers, chills, sweats, fatigue and weight loss     HEENT:    nasal congestion, sore mouth and sore throat     Resp:    cough, hemoptysis, pneumonia or dyspnea on exertion     Card:  chest pain, chest pressure/discomfort, dyspnea, palpitations,  lower extremity edema     GI:   nausea, vomiting, diarrhea, constipation and abdominal pain     :  dysuria, nocturia, urinary incontinence, hesitancy and hematuria     Derm:   rash, skin lesion(s), pruritus and dryness     Neuro:   headaches, dizziness, seizures, gait problems, tremor and weakness     MS:  myalgias, arthralgias, neck pain and back pain     Endo:    nephropathy and cardiovascular disease    PE:      Gen: ill-appearing and unable to respond on vent     HEENT:atraumatic, intubated      Neuro: not responsive off sedation     Neck:  supple, no significant adenopathy     Cardio: normal rate, regular rhythm, normal S1, S2, no murmurs, rubs, clicks or gallops      Resp: clear to auscultation, no wheezes, rales or rhonchi, symmetric air entry. GI:  soft, nontender, nondistended, no masses or organomegaly. Ext:  peripheral pulses normal, no pedal edema, no clubbing or cyanosis      MS: no joint tenderness, deformity or swelling      DERM: normal coloration and turgor, no rashesor bruising.        Vitals: Patient Vitals for the past 8 hrs:   BP Temp Temp src Pulse Resp SpO2 Height   06/23/22 1218 -- -- -- 76 16 99 % --   06/23/22 1215 115/61 -- -- 84 18 98 % --   06/23/22 1200 108/62 98.6 °F (37 °C) Axillary 82 18 99 % --   06/23/22 1145 120/68 -- -- 84 18 100 % --   06/23/22 1130 109/60 -- -- 80 18 99 % --   06/23/22 1115 (!) 107/58 -- -- 77 18 99 % --   06/23/22 1100 (!) 102/58 -- -- 78 18 99 % --   06/23/22 1045 (!) 104/58 -- -- 81 24 99 % 4' 9\" (1.448 m)   06/23/22 1030 108/62 -- -- 82 21 99 % --   06/23/22 1015 (!) 96/56 -- -- 71 18 100 % --   06/23/22 1000 (!) 96/58 -- -- 74 18 100 % --   06/23/22 0945 118/64 -- -- 69 18 100 % --   06/23/22 0930 121/64 -- -- 63 18 100 % --   06/23/22 0915 121/64 -- -- 69 18 100 % --   06/23/22 0900 120/82 -- -- 66 18 100 % --   06/23/22 0845 122/66 -- -- 66 18 100 % -- 06/23/22 0830 124/62 -- -- 63 18 100 % --   06/23/22 0815 113/65 -- -- 64 18 100 % --   06/23/22 0804 -- -- -- 65 18 100 % --   06/23/22 0800 108/63 98.2 °F (36.8 °C) Axillary 65 18 100 % --   06/23/22 0745 112/61 -- -- 61 18 100 % --   06/23/22 0730 125/73 -- -- 68 18 100 % --   06/23/22 0715 (!) 92/54 -- -- 67 18 100 % --   06/23/22 0700 (!) 94/55 -- -- 68 18 100 % --   06/23/22 0645 (!) 94/56 -- -- 67 18 100 % --   06/23/22 0630 94/62 -- -- 70 18 100 % --   06/23/22 0615 (!) 88/57 -- -- 70 18 100 % --   06/23/22 0600 114/60 -- -- 68 18 100 % --   06/23/22 0545 112/62 -- -- 66 19 100 % --   06/23/22 0530 (!) 93/59 -- -- 67 18 100 % --   06/23/22 0515 90/62 -- -- 68 18 100 % --   06/23/22 0500 90/61 -- -- 67 18 100 % --          I/Os:     Intake/Output Summary (Last 24 hours) at 6/23/2022 1246  Last data filed at 6/23/2022 1200  Gross per 24 hour   Intake 1139.67 ml   Output 558 ml   Net 581.67 ml       LABS:    Lab Results   Component Value Date    CREATININE 1.8 06/23/2022    BUN 30 06/23/2022     06/23/2022    K 4.6 06/23/2022    CL 99 06/23/2022    CO2 20 06/23/2022     No results found for: [id number]   Lab Results   Component Value Date    WBC 17.1 06/22/2022    HGB 13.5 06/22/2022    HCT 40.8 06/22/2022    MCV 85.5 06/22/2022     06/22/2022      No results found for: IRON, TIBC, FERRITIN   No results found for: Alberto Maxwell  Lab Results   Component Value Date    CALCIUM 8.6 06/23/2022    PHOS 3.4 06/23/2022

## 2022-06-23 NOTE — CONSULTS
Comprehensive Nutrition Assessment    Type and Reason for Visit:  Consult (TF ordering and management)    Nutrition Recommendations/Plan:   Start TF. Recommend Glucerna 1.5 with goal rate of 55 mL/hr. Flush per provider. Malnutrition Assessment:  Malnutrition Status:  Insufficient data (06/23/22 1053)      Nutrition Assessment:    Consult \"TF ordering and management\". Pt with PMH of CVA, DM, and COPD who presented with AMS and aspiration PNA. Pt intubated. Currenlty on levo, no propofol running. Starting TF. Nutrition Related Findings:    Na 134, Glu 176; No edema or BM noted Wound Type: None       Current Nutrition Intake & Therapies:    Average Meal Intake: NPO  Average Supplements Intake: NPO  Diet NPO  Current Tube Feeding (TF) Recommendations:  · Goal TF & Flush Orders Provides: Recommend Glucerna 1.5 with goal rate of 55 mL/hr. Flush per provider. TF regimen provides: 1100 mL TV, 1650 kcal, 91 gm pro, 835 mL free water. Anthropometric Measures:  Height: 4' 9\" (144.8 cm)  Ideal Body Weight (IBW): 88 lbs (40 kg)    Admission Body Weight: 127 lb (57.6 kg)  Current Body Weight: 124 lb (56.2 kg), 140.9 % IBW. Weight Source: Bed Scale  Current BMI (kg/m2): 26.8                          BMI Categories: Overweight (BMI 25.0-29. 9)    Estimated Daily Nutrient Needs:        Energy (kcal/day): 8141-0002 kcal (25-30 kcal/kg 56 kg CBW)     Protein (g/day): 51-85 gm (1.2-2 gm/kg IBW)     Fluid (ml/day): per provider    Nutrition Diagnosis:   · Inadequate oral intake related to impaired respiratory function as evidenced by intubation      Nutrition Interventions:   Food and/or Nutrient Delivery: Start Tube Feeding  Nutrition Education/Counseling: No recommendation at this time  Coordination of Nutrition Care: Continue to monitor while inpatient       Goals:     Goals: Initiate nutrition support,Tolerate nutrition support at goal rate       Nutrition Monitoring and Evaluation:      Food/Nutrient Intake Outcomes: Enteral Nutrition Intake/Tolerance  Physical Signs/Symptoms Outcomes: Biochemical Data,Weight,Skin,Nutrition Focused Physical Findings,Hemodynamic Status    Discharge Planning:     Too soon to determine     Keaton Bowling RD, LD  Contact: [phone]

## 2022-06-23 NOTE — CARE COORDINATION
06/23/22 1541   Readmission Assessment   Previous Disposition Home with Home Health  ([facility] was the home care he had been referred to)   Who is being Susy Ojeda   ([place] [address])   What was the patient's/caregiver's perception as to why they think they needed to return back to the hospital?   (stroke)   Did you visit your Primary Care Physician after you left the hospital, before you returned this time? No   Why weren't you able to visit your PCP?  Did not have an appointment   Did you see a specialist, such as Cardiac, Pulmonary, Orthopedic Physician, etc. after you left the hospital? No   Who advised the patient to return to the hospital? Caregiver  (family)

## 2022-06-23 NOTE — PROGRESS NOTES
Narcotic Waste Documentation    Administered 52 mL of Fentanyl and wasted 48 mL per [facility].     Electronically signed by Carla Heck RN on 6/23/2022 at 5:45 AM

## 2022-06-23 NOTE — PROGRESS NOTES
Narcotic Waste Documentation    Administered 20 mL of Versed and wasted 80 mL per [facility].     Electronically signed by Ingrid Alford RN on 6/23/2022 at 6:28 AM      Electronically signed by Clari Ronquillo RN on 6/23/2022 at 6:32 AM

## 2022-06-23 NOTE — PROCEDURES
[address]                          ELECTROENCEPHALOGRAM REPORT    PATIENT NAME: Joaquín Ramsay                        :        1940  MED REC NO:   [id number]                          ROOM:       2104  ACCOUNT NO:   [de-identified]                           ADMIT DATE: 2022  PROVIDER:     Shan Swanson Cea, DO    DATE OF EE2022    REFERRING PROVIDER:  Pia Garcia NP    REASON FOR STUDY:  Altered mental status. BRIEF HISTORY AND NEUROLOGIC FINDINGS:  The patient is an 80-year-old  male being evaluated for reported altered mental status and  encephalopathy. MEDICATIONS:  The patient's centrally acting medications listed include  Phenergan, Atarax, Compazine, Ultram, and Synthroid. EEG FINDINGS:  This is a 20-channel digital EEG performed utilizing  bipolar and referential montages. Wakefulness, drowsiness and sleep  were obtained during the recording. Much of the study was performed  during sleep which consisted of sleep spindles and K-complexes. Brief  wakefulness was manifested by a moderate voltage, fairly symmetric,  fairly well-organized 8 Hz posterior background rhythm. The anterior  background consisted of low voltage faster frequencies. Drowsiness was  manifested by attenuation of the waking background rhythms. Photic stimulation was not performed due to technical factors. Hyperventilation exercise was not performed as the patient was on a  ventilator. Her age also was a contraindication to hyperventilation. A 1-channel EKG rhythm strip was reviewed and showed no obvious cardiac  dysrhythmias. EEG DIAGNOSIS:  Essentially normal awake and asleep EEG. CLINICAL INTERPRETATION:  No definite epileptiform activity or evidence  for focal cerebral dysfunction was present during this recording.   If  seizures remain a clinical concern, then a repeat EEG may be of further  benefit. Clinical correlation is advised.         Jessika Arana DO    D: 06/23/2022 15:37:10       T: 06/23/2022 15:39:28     [email or web address]  Job#: [id number]     Doc#: [id number]    CC:

## 2022-06-23 NOTE — PLAN OF CARE
Problem: Discharge Planning  Goal: Discharge to home or other facility with appropriate resources  Outcome: Progressing     Problem: Pain  Goal: Verbalizes/displays adequate comfort level or baseline comfort level  Outcome: Progressing     Problem: Safety - Medical Restraint  Goal: Remains free of injury from restraints (Restraint for Interference with Medical Device)  Description: INTERVENTIONS:  1. Determine that other, less restrictive measures have been tried or would not be effective before applying the restraint  2. Evaluate the patient's condition at the time of restraint application  3. Inform patient/family regarding the reason for restraint  4. Q2H: Monitor safety, psychosocial status, comfort, nutrition and hydration  Outcome: Progressing     Problem: Safety - Adult  Goal: Free from fall injury  Outcome: Progressing     Problem: ABCDS Injury Assessment  Goal: Absence of physical injury  Outcome: Progressing     Problem: Skin/Tissue Integrity  Goal: Absence of new skin breakdown  Description: 1. Monitor for areas of redness and/or skin breakdown  2. Assess vascular access sites hourly  3. Every 4-6 hours minimum:  Change oxygen saturation probe site  4. Every 4-6 hours:  If on nasal continuous positive airway pressure, respiratory therapy assess nares and determine need for appliance change or resting period.   Outcome: Progressing

## 2022-06-23 NOTE — CONSULTS
REASON FOR CONSULTATION/CC: Respiratory failure      Consult at request of Marti Ballard MD     PCP: No primary care provider on file. Established Pulmonologist: Trevor ALEXANDRA    Chief Complaint   Patient presents with    Altered Mental Status     Patient arrives via ems with c/o AMS. Physician immediately called into room. Pt unresponsive and vomiting. Patient just discharged with stroke. Patient LKW 15 minutes prior to ems arrival       HISTORY OF PRESENT ILLNESS: Christiana Snell is a 80y.o. year old male with a history of metastatic [address] recent stroke, who presents with altered mental status. Intubated for airway protection. CT of the head shows evolving left MCA stroke. Patient is currently intubated and sedated on a provide HPI is obtained from bedside nursing report and EMR. CT scan of the chest shows evidence of aspiration possibly chronic component as well. Has been started on cefepime/Flagyl/Vanco, MRSA nares pending. Improving oxygen requirement. Neurology has been consulted      Past Medical History:   Diagnosis Date    Asthma     Diabetes mellitus ([facility] [address]     Hypertension     Thyroid disease          No past surgical history on file. Family Hx  family history is not on file. Unable to obtain due to mechanical ventilation    Social Hx   reports that he quit smoking about 42 years ago. His smoking use included cigarettes. He has a 15.00 pack-year smoking history.  He has never used smokeless tobacco.    Scheduled Meds:   magnesium sulfate  2,000 mg IntraVENous Once    chlorhexidine  15 mL Mouth/Throat BID    pantoprazole (PROTONIX) 40 mg injection  40 mg IntraVENous Daily    insulin lispro  0-18 Units SubCUTAneous Q4H    sodium chloride flush  10 mL IntraVENous 2 times per day    enoxaparin  40 mg SubCUTAneous Nightly    metroNIDAZOLE  500 mg IntraVENous Q8H    cefepime  2,000 mg IntraVENous Q12H    vancomycin (VANCOCIN) intermittent dosing (placeholder)   Other RX Placeholder Continuous Infusions:   dextrose      norepinephrine 3 mcg/min (06/23/22 0931)    sodium chloride         PRN Meds:  glucose, dextrose bolus **OR** dextrose bolus, glucagon (rDNA), dextrose, sodium chloride flush, sodium chloride, magnesium sulfate, promethazine **OR** ondansetron, polyethylene glycol, acetaminophen **OR** acetaminophen    ALLERGIES:  Patient has No Known Allergies. REVIEW OF SYSTEMS:  Unable to obtain due to mechanical ventilation    Objective:   PHYSICAL EXAM:  Blood pressure (!) 96/58, pulse 74, temperature 98.2 °F (36.8 °C), temperature source Axillary, resp. rate 18, weight 124 lb 5.4 oz (56.4 kg), SpO2 100 %.'  Gen:  No acute distress. Eyes: PERRL. Anicteric sclera. No conjunctival injection. ENT: No discharge. Posterior oropharynx clear. External appearance of ears and nose normal.  Neck: Trachea midline. No mass   Resp:  No crackles. No wheezes. No rhonchi. No dullness on percussion. CV: Regular rate. Regular rhythm. No murmur or rub. No edema. GI: Soft, Non-tender. Non-distended. +BS  Skin: Warm, dry, w/o erythema. Lymph: No cervical or supraclavicular LAD. M/S: No cyanosis. No clubbing. Neuro: Intubated and sedated      Data Reviewed:   LABS:  CBC:   Recent Labs     06/21/22  0614 06/22/22  1422   WBC 12.1* 17.1*   HGB 13.5 13.5   HCT 41.7 40.8   MCV 86.2 85.5    334     BMP:   Recent Labs     06/21/22  0614 06/22/22  1422 06/23/22  0348    137 134*   K 4.5 3.7 4.6    98* 99   CO2 24 20* 20*   PHOS  --   --  3.4   BUN 32* 28* 30*   CREATININE 1.3 1.3 1.8*     LIVER PROFILE:   Recent Labs     06/22/22  1422 06/23/22  0348   AST 63* 110*   ALT 30 67*   BILITOT 0.9 1.1*   ALKPHOS 150* 122     PT/INR: No results for input(s): PROTIME, INR in the last 72 hours. APTT: No results for input(s): APTT in the last 72 hours.   UA:  Recent Labs     06/22/22  1422   COLORU Yellow   PHUR 6.5   WBCUA 2   RBCUA 2   BACTERIA None Seen   CLARITYU Clear   SPECGRAV 1.016   LEUKOCYTESUR Negative   UROBILINOGEN 1.0   BILIRUBINUR Negative   BLOODU TRACE*   GLUCOSEU Negative     Recent Labs     06/22/22  1845 06/23/22  0355   PHART 7.315* 7.393   AZJ3VFB 42.0 35.9   PO2ART 94.8 124.0*       Vent Information  Ventilator ID: [id number]  Vent Mode: AC/VC  Ventilator Initiate: Yes    Radiology Review:  Pertinent images / reports were reviewed as a part of this visit. CT Chest w/ contrast: No results found for this or any previous visit. CT Chest w/o contrast: No results found for this or any previous visit. CTPA: Results for orders placed during the hospital encounter of 06/22/22    CT CHEST PULMONARY EMBOLISM W CONTRAST    Narrative  EXAMINATION:  CTA OF THE CHEST 6/22/2022 4:25 pm    TECHNIQUE:  CTA of the chest was performed after the administration of intravenous  contrast.  Multiplanar reformatted images are provided for review. MIP  images are provided for review. Automated exposure control, iterative  reconstruction, and/or weight based adjustment of the mA/kV was utilized to  reduce the radiation dose to as low as reasonably achievable. COMPARISON:  07/02/2021    HISTORY:  ORDERING SYSTEM PROVIDED HISTORY: respiratory failure, hypotension, recent  hospitalization, eval for PE/aspiration  TECHNOLOGIST PROVIDED HISTORY:  Reason for exam:->respiratory failure, hypotension, recent hospitalization,  eval for PE/aspiration  Decision Support Exception - unselect if not a suspected or confirmed  emergency medical condition->Emergency Medical Condition (MA)  Reason for Exam: respiratory failure, hypotension, recent hospitalization,  eval for PE/aspiration    FINDINGS:  Pulmonary Arteries: Pulmonary arteries are adequately opacified for  evaluation. No evidence of intraluminal filling defect to suggest pulmonary  embolism. Main pulmonary artery is normal in caliber. Mediastinum: No evidence of mediastinal lymphadenopathy.   There is an ET tube  in place with the tip in the distal trachea. There is a gastric tube in  place extending into the distal stomach. The heart and pericardium  demonstrate no acute abnormality. There is a hypodense cystic mass along the  right paratracheal region extending inferiorly measures 5 cm and is  unchanged. There is moderate calcified plaque throughout the thoracic aorta  which is mildly dilated throughout. There is a persistent saccular aneurysm  along the aortic arch laterally measuring proximally 6-7 cm which is grossly  unchanged in size and appearance. No dissection is seen. Lungs/pleura: There are tiny bibasilar pleural effusions with subsegmental  opacities along the lung bases posteriorly which is more prominent on the  right and has increased. There are irregular ground-glass and interstitial  opacity along the right lower lobe extending into the right middle lobe and  right upper lobe which is more apparent. There is questionable mild mucous  plugging scattered in the bronchi along the right lung base. There is volume  loss and pleural thickening and parenchymal opacity along the right apex  extending along the suprahilar region which is unchanged. Upper Abdomen: The adrenals are normal.  There is a hypodense exophytic mass  along the right lobe of the liver posteriorly extending medially measuring 7  cm which measured 5.4 cm previously and appears to have enlarged    Soft Tissues/Bones: No acute bone or soft tissue abnormality. Impression  No evidence of a pulmonary embolus. ET tube and gastric tube in good position. Large saccular aneurysm along the aortic arch which is grossly unchanged in  size and a 5 cm paratracheal cystic mass on the right which is unchanged    Small bibasilar pleural effusions and associated bibasilar atelectasis and/or  consolidations which is more prominent on the right and extends into the  right middle and upper lobe and probably represents multisegmental  bronchopneumonia.     Questionable mucous plugging scattered in the bronchi along the right lung  base which may be due to recent aspiration. Recommend pulmonology follow-up. 7 cm exophytic right hepatic mass which has increased in size. Suggest  PET-CT correlation. Postop changes right apex and upper lobe with fibrosis and scarring  throughout the area which is unchanged. CXR PA/LAT: No results found for this or any previous visit. CXR portable: Results for orders placed during the hospital encounter of 06/22/22    XR CHEST PORTABLE    Narrative  EXAMINATION:  ONE XRAY VIEW OF THE CHEST    6/22/2022 3:23 pm    COMPARISON:  06/17/2022    HISTORY:  ORDERING SYSTEM PROVIDED HISTORY: resp failure  TECHNOLOGIST PROVIDED HISTORY:  Reason for exam:->resp failure  Reason for Exam: resp failure    FINDINGS:  Endotracheal tube in satisfactory position above the mar. Enteric  catheter appears to be in the stomach. Heart size stable. Right upper lobe  opacity not changed. Impression  Unchanged right upper lobe scarring    No acute cardiopulmonary process  Access  Arterial       PICC           CVC        CVC Triple Lumen 06/22/22 (Active)   $ Central line insertion $ Yes 06/22/22 [id number] Being Utilized Yes 06/23/22 0600   Criteria for Appropriate Use Hemodynamically unstable, requiring monitoring lines, vasopressors, or volume resuscitation 06/23/22 0600   Site Assessment Clean, dry & intact 06/23/22 0600   Phlebitis Assessment No symptoms 06/23/22 0600   Infiltration Assessment 0 06/23/22 0600   Color/Movement/Sensation Capillary refill less than 3 sec 06/23/22 0600   Proximal Lumen Color/Status Infusing; White 06/23/22 0600   Medial Lumen Status Infusing;Blue 06/23/22 0600   Distal Lumen Color/Status Infusing;Brown 06/23/22 0600   Line Care Connections checked and tightened 06/23/22 0600   Alcohol Cap Used Yes 06/23/22 0600   Date of Last Dressing Change 06/22/22 06/23/22 0600   Dressing Type Transparent 06/23/22 0600 Dressing Status Clean, dry & intact 06/23/22 0600   Number of days: 0           Assessment/Plan:     Acute hypoxemic respiratory failure with SPO2 less than 90% on room air  -Full vent support, Wean supplemental oxygen to goal saturation of >90%  -Likely multifactorial from altered mental status and likely aspiration event    Stroke  -Recent stroke, CT evidence of evolving CVA  -Neurology consulted    Aspiration pneumonia  -CT evidence likely chronic component as well  -Cefepime/Flagyl/vancomycin, MRSA nares pending  -Pro-[place]ayne greater than 2    Septic shock  -Titrate pressors goal MAP 65    Metastatic HCC  -Follows with [facility]  -Agree with palliative care consult    PPI  Lovenox  Peridex  Start tf    Due to the immediate potential for life-threatening deterioration due to septic shock and respiratory failure, I spent 34 minutes providing critical care. This time is excluding time spent performing separately billable procedures. This note was transcribed using [id number]. Please disregard any translational errors.     Thank you for the consult    [address]  Pulmonary, Sleep and Critical Care Medicine

## 2022-06-23 NOTE — PROGRESS NOTES
Narcotic Waste Documentation    Administered 20 mL of Versed and wasted 80 mL per [facility].     Electronically signed by Carol Langford RN on 6/23/2022 at 5:46 AM

## 2022-06-23 NOTE — CONSULTS
Clinical Pharmacy Note  Vancomycin Consult    Cari Hodge is a 80 y.o. male ordered Vancomycin for sepsis; consult received from Dr. Magdalene Munroe to manage therapy. Also receiving cefepime. Patient Active Problem List   Diagnosis    Intractable vomiting with nausea    Intractable vomiting    Nausea vomiting and diarrhea    Aortic arch aneurysm (HCC)    HTN (hypertension), benign    RUQ pain    Cholelithiasis    DM2 (diabetes mellitus, type 2) ([address]    Hypothyroid    Diarrhea    Hepatocellular carcinoma ([address]    Paresthesia    Facial droop    Arterial ischemic stroke, ICA, left, acute ([address]    Carotid artery stenosis with cerebral infarction ([address]    DM type 2, controlled, with complication ([address]    AMS (altered mental status)    Acute cerebrovascular accident (CVA) ([address]       Allergies:  Patient has no known allergies. Temp max:  Temp (24hrs), Av.9 °F (37.7 °C), Min:98.6 °F (37 °C), Max:101.3 °F (38.5 °C)      Recent Labs     22  0536 22  0614 22  1422   WBC 9.8 12.1* 17.1*       Recent Labs     22  0536 22  0614 22  1422   BUN 24* 32* 28*   CREATININE 1.2 1.3 1.3         Intake/Output Summary (Last 24 hours) at 2022 2303  Last data filed at 2022 2200  Gross per 24 hour   Intake 450 ml   Output 200 ml   Net 250 ml       Culture Results:  Pending    Ht Readings from Last 1 Encounters:   22 4' 9\" (1.448 m)        Wt Readings from Last 1 Encounters:   22 130 lb 11.7 oz (59.3 kg)         Estimated Creatinine Clearance: 33 mL/min (based on SCr of 1.3 mg/dL). Assessment/Plan:  Given patient's age and elevated creatinine, will order a one time vancomycin 1000 mg IV tonight. Will check a random level tomorrow to assess need for scheduled vancomycin versus intermittent dosing. Thank you for the consult.      Lou Myers, [facility]  2022 11:09 PM

## 2022-06-23 NOTE — CARE COORDINATION
06/23/22 1532   Readmission Assessment   Number of Days since last admission? 1-7 days   Previous Disposition Home with Family   Who is being Skippy Satchel   (chart reviewed)   What was the patient's/caregiver's perception as to why they think they needed to return back to the hospital? Other (Comment)  (stroke)   Did you visit your Primary Care Physician after you left the hospital, before you returned this time? No   Why weren't you able to visit your PCP?  Did not have an appointment   Did you see a specialist, such as Cardiac, Pulmonary, Orthopedic Physician, etc. after you left the hospital? No   Who advised the patient to return to the hospital? Other (Comment)  (family)

## 2022-06-23 NOTE — ED PROVIDER NOTES
In addition to the advanced practice provider, I personally saw Dianne Roe and performed a substantive portion of the visit including all aspects of the medical decision making. Briefly, this is a 80 y.o. male here for altered mental status. Patient arrives hypoxic and obtunded and is unable to contribute meaningfully to history. History obtained by EMS, states the patient was acting normally as per family when he went to the bathroom and then collapsed. Patient was discharged from this facility yesterday after admission for ischemic CVA. Was unresponsive and covered in vomitus on EMS arrival however had pulses. No other historians are available on presentation. Patient will briefly open his eyes with sternal rub, however does not verbalize or follow commands. Does not respond to Louise  service. On exam, patient afebrile however critically ill-appearing. He is in severe respiratory distress. Heart tachycardic, regular rhythm. Lungs coarse throughout, no wheezes. Abdomen soft and protuberant, no distress elicited with deep palpation all quadrants. Patient opens eyes to sternal rubs only, does not verbalize or follow commands. PERRL. No obvious facial droop. Briefly withdraws all extremities to pressure. EKG  EKG was reviewed by emergency department physician in the absence of a cardiologist    Narrow complex sinus rhythm, rate 68, normal axis, normal AR and QRS intervals, normal Qtc, no ST elevations or depressions, TWI lead III, impression NSR with nonspecific t wave morphology, no STEMI, t wave inversion not evident on comparison EKG 6/17/22      Screenings   Kb Coma Scale  Eye Opening: To pain  Best Verbal Response: None  Best Motor Response: Withdraws from pain  Kb Coma Scale Score: 7      Is this patient to be included in the SEP-1 Core Measure due to severe sepsis or septic shock?      Yes   SEP-1 CORE MEASURE DATA      Sepsis Criteria   Severe Sepsis Criteria Septic Shock Criteria     Must be confirmed or suspected to move forward with diagnosis of sepsis. Must meet 2:    [] Temperature > 100.9 F (38.3 C)        or < 96.8 F (36 C)  [] HR > 90  [] RR > 20  [] WBC > 12 or < 4 or 10% bands      AND:      [] Infection Confirmed or        Suspected. Must meet 1:    [] Lactate > 2       or   [] Signs of Organ Dysfunction:    - SBP < 90 or MAP < 65  - Altered mental status  - Creatinine > 2 or increased from      baseline  - Urine Output < 0.5 ml/kg/hr  - Bilirubin > 2  - INR > 1.5 (not anticoagulated)  - Platelets < 564,089  - Acute Respiratory Failure as     evidenced by new need for NIPPV     or mechanical ventilation      [] No criteria met for Severe Sepsis. Must meet 1:    [] Lactate > 4        or   [] SBP < 90 or MAP < 65 for at        least two readings in the first        hour after fluid bolus        administration      [] Vasopressors initiated (if hypotension persists after fluid resuscitation)        [] No criteria met for Septic Shock.    Patient Vitals for the past 6 hrs:   BP Temp Pulse Resp SpO2 Weight Percent Weight Change   06/22/22 1428 (!) 128/47 -- 70 25 93 % -- --   06/22/22 1430 -- -- 72 22 95 % -- --   06/22/22 1431 (!) 110/59 -- 97 14 94 % -- --   06/22/22 1454 97/67 -- 80 18 100 % -- --   06/22/22 1456 -- -- 79 18 99 % -- --   06/22/22 1508 (!) 106/56 -- 73 18 (!) 88 % -- --   06/22/22 1509 -- -- -- -- (!) 89 % -- --   06/22/22 1513 95/65 -- 74 18 90 % -- --   06/22/22 1516 89/61 -- 64 18 91 % -- --   06/22/22 1519 -- -- -- -- 95 % -- --   06/22/22 1521 (!) 87/58 -- 71 21 93 % -- --   06/22/22 1523 -- -- 76 16 (!) 77 % -- --   06/22/22 1524 -- -- 71 18 (!) 74 % -- --   06/22/22 1526 (!) 102/59 -- 73 22 100 % -- --   06/22/22 1531 (!) 80/47 -- 70 17 100 % -- --   06/22/22 1535 (!) 74/55 -- 73 18 100 % -- --   06/22/22 1540 (!) 74/58 -- 72 18 100 % -- --   06/22/22 1551 (!) 50/41 -- 70 18 100 % -- --   06/22/22 1558 (!) 150/54 -- 59 14 100 % -- --   06/22/22 1601 (!) 82/52 -- 66 20 100 % -- --   06/22/22 1603 106/71 -- 69 20 100 % -- --   06/22/22 1610 (!) 165/91 -- 73 14 100 % -- --   06/22/22 1615 (!) 178/87 98.6 °F (37 °C) 85 16 100 % -- --   06/22/22 1625 -- -- 92 24 100 % -- --   06/22/22 1634 -- -- 98 15 100 % -- --   06/22/22 1647 -- -- 97 17 100 % -- --   06/22/22 1648 -- -- (!) 101 18 100 % -- --   06/22/22 1649 (!) 159/67 -- (!) 102 17 100 % -- --   06/22/22 1653 (!) 152/74 -- 100 17 100 % -- --   06/22/22 1655 (!) 139/58 -- (!) 101 18 100 % -- --   06/22/22 1710 (!) 101/50 -- 94 17 98 % -- --   06/22/22 1712 (!) 110/54 -- 95 15 96 % -- --   06/22/22 1715 (!) 127/28 -- 95 18 95 % -- --   06/22/22 1730 108/87 -- 92 17 96 % -- --   06/22/22 1745 109/85 -- 92 19 96 % -- --   06/22/22 1800 123/82 -- (!) 107 20 99 % -- --   06/22/22 1815 101/77 -- (!) 108 22 98 % -- --   06/22/22 1827 -- 99.7 °F (37.6 °C) -- -- -- 130 lb 11.7 oz (59.3 kg) 0   06/22/22 1830 93/68 -- (!) 106 18 99 % -- --   06/22/22 1845 120/71 -- 98 18 100 % -- --   06/22/22 1900 123/65 -- 91 18 100 % -- --   06/22/22 1943 -- -- 88 18 100 % -- --   06/22/22 2000 134/64 (!) 101.3 °F (38.5 °C) 86 18 99 % -- --      Recent Labs     06/20/22  0536 06/21/22  0614 06/22/22  1422   WBC 9.8 12.1* 17.1*   CREATININE 1.2 1.3 1.3   BILITOT  --   --  0.9    331 334         Time severe sepsis Identified: 1422    Fluid Resuscitation Rational: at least 30mL/kg based on entered actual weight at time of triage    Repeat lactate level: worsening    Reassessment Exam:   I have reassessed tissue perfusion and hemodynamic status after fluid bolus at this time: Heart RRR, cap refill 2+, skin cool and dry, radial 2+      Procedures:    Rapid Sequence Intubation  Date: 6/22/22  Indication: Hypoxic respiratory failure  Consent: Emergent    A time-out was completed verifying correct patient, procedure and positioning. The patient was placed in a flat position.  Sedation was obtained using Etomidate 20mg and Rocuronium 60mg. The patient was easily ventilated using an ambu bag. The Glidescope was used and inserted into the oropharynx at which time there was a Grade 1 view of the vocal cords. A 7.5-Chinese endotracheal tube was inserted and visualized going through the vocal cords. The stylette was removed. Colorimetric change was visualized on the CO2 meter. Breath sounds were heard in both lung fields equally. The endotracheal tube was secured at 23 cm, measured at the teeth. A chest x-ray was ordered to assess for pneumothorax and verify endotracheal tube placement. Estimated Blood Loss: none    The patient tolerated the procedure well and there were no complications        MDM    Patient arrived afebrile however critically ill-appearing. He is obtunded and in respiratory failure, BiPAP contraindicated. Patient was emergently intubated for hypoxic respiratory failure and airway protection. During intubation, food products noted in oropharynx and my suspicion for aspiration given his vomiting prior to arrival is very high. Neurologic exam limited by obtundation, however there were no obvious focal neurologic deficits on my exam.  CT head shows no evidence of hemorrhagic conversion of prior CVA, mass-effect or other acute lesion. Development of prior known infarct is noted. While new acute CVA remains in the differential, this is not clearly evident and regardless patient would not be an appropriate candidate for thrombolytics given his recent other ischemic infarcts. EKG no STEMI, troponin normal, ACS is not immediately evident. No malignant dysrhythmia. Laboratory work-up with mild lactic acidosis, otherwise no evidence of acute endorgan dysfunction or critical electrolyte derangement. BNP normal, CXR without pulmonary edema, no evidence of CHF exacerbation.   COPD considered given patient's history, will give breathing treatments and steroids, however no obvious wheezes and COPD not clear etiology of respiratory failure. Suspect respiratory failure most likely secondary to aspiration. Undergoing treatment, patient did have hypotension which resolved with 30 cc/kg IV fluids concurrent with vasopressors. Also given Zosyn. I have lengthy discussion with patient's son and spouse utilizing Faroese  #[id number]. I discussed critical nature of patient's illness and high risk for deterioration despite maximal medical management, family verbalized their understanding. All family's questions were answered and I assured him that I would be available if they wish to speak with me again. I Dr. Giancarlo Marcelino am the primary clinician of record. Critical Care:    I have discussed the case with the advanced practice provider. I have personally performed a history, physical exam, and my own medical decision making. I have reviewed the note and agree with the findings and plan. Upon my evaluation, this patient had a high probability of imminent or life-threatening deterioration due to acute encephalopathy, severe sepsis, acute hypoxic respiratory failure which required my direct attention, intervention, and personal management. I personally saw the patient and independently provided 45 minutes of non-concurrent critical care out of the total shared critical care time provided. The critical care time spent while evaluating and treating this patient was exclusive of any time spent doing separately billable procedures. This critical care time includes time at the bedside, data interpretation, medication management, monitoring for potential decompensation and physician consultation. Specifics of interventions taken and potentially life-threatening diagnostic considerations are listed above in the medical decision making. Patient Referrals:  No follow-up provider specified. Discharge Medications:  Current Discharge Medication List          FINAL IMPRESSION  1. Syncope and collapse    2.  Acute respiratory failure, unspecified whether with hypoxia or hypercapnia ([address]    3. Acute encephalopathy    4. Severe sepsis ([address]    5. Aspiration pneumonia, unspecified aspiration pneumonia type, unspecified laterality, unspecified part of lung ([address]    6. History of CVA (cerebrovascular accident)          For further details of [facility] emergency department encounter, please see documentation by advanced practice provider, Bree Russell NP.     Oz Hawley DO (electronically signed)  Attending Emergency Physician       Oz Hawley DO  06/23/22 [id number]

## 2022-06-23 NOTE — PROGRESS NOTES
Narcotic Waste Documentation    Administered 52 mL of Fentanyl and wasted 48 mL per [facility].     Electronically signed by Cielo Hanks RN on 6/23/2022 at 6:30 AM     Electronically signed by Joan Ball RN on 6/23/2022 at 6:32 AM

## 2022-06-23 NOTE — PROGRESS NOTES
Clinical Pharmacy Note  Renal Dose Adjustment    Adreduarda Kowalski is receiving the following renally eliminated medications: cefepime and enoxaparin. Based on the patient's estimated creatinine clearance of Estimated Creatinine Clearance: 22 mL/min (A) (based on SCr of 1.8 mg/dL (H)). and urine output, the doses have been adjusted. Pharmacy will continue to monitor and adjust dose as needed for changes in renal function.       CORBIN Chavira [facility] 6/23/2022 10:23 AM

## 2022-06-23 NOTE — ED PROVIDER NOTES
Jcarlos Childress [address]        Pt Name: Thu Retana  MRN: [id number]  [place] 1940  Date of evaluation: 6/22/2022  Provider: EDEN Arrington  PCP: No primary care provider on file. Note Started: 10:13 PM EDT     This patient was also seen and evaluated by the attending physician Nick Tatum, [address] [place] [address]       Chief Complaint   Patient presents with    Altered Mental Status     Patient arrives via ems with c/o AMS. Physician immediately called into room. Pt unresponsive and vomiting. Patient just discharged with stroke. Patient LKW 15 minutes prior to ems arrival       HISTORY OF PRESENT ILLNESS   (Location, Timing/Onset, Context/Setting, Quality, Duration, Modifying Factors, Severity, Associated Signs and Symptoms)  Note limiting factors. Chief Complaint: syncope, altered mental status     Thu Retana is a 80 y.o. male who presents after having been discharged from this hospital just yesterday, having had a CVA as seen on brain MRI. Today he reportedly collapsed in his bathroom at home, so family members called for an ambulance and the patient was promptly brought to the ED. EMS reports the patient was found unconscious but with a pulse. Patient is obviously altered on presentation here, somnolent but arousable, nonverbal.  No other information available for this HPI. Nursing Notes were all reviewed and agreed with or any disagreements were addressed in the HPI. REVIEW OF SYSTEMS    (2-9 systems for level 4, 10 or more for level 5)     Review of Systems    Positives and pertinent negatives as per HPI. PAST MEDICAL HISTORY     Past Medical History:   Diagnosis Date    Asthma     [facility] mellitus [facility]     Hypertension     Thyroid disease        SURGICAL HISTORY   No past surgical history on file.     [address]       Current Discharge Medication List      CONTINUE these medications which have NOT CHANGED    Details rosuvastatin (CRESTOR) 40 MG tablet Take 1 tablet by mouth nightly  Qty: 30 tablet, Refills: 3      amLODIPine (NORVASC) 5 MG tablet Take 1 tablet by mouth daily  Qty: 30 tablet, Refills: 3      aspirin 81 MG EC tablet Take 81 mg by mouth daily      hydrOXYzine HCl (ATARAX) 25 MG tablet Take 25 mg by mouth 3 times daily      ondansetron (ZOFRAN ODT) 4 MG disintegrating tablet Take 1-2 tablets by mouth every 12 hours as needed for Nausea  Qty: 12 tablet, Refills: 0      Lenvatinib Mesylate (LENVATINIB, 8 MG DAILY DOSE, PO) Take by mouth      budesonide-formoterol (SYMBICORT) 160-4.5 MCG/ACT AERO Inhale 2 puffs into the lungs 2 times daily  Qty: 3 Inhaler, Refills: 1      albuterol sulfate HFA (VENTOLIN HFA) 108 (90 Base) MCG/ACT inhaler Inhale 2 puffs into the lungs 4 times daily as needed for Wheezing  Qty: 3 Inhaler, Refills: 1      lisinopril (PRINIVIL;ZESTRIL) 20 MG tablet Take 20 mg by mouth daily      ibuprofen (ADVIL;MOTRIN) 800 MG tablet Take 800 mg by mouth every 6 hours as needed for Pain      hydroCHLOROthiazide (HYDRODIURIL) 25 MG tablet Take 25 mg by mouth daily      metFORMIN (GLUCOPHAGE) 500 MG tablet Take 500 mg by mouth daily (with breakfast)       levothyroxine (SYNTHROID) 100 MCG tablet Take 100 mcg by mouth Daily      omeprazole (PRILOSEC) 40 MG delayed release capsule Take 40 mg by mouth daily       traMADol (ULTRAM) 50 MG tablet Take 50 mg by mouth every 6 hours as needed for Pain.      prochlorperazine (COMPAZINE) 10 MG tablet Take 1 tablet by mouth every 6 hours as needed (pain, nausea)  Qty: 120 tablet, Refills: 0             ALLERGIES     Patient has no known allergies. FAMILYHISTORY     No family history on file.      SOCIAL HISTORY       Social History     Tobacco Use    Smoking status: Former Smoker     Packs/day: 1.00     Years: 15.00     Pack years: 15.00     Types: Cigarettes     Quit date: 1980     Years since quittin.5    Smokeless tobacco: Never Used    Tobacco comment: smoked 3 cigaretts a day   Vaping Use    Vaping Use: Never used   Substance Use Topics    Alcohol use: Never    Drug use: Never       SCREENINGS    Hubbard Coma Scale  Eye Opening: None  Best Verbal Response: None (intubated)  Best Motor Response: Flexion to pain  Hubbard Coma Scale Score: 5  OPO Notified: Not indicated      PHYSICAL EXAM    (up to 7 for level 4, 8 or more for level 5)     ED Triage Vitals   BP Temp Temp Source Heart Rate Resp SpO2 Height Weight   06/22/22 1428 06/22/22 1615 06/22/22 1615 06/22/22 1427 06/22/22 1427 06/22/22 1407 -- 06/22/22 1416   (!) 128/47 98.6 °F (37 °C) Oral 70 20 (!) 86 %  127 lb 3.3 oz (57.7 kg)       Physical Exam  Vitals and nursing note reviewed. Constitutional:       General: He is not in acute distress. Appearance: Normal appearance. He is not ill-appearing. HENT:      Head: Normocephalic and atraumatic. Nose: Nose normal.   Eyes:      General:         Right eye: No discharge. Left eye: No discharge. Cardiovascular:      Rate and Rhythm: Normal rate and regular rhythm. Heart sounds: Normal heart sounds. No murmur heard. No gallop. Pulmonary:      Effort: Pulmonary effort is normal. No respiratory distress. Breath sounds: No stridor. Rhonchi (Diffuse, bilateral, both inspiratory and expiratory but worse on expiration) present. No wheezing or rales. Musculoskeletal:         General: Normal range of motion. Cervical back: Normal range of motion. Skin:     General: Skin is warm and dry. Neurological:      General: No focal deficit present. Mental Status: He is lethargic, disoriented and confused.          DIAGNOSTIC RESULTS   LABS:    Labs Reviewed   CBC WITH AUTO DIFFERENTIAL - Abnormal; Notable for the following components:       Result Value    WBC 17.1 (*)     Neutrophils Absolute 10.1 (*)     Lymphocytes Absolute 5.3 (*)     Monocytes Absolute 1.7 (*)     Smudge Cells Present (*)     Anisocytosis Occasional COMPREHENSIVE METABOLIC PANEL W/ REFLEX TO MG FOR LOW K   BLOOD GAS, ARTERIAL   PROCALCITONIN   TROPONIN   TROPONIN   LACTIC ACID   POCT GLUCOSE   POCT GLUCOSE   POCT GLUCOSE   POCT GLUCOSE       When ordered only abnormal lab results are displayed. All other labs were within normal range or not returned as of this dictation. EKG: When ordered, EKG's are interpreted by the Emergency Department Physician in the absence of a cardiologist.  Please see their note for interpretation of EKG. RADIOLOGY:   Non-plain film images such as CT, Ultrasound and MRI are read by the radiologist. Plain radiographic images are visualized and preliminarily interpreted by the ED Provider with the below findings:    Interpretation per the Radiologist below, if available at the time of this note:    XR CHEST PORTABLE   Final Result   Unchanged right upper lobe scarring      No acute cardiopulmonary process         CT Head WO Contrast   Final Result   New low-attenuation in the left corona radiata consistent with an evolving   infarct as described on the recent MRI      No acute hemorrhage         CT CHEST PULMONARY EMBOLISM W CONTRAST    (Results Pending)     Is this patient to be included in the SEP-1 Core Measure due to severe sepsis or septic shock? Yes   SEP-1 CORE MEASURE DATA      Sepsis Criteria   Severe Sepsis Criteria   Septic Shock Criteria     Must be confirmed or suspected to move forward with diagnosis of sepsis. Must meet 2:    [] Temperature > 100.9 F (38.3 C)        or < 96.8 F (36 C)  [] HR > 90  [] RR > 20  [] WBC > 12 or < 4 or 10% bands      AND:      [] Infection Confirmed or        Suspected.      Must meet 1:    [] Lactate > 2       or   [] Signs of Organ Dysfunction:    - SBP < 90 or MAP < 65  - Altered mental status  - Creatinine > 2 or increased from      baseline  - Urine Output < 0.5 ml/kg/hr  - Bilirubin > 2  - INR > 1.5 (not anticoagulated)  - Platelets < 936,998  - Acute Respiratory Failure as evidenced by new need for NIPPV     or mechanical ventilation      [] No criteria met for Severe Sepsis. Must meet 1:    [] Lactate > 4        or   [] SBP < 90 or MAP < 65 for at        least two readings in the first        hour after fluid bolus        administration      [] Vasopressors initiated (if hypotension persists after fluid resuscitation)        [] No criteria met for Septic Shock.    Patient Vitals for the past 6 hrs:   BP Temp Pulse Resp SpO2 Weight Percent Weight Change   06/22/22 1625 -- -- 92 24 100 % -- --   06/22/22 1634 -- -- 98 15 100 % -- --   06/22/22 1647 -- -- 97 17 100 % -- --   06/22/22 1648 -- -- (!) 101 18 100 % -- --   06/22/22 1649 (!) 159/67 -- (!) 102 17 100 % -- --   06/22/22 1653 (!) 152/74 -- 100 17 100 % -- --   06/22/22 1655 (!) 139/58 -- (!) 101 18 100 % -- --   06/22/22 1710 (!) 101/50 -- 94 17 98 % -- --   06/22/22 1712 (!) 110/54 -- 95 15 96 % -- --   06/22/22 1715 (!) 127/28 -- 95 18 95 % -- --   06/22/22 1730 108/87 -- 92 17 96 % -- --   06/22/22 1745 109/85 -- 92 19 96 % -- --   06/22/22 1800 123/82 -- (!) 107 20 99 % -- --   06/22/22 1815 101/77 -- (!) 108 22 98 % -- --   06/22/22 1827 -- 99.7 °F (37.6 °C) -- -- -- 130 lb 11.7 oz (59.3 kg) 0   06/22/22 1830 93/68 -- (!) 106 18 99 % -- --   06/22/22 1845 120/71 -- 98 18 100 % -- --   06/22/22 1900 123/65 -- 91 18 100 % -- --   06/22/22 1915 123/64 -- 91 18 100 % -- --   06/22/22 1930 117/62 -- 89 18 100 % -- --   06/22/22 1943 -- -- 88 18 100 % -- --   06/22/22 1945 116/66 -- 82 18 100 % -- --   06/22/22 2000 134/64 (!) 101.3 °F (38.5 °C) 86 18 99 % -- --   06/22/22 2015 (!) 146/67 -- 83 18 98 % -- --   06/22/22 2030 (!) 142/72 -- 82 18 99 % -- --   06/22/22 2045 129/70 -- 81 18 99 % -- --   06/22/22 2100 123/67 -- 81 18 99 % -- --   06/22/22 2115 123/65 -- 81 18 99 % -- --   06/22/22 2130 113/67 -- 81 18 99 % -- --   06/22/22 2145 123/66 -- 77 18 100 % -- --   06/22/22 2200 114/68 -- 78 18 100 % -- --      Recent Labs     06/20/22  0536 06/21/22  0614 06/22/22  1422   WBC 9.8 12.1* 17.1*   CREATININE 1.2 1.3 1.3   BILITOT  --   --  0.9    331 334         Is this patient to be included in the SEP-1 Core Measure due to severe sepsis or septic shock? Yes; please see note from ED Attending Dr. Golden Kraft from same encounter for more information. CONSULTS:  IP CONSULT TO CRITICAL CARE  PHARMACY TO DOSE VANCOMYCIN  IP CONSULT TO NEUROLOGY    PROCEDURES     Central Line Placement:  Bhanu Jauregui was intubated and sedated, unable to discuss risks/benefits or offer permission, and his condition was critical. Patient was prepped and draped in standard bedside fashion in the right inguinal area. Physical landmarks with ultrasound guidance was used to locate the central vein. Local anesthesia with 10ml of 1% lidocaine was injected. Seldinger technique was used for placement of the CVC in a non-pulsatile vasculature. All ports nelida and flushed. The patient tolerated the procedure well and no acute complications occurred.     EMERGENCY DEPARTMENT COURSE and DIFFERENTIAL DIAGNOSIS/MDM:   Vitals:    Vitals:    06/22/22 2115 06/22/22 2130 06/22/22 2145 06/22/22 2200   BP: 123/65 113/67 123/66 114/68   Pulse: 81 81 77 78   Resp: 18 18 18 18   Temp:       TempSrc:       SpO2: 99% 99% 100% 100%   Weight:           Patient was given the following medications:  Medications   fentaNYL (SUBLIMAZE) 1,000 mcg in sodium chloride 0.9% 100 mL infusion (50 mcg/hr IntraVENous Rate/Dose Change 6/22/22 1610)   midazolam (VERSED) 1 mg/mL in NS infusion (2 mg/hr IntraVENous Rate/Dose Change 6/22/22 2119)   norepinephrine (LEVOPHED) 16 mg in dextrose 5% 250 mL infusion (10 mcg/min IntraVENous Rate/Dose Change 6/22/22 2119)   sodium chloride flush 0.9 % injection 10 mL (10 mLs IntraVENous Given 6/22/22 2045)   sodium chloride flush 0.9 % injection 10 mL (has no administration in time range)   0.9 % sodium chloride infusion (has no administration in time Must be confirmed or suspected to move forward with diagnosis of sepsis. Must meet 2:    [] Temperature > 100.9 F (38.3 C)        or < 96.8 F (36 C)  [] HR > 90  [] RR > 20  [] WBC > 12 or < 4 or 10% bands      AND:      [] Infection Confirmed or        Suspected. Must meet 1:    [] Lactate > 2       or   [] Signs of Organ Dysfunction:    - SBP < 90 or MAP < 65  - Altered mental status  - Creatinine > 2 or increased from      baseline  - Urine Output < 0.5 ml/kg/hr  - Bilirubin > 2  - INR > 1.5 (not anticoagulated)  - Platelets < 859,419  - Acute Respiratory Failure as     evidenced by new need for NIPPV     or mechanical ventilation      [] No criteria met for Severe Sepsis. Must meet 1:    [] Lactate > 4        or   [] SBP < 90 or MAP < 65 for at        least two readings in the first        hour after fluid bolus        administration      [] Vasopressors initiated (if hypotension persists after fluid resuscitation)        [] No criteria met for Septic Shock.    Patient Vitals for the past 6 hrs:   BP Temp Pulse Resp SpO2 Weight Percent Weight Change   06/22/22 1625 -- -- 92 24 100 % -- --   06/22/22 1634 -- -- 98 15 100 % -- --   06/22/22 1647 -- -- 97 17 100 % -- --   06/22/22 1648 -- -- (!) 101 18 100 % -- --   06/22/22 1649 (!) 159/67 -- (!) 102 17 100 % -- --   06/22/22 1653 (!) 152/74 -- 100 17 100 % -- --   06/22/22 1655 (!) 139/58 -- (!) 101 18 100 % -- --   06/22/22 1710 (!) 101/50 -- 94 17 98 % -- --   06/22/22 1712 (!) 110/54 -- 95 15 96 % -- --   06/22/22 1715 (!) 127/28 -- 95 18 95 % -- --   06/22/22 1730 108/87 -- 92 17 96 % -- --   06/22/22 1745 109/85 -- 92 19 96 % -- --   06/22/22 1800 123/82 -- (!) 107 20 99 % -- --   06/22/22 1815 101/77 -- (!) 108 22 98 % -- --   06/22/22 1827 -- 99.7 °F (37.6 °C) -- -- -- 130 lb 11.7 oz (59.3 kg) 0   06/22/22 1830 93/68 -- (!) 106 18 99 % -- --   06/22/22 1845 120/71 -- 98 18 100 % -- --   06/22/22 1900 123/65 -- 91 18 100 % -- --   06/22/22 1915 123/64 -- 91 18 100 % -- --   06/22/22 1930 117/62 -- 89 18 100 % -- --   06/22/22 1943 -- -- 88 18 100 % -- --   06/22/22 1945 116/66 -- 82 18 100 % -- --   06/22/22 2000 134/64 (!) 101.3 °F (38.5 °C) 86 18 99 % -- --   06/22/22 2015 (!) 146/67 -- 83 18 98 % -- --   06/22/22 2030 (!) 142/72 -- 82 18 99 % -- --   06/22/22 2045 129/70 -- 81 18 99 % -- --   06/22/22 2100 123/67 -- 81 18 99 % -- --   06/22/22 2115 123/65 -- 81 18 99 % -- --   06/22/22 2130 113/67 -- 81 18 99 % -- --   06/22/22 2145 123/66 -- 77 18 100 % -- --   06/22/22 2200 114/68 -- 78 18 100 % -- --      Recent Labs     06/20/22  0536 06/21/22  0614 06/22/22  1422   WBC 9.8 12.1* 17.1*   CREATININE 1.2 1.3 1.3   BILITOT  --   --  0.9    331 334           Patient was promptly intubated by ED attending Dr. Maria Isabel Samayoa. Aspiration pneumonia is suspected, and patient was given antibiotics in the emergency department. CT scan of the head without contrast suggested evolving CVA. Given the patient's known recent MRI findings, no benefit to calling a stroke alert at this time. Patient is in need of hospital admission. I consulted the hospitalist, who agreed to accept and admit the patient to ICU. CRITICAL CARE TIME   CRITICAL CARE: There was a high probability of clinically significant and/or life threatening deterioration in this patient's condition which required my urgent intervention. I independently provided 20 minutes of non-concurrent critical care out of the total shared critical care time provided. This excludes any time for separately reportable procedures. FINAL IMPRESSION      1. Syncope and collapse    2. Acute respiratory failure, unspecified whether with hypoxia or hypercapnia ([address]    3. Acute encephalopathy    4. Severe sepsis ([facility]    5. Aspiration pneumonia, unspecified aspiration pneumonia type, unspecified laterality, unspecified part of lung ([facility]    6.  History of CVA (cerebrovascular accident) DISPOSITION/PLAN   DISPOSITION Admitted 06/22/2022 05:12:30 PM      PATIENT REFERRED TO:  No follow-up provider specified.     DISCHARGE MEDICATIONS:  Current Discharge Medication List          DISCONTINUED MEDICATIONS:  Current Discharge Medication List               (Please note that portions of this note were completed with a voice recognition program.  Efforts were made to edit the dictations but occasionally words are mis-transcribed.)    EDEN Nugent (electronically signed)       Christa Nugent  06/22/22 7581

## 2022-06-23 NOTE — PROGRESS NOTES
Thank you for consulting [facility] in the care of your patient. A full consult will follow but if you have any questions I can be reached through our office at [phone] or through [address] [place] [address]. Thank you.   Dr. Bobbi Quick

## 2022-06-23 NOTE — PROGRESS NOTES
Patient to MRI on transport ventilator.   Electronically signed by Gato Lugo on 6/23/2022 at 1:19 PM

## 2022-06-23 NOTE — H&P
Hospital Medicine History & Physical      PCP: No primary care provider on file. Date of Admission: 6/22/2022    Chief Complaint: found unresponsive    History Of Present Illness:    Patient is 24-year-old male who presented to hospital for change in mental status. Patient was found unresponsive at home, patient is not able to provide history due to condition, most of the history is from patient's chart. Patient was recently discharged from the rehab facility after he was diagnosed with CVA. Patient was covered in vomiting on arrival of EMS      Past Medical History:          Diagnosis Date    Asthma     Diabetes mellitus ([address]     Hypertension     Thyroid disease        Past Surgical History:      No past surgical history on file. Medications Prior to Admission:      Prior to Admission medications    Medication Sig Start Date End Date Taking?  Authorizing Provider   rosuvastatin (CRESTOR) 40 MG tablet Take 1 tablet by mouth nightly 6/21/22   SUSY Mas CNP   amLODIPine (NORVASC) 5 MG tablet Take 1 tablet by mouth daily 6/22/22   SUSY Mas CNP   aspirin 81 MG EC tablet Take 81 mg by mouth daily    Historical Provider, MD   hydrOXYzine HCl (ATARAX) 25 MG tablet Take 25 mg by mouth 3 times daily    Historical Provider, MD   ondansetron (ZOFRAN ODT) 4 MG disintegrating tablet Take 1-2 tablets by mouth every 12 hours as needed for Nausea  Patient taking differently: Take 4-8 mg by mouth every 8 hours as needed for Nausea  12/28/21   Bulmaro Holly PA-C   Lenvatinib Mesylate (LENVATINIB, 8 MG DAILY DOSE, PO) Take by mouth  Patient not taking: Reported on 6/17/2022    Historical Provider, MD   budesonide-formoterol (SYMBICORT) 160-4.5 MCG/ACT AERO Inhale 2 puffs into the lungs 2 times daily  Patient not taking: Reported on 6/17/2022 6/2/20   Rhoda Taylor MD   albuterol sulfate HFA (VENTOLIN HFA) 108 (90 Base) MCG/ACT inhaler Inhale 2 puffs into the lungs 4 times daily as needed for Wheezing 6/2/20   Ricardo Chong MD   lisinopril (PRINIVIL;ZESTRIL) 20 MG tablet Take 20 mg by mouth daily    Historical Provider, MD   ibuprofen (ADVIL;MOTRIN) 800 MG tablet Take 800 mg by mouth every 6 hours as needed for Pain    Historical Provider, MD   hydroCHLOROthiazide (HYDRODIURIL) 25 MG tablet Take 25 mg by mouth daily    Historical Provider, MD   metFORMIN (GLUCOPHAGE) 500 MG tablet Take 500 mg by mouth daily (with breakfast)     Historical Provider, MD   levothyroxine (SYNTHROID) 100 MCG tablet Take 100 mcg by mouth Daily    Historical Provider, MD   omeprazole (PRILOSEC) 40 MG delayed release capsule Take 40 mg by mouth daily     Historical Provider, MD   traMADol (ULTRAM) 50 MG tablet Take 50 mg by mouth every 6 hours as needed for Pain. Historical Provider, MD   prochlorperazine (COMPAZINE) 10 MG tablet Take 1 tablet by mouth every 6 hours as needed (pain, nausea) 8/86/19   Joaquina Sofia MD       Allergies:  Patient has no known allergies. Social History:      TOBACCO:   reports that he quit smoking about 42 years ago. His smoking use included cigarettes. He has a 15.00 pack-year smoking history. He has never used smokeless tobacco.  ETOH:   reports no history of alcohol use. Family History:       Reviewed in detail and non contributory      No family history on file. REVIEW OF SYSTEMS:   Pertinent positives as noted in the HPI. All other systems reviewed and negative. PHYSICAL EXAM PERFORMED:    /67   Pulse 81   Temp (!) 101.3 °F (38.5 °C) (Axillary)   Resp 18   Wt 130 lb 11.7 oz (59.3 kg)   SpO2 99%   BMI 28.29 kg/m²     General appearance: Intubated and sedated  HEENT:  Normal cephalic, atraumatic without obvious deformity. Conjunctivae/corneas clear. Neck: Supple, with full range of motion.  No cervical lymphadenopathy  Respiratory: Mechanical breathing sounds  Cardiovascular:  Regular rate and rhythm with normal S1/S2 without murmurs, rubs or gallops. Abdomen: Soft, non-tender, non-distended, normal bowel sounds. Musculoskeletal:  No edema noted bilaterally. No tenderness on palpation   Skin: no rash visible  Neurologic: Intubated and sedated  Psychiatric: Intubated and sedated  Peripheral Pulses: +2 palpable, equal bilaterally       Labs:     Recent Labs     06/20/22  0536 06/21/22  0614 06/22/22  1422   WBC 9.8 12.1* 17.1*   HGB 12.8* 13.5 13.5   HCT 39.3* 41.7 40.8    331 334     Recent Labs     06/20/22  0536 06/21/22  0614 06/22/22  1422    139 137   K 4.3 4.5 3.7    103 98*   CO2 20* 24 20*   BUN 24* 32* 28*   CREATININE 1.2 1.3 1.3   CALCIUM 9.2 9.4 9.6     Recent Labs     06/22/22  1422   AST 63*   ALT 30   BILITOT 0.9   ALKPHOS 150*     No results for input(s): INR in the last 72 hours. Recent Labs     06/22/22 1422   TROPONINI <0.01       Urinalysis:      Lab Results   Component Value Date    NITRU Negative 06/22/2022    WBCUA 2 06/22/2022    BACTERIA None Seen 06/22/2022    RBCUA 2 06/22/2022    BLOODU TRACE 06/22/2022    SPECGRAV 1.016 06/22/2022    GLUCOSEU Negative 06/22/2022       Radiology:       XR CHEST PORTABLE   Final Result   Unchanged right upper lobe scarring      No acute cardiopulmonary process         CT Head WO Contrast   Final Result   New low-attenuation in the left corona radiata consistent with an evolving   infarct as described on the recent MRI      No acute hemorrhage         CT CHEST PULMONARY EMBOLISM W CONTRAST    (Results Pending)           Active Hospital Problems    Diagnosis Date Noted    AMS (altered mental status) [R41.82] 06/22/2022     Priority: Medium    Acute cerebrovascular accident (CVA) ([facility] [I63.9] 06/22/2022     Priority: Medium       Patient is 19-year-old male who presented to hospital for change in mental status.   Patient was found unresponsive at home, patient is not able to provide history due to condition, most of the history is from patient's chart. Patient was recently discharged from the rehab facility after he was diagnosed with CVA. Patient was covered in vomiting on arrival of EMS    Assessment  Acute hypoxic respiratory failure s/p intubation  Sepsis present on admission  Suspect aspiration pneumonia  Acute encephalopathy  Recent history of CVA  Lactic acidosis      Plan  Start vancomycin, cefepime, Flagyl  Patient is requiring vasopressor support with Levophed  Intubated and sedated, consult ICU  DuoNeb every 4 hours  Check blood cultures  Check procalcitonin  Monitor lactic acid  CT head without contrast shows evolving infarct, consult neurology  DVT prophylaxis-Lovenox  Diet: Diet NPO  Code Status: Full Code    PT/OT Eval Status: ordered    Dispo - pending clinical improvement       Susy Servin MD    The note was completed using EMR and Dragon dictation system. Every effort was made to ensure accuracy; however, inadvertent computerized transcription errors may be present. Thank you No primary care provider on file. for the opportunity to be involved in this patient's care. If you have any questions or concerns please feel free to contact me at [phone].     Susy Servin MD

## 2022-06-24 LAB
ANION GAP SERPL CALCULATED.3IONS-SCNC: 11 MMOL/L (ref 3–16)
BASE EXCESS ARTERIAL: -2.4 MMOL/L (ref -3–3)
BASOPHILS ABSOLUTE: 0.1 K/UL (ref 0–0.2)
BASOPHILS RELATIVE PERCENT: 0.4 %
BUN BLDV-MCNC: 38 MG/DL (ref 7–20)
CALCIUM SERPL-MCNC: 8 MG/DL (ref 8.3–10.6)
CARBOXYHEMOGLOBIN ARTERIAL: 0.6 % (ref 0–1.5)
CHLORIDE BLD-SCNC: 102 MMOL/L (ref 99–110)
CO2: 21 MMOL/L (ref 21–32)
CREAT SERPL-MCNC: 1.8 MG/DL (ref 0.8–1.3)
CREATININE URINE: 66.1 MG/DL (ref 39–259)
EOSINOPHILS ABSOLUTE: 0 K/UL (ref 0–0.6)
EOSINOPHILS RELATIVE PERCENT: 0 %
GFR AFRICAN AMERICAN: 44
GFR NON-AFRICAN AMERICAN: 36
GLUCOSE BLD-MCNC: 130 MG/DL (ref 70–99)
GLUCOSE BLD-MCNC: 138 MG/DL (ref 70–99)
GLUCOSE BLD-MCNC: 140 MG/DL (ref 70–99)
GLUCOSE BLD-MCNC: 146 MG/DL (ref 70–99)
GLUCOSE BLD-MCNC: 153 MG/DL (ref 70–99)
GLUCOSE BLD-MCNC: 159 MG/DL (ref 70–99)
GLUCOSE BLD-MCNC: 164 MG/DL (ref 70–99)
HCO3 ARTERIAL: 21.6 MMOL/L (ref 21–29)
HCT VFR BLD CALC: 31.3 % (ref 40.5–52.5)
HEMOGLOBIN, ART, EXTENDED: 14.7 G/DL (ref 13.5–17.5)
HEMOGLOBIN: 10.4 G/DL (ref 13.5–17.5)
LYMPHOCYTES ABSOLUTE: 1.4 K/UL (ref 1–5.1)
LYMPHOCYTES RELATIVE PERCENT: 9.3 %
MAGNESIUM: 2.9 MG/DL (ref 1.8–2.4)
MCH RBC QN AUTO: 28.4 PG (ref 26–34)
MCHC RBC AUTO-ENTMCNC: 33.3 G/DL (ref 31–36)
MCV RBC AUTO: 85.4 FL (ref 80–100)
METHEMOGLOBIN ARTERIAL: 1 %
MONOCYTES ABSOLUTE: 1 K/UL (ref 0–1.3)
MONOCYTES RELATIVE PERCENT: 6.7 %
NEUTROPHILS ABSOLUTE: 12.7 K/UL (ref 1.7–7.7)
NEUTROPHILS RELATIVE PERCENT: 83.6 %
O2 SAT, ARTERIAL: 94 %
O2 THERAPY: ABNORMAL
PCO2 ARTERIAL: 34.2 MMHG (ref 35–45)
PDW BLD-RTO: 12.8 % (ref 12.4–15.4)
PERFORMED ON: ABNORMAL
PH ARTERIAL: 7.41 (ref 7.35–7.45)
PHOSPHORUS: 3.5 MG/DL (ref 2.5–4.9)
PLATELET # BLD: 229 K/UL (ref 135–450)
PMV BLD AUTO: 7 FL (ref 5–10.5)
PO2 ARTERIAL: 76.8 MMHG (ref 75–108)
POTASSIUM SERPL-SCNC: 4.1 MMOL/L (ref 3.5–5.1)
PROTEIN PROTEIN: 156 MG/DL
PROTEIN/CREAT RATIO: 2.4 MG/DL
RBC # BLD: 3.66 M/UL (ref 4.2–5.9)
SODIUM BLD-SCNC: 134 MMOL/L (ref 136–145)
TCO2 ARTERIAL: 22.6 MMOL/L
URINE ELECTROPHORESIS INTERP: NORMAL
WBC # BLD: 15.2 K/UL (ref 4–11)

## 2022-06-24 PROCEDURE — 6370000000 HC RX 637 (ALT 250 FOR IP): Performed by: NURSE PRACTITIONER

## 2022-06-24 PROCEDURE — 2700000000 HC OXYGEN THERAPY PER DAY

## 2022-06-24 PROCEDURE — 82803 BLOOD GASES ANY COMBINATION: CPT

## 2022-06-24 PROCEDURE — 2500000003 HC RX 250 WO HCPCS: Performed by: INTERNAL MEDICINE

## 2022-06-24 PROCEDURE — 80048 BASIC METABOLIC PNL TOTAL CA: CPT

## 2022-06-24 PROCEDURE — 84100 ASSAY OF PHOSPHORUS: CPT

## 2022-06-24 PROCEDURE — 2000000000 HC ICU R&B

## 2022-06-24 PROCEDURE — 36600 WITHDRAWAL OF ARTERIAL BLOOD: CPT

## 2022-06-24 PROCEDURE — 84165 PROTEIN E-PHORESIS SERUM: CPT

## 2022-06-24 PROCEDURE — 83735 ASSAY OF MAGNESIUM: CPT

## 2022-06-24 PROCEDURE — 84156 ASSAY OF PROTEIN URINE: CPT

## 2022-06-24 PROCEDURE — 2580000003 HC RX 258: Performed by: INTERNAL MEDICINE

## 2022-06-24 PROCEDURE — C9113 INJ PANTOPRAZOLE SODIUM, VIA: HCPCS | Performed by: NURSE PRACTITIONER

## 2022-06-24 PROCEDURE — 82570 ASSAY OF URINE CREATININE: CPT

## 2022-06-24 PROCEDURE — 6370000000 HC RX 637 (ALT 250 FOR IP): Performed by: INTERNAL MEDICINE

## 2022-06-24 PROCEDURE — 85025 COMPLETE CBC W/AUTO DIFF WBC: CPT

## 2022-06-24 PROCEDURE — 2580000003 HC RX 258: Performed by: NURSE PRACTITIONER

## 2022-06-24 PROCEDURE — 84155 ASSAY OF PROTEIN SERUM: CPT

## 2022-06-24 PROCEDURE — 94003 VENT MGMT INPAT SUBQ DAY: CPT

## 2022-06-24 PROCEDURE — 6360000002 HC RX W HCPCS: Performed by: INTERNAL MEDICINE

## 2022-06-24 PROCEDURE — 94761 N-INVAS EAR/PLS OXIMETRY MLT: CPT

## 2022-06-24 PROCEDURE — 99291 CRITICAL CARE FIRST HOUR: CPT | Performed by: INTERNAL MEDICINE

## 2022-06-24 PROCEDURE — 6360000002 HC RX W HCPCS: Performed by: NURSE PRACTITIONER

## 2022-06-24 RX ADMIN — INSULIN LISPRO 3 UNITS: 100 INJECTION, SOLUTION INTRAVENOUS; SUBCUTANEOUS at 12:25

## 2022-06-24 RX ADMIN — INSULIN LISPRO 3 UNITS: 100 INJECTION, SOLUTION INTRAVENOUS; SUBCUTANEOUS at 17:25

## 2022-06-24 RX ADMIN — METRONIDAZOLE 500 MG: 500 INJECTION, SOLUTION INTRAVENOUS at 22:32

## 2022-06-24 RX ADMIN — CEFEPIME HYDROCHLORIDE 1000 MG: 1 INJECTION, POWDER, FOR SOLUTION INTRAMUSCULAR; INTRAVENOUS at 12:24

## 2022-06-24 RX ADMIN — CHLORHEXIDINE GLUCONATE 0.12% ORAL RINSE 15 ML: 1.2 LIQUID ORAL at 19:29

## 2022-06-24 RX ADMIN — SODIUM CHLORIDE: 9 INJECTION, SOLUTION INTRAVENOUS at 05:24

## 2022-06-24 RX ADMIN — Medication 40 MG: at 08:42

## 2022-06-24 RX ADMIN — CEFEPIME HYDROCHLORIDE 1000 MG: 1 INJECTION, POWDER, FOR SOLUTION INTRAMUSCULAR; INTRAVENOUS at 23:52

## 2022-06-24 RX ADMIN — SODIUM CHLORIDE: 9 INJECTION, SOLUTION INTRAVENOUS at 18:30

## 2022-06-24 RX ADMIN — METRONIDAZOLE 500 MG: 500 INJECTION, SOLUTION INTRAVENOUS at 06:24

## 2022-06-24 RX ADMIN — SODIUM CHLORIDE, PRESERVATIVE FREE 10 ML: 5 INJECTION INTRAVENOUS at 20:53

## 2022-06-24 RX ADMIN — CHLORHEXIDINE GLUCONATE 0.12% ORAL RINSE 15 ML: 1.2 LIQUID ORAL at 08:42

## 2022-06-24 RX ADMIN — ENOXAPARIN SODIUM 30 MG: 100 INJECTION SUBCUTANEOUS at 20:53

## 2022-06-24 RX ADMIN — METRONIDAZOLE 500 MG: 500 INJECTION, SOLUTION INTRAVENOUS at 16:18

## 2022-06-24 RX ADMIN — INSULIN LISPRO 3 UNITS: 100 INJECTION, SOLUTION INTRAVENOUS; SUBCUTANEOUS at 20:53

## 2022-06-24 RX ADMIN — ASPIRIN 300 MG: 300 SUPPOSITORY RECTAL at 10:32

## 2022-06-24 RX ADMIN — INSULIN LISPRO 3 UNITS: 100 INJECTION, SOLUTION INTRAVENOUS; SUBCUTANEOUS at 04:39

## 2022-06-24 RX ADMIN — CEFEPIME HYDROCHLORIDE 1000 MG: 1 INJECTION, POWDER, FOR SOLUTION INTRAMUSCULAR; INTRAVENOUS at 00:09

## 2022-06-24 RX ADMIN — SODIUM CHLORIDE, PRESERVATIVE FREE 10 ML: 5 INJECTION INTRAVENOUS at 08:36

## 2022-06-24 ASSESSMENT — PULMONARY FUNCTION TESTS
PIF_VALUE: 26
PIF_VALUE: 29
PIF_VALUE: 23
PIF_VALUE: 23
PIF_VALUE: 29
PIF_VALUE: 24
PIF_VALUE: 23
PIF_VALUE: 40
PIF_VALUE: 23
PIF_VALUE: 26
PIF_VALUE: 29
PIF_VALUE: 23
PIF_VALUE: 33
PIF_VALUE: 29
PIF_VALUE: 40
PIF_VALUE: 29
PIF_VALUE: 26
PIF_VALUE: 31
PIF_VALUE: 29
PIF_VALUE: 24
PIF_VALUE: 24
PIF_VALUE: 32
PIF_VALUE: 24
PIF_VALUE: 28
PIF_VALUE: 24
PIF_VALUE: 24
PIF_VALUE: 28
PIF_VALUE: 22
PIF_VALUE: 24
PIF_VALUE: 27
PIF_VALUE: 28
PIF_VALUE: 28
PIF_VALUE: 24
PIF_VALUE: 28
PIF_VALUE: 31
PIF_VALUE: 23
PIF_VALUE: 33
PIF_VALUE: 23
PIF_VALUE: 33
PIF_VALUE: 31
PIF_VALUE: 24
PIF_VALUE: 27
PIF_VALUE: 23
PIF_VALUE: 28
PIF_VALUE: 23
PIF_VALUE: 26
PIF_VALUE: 24
PIF_VALUE: 23
PIF_VALUE: 23
PIF_VALUE: 30
PIF_VALUE: 25
PIF_VALUE: 23
PIF_VALUE: 23

## 2022-06-24 NOTE — PROGRESS NOTES
Respiratory:  Normal respiratory effort. Clear to auscultation, bilaterally without Rales/Wheezes/Rhonchi. Cardiovascular: Regular rate and rhythm with normal S1/S2 without murmurs or rubs  Abdomen: Soft, non-tender, non-distended, normal bowel sounds. Musculoskeletal: No cyanosis or edema bilaterally  Neurologic: Intubated and sedated  Psychiatric: Intubated and sedated  Peripheral Pulses: +2 palpable, equal bilaterally, no change today 6/24      Labs:   Recent Labs     06/22/22  1422 06/24/22  0409   WBC 17.1* 15.2*   HGB 13.5 10.4*   HCT 40.8 31.3*    229     Recent Labs     06/22/22  1422 06/23/22  0348 06/24/22  0409    134* 134*   K 3.7 4.6 4.1   CL 98* 99 102   CO2 20* 20* 21   BUN 28* 30* 38*   CREATININE 1.3 1.8* 1.8*   CALCIUM 9.6 8.6 8.0*   PHOS  --  3.4 3.5     Recent Labs     06/22/22  1422 06/23/22  0348   AST 63* 110*   ALT 30 67*   BILITOT 0.9 1.1*   ALKPHOS 150* 122     No results for input(s): INR in the last 72 hours. Recent Labs     06/22/22  1422 06/22/22 2211 06/23/22 0348   TROPONINI <0.01 <0.01 <0.01       Urinalysis:      Lab Results   Component Value Date    NITRU Negative 06/22/2022    WBCUA 2 06/22/2022    BACTERIA None Seen 06/22/2022    RBCUA 2 06/22/2022    BLOODU TRACE 06/22/2022    SPECGRAV 1.016 06/22/2022    GLUCOSEU Negative 06/22/2022       Radiology:  MRA NECK WO CONTRAST   Final Result   Acute to subacute infarctions in the left frontal lobe, increased in size   since MRI brain June 18, 2022. Small acute to subacute infarction along the left external capsule, stable   since June 18, 2022. Old lacunar infarcts in the left central franki. Mild to moderate parenchymal volume loss. Mild chronic microvascular disease. 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       Right dominance of the vertebral arteries with hypoplastic intracranial left   vertebral artery, and superimposed occlusion in the mid to distal intracranial left vertebral artery. Moderate stenosis in the intracranial right vertebral artery. Moderate stenosis in the proximal P2 segment of the left PCA. Known 95% critical stenosis in the proximal right internal carotid artery. 80% stenosis in the proximal left internal carotid artery. MRA HEAD WO CONTRAST   Final Result   Acute to subacute infarctions in the left frontal lobe, increased in size   since MRI brain June 18, 2022. Small acute to subacute infarction along the left external capsule, stable   since June 18, 2022. Old lacunar infarcts in the left central franki. Mild to moderate parenchymal volume loss. Mild chronic microvascular disease. 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 Right dominance of the vertebral arteries with hypoplastic intracranial left   vertebral artery, and superimposed occlusion in the mid to distal   intracranial left vertebral artery. Moderate stenosis in the intracranial right vertebral artery. Moderate stenosis in the proximal P2 segment of the left PCA. Known 95% critical stenosis in the proximal right internal carotid artery. 80% stenosis in the proximal left internal carotid artery. MRI BRAIN WO CONTRAST   Final Result   Acute to subacute infarctions in the left frontal lobe, increased in size   since MRI brain June 18, 2022. Small acute to subacute infarction along the left external capsule, stable   since June 18, 2022. Old lacunar infarcts in the left central franki. Mild to moderate parenchymal volume loss. Mild chronic microvascular disease. 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 Right dominance of the vertebral arteries with hypoplastic intracranial left   vertebral artery, and superimposed occlusion in the mid to distal   intracranial left vertebral artery.       Moderate stenosis in the intracranial right vertebral artery. Moderate stenosis in the proximal P2 segment of the left PCA. Known 95% critical stenosis in the proximal right internal carotid artery. 80% stenosis in the proximal left internal carotid artery. CT CHEST PULMONARY EMBOLISM W CONTRAST   Final Result   No evidence of a pulmonary embolus. ET tube and gastric tube in good position. 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 Questionable mucous plugging scattered in the bronchi along the right lung   base which may be due to recent aspiration. Recommend pulmonology follow-up. 7 cm exophytic right hepatic mass which has increased in size. Suggest   PET-CT correlation. Postop changes right apex and upper lobe with fibrosis and scarring   throughout the area which is unchanged.          XR CHEST PORTABLE   Final Result   Unchanged right upper lobe scarring      No acute cardiopulmonary process         CT Head WO Contrast   Final Result   New low-attenuation in the left corona radiata consistent with an evolving   infarct as described on the recent MRI      No acute hemorrhage               Assessment/Plan:    Active Hospital Problems    Diagnosis     Syncope and collapse [R55]      Priority: Medium    Acute respiratory failure (HCC) [J96.00]      Priority: Medium    Aspiration pneumonia ([address] [J69.0]      Priority: Medium    History of CVA (cerebrovascular accident) [Z86.73]      Priority: Medium    AMS (altered mental status) [R41.82]      Priority: Medium    Acute cerebrovascular accident (CVA) ([address] [I63.9]      Priority: Medium     Patient is 80-year-old male who presented to hospital for change in mental status. Patient was found unresponsive at home, patient is not able to provide history due to condition, most of the history is from patient's chart. Patient was recently discharged from the rehab facility after he was diagnosed with CVA.   Patient was covered in vomiting on arrival of EMS     Assessment  Acute hypoxic respiratory failure s/p intubation  Sepsis present on admission  Suspect aspiration pneumonia  Acute encephalopathy  Recent history of CVA  Lactic acidosis  Acute kidney injury  Hepatic cancer        Plan  Start vancomycin, cefepime, Flagyl  Patient is requiring vasopressor support with Levophed  Intubated and sedated, consult ICU  DuoNeb every 4 hours  Check blood cultures-negative to date  Check procalcitonin-2.25  Monitor lactic acids  Started on ASA  Oncology consulted  CT head without contrast shows evolving infarct, consult neurology  DVT prophylaxis-Lovenox  Diet: Diet NPO  ADULT TUBE FEEDING; Orogastric; Diabetic; Continuous; 25; Yes; 25; Q 4 hours; 55; 30; Q 4 hours  Code Status: Full Code    PT/OT Eval Status: ordered    Dispo/Plan of care -continue IV antibiotics, wean ventilator as tolerated, neurology following    Rene Calvillo MD

## 2022-06-24 NOTE — CONSULTS
Reported on 6/17/2022) 3 Inhaler 1    albuterol sulfate HFA (VENTOLIN HFA) 108 (90 Base) MCG/ACT inhaler Inhale 2 puffs into the lungs 4 times daily as needed for Wheezing 3 Inhaler 1    lisinopril (PRINIVIL;ZESTRIL) 20 MG tablet Take 20 mg by mouth daily      ibuprofen (ADVIL;MOTRIN) 800 MG tablet Take 800 mg by mouth every 6 hours as needed for Pain      hydroCHLOROthiazide (HYDRODIURIL) 25 MG tablet Take 25 mg by mouth daily      metFORMIN (GLUCOPHAGE) 500 MG tablet Take 500 mg by mouth daily (with breakfast)       levothyroxine (SYNTHROID) 100 MCG tablet Take 100 mcg by mouth Daily      omeprazole (PRILOSEC) 40 MG delayed release capsule Take 40 mg by mouth daily       traMADol (ULTRAM) 50 MG tablet Take 50 mg by mouth every 6 hours as needed for Pain.  prochlorperazine (COMPAZINE) 10 MG tablet Take 1 tablet by mouth every 6 hours as needed (pain, nausea) 120 tablet 0       Objective         BP (!) 100/49   Pulse 95   Temp 99.7 °F (37.6 °C) (Oral)   Resp 18   Ht 4' 9\" (1.448 m)   Wt 115 lb 15.4 oz (52.6 kg)   SpO2 91%   BMI 25.09 kg/m²     Code Status: Full Code    Advanced Directives: not completed he has a wife and children and grandchildren that all work together, but I did explain she(wife) needs to make decisions. Assessment        Management and Education    Persons available for education:       [] Self     [] Caregiver       [] Spouse       [x] Other Family Member   []  Other    Spiritual History:  notified: Yes,     Does the patient have a Primary Care Physician? Yes    Palliative Performance Scale:  60% [] Ambulation reduced; Significant disease; Can't do hobbies/housework; intake normal or reduced; occasional assist; LOC full/confusion  50% [] Mainly sit/lie;  Extensive disease; Can't do any work; Considerable assist; intake normal or reduced; LOC full/confusion  40% [x] Mainly in bed; Extensive disease; Mainly assist; intake normal or reduced; occasional assist; LOC full/confusion  30% [] Bed Bound; Extensive disease; Total care; intake reduced; LOC full/confusion  20% [] Bed Bound; Extensive disease; Total care; intake minimal; Drowsy/coma  10% [] Bed Bound; Extensive disease; Total care; Mouth care only; Drowsy/coma  0 [] Death    Level of patient/caregiver understanding able to:       [x] Verbalize Understanding   [] Demonstrate Understanding       [] Teach Back       [] Needs Reinforcement     []  Other:      Teaching Time:  1hours  0 minutes     Plan        Social Service Consult Made:  Yes  Assistance filling out Living Will/HPOA:  No      Discharge Plan:  Education/support to family  Providing support for coping/adaptation/distress of family  Clarification of medical condition to patient and family  Palliative care orders introduced    Discharge Environment:  [] Hospice Consult Agency:  [] Inpatient Hospice    [] Home with Hospice Care   [] ECF with Hospice  [] ECF skilled care with Hospice to follow   [] Other:    Discussion: He was found to have altered mental status after waking up from nap. Patient on vent awake follows some simple commands. I met with patient's grandson,  Parth Brandon to discuss care. I have explained all current conditions of patient at this time he will let his family know and try to have them here to speak with physicians in AM. At this time they would want all aggressive care to be done. I will continue to follow Mr. Nhi Peoples care as needed. Thank you for allowing me to participate in the care of Mr. Beth Whalen .      Electronically signed by Letty Goldberg RN, BSN, [facility] on 6/24/2022 at 11:24 AM  Palliative Care Nurse [facility]  Office: [phone]

## 2022-06-24 NOTE — PROGRESS NOTES
Hospitalist Progress Note      PCP: No primary care provider on file. Chief Complaint. Patient is 19-year-old male who presented to hospital for change in mental status. Patient was found unresponsive at home, patient is not able to provide history due to condition, most of the history is from patient's chart. Patient was recently discharged from the rehab facility after he was diagnosed with CVA. Patient was covered in vomiting on arrival of EMS    Date of Admission: 6/22/2022      Subjective: Overall his neurological status is improving, he is opening his eyes, he is intubated and sedated    Medications:  Reviewed    Infusion Medications    dextrose      sodium chloride 75 mL/hr at 06/23/22 1513    norepinephrine Stopped (06/23/22 1800)    sodium chloride       Scheduled Medications    chlorhexidine  15 mL Mouth/Throat BID    pantoprazole (PROTONIX) 40 mg injection  40 mg IntraVENous Daily    insulin lispro  0-18 Units SubCUTAneous Q4H    cefepime  1,000 mg IntraVENous Q12H    enoxaparin  30 mg SubCUTAneous Nightly    aspirin  300 mg Rectal Daily    sodium chloride flush  10 mL IntraVENous 2 times per day    metroNIDAZOLE  500 mg IntraVENous Q8H     PRN Meds: glucose, dextrose bolus **OR** dextrose bolus, glucagon (rDNA), dextrose, sodium chloride flush, sodium chloride, promethazine **OR** ondansetron, polyethylene glycol, acetaminophen **OR** acetaminophen      Intake/Output Summary (Last 24 hours) at 6/23/2022 2007  Last data filed at 6/23/2022 1800  Gross per 24 hour   Intake 689.67 ml   Output 703 ml   Net -13.33 ml       Physical Exam Performed:    /65   Pulse 74   Temp 98.2 °F (36.8 °C) (Axillary)   Resp 18   Ht 4' 9\" (1.448 m)   Wt 124 lb 5.4 oz (56.4 kg)   SpO2 98%   BMI 26.91 kg/m²     General appearance: Intubated and sedated  HEENT:  Conjunctivae/corneas clear. Neck: Supple, with full range of motion. Respiratory:  Normal respiratory effort.  Clear to auscultation, bilaterally without Rales/Wheezes/Rhonchi. Cardiovascular: Regular rate and rhythm with normal S1/S2 without murmurs or rubs  Abdomen: Soft, non-tender, non-distended, normal bowel sounds. Musculoskeletal: No cyanosis or edema bilaterally  Neurologic: Intubated and sedated  Psychiatric: Intubated and sedated  Peripheral Pulses: +2 palpable, equal bilaterally       Labs:   Recent Labs     06/21/22  0614 06/22/22  1422   WBC 12.1* 17.1*   HGB 13.5 13.5   HCT 41.7 40.8    334     Recent Labs     06/21/22  0614 06/22/22  1422 06/23/22  0348    137 134*   K 4.5 3.7 4.6    98* 99   CO2 24 20* 20*   BUN 32* 28* 30*   CREATININE 1.3 1.3 1.8*   CALCIUM 9.4 9.6 8.6   PHOS  --   --  3.4     Recent Labs     06/22/22  1422 06/23/22  0348   AST 63* 110*   ALT 30 67*   BILITOT 0.9 1.1*   ALKPHOS 150* 122     No results for input(s): INR in the last 72 hours. Recent Labs     06/22/22  1422 06/22/22  2211 06/23/22  0348   TROPONINI <0.01 <0.01 <0.01       Urinalysis:      Lab Results   Component Value Date    NITRU Negative 06/22/2022    WBCUA 2 06/22/2022    BACTERIA None Seen 06/22/2022    RBCUA 2 06/22/2022    BLOODU TRACE 06/22/2022    SPECGRAV 1.016 06/22/2022    GLUCOSEU Negative 06/22/2022       Radiology:  MRA NECK WO CONTRAST   Final Result   Acute to subacute infarctions in the left frontal lobe, increased in size   since MRI brain June 18, 2022. Small acute to subacute infarction along the left external capsule, stable   since June 18, 2022. Old lacunar infarcts in the left central franki. Mild to moderate parenchymal volume loss. Mild chronic microvascular disease. 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 Right dominance of the vertebral arteries with hypoplastic intracranial left   vertebral artery, and superimposed occlusion in the mid to distal   intracranial left vertebral artery.       Moderate stenosis in the intracranial right vertebral artery. Moderate stenosis in the proximal P2 segment of the left PCA. Known 95% critical stenosis in the proximal right internal carotid artery. 80% stenosis in the proximal left internal carotid artery. MRA HEAD WO CONTRAST   Final Result   Acute to subacute infarctions in the left frontal lobe, increased in size   since MRI brain June 18, 2022. Small acute to subacute infarction along the left external capsule, stable   since June 18, 2022. Old lacunar infarcts in the left central franki. Mild to moderate parenchymal volume loss. Mild chronic microvascular disease. 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 Right dominance of the vertebral arteries with hypoplastic intracranial left   vertebral artery, and superimposed occlusion in the mid to distal   intracranial left vertebral artery. Moderate stenosis in the intracranial right vertebral artery. Moderate stenosis in the proximal P2 segment of the left PCA. Known 95% critical stenosis in the proximal right internal carotid artery. 80% stenosis in the proximal left internal carotid artery. MRI BRAIN WO CONTRAST   Final Result   Acute to subacute infarctions in the left frontal lobe, increased in size   since MRI brain June 18, 2022. Small acute to subacute infarction along the left external capsule, stable   since June 18, 2022. Old lacunar infarcts in the left central franki. Mild to moderate parenchymal volume loss. Mild chronic microvascular disease. 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 Right dominance of the vertebral arteries with hypoplastic intracranial left   vertebral artery, and superimposed occlusion in the mid to distal   intracranial left vertebral artery. Moderate stenosis in the intracranial right vertebral artery. Moderate stenosis in the proximal P2 segment of the left PCA. Known 95% critical stenosis in the proximal right internal carotid artery. 80% stenosis in the proximal left internal carotid artery. CT CHEST PULMONARY EMBOLISM W CONTRAST   Final Result   No evidence of a pulmonary embolus. ET tube and gastric tube in good position. 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 Questionable mucous plugging scattered in the bronchi along the right lung   base which may be due to recent aspiration. Recommend pulmonology follow-up. 7 cm exophytic right hepatic mass which has increased in size. Suggest   PET-CT correlation. Postop changes right apex and upper lobe with fibrosis and scarring   throughout the area which is unchanged. XR CHEST PORTABLE   Final Result   Unchanged right upper lobe scarring      No acute cardiopulmonary process         CT Head WO Contrast   Final Result   New low-attenuation in the left corona radiata consistent with an evolving   infarct as described on the recent MRI      No acute hemorrhage               Assessment/Plan:    Active Hospital Problems    Diagnosis     Syncope and collapse [R55]      Priority: Medium    Acute respiratory failure (HCC) [J96.00]      Priority: Medium    Aspiration pneumonia ([address] [J69.0]      Priority: Medium    History of CVA (cerebrovascular accident) [Z86.73]      Priority: Medium    AMS (altered mental status) [R41.82]      Priority: Medium    Acute cerebrovascular accident (CVA) ([address] [I63.9]      Priority: Medium     Patient is 27-year-old male who presented to hospital for change in mental status.   Patient was found unresponsive at home, patient is not able to provide history due to condition, most of the history is from patient's chart. Patient was recently discharged from the rehab facility after he was diagnosed with CVA.   Patient was covered in vomiting on arrival of EMS     Assessment  Acute hypoxic respiratory failure s/p intubation  Sepsis present on admission  Suspect aspiration pneumonia  Acute encephalopathy  Recent history of CVA  Lactic acidosis  Acute kidney injury        Plan  Start vancomycin, cefepime, Flagyl  Patient is requiring vasopressor support with Levophed  Intubated and sedated, consult ICU  DuoNeb every 4 hours  Check blood cultures-negative to date  Check procalcitonin-2.25  Monitor lactic acid  CT head without contrast shows evolving infarct, consult neurology  DVT prophylaxis-Lovenox  Diet: Diet NPO  ADULT TUBE FEEDING; Orogastric; Diabetic; Continuous; 25; Yes; 25; Q 4 hours; 55; 30; Q 4 hours  Code Status: Full Code    PT/OT Eval Status: ordered    Dispo/Plan of care -continue IV antibiotics, wean ventilator as tolerated, neurology following    Leonardo Nelson MD

## 2022-06-24 NOTE — PROGRESS NOTES
Pulmonary Progress Note    Date of Admission: 6/22/2022   LOS: 2 days     CC:  Chief Complaint   Patient presents with    Altered Mental Status     Patient arrives via ems with c/o AMS. Physician immediately called into room. Pt unresponsive and vomiting. Patient just discharged with stroke. Patient LKW 15 minutes prior to ems arrival           Assessment/Plan     Acute hypoxemic respiratory failure with SPO2 less than 90% on room air  -Full vent support, Wean supplemental oxygen to goal saturation of >90%  -Likely multifactorial from altered mental status and likely aspiration event  -Mental status improving, continue SAT. SBT as tolerated    Comatose  -Likely multifactorial.  Recent stroke, ongoing infectious process, underlying malignancy, advanced age  -Avoid sedating medications     Stroke  -Recent stroke, CT evidence of evolving CVA, MRI with extensive blockages  -Neurology following     Aspiration pneumonia  -CT evidence likely chronic component as well  -Cefepime/Flagyl/vancomycin, MRSA nares pending  -Pro-Dewayne greater than 2, trend 48-hour tomorrow     Septic shock  -Titrate pressors goal MAP 65     Metastatic HCC  -Follows with [facility]  -Agree with palliative care consult     PPI  Lovenox  Peridex  Tube feed     Due to the immediate potential for life-threatening deterioration due to septic shock and respiratory failure, I spent 31 minutes providing critical care. This time is excluding time spent performing separately billable procedures. HPI/Subjective  No acute events overnight. Off sedation. ROS: Unable to obtain due to mechanical ventilation        Intake/Output Summary (Last 24 hours) at 6/24/2022 0816  Last data filed at 6/24/2022 0600  Gross per 24 hour   Intake 1789.6 ml   Output 955 ml   Net 834.6 ml         PHYSICAL EXAM:   Blood pressure (!) 101/52, pulse 74, temperature 97.6 °F (36.4 °C), temperature source Axillary, resp.  rate 18, height 4' 9\" (1.448 m), weight 115 lb 15.4 oz (52.6 kg), SpO2 92 %.'  Gen:  No acute distress. Eyes: PERRL. Anicteric sclera. No conjunctival injection. ENT: No discharge. OP with ETT external appearance of ears and nose normal.  Neck: Trachea midline. No mass   Resp:  No crackles. No wheezes. No rhonchi. No dullness on percussion. CV: Regular rate. Regular rhythm. No murmur or rub. No edema. GI: Soft, Non-tender. Non-distended. +BS  Skin: Warm, dry, w/o erythema. Lymph: No cervical or supraclavicular LAD. M/S: No cyanosis. No clubbing. Neuro: Comatose intubated      Medications:    Scheduled Meds:   chlorhexidine  15 mL Mouth/Throat BID    pantoprazole (PROTONIX) 40 mg injection  40 mg IntraVENous Daily    insulin lispro  0-18 Units SubCUTAneous Q4H    cefepime  1,000 mg IntraVENous Q12H    enoxaparin  30 mg SubCUTAneous Nightly    aspirin  300 mg Rectal Daily    sodium chloride flush  10 mL IntraVENous 2 times per day    metroNIDAZOLE  500 mg IntraVENous Q8H       Continuous Infusions:   dextrose      sodium chloride 75 mL/hr at 06/24/22 0524    norepinephrine Stopped (06/23/22 1800)    sodium chloride         PRN Meds:  glucose, dextrose bolus **OR** dextrose bolus, glucagon (rDNA), dextrose, sodium chloride flush, sodium chloride, promethazine **OR** ondansetron, polyethylene glycol, acetaminophen **OR** acetaminophen    Labs reviewed:  CBC:   Recent Labs     06/22/22  1422 06/24/22  0409   WBC 17.1* 15.2*   HGB 13.5 10.4*   HCT 40.8 31.3*   MCV 85.5 85.4    229     BMP:   Recent Labs     06/22/22  1422 06/23/22  0348 06/24/22  0409    134* 134*   K 3.7 4.6 4.1   CL 98* 99 102   CO2 20* 20* 21   PHOS  --  3.4 3.5   BUN 28* 30* 38*   CREATININE 1.3 1.8* 1.8*     LIVER PROFILE:   Recent Labs     06/22/22  1422 06/23/22  0348   AST 63* 110*   ALT 30 67*   BILITOT 0.9 1.1*   ALKPHOS 150* 122     PT/INR: No results for input(s): PROTIME, INR in the last 72 hours. APTT: No results for input(s): APTT in the last 72 hours.   UA:  Recent a  small acute to subacute infarction along the left external capsule, stable  since June 18, 2022. There are old lacunar infarcts in the left central  franki. There is mild to moderate parenchymal volume loss. There are  scattered foci of T2/FLAIR hyperintensity in the periventricular and  subcortical white matter, likely related to mild chronic microvascular  disease. No mass effect or midline shift. No acute intracranial hemorrhage. There is no hydrocephalus. The sellar/suprasellar regions appear  unremarkable. The normal signal voids within the major intracranial vessels  appear maintained. ORBITS: The visualized portion of the orbits demonstrate no acute abnormality. SINUSES: There is scattered mild mucosal thickening in the paranasal sinuses. The bilateral mastoid air cells are clear. BONES/SOFT TISSUES: The bone marrow signal intensity appears normal. The soft  tissues demonstrate no acute abnormality. MRA HEAD:    ANTERIOR CIRCULATION: There is known severe stenosis in a proximal M2 branch  of the left MCA. The internal carotid arteries are normal in course and  caliber without focal stenosis. The anterior cerebral and right middle  cerebral arteries demonstrate no focal stenosis. POSTERIOR CIRCULATION: There is right dominance of the vertebral arteries  with hypoplastic intracranial left vertebral artery, and superimposed  occlusion in the mid to distal intracranial left vertebral artery. There is  moderate stenosis in the intracranial right vertebral artery. There is  moderate stenosis in the proximal P2 segment of the left PCA. There is mild  stenosis in the P2 segment of the right PCA. The basilar artery appear  unremarkable. No intracranial aneurysm. MRA neck:    Common and internal carotid arteries: There is 95% critical stenosis in the  proximal right internal carotid artery. There is 80% stenosis in the  proximal left internal carotid artery.   There is mild stenosis MRI brain June 18, 2022, CTA head and neck June 17, 2022    HISTORY:  ORDERING SYSTEM PROVIDED HISTORY: unresponsive, severe cerebrovacular disease  on CTA  TECHNOLOGIST PROVIDED HISTORY:  Reason for exam:->unresponsive, severe cerebrovacular disease on CTA  Reason for Exam: unresponsive, severe cerebrovacular disease on CTA; ORDERING  SYSTEM PROVIDED HISTORY: unresponsive, severe cerebrovascular disease on CTA  TECHNOLOGIST PROVIDED HISTORY:  Reason for exam:->unresponsive, severe cerebrovascular disease on CTA  Reason for Exam: unresponsive, severe cerebrovascular disease on CTA;  ORDERING SYSTEM PROVIDED HISTORY: ams  TECHNOLOGIST PROVIDED HISTORY:  Reason for exam:->ams  Reason for Exam: ams    FINDINGS:  MRI Brain:    INTRACRANIAL STRUCTURES/VENTRICLES: There are acute to subacute infarctions  in the left frontal lobe, increased in size since June 18, 2022. There is a  small acute to subacute infarction along the left external capsule, stable  since June 18, 2022. There are old lacunar infarcts in the left central  franki. There is mild to moderate parenchymal volume loss. There are  scattered foci of T2/FLAIR hyperintensity in the periventricular and  subcortical white matter, likely related to mild chronic microvascular  disease. No mass effect or midline shift. No acute intracranial hemorrhage. There is no hydrocephalus. The sellar/suprasellar regions appear  unremarkable. The normal signal voids within the major intracranial vessels  appear maintained. ORBITS: The visualized portion of the orbits demonstrate no acute abnormality. SINUSES: There is scattered mild mucosal thickening in the paranasal sinuses. The bilateral mastoid air cells are clear. BONES/SOFT TISSUES: The bone marrow signal intensity appears normal. The soft  tissues demonstrate no acute abnormality. MRA HEAD:    ANTERIOR CIRCULATION: There is known severe stenosis in a proximal M2 branch  of the left MCA.   The internal carotid arteries are normal in course and  caliber without focal stenosis. The anterior cerebral and right middle  cerebral arteries demonstrate no focal stenosis. POSTERIOR CIRCULATION: There is right dominance of the vertebral arteries  with hypoplastic intracranial left vertebral artery, and superimposed  occlusion in the mid to distal intracranial left vertebral artery. There is  moderate stenosis in the intracranial right vertebral artery. There is  moderate stenosis in the proximal P2 segment of the left PCA. There is mild  stenosis in the P2 segment of the right PCA. The basilar artery appear  unremarkable. No intracranial aneurysm. MRA neck:    Common and internal carotid arteries: There is 95% critical stenosis in the  proximal right internal carotid artery. There is 80% stenosis in the  proximal left internal carotid artery. There is mild stenosis in the mid  left common carotid artery. The bilateral common carotid arteries are within  normal limits without flow limiting stenosis. The bilateral internal carotid  arteries are within normal limits without flow limiting stenosis by NASCET  criteria. There is no dissection or vascular injury. Vertebral arteries: There is right dominance of the vertebral arteries with  hypoplastic left vertebral artery. Impression: Acute to subacute infarctions in the left frontal lobe, increased in size  since MRI brain June 18, 2022. Small acute to subacute infarction along the left external capsule, stable  since June 18, 2022. Old lacunar infarcts in the left central franki. Mild to moderate parenchymal volume loss. Mild chronic microvascular disease. 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 Right dominance of the vertebral arteries with hypoplastic intracranial left  vertebral artery, and superimposed occlusion in the mid to distal  intracranial left vertebral artery.     Moderate stenosis in the intracranial right vertebral artery. Moderate stenosis in the proximal P2 segment of the left PCA. Known 95% critical stenosis in the proximal right internal carotid artery. 80% stenosis in the proximal left internal carotid artery. MRI BRAIN WO CONTRAST  Narrative: EXAMINATION:  MRA OF THE NECK WITHOUT CONTRAST; MRA OF THE HEAD WITHOUT CONTRAST; MRI OF  THE BRAIN WITHOUT CONTRAST 6/23/2022 1:08 pm; 6/23/2022 1:07 pm    TECHNIQUE:  Multiplanar multisequence MRA of the neck was performed without the  administration of intravenous contrast. Stenosis of the internal carotid  arteries measured using NASCET criteria.; MRA of the head was performed  utilizing time-of-flight imaging with MIP images. No intravenous contrast was  administered.; Multiplanar multisequence MRI of the brain was performed  without the administration of intravenous contrast.    COMPARISON:  CT head June 22, 2022, MRI brain June 18, 2022, CTA head and neck June 17, 2022    HISTORY:  ORDERING SYSTEM PROVIDED HISTORY: unresponsive, severe cerebrovacular disease  on CTA  TECHNOLOGIST PROVIDED HISTORY:  Reason for exam:->unresponsive, severe cerebrovacular disease on CTA  Reason for Exam: unresponsive, severe cerebrovacular disease on CTA; ORDERING  SYSTEM PROVIDED HISTORY: unresponsive, severe cerebrovascular disease on CTA  TECHNOLOGIST PROVIDED HISTORY:  Reason for exam:->unresponsive, severe cerebrovascular disease on CTA  Reason for Exam: unresponsive, severe cerebrovascular disease on CTA;  ORDERING SYSTEM PROVIDED HISTORY: ams  TECHNOLOGIST PROVIDED HISTORY:  Reason for exam:->ams  Reason for Exam: ams    FINDINGS:  MRI Brain:    INTRACRANIAL STRUCTURES/VENTRICLES: There are acute to subacute infarctions  in the left frontal lobe, increased in size since June 18, 2022. There is a  small acute to subacute infarction along the left external capsule, stable  since June 18, 2022. There are old lacunar infarcts in the left central  franki.   There is carotid  arteries are within normal limits without flow limiting stenosis by NASCET  criteria. There is no dissection or vascular injury. Vertebral arteries: There is right dominance of the vertebral arteries with  hypoplastic left vertebral artery. Impression: Acute to subacute infarctions in the left frontal lobe, increased in size  since MRI brain June 18, 2022. Small acute to subacute infarction along the left external capsule, stable  since June 18, 2022. Old lacunar infarcts in the left central franki. Mild to moderate parenchymal volume loss. Mild chronic microvascular disease. 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 Right dominance of the vertebral arteries with hypoplastic intracranial left  vertebral artery, and superimposed occlusion in the mid to distal  intracranial left vertebral artery. Moderate stenosis in the intracranial right vertebral artery. Moderate stenosis in the proximal P2 segment of the left PCA. Known 95% critical stenosis in the proximal right internal carotid artery. 80% stenosis in the proximal left internal carotid artery. Access  CVC   Arterial          PICC             CVC        CVC Triple Lumen 06/22/22 (Active)   $ Central line insertion $ Yes 06/22/22 Paul 6807 Being Utilized Yes 06/24/22 0600   Criteria for Appropriate Use Hemodynamically unstable, requiring monitoring lines, vasopressors, or volume resuscitation 06/24/22 0600   Site Assessment Clean, dry & intact 06/24/22 0600   Phlebitis Assessment No symptoms 06/24/22 0600   Infiltration Assessment 0 06/24/22 0600   Color/Movement/Sensation Capillary refill less than 3 sec 06/24/22 0600   Proximal Lumen Color/Status White; Infusing 06/24/22 0600   Medial Lumen Status Blue; Infusing 06/24/22 0600   Distal Lumen Color/Status Brown; Infusing 06/24/22 0600   Line Care Connections checked and tightened 06/24/22 0600   Alcohol Cap Used Yes 06/24/22 0600   Date of Last Dressing Change 06/22/22 06/24/22 0600   Dressing Type Transparent 06/24/22 0600   Dressing Status Clean, dry & intact 06/24/22 0600   Number of days: 1            Moser  Urinary Catheter 2 Way (Active)   $ Urethral catheter insertion Inserted for procedure 06/22/22 1510   Catheter Indications Need for fluid volume management of the critically ill patient in a critical care setting 06/24/22 0600   Site Assessment No urethral drainage 06/24/22 0600   Urine Color Yellow 06/24/22 0600   Urine Appearance Clear 06/24/22 0600   Collection Container Standard 06/24/22 0600   Securement Method Securing device (Describe) 06/24/22 0600   Catheter Best Practices  Drainage tube clipped to bed;Catheter secured to thigh; Tamper seal intact; Bag below bladder;Bag not on floor; Lack of dependent loop in tubing;Drainage bag less than half full 06/24/22 0600   Status Draining 06/24/22 0600   Manual Irrigation Volume Input (mL) 350 mL 06/22/22 1854   Output (mL) 75 mL 06/24/22 0600   Number of days: 1         Thank you for this consult,    Naa Hardy MD  [facility] Pulmonary, Critical Care, and Sleep Medicine

## 2022-06-24 NOTE — PLAN OF CARE
Problem: Discharge Planning  Goal: Discharge to home or other facility with appropriate resources  Outcome: Progressing     Problem: Pain  Goal: Verbalizes/displays adequate comfort level or baseline comfort level  Outcome: Progressing     Problem: Safety - Medical Restraint  Goal: Remains free of injury from restraints (Restraint for Interference with Medical Device)  Description: INTERVENTIONS:  1. Determine that other, less restrictive measures have been tried or would not be effective before applying the restraint  2. Evaluate the patient's condition at the time of restraint application  3. Inform patient/family regarding the reason for restraint  4. Q2H: Monitor safety, psychosocial status, comfort, nutrition and hydration  Outcome: Progressing     Problem: Safety - Adult  Goal: Free from fall injury  Outcome: Progressing     Problem: ABCDS Injury Assessment  Goal: Absence of physical injury  Outcome: Progressing     Problem: Skin/Tissue Integrity  Goal: Absence of new skin breakdown  Description: 1. Monitor for areas of redness and/or skin breakdown  2. Assess vascular access sites hourly  3. Every 4-6 hours minimum:  Change oxygen saturation probe site  4. Every 4-6 hours:  If on nasal continuous positive airway pressure, respiratory therapy assess nares and determine need for appliance change or resting period.   Outcome: Progressing     Problem: Nutrition Deficit:  Goal: Optimize nutritional status  6/23/2022 2333 by Luigi Park RN  Outcome: Progressing  6/23/2022 1055 by Tony Mayfield RD, LD  Outcome: Progressing

## 2022-06-24 NOTE — CONSULTS
Oncology Consult    See dictation    A/P:  1. CVA/resp failure. Will defer to Neurology/primary team    2. [facility].. He is followed by my partner, Dr. Heath Huitorn. Dr. Heath Huitron is on-call this weekend and will be seeing the patient over the weekend. He seems to be slowly progressing over the last several months. His tumor is getting slightly bigger on the current regimen of Avastin and Tecentriq. There are a few other options including local therapies like Y90. If the patient\family want to continue to be aggressive, he would need to recover from this to be considered for those treatment options. Thank you for the consultation. We will follow closely.     Ava Lang MD

## 2022-06-24 NOTE — PROGRESS NOTES
[facility]                                               Progress note    Summary:   Natalia Benavides is being seen by nephrology for LUL. Admitted with AMS. He is now intubated for hypoxic respiratory failure. Had recent stroke dx with aspiration pneumonia on ABX. also has metastatic HCC. Interval History  Seen and examined at bedside. Intubated. /64 off pressors. 91% on 30% fio2 and P 8    cc past day   300 cc today   + 1.3 L for admit   Wt 127 on admit last was 115 ? Does not appear volume overloaded. Na 134  K4.1  bicarb 21  BUN 38   Cr 1.8       Plan:   - likely has hemodynamic mediated ATN in the setting of septic shock. - off pressors now and making some urine, does not appear volume overload and stable vent so no need for diuretics yet   - expectant management of LUL        Inga Alford MD  [facility]  Office: [phone]    Assessment:     #LUL  Baseline Cr   Cr at time of consult  UA  Had septic shock , required pressors. Non oliguric   UA with hyaline catss 2 WBC and 2 RBC  No proteinuria but discrepant values < 4 then 233 on another check  , did not correlate with UA showing albumin > 1000 mg, dl will check again. Had contrast on 6/22  SPEP negative     #acute hypoxic respiratory failure. Aspiration pneumonia  CTPA negative for PE  proca elevated  Blood cxs  Sputum cx  MRSA nares negative   Pro BNP 91  Hyperdynamic EF 70% consistent with septic shock   G1DD    #Mild hyponatremia  Likely due to intravascular volume depletion. #HCC  Prevoius CT Abd / Pelvis with conrast showed hepatic mass with no nmets, moderate stenosis of SMA and 50% narrowing. No mention of renal arteries or stenosis. CT abd without contrast on 6/3/22 showed normal kidneys and no hydro, mass, or stones. ROS:   Unable to assess      PMH:   Past medical history, surgical history, social history, family history are reviewed and updated as appropriate.     Reviewed current medication list.   Allergies reviewed and updated as needed. PE:   Vitals:    06/24/22 1200   BP: 131/64   Pulse: 91   Resp: 19   Temp: 99.2 °F (37.3 °C)   SpO2: 91%       General appearance:  in NAD, . Comfortable. HEENT: EOM intact, no icterus. Trachea is midline. Neck : No masses, appears symmetrical, no JVD  Respiratory: Respiratory effort appears normal, bilateral equal chest rise, no wheeze, no crackles   Cardiovascular: Ausculation shows RRR no edema  Abdomen: No visible mass or tenderness, non distended. Musculoskeletal:  Joints with no swelling or deformity. Skin:no rashes, ulcers, induration, no jaundice.    Neuro: sedated       Lab Results   Component Value Date    CREATININE 1.8 (H) 06/24/2022    BUN 38 (H) 06/24/2022     (L) 06/24/2022    K 4.1 06/24/2022     06/24/2022    CO2 21 06/24/2022      Lab Results   Component Value Date    WBC 15.2 (H) 06/24/2022    HGB 10.4 (L) 06/24/2022    HCT 31.3 (L) 06/24/2022    MCV 85.4 06/24/2022     06/24/2022     Lab Results   Component Value Date    CALCIUM 8.0 (L) 06/24/2022    PHOS 3.5 06/24/2022

## 2022-06-25 ENCOUNTER — APPOINTMENT (OUTPATIENT)
Dept: CT IMAGING | Age: 82
DRG: 720 | End: 2022-06-25
Payer: MEDICAID

## 2022-06-25 LAB
ANION GAP SERPL CALCULATED.3IONS-SCNC: 11 MMOL/L (ref 3–16)
BASE EXCESS ARTERIAL: -2.9 MMOL/L (ref -3–3)
BASE EXCESS ARTERIAL: -3.1 MMOL/L (ref -3–3)
BASOPHILS ABSOLUTE: 0.1 K/UL (ref 0–0.2)
BASOPHILS RELATIVE PERCENT: 0.4 %
BUN BLDV-MCNC: 41 MG/DL (ref 7–20)
CALCIUM SERPL-MCNC: 7.8 MG/DL (ref 8.3–10.6)
CARBOXYHEMOGLOBIN ARTERIAL: 0.2 % (ref 0–1.5)
CARBOXYHEMOGLOBIN ARTERIAL: 0.2 % (ref 0–1.5)
CHLORIDE BLD-SCNC: 111 MMOL/L (ref 99–110)
CO2: 20 MMOL/L (ref 21–32)
CREAT SERPL-MCNC: 1.4 MG/DL (ref 0.8–1.3)
EOSINOPHILS ABSOLUTE: 0 K/UL (ref 0–0.6)
EOSINOPHILS RELATIVE PERCENT: 0.1 %
GFR AFRICAN AMERICAN: 59
GFR NON-AFRICAN AMERICAN: 49
GLUCOSE BLD-MCNC: 110 MG/DL (ref 70–99)
GLUCOSE BLD-MCNC: 128 MG/DL (ref 70–99)
GLUCOSE BLD-MCNC: 147 MG/DL (ref 70–99)
GLUCOSE BLD-MCNC: 148 MG/DL (ref 70–99)
GLUCOSE BLD-MCNC: 148 MG/DL (ref 70–99)
GLUCOSE BLD-MCNC: 155 MG/DL (ref 70–99)
GLUCOSE BLD-MCNC: 159 MG/DL (ref 70–99)
HCO3 ARTERIAL: 21 MMOL/L (ref 21–29)
HCO3 ARTERIAL: 21.4 MMOL/L (ref 21–29)
HCT VFR BLD CALC: 30.9 % (ref 40.5–52.5)
HEMOGLOBIN, ART, EXTENDED: 10.8 G/DL (ref 13.5–17.5)
HEMOGLOBIN, ART, EXTENDED: 11 G/DL (ref 13.5–17.5)
HEMOGLOBIN: 10.4 G/DL (ref 13.5–17.5)
LYMPHOCYTES ABSOLUTE: 1.5 K/UL (ref 1–5.1)
LYMPHOCYTES RELATIVE PERCENT: 11.5 %
MAGNESIUM: 2.4 MG/DL (ref 1.8–2.4)
MCH RBC QN AUTO: 29.2 PG (ref 26–34)
MCHC RBC AUTO-ENTMCNC: 33.7 G/DL (ref 31–36)
MCV RBC AUTO: 86.7 FL (ref 80–100)
METHEMOGLOBIN ARTERIAL: 0.5 %
METHEMOGLOBIN ARTERIAL: 0.6 %
MONOCYTES ABSOLUTE: 0.8 K/UL (ref 0–1.3)
MONOCYTES RELATIVE PERCENT: 6.4 %
NEUTROPHILS ABSOLUTE: 10.3 K/UL (ref 1.7–7.7)
NEUTROPHILS RELATIVE PERCENT: 81.6 %
O2 SAT, ARTERIAL: 93.8 %
O2 SAT, ARTERIAL: 95.9 %
O2 THERAPY: ABNORMAL
O2 THERAPY: ABNORMAL
PCO2 ARTERIAL: 33.1 MMHG (ref 35–45)
PCO2 ARTERIAL: 34.5 MMHG (ref 35–45)
PDW BLD-RTO: 13.3 % (ref 12.4–15.4)
PERFORMED ON: ABNORMAL
PH ARTERIAL: 7.4 (ref 7.35–7.45)
PH ARTERIAL: 7.41 (ref 7.35–7.45)
PHOSPHORUS: 1.8 MG/DL (ref 2.5–4.9)
PLATELET # BLD: 255 K/UL (ref 135–450)
PMV BLD AUTO: 7.1 FL (ref 5–10.5)
PO2 ARTERIAL: 70.5 MMHG (ref 75–108)
PO2 ARTERIAL: 81.4 MMHG (ref 75–108)
POTASSIUM SERPL-SCNC: 4.2 MMOL/L (ref 3.5–5.1)
PROCALCITONIN: 1.6 NG/ML (ref 0–0.15)
RBC # BLD: 3.57 M/UL (ref 4.2–5.9)
SODIUM BLD-SCNC: 142 MMOL/L (ref 136–145)
TCO2 ARTERIAL: 22 MMOL/L
TCO2 ARTERIAL: 22.5 MMOL/L
WBC # BLD: 12.6 K/UL (ref 4–11)

## 2022-06-25 PROCEDURE — 6360000002 HC RX W HCPCS: Performed by: NURSE PRACTITIONER

## 2022-06-25 PROCEDURE — 2580000003 HC RX 258: Performed by: INTERNAL MEDICINE

## 2022-06-25 PROCEDURE — 6360000002 HC RX W HCPCS: Performed by: INTERNAL MEDICINE

## 2022-06-25 PROCEDURE — 82803 BLOOD GASES ANY COMBINATION: CPT

## 2022-06-25 PROCEDURE — 99291 CRITICAL CARE FIRST HOUR: CPT | Performed by: INTERNAL MEDICINE

## 2022-06-25 PROCEDURE — 84100 ASSAY OF PHOSPHORUS: CPT

## 2022-06-25 PROCEDURE — 6370000000 HC RX 637 (ALT 250 FOR IP): Performed by: NURSE PRACTITIONER

## 2022-06-25 PROCEDURE — 85025 COMPLETE CBC W/AUTO DIFF WBC: CPT

## 2022-06-25 PROCEDURE — 2000000000 HC ICU R&B

## 2022-06-25 PROCEDURE — 2700000000 HC OXYGEN THERAPY PER DAY

## 2022-06-25 PROCEDURE — 2580000003 HC RX 258: Performed by: NURSE PRACTITIONER

## 2022-06-25 PROCEDURE — 70450 CT HEAD/BRAIN W/O DYE: CPT

## 2022-06-25 PROCEDURE — 94761 N-INVAS EAR/PLS OXIMETRY MLT: CPT

## 2022-06-25 PROCEDURE — 6370000000 HC RX 637 (ALT 250 FOR IP): Performed by: INTERNAL MEDICINE

## 2022-06-25 PROCEDURE — 2500000003 HC RX 250 WO HCPCS: Performed by: INTERNAL MEDICINE

## 2022-06-25 PROCEDURE — 83735 ASSAY OF MAGNESIUM: CPT

## 2022-06-25 PROCEDURE — C9113 INJ PANTOPRAZOLE SODIUM, VIA: HCPCS | Performed by: NURSE PRACTITIONER

## 2022-06-25 PROCEDURE — 2500000003 HC RX 250 WO HCPCS: Performed by: NURSE PRACTITIONER

## 2022-06-25 PROCEDURE — 94003 VENT MGMT INPAT SUBQ DAY: CPT

## 2022-06-25 PROCEDURE — 80048 BASIC METABOLIC PNL TOTAL CA: CPT

## 2022-06-25 PROCEDURE — 84145 PROCALCITONIN (PCT): CPT

## 2022-06-25 PROCEDURE — APPNB15 APP NON BILLABLE TIME 0-15 MINS: Performed by: NURSE PRACTITIONER

## 2022-06-25 PROCEDURE — 36600 WITHDRAWAL OF ARTERIAL BLOOD: CPT

## 2022-06-25 RX ORDER — HYDRALAZINE HYDROCHLORIDE 20 MG/ML
10 INJECTION INTRAMUSCULAR; INTRAVENOUS EVERY 6 HOURS PRN
Status: DISCONTINUED | OUTPATIENT
Start: 2022-06-25 | End: 2022-07-05 | Stop reason: HOSPADM

## 2022-06-25 RX ORDER — CALCIUM GLUCONATE 20 MG/ML
1000 INJECTION, SOLUTION INTRAVENOUS ONCE
Status: COMPLETED | OUTPATIENT
Start: 2022-06-25 | End: 2022-06-25

## 2022-06-25 RX ADMIN — SODIUM CHLORIDE: 9 INJECTION, SOLUTION INTRAVENOUS at 06:30

## 2022-06-25 RX ADMIN — SODIUM CHLORIDE, PRESERVATIVE FREE 10 ML: 5 INJECTION INTRAVENOUS at 22:07

## 2022-06-25 RX ADMIN — HYDRALAZINE HYDROCHLORIDE 10 MG: 20 INJECTION INTRAMUSCULAR; INTRAVENOUS at 14:53

## 2022-06-25 RX ADMIN — METRONIDAZOLE 500 MG: 500 INJECTION, SOLUTION INTRAVENOUS at 23:53

## 2022-06-25 RX ADMIN — Medication 40 MG: at 08:34

## 2022-06-25 RX ADMIN — CALCIUM GLUCONATE 1000 MG: 20 INJECTION, SOLUTION INTRAVENOUS at 08:38

## 2022-06-25 RX ADMIN — CHLORHEXIDINE GLUCONATE 0.12% ORAL RINSE 15 ML: 1.2 LIQUID ORAL at 22:07

## 2022-06-25 RX ADMIN — INSULIN LISPRO 3 UNITS: 100 INJECTION, SOLUTION INTRAVENOUS; SUBCUTANEOUS at 04:44

## 2022-06-25 RX ADMIN — ASPIRIN 300 MG: 300 SUPPOSITORY RECTAL at 08:34

## 2022-06-25 RX ADMIN — CEFEPIME HYDROCHLORIDE 1000 MG: 1 INJECTION, POWDER, FOR SOLUTION INTRAMUSCULAR; INTRAVENOUS at 12:26

## 2022-06-25 RX ADMIN — INSULIN LISPRO 3 UNITS: 100 INJECTION, SOLUTION INTRAVENOUS; SUBCUTANEOUS at 09:21

## 2022-06-25 RX ADMIN — ENOXAPARIN SODIUM 30 MG: 100 INJECTION SUBCUTANEOUS at 20:02

## 2022-06-25 RX ADMIN — INSULIN LISPRO 3 UNITS: 100 INJECTION, SOLUTION INTRAVENOUS; SUBCUTANEOUS at 17:10

## 2022-06-25 RX ADMIN — CHLORHEXIDINE GLUCONATE 0.12% ORAL RINSE 15 ML: 1.2 LIQUID ORAL at 09:26

## 2022-06-25 RX ADMIN — METRONIDAZOLE 500 MG: 500 INJECTION, SOLUTION INTRAVENOUS at 06:32

## 2022-06-25 RX ADMIN — CEFEPIME HYDROCHLORIDE 1000 MG: 1 INJECTION, POWDER, FOR SOLUTION INTRAMUSCULAR; INTRAVENOUS at 23:54

## 2022-06-25 RX ADMIN — SODIUM CHLORIDE, PRESERVATIVE FREE 10 ML: 5 INJECTION INTRAVENOUS at 08:34

## 2022-06-25 RX ADMIN — POTASSIUM PHOSPHATE, MONOBASIC AND POTASSIUM PHOSPHATE, DIBASIC 20 MMOL: 224; 236 INJECTION, SOLUTION, CONCENTRATE INTRAVENOUS at 11:13

## 2022-06-25 RX ADMIN — METRONIDAZOLE 500 MG: 500 INJECTION, SOLUTION INTRAVENOUS at 16:07

## 2022-06-25 RX ADMIN — INSULIN LISPRO 3 UNITS: 100 INJECTION, SOLUTION INTRAVENOUS; SUBCUTANEOUS at 00:44

## 2022-06-25 ASSESSMENT — PULMONARY FUNCTION TESTS
PIF_VALUE: 11
PIF_VALUE: 11
PIF_VALUE: 12
PIF_VALUE: 11
PIF_VALUE: 12
PIF_VALUE: 27
PIF_VALUE: 27
PIF_VALUE: 11
PIF_VALUE: 25
PIF_VALUE: 26
PIF_VALUE: 11
PIF_VALUE: 16
PIF_VALUE: 15
PIF_VALUE: 11
PIF_VALUE: 26
PIF_VALUE: 12
PIF_VALUE: 11
PIF_VALUE: 27
PIF_VALUE: 11
PIF_VALUE: 11
PIF_VALUE: 19
PIF_VALUE: 11
PIF_VALUE: 28
PIF_VALUE: 26
PIF_VALUE: 29
PIF_VALUE: 10
PIF_VALUE: 12
PIF_VALUE: 12
PIF_VALUE: 11

## 2022-06-25 ASSESSMENT — PAIN SCALES - GENERAL: PAINLEVEL_OUTOF10: 0

## 2022-06-25 NOTE — PROGRESS NOTES
[facility]                                               Progress note    Summary:   Thu Retana is being seen by nephrology for LUL. Admitted with AMS. He is now intubated for hypoxic respiratory failure. Had recent stroke dx with aspiration pneumonia on ABX. also has metastatic HCC. Interval History  Seen and examined in ICU  Intubated. /59  UO 2370  Wt 127 on admit last was 115 ? Does not appear volume overloaded. Na 134  K4.1  bicarb 21  BUN 38   Cr 1.8-->1.4   Family here discussed   BP rising  Very goo urine out put      Plan:   Stop IV fluids   Renal function improving . Alma Quezada MD  [facility]  Office: [phone]    Assessment:     #LUL  Baseline Cr   Cr at time of consult  UA  Had septic shock , required pressors. Non oliguric   UA with hyaline catss 2 WBC and 2 RBC  No proteinuria but discrepant values < 4 then 233 on another check  , did not correlate with UA showing albumin > 1000 mg, dl will check again. Had contrast on 6/22  SPEP negative     #acute hypoxic respiratory failure. Aspiration pneumonia  CTPA negative for PE  proca elevated  Blood cxs  Sputum cx  MRSA nares negative   Pro BNP 91  Hyperdynamic EF 70% consistent with septic shock   G1DD    #Mild hyponatremia  Likely due to intravascular volume depletion. #HCC  Prevoius CT Abd / Pelvis with conrast showed hepatic mass with no nmets, moderate stenosis of SMA and 50% narrowing. No mention of renal arteries or stenosis. CT abd without contrast on 6/3/22 showed normal kidneys and no hydro, mass, or stones. ROS:   Unable to assess      PMH:   Past medical history, surgical history, social history, family history are reviewed and updated as appropriate. Reviewed current medication list.   Allergies reviewed and updated as needed. PE:   Vitals:    06/25/22 1615   BP:    Pulse: (!) 101   Resp: 27   Temp:    SpO2: 98%       General appearance:  in NAD, . Comfortable. HEENT: EOM intact, no icterus. Trachea is midline. Neck : No masses, appears symmetrical, no JVD  Respiratory: Respiratory effort appears normal, bilateral equal chest rise, no wheeze, no crackles   Cardiovascular: Ausculation shows RRR no edema  Abdomen: No visible mass or tenderness, non distended. Musculoskeletal:  Joints with no swelling or deformity. Skin:no rashes, ulcers, induration, no jaundice.    Neuro: sedated       Lab Results   Component Value Date    CREATININE 1.4 (H) 06/25/2022    BUN 41 (H) 06/25/2022     06/25/2022    K 4.2 06/25/2022     (H) 06/25/2022    CO2 20 (L) 06/25/2022      Lab Results   Component Value Date    WBC 12.6 (H) 06/25/2022    HGB 10.4 (L) 06/25/2022    HCT 30.9 (L) 06/25/2022    MCV 86.7 06/25/2022     06/25/2022     Lab Results   Component Value Date    CALCIUM 7.8 (L) 06/25/2022    PHOS 1.8 (L) 06/25/2022

## 2022-06-25 NOTE — PROGRESS NOTES
Hospitalist Progress Note      PCP: No primary care provider on file. Chief Complaint. Patient is 80-year-old male who presented to hospital for change in mental status. Patient was found unresponsive at home, patient is not able to provide history due to condition, most of the history is from patient's chart. Patient was recently discharged from the rehab facility after he was diagnosed with CVA. Patient was covered in vomiting on arrival of EMS    Date of Admission: 6/22/2022    Subjective: Seen today at bedside with nurse. Patient's grandson is present. He is talking to his grandfather but the patient is not opening his eyes. Not following commands    Patient not anxious. Off of sedation.   Minimal secretions    Medications:  Reviewed    Infusion Medications    dextrose      sodium chloride 75 mL/hr at 06/25/22 0630    norepinephrine Stopped (06/23/22 1800)    sodium chloride       Scheduled Medications    potassium phosphate IVPB  20 mmol IntraVENous Once    chlorhexidine  15 mL Mouth/Throat BID    pantoprazole (PROTONIX) 40 mg injection  40 mg IntraVENous Daily    insulin lispro  0-18 Units SubCUTAneous Q4H    cefepime  1,000 mg IntraVENous Q12H    enoxaparin  30 mg SubCUTAneous Nightly    aspirin  300 mg Rectal Daily    sodium chloride flush  10 mL IntraVENous 2 times per day    metroNIDAZOLE  500 mg IntraVENous Q8H     PRN Meds: glucose, dextrose bolus **OR** dextrose bolus, glucagon (rDNA), dextrose, sodium chloride flush, sodium chloride, promethazine **OR** ondansetron, polyethylene glycol, acetaminophen **OR** acetaminophen      Intake/Output Summary (Last 24 hours) at 6/25/2022 1124  Last data filed at 6/25/2022 1000  Gross per 24 hour   Intake 3351.27 ml   Output 2570 ml   Net 781.27 ml       Physical Exam Performed:    /61   Pulse 89   Temp 100 °F (37.8 °C) (Oral)   Resp 29   Ht 4' 9\" (1.448 m)   Wt 120 lb 9.5 oz (54.7 kg)   SpO2 92%   BMI 26.10 kg/m² General appearance: Intubated poorly interactive  HEENT:  Conjunctivae/corneas clear. Neck: Supple, with full range of motion. Respiratory:  Normal respiratory effort. Clear to auscultation, bilaterally without Rales/Wheezes/Rhonchi. Cardiovascular: Regular rate and rhythm with normal S1/S2 without murmurs or rubs  Abdomen: Soft, non-tender, non-distended, normal bowel sounds. Musculoskeletal: No cyanosis or edema bilaterally  Neurologic: Intubated and sedated  Psychiatric: Intubated and sedated  Peripheral Pulses: +2 palpable, equal bilaterally, no change today 6/24      Labs:   Recent Labs     06/22/22  1422 06/24/22  0409 06/25/22  0423   WBC 17.1* 15.2* 12.6*   HGB 13.5 10.4* 10.4*   HCT 40.8 31.3* 30.9*    229 255     Recent Labs     06/23/22  0348 06/24/22  0409 06/25/22  0423   * 134* 142   K 4.6 4.1 4.2   CL 99 102 111*   CO2 20* 21 20*   BUN 30* 38* 41*   CREATININE 1.8* 1.8* 1.4*   CALCIUM 8.6 8.0* 7.8*   PHOS 3.4 3.5 1.8*     Recent Labs     06/22/22  1422 06/23/22  0348   AST 63* 110*   ALT 30 67*   BILITOT 0.9 1.1*   ALKPHOS 150* 122     No results for input(s): INR in the last 72 hours. Recent Labs     06/22/22  1422 06/22/22  2211 06/23/22 0348   TROPONINI <0.01 <0.01 <0.01       Urinalysis:      Lab Results   Component Value Date    NITRU Negative 06/22/2022    WBCUA 2 06/22/2022    BACTERIA None Seen 06/22/2022    RBCUA 2 06/22/2022    BLOODU TRACE 06/22/2022    SPECGRAV 1.016 06/22/2022    GLUCOSEU Negative 06/22/2022       Radiology:  MRA NECK WO CONTRAST   Final Result   Acute to subacute infarctions in the left frontal lobe, increased in size   since MRI brain June 18, 2022. Small acute to subacute infarction along the left external capsule, stable   since June 18, 2022. Old lacunar infarcts in the left central franki. Mild to moderate parenchymal volume loss. Mild chronic microvascular disease.       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 Right dominance of the vertebral arteries with hypoplastic intracranial left   vertebral artery, and superimposed occlusion in the mid to distal   intracranial left vertebral artery. Moderate stenosis in the intracranial right vertebral artery. Moderate stenosis in the proximal P2 segment of the left PCA. Known 95% critical stenosis in the proximal right internal carotid artery. 80% stenosis in the proximal left internal carotid artery. MRA HEAD WO CONTRAST   Final Result   Acute to subacute infarctions in the left frontal lobe, increased in size   since MRI brain June 18, 2022. Small acute to subacute infarction along the left external capsule, stable   since June 18, 2022. Old lacunar infarcts in the left central franki. Mild to moderate parenchymal volume loss. Mild chronic microvascular disease. 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 Right dominance of the vertebral arteries with hypoplastic intracranial left   vertebral artery, and superimposed occlusion in the mid to distal   intracranial left vertebral artery. Moderate stenosis in the intracranial right vertebral artery. Moderate stenosis in the proximal P2 segment of the left PCA. Known 95% critical stenosis in the proximal right internal carotid artery. 80% stenosis in the proximal left internal carotid artery. MRI BRAIN WO CONTRAST   Final Result   Acute to subacute infarctions in the left frontal lobe, increased in size   since MRI brain June 18, 2022. Small acute to subacute infarction along the left external capsule, stable   since June 18, 2022. Old lacunar infarcts in the left central franki. Mild to moderate parenchymal volume loss. Mild chronic microvascular disease. 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       Right dominance of the vertebral arteries with hypoplastic intracranial left   vertebral artery, and superimposed occlusion in the mid to distal   intracranial left vertebral artery. Moderate stenosis in the intracranial right vertebral artery. Moderate stenosis in the proximal P2 segment of the left PCA. Known 95% critical stenosis in the proximal right internal carotid artery. 80% stenosis in the proximal left internal carotid artery. CT CHEST PULMONARY EMBOLISM W CONTRAST   Final Result   No evidence of a pulmonary embolus. ET tube and gastric tube in good position. 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 Questionable mucous plugging scattered in the bronchi along the right lung   base which may be due to recent aspiration. Recommend pulmonology follow-up. 7 cm exophytic right hepatic mass which has increased in size. Suggest   PET-CT correlation. Postop changes right apex and upper lobe with fibrosis and scarring   throughout the area which is unchanged.          XR CHEST PORTABLE   Final Result   Unchanged right upper lobe scarring      No acute cardiopulmonary process         CT Head WO Contrast   Final Result   New low-attenuation in the left corona radiata consistent with an evolving   infarct as described on the recent MRI      No acute hemorrhage         CT HEAD WO CONTRAST    (Results Pending)         Assessment/Plan:    Active Hospital Problems    Diagnosis     Syncope and collapse [R55]      Priority: Medium    Acute respiratory failure (HCC) [J96.00]      Priority: Medium    Aspiration pneumonia (HCC) [J69.0]      Priority: Medium    History of CVA (cerebrovascular

## 2022-06-25 NOTE — PROGRESS NOTES
[facility] FOLLOW-UP:     Primary Oncologist: Dr. Chandler Ingram  Date: 6/25/2022    PCP: No primary care provider on file. Referring Provider: No ref.  provider found    PROBLEM LIST:     Patient Active Problem List   Diagnosis    Intractable vomiting with nausea    Intractable vomiting    Nausea vomiting and diarrhea    Aortic arch aneurysm (HCC)    HTN (hypertension), benign    RUQ pain    Cholelithiasis    DM2 (diabetes mellitus, type 2) ([address]    Hypothyroid    Diarrhea    Hepatocellular carcinoma (HCC)    Paresthesia    Facial droop    Arterial ischemic stroke, ICA, left, acute ([address]    Carotid artery stenosis with cerebral infarction ([address]    DM type 2, controlled, with complication ([address]    AMS (altered mental status)    Acute cerebrovascular accident (CVA) ([address]    Syncope and collapse    Acute respiratory failure ([address]    Aspiration pneumonia ([address]    History of CVA (cerebrovascular accident)       Specialty Problems     None          INTERVAL HISTORY     Patient is on ventilator    REVIEW OF SYSTEMS:     Unable to obtain    PHYSICAL EXAM:     BP (!) 138/59   Pulse (!) 101   Temp 99.5 °F (37.5 °C) (Oral)   Resp 27   Ht 4' 9\" (1.448 m)   Wt 120 lb 9.5 oz (54.7 kg)   SpO2 98%   BMI 26.10 kg/m²     WEIGHT:   Wt Readings from Last 3 Encounters:   06/25/22 120 lb 9.5 oz (54.7 kg)   06/19/22 126 lb 12.8 oz (57.5 kg)   02/03/21 145 lb (65.8 kg)     On ventilator    LABS:     CBC:  Lab Results   Component Value Date    WBC 12.6 (H) 06/25/2022    HGB 10.4 (L) 06/25/2022    HCT 30.9 (L) 06/25/2022    MCV 86.7 06/25/2022     06/25/2022    LYMPHOPCT 11.5 06/25/2022    RBC 3.57 (L) 06/25/2022    MCH 29.2 06/25/2022    MCHC 33.7 06/25/2022    RDW 13.3 06/25/2022       Lab Results   Component Value Date     06/25/2022    K 4.2 06/25/2022     (H) 06/25/2022    CO2 20 (L) 06/25/2022    BUN 41 (H) 06/25/2022    CREATININE 1.4 (H) 06/25/2022    GLUCOSE 155 (H) 06/25/2022    CALCIUM 7.8 (L) 06/25/2022    PROT 5.8 (L) 06/24/2022    LABALBU 3.3 (L) 06/23/2022    BILITOT 1.1 (H) 06/23/2022    ALKPHOS 122 06/23/2022     (H) 06/23/2022    ALT 67 (H) 06/23/2022    LABGLOM 49 (A) 06/25/2022    GFRAA 59 (A) 06/25/2022    AGRATIO 1.1 06/23/2022    GLOB 3.3 05/03/2020       No results found for: PTINR    TUMOR MARKERS:    Lab Results   Component Value Date    CEA 1.6 05/01/2020     <1 05/01/2020         IMAGING:       Echo Complete    Result Date: 6/18/2022  Transthoracic Echocardiography Report (TTE)  Demographics   Patient Name       Asha Palmer   Date of Study      06/18/2022        Gender              Male   Patient Number     [id number]        Date of Birth       1940   Visit Number       [id number]         Age                 80 year(s)   Accession Number   [id number]        Room Number         6754   Corporate ID       [id number]          Sonographer         Meghan Zabala RVT   Ordering Physician Abdullahi Pantoja MD Interpreting        Payton Curiel MD                                       Physician  Procedure Type of Study   TTE procedure:ECHOCARDIOGRAM COMPLETE 2D W DOPPLER W COLOR. Procedure Date Date: 06/18/2022 Start: 10:36 AM Study Location: [facility] - Echo Lab Technical Quality: Limited visualization due to body habitus. Additional Indications:CVA workup. Patient Status: Routine Height: 57 inches Weight: 128 pounds BSA: 1.49 m2 BMI: 27.7 kg/m2 BP: 143/85 mmHg  Conclusions   Summary  Technically difficult exam due to body habitus and patient immobility. Hyperdynamic left ventricular systolic function with an ejection fraction  estimated at greater than 70%. No obvious regional wall motion abnormalities noted. Aortic valve appears sclerotic but opens adequately. No evidence of aortic  valve regurgitation. Thickened mitral valve without evidence of significant stenosis or  regurgitation.   Moderate mitral annular calcification noted. Grade I diastolic dysfunction with normal LV filling pressures. Avg.  E/e'=14.6   Signature   ------------------------------------------------------------------  Electronically signed by Michelle Palomares MD (Interpreting  physician) on 06/18/2022 at 12:53 PM  ------------------------------------------------------------------   Findings   Left Ventricle  Normal left ventricular cavity size and wall thickness. Hyperdynamic left ventricular systolic function with an ejection fraction  estimated at greater than 70%. No obvious regional wall motion abnormalities noted. Grade I diastolic dysfunction with normal LV filling pressures. Avg.  E/e'=14.6   Mitral Valve  Thickened mitral valve without evidence of significant stenosis or  regurgitation. Moderate mitral annular calcification noted. Left Atrium  The left atrium is normal in size. Aortic Valve  Aortic valve appears sclerotic but opens adequately. No evidence of aortic  valve regurgitation. Aorta  The aortic root is normal in size. Right Ventricle  The right ventricle is normal in size and function. Tricuspid Valve  The tricuspid valve is normal in structure and function. There is no  significant tricuspid valve regurgitation or stenosis. Right Atrium  The right atrial size is normal.   Pulmonic Valve  The pulmonic valve is not well visualized. Pericardial Effusion  No pericardial effusion noted. Pleural Effusion  No pleural effusion. Miscellaneous  The inferior vena cava appears normal in size with normal respiratory  variation.   M-Mode/2D Measurements (cm)   LV Diastolic Dimension: 4.40 cm LV Systolic Dimension: 4.19 cm  LV Septum Diastolic: 8.57 cm  LV PW Diastolic: 7.53 cm        AO Root Dimension: 2.3 cm                                   LA Area: 11.2 cm2                                  LA volume/Index: 20.4 ml /14 ml/m2  Doppler Measurements   AV Peak Velocity: 136 cm/s     MV Peak E-Wave: 74.7 cm/s  AV Peak Gradient: 7.4 mmHg     MV Peak A-Wave: 133 cm/s  AV Mean Gradient: 4 mmHg       MV E/A Ratio: 0.56  LVOT Peak Velocity: 102 cm/s   E' Septal Velocity: 6.74 cm/s  E' Lateral Velocity: 4.13 cm/s   Aortic Valve   Peak Velocity: 136 cm/s Mean Velocity: 97.9 cm/s  Peak Gradient: 7.4 mmHg Mean Gradient: 4 mmHg  AV VTI: 26.6 cm  Aorta   Aortic Root: 2.3 cm  Ascending Aorta: 3.2 cm      CT ABDOMEN PELVIS WO CONTRAST Additional Contrast? None    Result Date: 6/3/2022  EXAMINATION: CT OF THE ABDOMEN AND PELVIS WITHOUT CONTRAST 6/3/2022 2:50 pm TECHNIQUE: CT of the abdomen and pelvis was performed without the administration of intravenous contrast. Multiplanar reformatted images are provided for review. Automated exposure control, iterative reconstruction, and/or weight based adjustment of the mA/kV was utilized to reduce the radiation dose to as low as reasonably achievable. COMPARISON: CT abdomen pelvis, 12/27/2021 HISTORY: ORDERING SYSTEM PROVIDED HISTORY: Carcinoma of liver [facility] TECHNOLOGIST PROVIDED HISTORY: Additional Contrast?->None Reason for exam:->CARCINOMA OF LIVER Reason for Exam: CARCINOMA OF LIVER FINDINGS: Lower Chest: There is mild atelectasis in the lung bases. The visualized heart is unremarkable. Organs: There is a mass in segment 7 measuring 6.8 x 5.7 cm (previously 6.6 x 5.3 cm. No additional hepatic masses are identified. Hypodensity along the falciform ligament is not conspicuous on the current non contrasted study. A gallbladder stone is present. The pancreas and adrenal glands are unremarkable. There is stable pericapsular calcification the spleen, consistent with remote inflammation or trauma. The kidneys are symmetrical.  There is no hydronephrosis. GI/Bowel: The stomach and small bowel are unremarkable. There is no focal mural thickening of the colon. Appendix is normal. Pelvis: Urinary bladder and prostate gland are unremarkable.  Peritoneum/Retroperitoneum: There is mild aortic calcification. No aneurysm. No adenopathy. Bones/Soft Tissues: No acute osseous abnormalities appreciated. Abdominal wall is unremarkable. Mass in the right lobe of the liver measures slightly larger, compared with 12/27/2021, noting differences in comparison between current non contrasted and previous contrasted studies. No additional hepatic nodules are appreciated. No metastatic disease is identified. No acute abdominopelvic abnormality. RECOMMENDATIONS: Unavailable     CT HEAD WO CONTRAST    Result Date: 6/25/2022  EXAMINATION: CT OF THE HEAD WITHOUT CONTRAST  6/25/2022 11:04 am TECHNIQUE: CT of the head was performed without the administration of intravenous contrast. Automated exposure control, iterative reconstruction, and/or weight based adjustment of the mA/kV was utilized to reduce the radiation dose to as low as reasonably achievable. COMPARISON: CT 06/22/2022. MRI 06/23/2022. HISTORY: ORDERING SYSTEM PROVIDED HISTORY: previous CVA TECHNOLOGIST PROVIDED HISTORY: Reason for exam:->previous CVA Has a \"code stroke\" or \"stroke alert\" been called? ->No Reason for Exam: Altered Mental Status Initial evaluation. FINDINGS: BRAIN/VENTRICLES: There are areas of decreased attenuation involving the left frontal lobe as well as the left subinsular region compatible with evolution of the known infarcts. Otherwise, the gray-white differentiation appears maintained. No acute intracranial hemorrhage. No mass effect or midline shift. There are areas of hypoattenuation in the periventricular and subcortical white matter, which is nonspecific, but may represent chronic microvasvular ischemic change. There is mild global parenchymal volume loss. No evidence of hydrocephalus. Atherosclerosis of the intracranial vasculature. ORBITS: The visualized portion of the orbits demonstrate no acute abnormality. SINUSES: The visualized paranasal sinuses and mastoid air cells demonstrate no acute abnormality.  SOFT TISSUES/SKULL:  No acute abnormality of the visualized skull or soft tissues. 1. Evolution of the known infarcts involving is the left frontal lobe as well as the left subinsular region. 2. Otherwise, no acute intracranial abnormality. 3. No significant mass effect, midline shift or acute intracranial hemorrhage. 4. Mild global parenchymal volume loss with chronic microvascular ischemic changes. CT Head WO Contrast    Result Date: 6/22/2022  EXAMINATION: CT OF THE HEAD WITHOUT CONTRAST  6/22/2022 2:16 pm TECHNIQUE: CT of the head was performed without the administration of intravenous contrast. Automated exposure control, iterative reconstruction, and/or weight based adjustment of the mA/kV was utilized to reduce the radiation dose to as low as reasonably achievable. COMPARISON: 06/17/2022 HISTORY: ORDERING SYSTEM PROVIDED HISTORY: altered mental status, recent CVA TECHNOLOGIST PROVIDED HISTORY: Reason for exam:->altered mental status, recent CVA Has a \"code stroke\" or \"stroke alert\" been called? ->No Decision Support Exception - unselect if not a suspected or confirmed emergency medical condition->Emergency Medical Condition ([place]) Reason for Exam: altered mental status, recent CVA FINDINGS: BRAIN/VENTRICLES: The ventricles and sulci are diffusely enlarged. Low attenuation is seen in the periventricular and subcortical white matter. No acute hemorrhage. New low-attenuation region in the left corona radiata. . ORBITS: The visualized portion of the orbits demonstrate no acute abnormality. SINUSES: The visualized paranasal sinuses and mastoid air cells demonstrate no acute abnormality. SOFT TISSUES/SKULL:  No acute abnormality of the visualized skull or soft tissues.      New low-attenuation in the left corona radiata consistent with an evolving infarct as described on the recent MRI No acute hemorrhage     CT HEAD WO CONTRAST    Result Date: 6/17/2022  EXAMINATION: CTA OF THE HEAD AND NECK WITH CONTRAST; CT OF THE HEAD WITHOUT CONTRAST 6/17/2022 3:21 pm: TECHNIQUE: CTA of the head and neck was performed with the administration of intravenous contrast. Multiplanar reformatted images are provided for review. MIP images are provided for review. Stenosis of the internal carotid arteries measured using NASCET criteria. Automated exposure control, iterative reconstruction, and/or weight based adjustment of the mA/kV was utilized to reduce the radiation dose to as low as reasonably achievable.; CT of the head was performed without the administration of intravenous contrast. Automated exposure control, iterative reconstruction, and/or weight based adjustment of the mA/kV was utilized to reduce the radiation dose to as low as reasonably achievable. Noncontrast CT of the head with reconstructed 2-D images are also provided for review. COMPARISON: CT head December 27, 2021, MRI brain May 20, 2020, CT chest abdomen pelvis July 2, 2021 HISTORY: ORDERING SYSTEM PROVIDED HISTORY: facial droop TECHNOLOGIST PROVIDED HISTORY: Reason for exam:->facial droop Has a \"code stroke\" or \"stroke alert\" been called? ->No Decision Support Exception - unselect if not a suspected or confirmed emergency medical condition->Emergency Medical Condition ([place]) Reason for Exam: Numbness (neplai  [phone], numbness to L side of face and body x3 days); ORDERING SYSTEM PROVIDED HISTORY: facial droop TECHNOLOGIST PROVIDED HISTORY: Has a \"code stroke\" or \"stroke alert\" been called? ->No Reason for exam:->facial droop Decision Support Exception - unselect if not a suspected or confirmed emergency medical condition->Emergency Medical Condition ([place]) Reason for Exam: Numbness (neplai  [phone], numbness to L side of face and body x3 days) FINDINGS: CT HEAD: BRAIN/VENTRICLES:  There is mild parenchymal volume loss. There are prominent CSF spaces along the bilateral frontal convexities, stable since May 20, 2020.   There is periventricular white matter low attenuation, likely related to mild chronic microvascular disease. .  No acute intracranial hemorrhage or extraaxial fluid collection. Grey-white differentiation is maintained. No evidence of mass, mass effect or midline shift. No evidence of hydrocephalus. ORBITS: The visualized portion of the orbits demonstrate no acute abnormality. SINUSES:  The visualized paranasal sinuses and mastoid air cells demonstrate no acute abnormality. SOFT TISSUES/SKULL: No acute abnormality of the visualized skull or soft tissues. CTA NECK: AORTIC ARCH/ARCH VESSELS: There is a chronic 3.0 x 5.9 cm wide-necked saccular aneurysm at the anterior aspect of the aortic arch, stable since July 2, 2021. No dissection or arterial injury. There is 65% stenosis in the mid left subclavian artery. There is 40% stenosis in the distal right subclavian artery. No significant stenosis of the brachiocephalic artery. CAROTID ARTERIES: There is 95% stenosis in the proximal right internal carotid artery. There is 85% stenosis in the proximal left internal carotid artery. There is 40% stenosis in the proximal left common carotid artery. No dissection, arterial injury, or hemodynamically significant stenosis in the remainder of the bilateral internal or bilateral common carotid arteries by NASCET criteria. VERTEBRAL ARTERIES: There is right dominance of the vertebral arteries with severe stenosis in the proximal V1 segment of the left vertebral artery. No dissection, arterial injury, or significant stenosis in the right vertebral artery. SOFT TISSUES: There is airspace opacity in the right upper lobe with volume loss, likely related to chronic scarring, grossly stable. There is mild pericardial effusion. No cervical or superior mediastinal lymphadenopathy. The larynx and pharynx are unremarkable. No acute abnormality of the salivary and thyroid glands. BONES: No acute osseous abnormality.  CTA HEAD: ANTERIOR CIRCULATION: There is hypoplastic in the mid left subclavian artery. 13. 40% stenosis in the distal right subclavian artery. 14. Airspace opacity in the right upper lobe with volume loss, likely related to chronic scarring, grossly stable since July 2, 2021. Mild pericardial effusion. MRA HEAD WO CONTRAST    Result Date: 6/23/2022  EXAMINATION: MRA OF THE NECK WITHOUT CONTRAST; MRA OF THE HEAD WITHOUT CONTRAST; MRI OF THE BRAIN WITHOUT CONTRAST 6/23/2022 1:08 pm; 6/23/2022 1:07 pm TECHNIQUE: Multiplanar multisequence MRA of the neck was performed without the administration of intravenous contrast. Stenosis of the internal carotid arteries measured using NASCET criteria.; MRA of the head was performed utilizing time-of-flight imaging with MIP images. No intravenous contrast was administered.; Multiplanar multisequence MRI of the brain was performed without the administration of intravenous contrast. COMPARISON: CT head June 22, 2022, MRI brain June 18, 2022, CTA head and neck June 17, 2022 HISTORY: ORDERING SYSTEM PROVIDED HISTORY: unresponsive, severe cerebrovacular disease on CTA TECHNOLOGIST PROVIDED HISTORY: Reason for exam:->unresponsive, severe cerebrovacular disease on CTA Reason for Exam: unresponsive, severe cerebrovacular disease on CTA; ORDERING SYSTEM PROVIDED HISTORY: unresponsive, severe cerebrovascular disease on CTA TECHNOLOGIST PROVIDED HISTORY: Reason for exam:->unresponsive, severe cerebrovascular disease on CTA Reason for Exam: unresponsive, severe cerebrovascular disease on CTA; ORDERING SYSTEM PROVIDED HISTORY: ams TECHNOLOGIST PROVIDED HISTORY: Reason for exam:->ams Reason for Exam: ams FINDINGS: MRI Brain: INTRACRANIAL STRUCTURES/VENTRICLES: There are acute to subacute infarctions in the left frontal lobe, increased in size since June 18, 2022. There is a small acute to subacute infarction along the left external capsule, stable since June 18, 2022. There are old lacunar infarcts in the left central franki.   There is mild to moderate parenchymal volume loss. There are scattered foci of T2/FLAIR hyperintensity in the periventricular and subcortical white matter, likely related to mild chronic microvascular disease. No mass effect or midline shift. No acute intracranial hemorrhage. There is no hydrocephalus. The sellar/suprasellar regions appear unremarkable. The normal signal voids within the major intracranial vessels appear maintained. ORBITS: The visualized portion of the orbits demonstrate no acute abnormality. SINUSES: There is scattered mild mucosal thickening in the paranasal sinuses. The bilateral mastoid air cells are clear. BONES/SOFT TISSUES: The bone marrow signal intensity appears normal. The soft tissues demonstrate no acute abnormality. MRA HEAD: ANTERIOR CIRCULATION: There is known severe stenosis in a proximal M2 branch of the left MCA. The internal carotid arteries are normal in course and caliber without focal stenosis. The anterior cerebral and right middle cerebral arteries demonstrate no focal stenosis. POSTERIOR CIRCULATION: There is right dominance of the vertebral arteries with hypoplastic intracranial left vertebral artery, and superimposed occlusion in the mid to distal intracranial left vertebral artery. There is moderate stenosis in the intracranial right vertebral artery. There is moderate stenosis in the proximal P2 segment of the left PCA. There is mild stenosis in the P2 segment of the right PCA. The basilar artery appear unremarkable. No intracranial aneurysm. MRA neck: Common and internal carotid arteries: There is 95% critical stenosis in the proximal right internal carotid artery. There is 80% stenosis in the proximal left internal carotid artery. There is mild stenosis in the mid left common carotid artery. The bilateral common carotid arteries are within normal limits without flow limiting stenosis.   The bilateral internal carotid arteries are within normal limits without flow limiting stenosis by NASCET criteria. There is no dissection or vascular injury. Vertebral arteries: There is right dominance of the vertebral arteries with hypoplastic left vertebral artery. Acute to subacute infarctions in the left frontal lobe, increased in size since MRI brain June 18, 2022. Small acute to subacute infarction along the left external capsule, stable since June 18, 2022. Old lacunar infarcts in the left central franki. Mild to moderate parenchymal volume loss. Mild chronic microvascular disease. 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 Right dominance of the vertebral arteries with hypoplastic intracranial left vertebral artery, and superimposed occlusion in the mid to distal intracranial left vertebral artery. Moderate stenosis in the intracranial right vertebral artery. Moderate stenosis in the proximal P2 segment of the left PCA. Known 95% critical stenosis in the proximal right internal carotid artery. 80% stenosis in the proximal left internal carotid artery. XR CHEST PORTABLE    Result Date: 6/22/2022  EXAMINATION: ONE XRAY VIEW OF THE CHEST 6/22/2022 3:23 pm COMPARISON: 06/17/2022 HISTORY: ORDERING SYSTEM PROVIDED HISTORY: resp failure TECHNOLOGIST PROVIDED HISTORY: Reason for exam:->resp failure Reason for Exam: resp failure FINDINGS: Endotracheal tube in satisfactory position above the mar. Enteric catheter appears to be in the stomach. Heart size stable. Right upper lobe opacity not changed. Unchanged right upper lobe scarring No acute cardiopulmonary process     XR CHEST PORTABLE    Result Date: 6/17/2022  EXAMINATION: ONE XRAY VIEW OF THE CHEST 6/17/2022 1:51 pm COMPARISON: 04/30/2020 HISTORY: ORDERING SYSTEM PROVIDED HISTORY: cva TECHNOLOGIST PROVIDED HISTORY: Reason for exam:->cva Reason for Exam: Numbness (neplai  [phone], numbness to L side of face and body x3 days) FINDINGS: Cardiomediastinal silhouette stable.   Stable opacity in the right apex with retraction of the right judith superiorly. No acute airspace disease. No pneumothorax. No pleural effusion. Stable scarring right apex No acute cardiopulmonary process     CT CHEST PULMONARY EMBOLISM W CONTRAST    Result Date: 6/22/2022  EXAMINATION: CTA OF THE CHEST 6/22/2022 4:25 pm TECHNIQUE: CTA of the chest was performed after the administration of intravenous contrast.  Multiplanar reformatted images are provided for review. MIP images are provided for review. Automated exposure control, iterative reconstruction, and/or weight based adjustment of the mA/kV was utilized to reduce the radiation dose to as low as reasonably achievable. COMPARISON: 07/02/2021 HISTORY: ORDERING SYSTEM PROVIDED HISTORY: respiratory failure, hypotension, recent hospitalization, eval for PE/aspiration TECHNOLOGIST PROVIDED HISTORY: Reason for exam:->respiratory failure, hypotension, recent hospitalization, eval for PE/aspiration Decision Support Exception - unselect if not a suspected or confirmed emergency medical condition->Emergency Medical Condition ([place]) Reason for Exam: respiratory failure, hypotension, recent hospitalization, eval for PE/aspiration FINDINGS: Pulmonary Arteries: Pulmonary arteries are adequately opacified for evaluation. No evidence of intraluminal filling defect to suggest pulmonary embolism. Main pulmonary artery is normal in caliber. Mediastinum: No evidence of mediastinal lymphadenopathy. There is an ET tube in place with the tip in the distal trachea. There is a gastric tube in place extending into the distal stomach. The heart and pericardium demonstrate no acute abnormality. There is a hypodense cystic mass along the right paratracheal region extending inferiorly measures 5 cm and is unchanged. There is moderate calcified plaque throughout the thoracic aorta which is mildly dilated throughout.   There is a persistent saccular aneurysm along the aortic arch laterally measuring proximally 6-7 cm which is grossly unchanged in size and appearance. No dissection is seen. Lungs/pleura: There are tiny bibasilar pleural effusions with subsegmental opacities along the lung bases posteriorly which is more prominent on the right and has increased. There are irregular ground-glass and interstitial opacity along the right lower lobe extending into the right middle lobe and right upper lobe which is more apparent. There is questionable mild mucous plugging scattered in the bronchi along the right lung base. There is volume loss and pleural thickening and parenchymal opacity along the right apex extending along the suprahilar region which is unchanged. Upper Abdomen: The adrenals are normal.  There is a hypodense exophytic mass along the right lobe of the liver posteriorly extending medially measuring 7 cm which measured 5.4 cm previously and appears to have enlarged Soft Tissues/Bones: No acute bone or soft tissue abnormality. No evidence of a pulmonary embolus. ET tube and gastric tube in good position. 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 Questionable mucous plugging scattered in the bronchi along the right lung base which may be due to recent aspiration. Recommend pulmonology follow-up. 7 cm exophytic right hepatic mass which has increased in size. Suggest PET-CT correlation. Postop changes right apex and upper lobe with fibrosis and scarring throughout the area which is unchanged.      VL DUP CAROTID BILATERAL    Result Date: 6/22/2022  Carotid Duplex Study  Demographics   Patient Name       Rita Xavier   Date of Study      06/20/2022 Gender              Male   Patient Number     [id number]         Date of Birth       1940   Visit Number       [id number]          Age                 80 year(s)   Accession Number   [id number]         Room Number         4929   Corporate ID       [id number]           Rabia Michael GRUBBST, RDMS, AB,                                                            OB/GYN   Ordering Physician Leandra Keating,  Interpreting        Melody Donohue MD MD                 Physician  Procedure Type of Study:   Cerebral:Carotid, VL CAROTID DUPLEX BILATERAL. Vascular Sonographer Report  Impressions Right Impression The right internal carotid artery appears to have a greater than or equal to 70% diameter reducing stenosis based on velocity criteria. The right vertebral artery demonstrates antegrade flow. Elevated velocity noted in the right vertebral artery. The right subclavian artery is visualized and demonstrates multiphasic flow. Spoke with [employer] on [facility] [address]. Left Impression The left internal carotid artery appears to have a 50-69% diameter reducing stenosis based on velocity criteria. The left vertebral artery demonstrates normal antegrade flow. The left subclavian artery is visualized and demonstrates multiphasic flow. Conclusions   Summary   -The right internal carotid artery appears to have a greater than or equal  to 70% diameter reducing stenosis based on velocity criteria. -The left internal carotid artery appears to have a 50-69% diameter reducing  stenosis based on velocity criteria. Recommendations   Recommend vascular surgeon consult. Signature   ------------------------------------------------------------------  Electronically signed by Melody Donohue MD (Interpreting  physician) on 06/22/2022 at 09:39 AM  ------------------------------------------------------------------  Patient Status:Routine.  Study [address] [facility] - Vascular Lab. Technical Quality:Adequate visualization. Plaque   - A plaque was found in the Right ICA. irregular. The plaque characteristics are: severe and heterogeneous texture. - A plaque was found in the Left ICA. smooth. The plaque characteristics are: moderate severity and homogeneous texture. Velocities are measured in cm/s ; Diameters are measured in mm Carotid Right Measurements +---------------+----+----+-----+----+ ! Location       ! PSV ! EDV ! Angle! RI  ! +---------------+----+----+-----+----+ ! Prox CCA       !69. 3!10. 3! 60   !0.85! +---------------+----+----+-----+----+ ! Mid CCA        !94. 9!16. 5!60   !0.83! +---------------+----+----+-----+----+ ! Dist CCA       !68.6!12. 6!60   !0.82! +---------------+----+----+-----+----+ ! Prox ICA       !29.4!6.66!60   !0.77! +---------------+----+----+-----+----+ ! Mid ICA        ! 576 !260 !60   !0.55! +---------------+----+----+-----+----+ ! Dist ICA       !125 !41. 5!44   !0.67! +---------------+----+----+-----+----+ ! Prox ECA       !482 !    !61   !    ! +---------------+----+----+-----+----+ ! Vertebral      !118 !    !60   !    ! +---------------+----+----+-----+----+ ! Prox Subclavian! 124 !    !60   !    ! +---------------+----+----+-----+----+   - There is antegrade vertebral flow noted on the right side. - Additional Measurements:ICAPSV/CCAPSV 6.07. ICAEDV/CCAEDV 25.24. Carotid Left Measurements +---------------+----+----+-----+----+ ! Location       ! PSV ! EDV ! Angle! RI  ! +---------------+----+----+-----+----+ ! Prox CCA       !107 !17. 2! 60   !0.84! +---------------+----+----+-----+----+ ! Mid CCA        !93. 2!13. 7!60   !0.85! +---------------+----+----+-----+----+ ! Dist CCA       !54. 8!2.74!08   !0.84! +---------------+----+----+-----+----+ ! Prox ICA       !76. 2!22. 1! 60   !0.71! +---------------+----+----+-----+----+ ! Mid ICA        !227 !70. 2! 60   !0.69! +---------------+----+----+-----+----+ ! Dist ICA       !60. 6!14  !0    !0.77! +---------------+----+----+-----+----+ ! Prox ECA       !917 !    !61   !    ! +---------------+----+----+-----+----+ ! Vertebral      !47.4!    !60   !    ! +---------------+----+----+-----+----+ ! Prox Subclavian!94.9!    !61   !    ! +---------------+----+----+-----+----+   - There is antegrade vertebral flow noted on the left side. - Additional Measurements:ICAPSV/CCAPSV 2.44. ICAEDV/CCAEDV 4.08.    CTA HEAD NECK W CONTRAST    Result Date: 6/17/2022  EXAMINATION: CTA OF THE HEAD AND NECK WITH CONTRAST; CT OF THE HEAD WITHOUT CONTRAST 6/17/2022 3:21 pm: TECHNIQUE: CTA of the head and neck was performed with the administration of intravenous contrast. Multiplanar reformatted images are provided for review. MIP images are provided for review. Stenosis of the internal carotid arteries measured using NASCET criteria. Automated exposure control, iterative reconstruction, and/or weight based adjustment of the mA/kV was utilized to reduce the radiation dose to as low as reasonably achievable.; CT of the head was performed without the administration of intravenous contrast. Automated exposure control, iterative reconstruction, and/or weight based adjustment of the mA/kV was utilized to reduce the radiation dose to as low as reasonably achievable. Noncontrast CT of the head with reconstructed 2-D images are also provided for review. COMPARISON: CT head December 27, 2021, MRI brain May 20, 2020, CT chest abdomen pelvis July 2, 2021 HISTORY: ORDERING SYSTEM PROVIDED HISTORY: facial droop TECHNOLOGIST PROVIDED HISTORY: Reason for exam:->facial droop Has a \"code stroke\" or \"stroke alert\" been called? ->No Decision Support Exception - unselect if not a suspected or confirmed emergency medical condition->Emergency Medical Condition (MA) Reason for Exam: Numbness ([facility]  [phone], numbness to L side of face and body x3 days); ORDERING SYSTEM PROVIDED HISTORY: facial droop TECHNOLOGIST PROVIDED HISTORY: Has a \"code stroke\" or \"stroke alert\" been called? ->No Reason for exam:->facial droop Decision Support Exception - unselect if not a suspected or confirmed emergency medical condition->Emergency Medical Condition (MA) Reason for Exam: Numbness ([place]  [phone], numbness to L side of face and body x3 days) FINDINGS: CT HEAD: BRAIN/VENTRICLES:  There is mild parenchymal volume loss. There are prominent CSF spaces along the bilateral frontal convexities, stable since May 20, 2020. There is periventricular white matter low attenuation, likely related to mild chronic microvascular disease. .  No acute intracranial hemorrhage or extraaxial fluid collection. Grey-white differentiation is maintained. No evidence of mass, mass effect or midline shift. No evidence of hydrocephalus. ORBITS: The visualized portion of the orbits demonstrate no acute abnormality. SINUSES:  The visualized paranasal sinuses and mastoid air cells demonstrate no acute abnormality. SOFT TISSUES/SKULL: No acute abnormality of the visualized skull or soft tissues. CTA NECK: AORTIC ARCH/ARCH VESSELS: There is a chronic 3.0 x 5.9 cm wide-necked saccular aneurysm at the anterior aspect of the aortic arch, stable since July 2, 2021. No dissection or arterial injury. There is 65% stenosis in the mid left subclavian artery. There is 40% stenosis in the distal right subclavian artery. No significant stenosis of the brachiocephalic artery. CAROTID ARTERIES: There is 95% stenosis in the proximal right internal carotid artery. There is 85% stenosis in the proximal left internal carotid artery. There is 40% stenosis in the proximal left common carotid artery. No dissection, arterial injury, or hemodynamically significant stenosis in the remainder of the bilateral internal or bilateral common carotid arteries by NASCET criteria.  VERTEBRAL ARTERIES: There is right dominance of the vertebral arteries with severe stenosis in the proximal V1 segment of the left vertebral artery. No dissection, arterial injury, or significant stenosis in the right vertebral artery. SOFT TISSUES: There is airspace opacity in the right upper lobe with volume loss, likely related to chronic scarring, grossly stable. There is mild pericardial effusion. No cervical or superior mediastinal lymphadenopathy. The larynx and pharynx are unremarkable. No acute abnormality of the salivary and thyroid glands. BONES: No acute osseous abnormality. CTA HEAD: ANTERIOR CIRCULATION: There is hypoplastic A1 segment of the left MARI, normal variant. There is severe stenosis in a proximal M2 branch of the left MCA (series 613, image 35). There is 75% stenosis in the cavernous/supraclinoid segment of the left internal carotid artery. No significant stenosis of the right intracranial internal carotid, bilateral anterior cerebral, or right middle cerebral arteries. No aneurysm. POSTERIOR CIRCULATION: There is right dominance of the vertebral arteries with hypoplastic intracranial left vertebral artery. There is superimposed occlusion in the mid to distal intracranial left vertebral artery. There is 80% stenosis in the proximal V4 segment of right vertebral artery. There is 80% stenosis in the proximal P2 segment of the left PCA. There is 50% stenosis in the proximal P2 segment of the right PCA. No significant stenosis of the basilar artery. No aneurysm. OTHER: No dural venous sinus thrombosis on this non-dedicated study. 1. No acute intracranial abnormality. 2. Severe stenosis in a proximal M2 branch of the left MCA 3. 75% stenosis in the cavernous/supraclinoid segment of the left internal carotid artery. 4. Right dominance of the vertebral arteries with hypoplastic intracranial left vertebral artery. Superimposed occlusion in the mid to distal intracranial left vertebral artery. 5. 80% stenosis in the proximal V4 segment of right vertebral artery.  6. 80% stenosis in the proximal P2 segment of the left PCA. 50% stenosis in the proximal P2 segment of the right PCA. 7. 95% stenosis in the proximal right internal carotid artery. 8. 85% stenosis in the proximal left internal carotid artery. 9. 40% stenosis in the proximal left common carotid artery. 10. Right dominance of the vertebral arteries with severe stenosis in the proximal V1 segment of the left vertebral artery. 11. Chronic 3.0 x 5.9 cm wide-necked saccular aneurysm at the anterior aspect of the aortic arch, stable since July 2, 2021. 12. 65% stenosis in the mid left subclavian artery. 13. 40% stenosis in the distal right subclavian artery. 14. Airspace opacity in the right upper lobe with volume loss, likely related to chronic scarring, grossly stable since July 2, 2021. Mild pericardial effusion. MRA NECK WO CONTRAST    Result Date: 6/23/2022  EXAMINATION: MRA OF THE NECK WITHOUT CONTRAST; MRA OF THE HEAD WITHOUT CONTRAST; MRI OF THE BRAIN WITHOUT CONTRAST 6/23/2022 1:08 pm; 6/23/2022 1:07 pm TECHNIQUE: Multiplanar multisequence MRA of the neck was performed without the administration of intravenous contrast. Stenosis of the internal carotid arteries measured using NASCET criteria.; MRA of the head was performed utilizing time-of-flight imaging with MIP images.  No intravenous contrast was administered.; Multiplanar multisequence MRI of the brain was performed without the administration of intravenous contrast. COMPARISON: CT head June 22, 2022, MRI brain June 18, 2022, CTA head and neck June 17, 2022 HISTORY: ORDERING SYSTEM PROVIDED HISTORY: unresponsive, severe cerebrovacular disease on CTA TECHNOLOGIST PROVIDED HISTORY: Reason for exam:->unresponsive, severe cerebrovacular disease on CTA Reason for Exam: unresponsive, severe cerebrovacular disease on CTA; ORDERING SYSTEM PROVIDED HISTORY: unresponsive, severe cerebrovascular disease on CTA TECHNOLOGIST PROVIDED HISTORY: Reason for exam:->unresponsive, severe cerebrovascular disease on CTA Reason for Exam: unresponsive, severe cerebrovascular disease on CTA; ORDERING SYSTEM PROVIDED HISTORY: ams TECHNOLOGIST PROVIDED HISTORY: Reason for exam:->ams Reason for Exam: ams FINDINGS: MRI Brain: INTRACRANIAL STRUCTURES/VENTRICLES: There are acute to subacute infarctions in the left frontal lobe, increased in size since June 18, 2022. There is a small acute to subacute infarction along the left external capsule, stable since June 18, 2022. There are old lacunar infarcts in the left central franki. There is mild to moderate parenchymal volume loss. There are scattered foci of T2/FLAIR hyperintensity in the periventricular and subcortical white matter, likely related to mild chronic microvascular disease. No mass effect or midline shift. No acute intracranial hemorrhage. There is no hydrocephalus. The sellar/suprasellar regions appear unremarkable. The normal signal voids within the major intracranial vessels appear maintained. ORBITS: The visualized portion of the orbits demonstrate no acute abnormality. SINUSES: There is scattered mild mucosal thickening in the paranasal sinuses. The bilateral mastoid air cells are clear. BONES/SOFT TISSUES: The bone marrow signal intensity appears normal. The soft tissues demonstrate no acute abnormality. MRA HEAD: ANTERIOR CIRCULATION: There is known severe stenosis in a proximal M2 branch of the left MCA. The internal carotid arteries are normal in course and caliber without focal stenosis. The anterior cerebral and right middle cerebral arteries demonstrate no focal stenosis. POSTERIOR CIRCULATION: There is right dominance of the vertebral arteries with hypoplastic intracranial left vertebral artery, and superimposed occlusion in the mid to distal intracranial left vertebral artery. There is moderate stenosis in the intracranial right vertebral artery. There is moderate stenosis in the proximal P2 segment of the left PCA.   There is mild stenosis in the P2 segment of the right PCA. The basilar artery appear unremarkable. No intracranial aneurysm. MRA neck: Common and internal carotid arteries: There is 95% critical stenosis in the proximal right internal carotid artery. There is 80% stenosis in the proximal left internal carotid artery. There is mild stenosis in the mid left common carotid artery. The bilateral common carotid arteries are within normal limits without flow limiting stenosis. The bilateral internal carotid arteries are within normal limits without flow limiting stenosis by NASCET criteria. There is no dissection or vascular injury. Vertebral arteries: There is right dominance of the vertebral arteries with hypoplastic left vertebral artery. Acute to subacute infarctions in the left frontal lobe, increased in size since MRI brain June 18, 2022. Small acute to subacute infarction along the left external capsule, stable since June 18, 2022. Old lacunar infarcts in the left central franki. Mild to moderate parenchymal volume loss. Mild chronic microvascular disease. 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 Right dominance of the vertebral arteries with hypoplastic intracranial left vertebral artery, and superimposed occlusion in the mid to distal intracranial left vertebral artery. Moderate stenosis in the intracranial right vertebral artery. Moderate stenosis in the proximal P2 segment of the left PCA. Known 95% critical stenosis in the proximal right internal carotid artery. 80% stenosis in the proximal left internal carotid artery.      MRI BRAIN WO CONTRAST    Result Date: 6/23/2022  EXAMINATION: MRA OF THE NECK WITHOUT CONTRAST; MRA OF THE HEAD WITHOUT CONTRAST; MRI OF THE BRAIN WITHOUT CONTRAST 6/23/2022 1:08 pm; 6/23/2022 1:07 pm TECHNIQUE: Multiplanar multisequence MRA of the neck was performed without the administration of intravenous contrast. Stenosis of the internal carotid arteries measured using NASCET criteria.; MRA of the head was performed utilizing time-of-flight imaging with MIP images. No intravenous contrast was administered.; Multiplanar multisequence MRI of the brain was performed without the administration of intravenous contrast. COMPARISON: CT head June 22, 2022, MRI brain June 18, 2022, CTA head and neck June 17, 2022 HISTORY: ORDERING SYSTEM PROVIDED HISTORY: unresponsive, severe cerebrovacular disease on CTA TECHNOLOGIST PROVIDED HISTORY: Reason for exam:->unresponsive, severe cerebrovacular disease on CTA Reason for Exam: unresponsive, severe cerebrovacular disease on CTA; ORDERING SYSTEM PROVIDED HISTORY: unresponsive, severe cerebrovascular disease on CTA TECHNOLOGIST PROVIDED HISTORY: Reason for exam:->unresponsive, severe cerebrovascular disease on CTA Reason for Exam: unresponsive, severe cerebrovascular disease on CTA; ORDERING SYSTEM PROVIDED HISTORY: ams TECHNOLOGIST PROVIDED HISTORY: Reason for exam:->ams Reason for Exam: ams FINDINGS: MRI Brain: INTRACRANIAL STRUCTURES/VENTRICLES: There are acute to subacute infarctions in the left frontal lobe, increased in size since June 18, 2022. There is a small acute to subacute infarction along the left external capsule, stable since June 18, 2022. There are old lacunar infarcts in the left central franki. There is mild to moderate parenchymal volume loss. There are scattered foci of T2/FLAIR hyperintensity in the periventricular and subcortical white matter, likely related to mild chronic microvascular disease. No mass effect or midline shift. No acute intracranial hemorrhage. There is no hydrocephalus. The sellar/suprasellar regions appear unremarkable. The normal signal voids within the major intracranial vessels appear maintained. ORBITS: The visualized portion of the orbits demonstrate no acute abnormality. SINUSES: There is scattered mild mucosal thickening in the paranasal sinuses. The bilateral mastoid air cells are clear.  BONES/SOFT TISSUES: The bone marrow signal intensity appears normal. The soft tissues demonstrate no acute abnormality. MRA HEAD: ANTERIOR CIRCULATION: There is known severe stenosis in a proximal M2 branch of the left MCA. The internal carotid arteries are normal in course and caliber without focal stenosis. The anterior cerebral and right middle cerebral arteries demonstrate no focal stenosis. POSTERIOR CIRCULATION: There is right dominance of the vertebral arteries with hypoplastic intracranial left vertebral artery, and superimposed occlusion in the mid to distal intracranial left vertebral artery. There is moderate stenosis in the intracranial right vertebral artery. There is moderate stenosis in the proximal P2 segment of the left PCA. There is mild stenosis in the P2 segment of the right PCA. The basilar artery appear unremarkable. No intracranial aneurysm. MRA neck: Common and internal carotid arteries: There is 95% critical stenosis in the proximal right internal carotid artery. There is 80% stenosis in the proximal left internal carotid artery. There is mild stenosis in the mid left common carotid artery. The bilateral common carotid arteries are within normal limits without flow limiting stenosis. The bilateral internal carotid arteries are within normal limits without flow limiting stenosis by NASCET criteria. There is no dissection or vascular injury. Vertebral arteries: There is right dominance of the vertebral arteries with hypoplastic left vertebral artery. Acute to subacute infarctions in the left frontal lobe, increased in size since MRI brain June 18, 2022. Small acute to subacute infarction along the left external capsule, stable since June 18, 2022. Old lacunar infarcts in the left central franki. Mild to moderate parenchymal volume loss. Mild chronic microvascular disease. 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  Right dominance of the vertebral arteries with hypoplastic intracranial left vertebral artery, and superimposed occlusion in the mid to distal intracranial left vertebral artery. Moderate stenosis in the intracranial right vertebral artery. Moderate stenosis in the proximal P2 segment of the left PCA. Known 95% critical stenosis in the proximal right internal carotid artery. 80% stenosis in the proximal left internal carotid artery. MRI brain without contrast    Result Date: 6/18/2022  EXAMINATION: MRI OF THE BRAIN WITHOUT CONTRAST  6/18/2022 10:09 am TECHNIQUE: Multiplanar multisequence MRI of the brain was performed without the administration of intravenous contrast. COMPARISON: 05/20/2020 MRI. HISTORY: ORDERING SYSTEM PROVIDED HISTORY: paresthesias TECHNOLOGIST PROVIDED HISTORY: Reason for exam:->paresthesias Decision Support Exception - unselect if not a suspected or confirmed emergency medical condition->Emergency Medical Condition (MA) Reason for Exam: Facial droop right side and confusion; 4 days ago with tingling FINDINGS: INTRACRANIAL STRUCTURES/VENTRICLES: Acute infarcts of the left corona radiata and left subinsular region. No midline shift. No evidence of an acute intracranial hemorrhage. Scattered periventricular, deep, and subcortical white matter T2/FLAIR hyperintensities are nonspecific and likely related to microvascular ischemic disease. Involutional parenchymal changes. No evidence of hydrocephalus. Partially empty sella. The normal signal voids within the major intracranial vessels appear maintained. ORBITS: The visualized portion of the orbits demonstrate no acute abnormality. SINUSES: Mucosal thickening the right maxillary sinus. The mastoid air cells appear clear. BONES/SOFT TISSUES: The bone marrow signal intensity appears normal. The soft tissues demonstrate no acute abnormality. 1. 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 No evidence of hemorrhagic transformation.  2. Involutional parenchymal changes with mild microvascular ischemic disease. Fluoroscopy modified barium swallow with video    Result Date: 6/20/2022  EXAMINATION: MODIFIED BARIUM SWALLOW WAS PERFORMED IN CONJUNCTION WITH SPEECH PATHOLOGY SERVICES TECHNIQUE: Fluoroscopic evaluation of the swallowing mechanism was performed using cineradiography with multiple consistency of barium product in conjunction with speech pathology services. FLUOROSCOPY DOSE AND TYPE OR TIME AND EXPOSURES: 1 minute 18 seconds fluoroscopy, 9 fluoroscopic loop images COMPARISON: None HISTORY: ORDERING SYSTEM PROVIDED HISTORY: dysphagia TECHNOLOGIST PROVIDED HISTORY: Reason for exam:->dysphagia Reason for Exam: dysphagia FINDINGS: Study was conducted in conjunction with the department speech pathology and using a . No kala aspiration. No significant penetration. Soft solid was expelled from the mouth and was not fully trialed as a consequence. No kala aspiration. Please see separate speech pathology report for full discussion of findings and recommendations. STAGING:     Cancer Staging  No matching staging information was found for the patient. 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 Not interested in radioablation or TACE. Tecentriq and Avastin started in November 2020. Reviewed his labs and imaging studies. In addition to the stroke there is evidence of progressive disease. Discussed with the son today. His condition is quite critical at present. Hopefully he will be weaned off the ventilator. If his condition improves we may consider TKI for his hepatocellular carcinoma. Cherry Jacobs M.D.; M.S. Medical Oncology/Hematology  Phone: [phone]  Fax: [phone]    [address] [facility], [address].   [facility]

## 2022-06-25 NOTE — PLAN OF CARE
Problem: Discharge Planning  Goal: Discharge to home or other facility with appropriate resources  Outcome: Progressing  Flowsheets (Taken 6/24/2022 0815 by Tish Cassidy, RN)  Discharge to home or other facility with appropriate resources: Identify barriers to discharge with patient and caregiver     Problem: Pain  Goal: Verbalizes/displays adequate comfort level or baseline comfort level  Outcome: Progressing     Problem: Safety - Medical Restraint  Goal: Remains free of injury from restraints (Restraint for Interference with Medical Device)  Description: INTERVENTIONS:  1. Determine that other, less restrictive measures have been tried or would not be effective before applying the restraint  2. Evaluate the patient's condition at the time of restraint application  3. Inform patient/family regarding the reason for restraint  4. Q2H: Monitor safety, psychosocial status, comfort, nutrition and hydration  Outcome: Progressing     Problem: Safety - Adult  Goal: Free from fall injury  Outcome: Progressing  Flowsheets (Taken 6/24/2022 0909 by Tish Cassidy, RN)  Free From Fall Injury: Instruct family/caregiver on patient safety     Problem: ABCDS Injury Assessment  Goal: Absence of physical injury  Outcome: Progressing     Problem: Skin/Tissue Integrity  Goal: Absence of new skin breakdown  Description: 1. Monitor for areas of redness and/or skin breakdown  2. Assess vascular access sites hourly  3. Every 4-6 hours minimum:  Change oxygen saturation probe site  4. Every 4-6 hours:  If on nasal continuous positive airway pressure, respiratory therapy assess nares and determine need for appliance change or resting period.   Outcome: Progressing     Problem: Nutrition Deficit:  Goal: Optimize nutritional status  Outcome: Progressing

## 2022-06-25 NOTE — PROGRESS NOTES
Pulmonary Progress Note    Date of Admission: 6/22/2022   LOS: 3 days     CC:  Chief Complaint   Patient presents with    Altered Mental Status     Patient arrives via ems with c/o AMS. Physician immediately called into room. Pt unresponsive and vomiting. Patient just discharged with stroke. Patient LKW 15 minutes prior to ems arrival           Assessment/Plan     Acute hypoxemic respiratory failure with SPO2 less than 90% on room air  -Full vent support, Wean supplemental oxygen to goal saturation of >90%  -SAT/SBT     Acute metabolic encephalopathy  -multifactorial, infectious/Stroke  -Avoid sedating medications  -Less responsive today repeat CT of the head     Stroke  -Recent stroke, CT evidence of evolving CVA, MRA with significant cerebrovascular disease  -Neurology following     Aspiration pneumonia  -chronic component as well  -Cefepime/Flagyl, MRSA nares -  -Pro-[place] trending down     Septic shock  -Titrate pressors goal MAP 65     Metastatic HCC  -Follows with [facility]  -Agree with palliative care consult     PPI  Lovenox  Peridex  Tube feed    Due to the immediate potential for life-threatening deterioration due to respiratory failure , I spent 31 minutes providing critical care. This time is excluding time spent performing separately billable procedures. HPI/Subjective  No acute events overnight. ROS:   Unable to obtain due to mechanical ventilation      Intake/Output Summary (Last 24 hours) at 6/25/2022 0721  Last data filed at 6/25/2022 9331  Gross per 24 hour   Intake 3230.27 ml   Output 2370 ml   Net 860.27 ml         PHYSICAL EXAM:   Blood pressure 121/60, pulse 78, temperature 99.6 °F (37.6 °C), temperature source Oral, resp. rate 20, height 4' 9\" (1.448 m), weight 120 lb 9.5 oz (54.7 kg), SpO2 92 %.'  Gen:  No acute distress. Eyes: PERRL. Anicteric sclera. No conjunctival injection. ENT: No discharge. OP with ETT external appearance of ears and nose normal.  Neck: Trachea midline.  No mass Resp:  No crackles. No wheezes. No rhonchi. No dullness on percussion. CV: Regular rate. Regular rhythm. No murmur or rub. No edema. GI: Soft, Non-tender. Non-distended. +BS  Skin: Warm, dry, w/o erythema. Lymph: No cervical or supraclavicular LAD. M/S: No cyanosis. No clubbing. Neuro: Intubated. Minimal right-sided movement. Decreased responsiveness today      Medications:    Scheduled Meds:   chlorhexidine  15 mL Mouth/Throat BID    pantoprazole (PROTONIX) 40 mg injection  40 mg IntraVENous Daily    insulin lispro  0-18 Units SubCUTAneous Q4H    cefepime  1,000 mg IntraVENous Q12H    enoxaparin  30 mg SubCUTAneous Nightly    aspirin  300 mg Rectal Daily    sodium chloride flush  10 mL IntraVENous 2 times per day    metroNIDAZOLE  500 mg IntraVENous Q8H       Continuous Infusions:   dextrose      sodium chloride 75 mL/hr at 06/25/22 0630    norepinephrine Stopped (06/23/22 1800)    sodium chloride         PRN Meds:  glucose, dextrose bolus **OR** dextrose bolus, glucagon (rDNA), dextrose, sodium chloride flush, sodium chloride, promethazine **OR** ondansetron, polyethylene glycol, acetaminophen **OR** acetaminophen    Labs reviewed:  CBC:   Recent Labs     06/22/22  1422 06/24/22  0409 06/25/22  0423   WBC 17.1* 15.2* 12.6*   HGB 13.5 10.4* 10.4*   HCT 40.8 31.3* 30.9*   MCV 85.5 85.4 86.7    229 255     BMP:   Recent Labs     06/23/22  0348 06/24/22  0409 06/25/22  0423   * 134* 142   K 4.6 4.1 4.2   CL 99 102 111*   CO2 20* 21 20*   PHOS 3.4 3.5 1.8*   BUN 30* 38* 41*   CREATININE 1.8* 1.8* 1.4*     LIVER PROFILE:   Recent Labs     06/22/22  1422 06/23/22  0348   AST 63* 110*   ALT 30 67*   BILITOT 0.9 1.1*   ALKPHOS 150* 122     PT/INR: No results for input(s): PROTIME, INR in the last 72 hours. APTT: No results for input(s): APTT in the last 72 hours.   UA:  Recent Labs     06/22/22  1422   COLORU Yellow   PHUR 6.5   WBCUA 2   RBCUA 2   BACTERIA None Seen   CLARITYU Clear SPECGRAV 1.016   LEUKOCYTESUR Negative   UROBILINOGEN 1.0   BILIRUBINUR Negative   BLOODU TRACE*   GLUCOSEU Negative     No results for input(s): PH, PCO2, PO2 in the last 72 hours. Films:  Radiology Review:  Pertinent images / reports were reviewed as a part of this visit. MRA HEAD WO CONTRAST  Narrative: EXAMINATION:  MRA OF THE NECK WITHOUT CONTRAST; MRA OF THE HEAD WITHOUT CONTRAST; MRI OF  THE BRAIN WITHOUT CONTRAST 6/23/2022 1:08 pm; 6/23/2022 1:07 pm    TECHNIQUE:  Multiplanar multisequence MRA of the neck was performed without the  administration of intravenous contrast. Stenosis of the internal carotid  arteries measured using NASCET criteria.; MRA of the head was performed  utilizing time-of-flight imaging with MIP images. No intravenous contrast was  administered.; Multiplanar multisequence MRI of the brain was performed  without the administration of intravenous contrast.    COMPARISON:  CT head June 22, 2022, MRI brain June 18, 2022, CTA head and neck June 17, 2022    HISTORY:  ORDERING SYSTEM PROVIDED HISTORY: unresponsive, severe cerebrovacular disease  on CTA  TECHNOLOGIST PROVIDED HISTORY:  Reason for exam:->unresponsive, severe cerebrovacular disease on CTA  Reason for Exam: unresponsive, severe cerebrovacular disease on CTA; ORDERING  SYSTEM PROVIDED HISTORY: unresponsive, severe cerebrovascular disease on CTA  TECHNOLOGIST PROVIDED HISTORY:  Reason for exam:->unresponsive, severe cerebrovascular disease on CTA  Reason for Exam: unresponsive, severe cerebrovascular disease on CTA;  ORDERING SYSTEM PROVIDED HISTORY: ams  TECHNOLOGIST PROVIDED HISTORY:  Reason for exam:->ams  Reason for Exam: ams    FINDINGS:  MRI Brain:    INTRACRANIAL STRUCTURES/VENTRICLES: There are acute to subacute infarctions  in the left frontal lobe, increased in size since June 18, 2022. There is a  small acute to subacute infarction along the left external capsule, stable  since June 18, 2022.   There are old lacunar infarcts in the left central  franki. There is mild to moderate parenchymal volume loss. There are  scattered foci of T2/FLAIR hyperintensity in the periventricular and  subcortical white matter, likely related to mild chronic microvascular  disease. No mass effect or midline shift. No acute intracranial hemorrhage. There is no hydrocephalus. The sellar/suprasellar regions appear  unremarkable. The normal signal voids within the major intracranial vessels  appear maintained. ORBITS: The visualized portion of the orbits demonstrate no acute abnormality. SINUSES: There is scattered mild mucosal thickening in the paranasal sinuses. The bilateral mastoid air cells are clear. BONES/SOFT TISSUES: The bone marrow signal intensity appears normal. The soft  tissues demonstrate no acute abnormality. MRA HEAD:    ANTERIOR CIRCULATION: There is known severe stenosis in a proximal M2 branch  of the left MCA. The internal carotid arteries are normal in course and  caliber without focal stenosis. The anterior cerebral and right middle  cerebral arteries demonstrate no focal stenosis. POSTERIOR CIRCULATION: There is right dominance of the vertebral arteries  with hypoplastic intracranial left vertebral artery, and superimposed  occlusion in the mid to distal intracranial left vertebral artery. There is  moderate stenosis in the intracranial right vertebral artery. There is  moderate stenosis in the proximal P2 segment of the left PCA. There is mild  stenosis in the P2 segment of the right PCA. The basilar artery appear  unremarkable. No intracranial aneurysm. MRA neck:    Common and internal carotid arteries: There is 95% critical stenosis in the  proximal right internal carotid artery. There is 80% stenosis in the  proximal left internal carotid artery. There is mild stenosis in the mid  left common carotid artery.   The bilateral common carotid arteries are within  normal limits without flow limiting stenosis. The bilateral internal carotid  arteries are within normal limits without flow limiting stenosis by NASCET  criteria. There is no dissection or vascular injury. Vertebral arteries: There is right dominance of the vertebral arteries with  hypoplastic left vertebral artery. Impression: Acute to subacute infarctions in the left frontal lobe, increased in size  since MRI brain June 18, 2022. Small acute to subacute infarction along the left external capsule, stable  since June 18, 2022. Old lacunar infarcts in the left central franki. Mild to moderate parenchymal volume loss. Mild chronic microvascular disease. 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 Right dominance of the vertebral arteries with hypoplastic intracranial left  vertebral artery, and superimposed occlusion in the mid to distal  intracranial left vertebral artery. Moderate stenosis in the intracranial right vertebral artery. Moderate stenosis in the proximal P2 segment of the left PCA. Known 95% critical stenosis in the proximal right internal carotid artery. 80% stenosis in the proximal left internal carotid artery. MRA NECK WO CONTRAST  Narrative: EXAMINATION:  MRA OF THE NECK WITHOUT CONTRAST; MRA OF THE HEAD WITHOUT CONTRAST; MRI OF  THE BRAIN WITHOUT CONTRAST 6/23/2022 1:08 pm; 6/23/2022 1:07 pm    TECHNIQUE:  Multiplanar multisequence MRA of the neck was performed without the  administration of intravenous contrast. Stenosis of the internal carotid  arteries measured using NASCET criteria.; MRA of the head was performed  utilizing time-of-flight imaging with MIP images.  No intravenous contrast was  administered.; Multiplanar multisequence MRI of the brain was performed  without the administration of intravenous contrast.    COMPARISON:  CT head June 22, 2022, MRI brain June 18, 2022, CTA head and neck June 17, 2022    HISTORY:  ORDERING SYSTEM PROVIDED HISTORY: unresponsive, severe cerebrovacular disease  on CTA  TECHNOLOGIST PROVIDED HISTORY:  Reason for exam:->unresponsive, severe cerebrovacular disease on CTA  Reason for Exam: unresponsive, severe cerebrovacular disease on CTA; ORDERING  SYSTEM PROVIDED HISTORY: unresponsive, severe cerebrovascular disease on CTA  TECHNOLOGIST PROVIDED HISTORY:  Reason for exam:->unresponsive, severe cerebrovascular disease on CTA  Reason for Exam: unresponsive, severe cerebrovascular disease on CTA;  ORDERING SYSTEM PROVIDED HISTORY: ams  TECHNOLOGIST PROVIDED HISTORY:  Reason for exam:->ams  Reason for Exam: ams    FINDINGS:  MRI Brain:    INTRACRANIAL STRUCTURES/VENTRICLES: There are acute to subacute infarctions  in the left frontal lobe, increased in size since June 18, 2022. There is a  small acute to subacute infarction along the left external capsule, stable  since June 18, 2022. There are old lacunar infarcts in the left central  franki. There is mild to moderate parenchymal volume loss. There are  scattered foci of T2/FLAIR hyperintensity in the periventricular and  subcortical white matter, likely related to mild chronic microvascular  disease. No mass effect or midline shift. No acute intracranial hemorrhage. There is no hydrocephalus. The sellar/suprasellar regions appear  unremarkable. The normal signal voids within the major intracranial vessels  appear maintained. ORBITS: The visualized portion of the orbits demonstrate no acute abnormality. SINUSES: There is scattered mild mucosal thickening in the paranasal sinuses. The bilateral mastoid air cells are clear. BONES/SOFT TISSUES: The bone marrow signal intensity appears normal. The soft  tissues demonstrate no acute abnormality. MRA HEAD:    ANTERIOR CIRCULATION: There is known severe stenosis in a proximal M2 branch  of the left MCA. The internal carotid arteries are normal in course and  caliber without focal stenosis.  The anterior cerebral and right middle  cerebral arteries demonstrate no focal stenosis. POSTERIOR CIRCULATION: There is right dominance of the vertebral arteries  with hypoplastic intracranial left vertebral artery, and superimposed  occlusion in the mid to distal intracranial left vertebral artery. There is  moderate stenosis in the intracranial right vertebral artery. There is  moderate stenosis in the proximal P2 segment of the left PCA. There is mild  stenosis in the P2 segment of the right PCA. The basilar artery appear  unremarkable. No intracranial aneurysm. MRA neck:    Common and internal carotid arteries: There is 95% critical stenosis in the  proximal right internal carotid artery. There is 80% stenosis in the  proximal left internal carotid artery. There is mild stenosis in the mid  left common carotid artery. The bilateral common carotid arteries are within  normal limits without flow limiting stenosis. The bilateral internal carotid  arteries are within normal limits without flow limiting stenosis by NASCET  criteria. There is no dissection or vascular injury. Vertebral arteries: There is right dominance of the vertebral arteries with  hypoplastic left vertebral artery. Impression: Acute to subacute infarctions in the left frontal lobe, increased in size  since MRI brain June 18, 2022. Small acute to subacute infarction along the left external capsule, stable  since June 18, 2022. Old lacunar infarcts in the left central franki. Mild to moderate parenchymal volume loss. Mild chronic microvascular disease. 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 Right dominance of the vertebral arteries with hypoplastic intracranial left  vertebral artery, and superimposed occlusion in the mid to distal  intracranial left vertebral artery. Moderate stenosis in the intracranial right vertebral artery. Moderate stenosis in the proximal P2 segment of the left PCA.     Known 95% critical stenosis in the proximal right internal carotid artery. 80% stenosis in the proximal left internal carotid artery. MRI BRAIN WO CONTRAST  Narrative: EXAMINATION:  MRA OF THE NECK WITHOUT CONTRAST; MRA OF THE HEAD WITHOUT CONTRAST; MRI OF  THE BRAIN WITHOUT CONTRAST 6/23/2022 1:08 pm; 6/23/2022 1:07 pm    TECHNIQUE:  Multiplanar multisequence MRA of the neck was performed without the  administration of intravenous contrast. Stenosis of the internal carotid  arteries measured using NASCET criteria.; MRA of the head was performed  utilizing time-of-flight imaging with MIP images. No intravenous contrast was  administered.; Multiplanar multisequence MRI of the brain was performed  without the administration of intravenous contrast.    COMPARISON:  CT head June 22, 2022, MRI brain June 18, 2022, CTA head and neck June 17, 2022    HISTORY:  ORDERING SYSTEM PROVIDED HISTORY: unresponsive, severe cerebrovacular disease  on CTA  TECHNOLOGIST PROVIDED HISTORY:  Reason for exam:->unresponsive, severe cerebrovacular disease on CTA  Reason for Exam: unresponsive, severe cerebrovacular disease on CTA; ORDERING  SYSTEM PROVIDED HISTORY: unresponsive, severe cerebrovascular disease on CTA  TECHNOLOGIST PROVIDED HISTORY:  Reason for exam:->unresponsive, severe cerebrovascular disease on CTA  Reason for Exam: unresponsive, severe cerebrovascular disease on CTA;  ORDERING SYSTEM PROVIDED HISTORY: ams  TECHNOLOGIST PROVIDED HISTORY:  Reason for exam:->ams  Reason for Exam: ams    FINDINGS:  MRI Brain:    INTRACRANIAL STRUCTURES/VENTRICLES: There are acute to subacute infarctions  in the left frontal lobe, increased in size since June 18, 2022. There is a  small acute to subacute infarction along the left external capsule, stable  since June 18, 2022. There are old lacunar infarcts in the left central  franki. There is mild to moderate parenchymal volume loss.   There are  scattered foci of T2/FLAIR hyperintensity in the periventricular and  subcortical white matter, likely related to mild chronic microvascular  disease. No mass effect or midline shift. No acute intracranial hemorrhage. There is no hydrocephalus. The sellar/suprasellar regions appear  unremarkable. The normal signal voids within the major intracranial vessels  appear maintained. ORBITS: The visualized portion of the orbits demonstrate no acute abnormality. SINUSES: There is scattered mild mucosal thickening in the paranasal sinuses. The bilateral mastoid air cells are clear. BONES/SOFT TISSUES: The bone marrow signal intensity appears normal. The soft  tissues demonstrate no acute abnormality. MRA HEAD:    ANTERIOR CIRCULATION: There is known severe stenosis in a proximal M2 branch  of the left MCA. The internal carotid arteries are normal in course and  caliber without focal stenosis. The anterior cerebral and right middle  cerebral arteries demonstrate no focal stenosis. POSTERIOR CIRCULATION: There is right dominance of the vertebral arteries  with hypoplastic intracranial left vertebral artery, and superimposed  occlusion in the mid to distal intracranial left vertebral artery. There is  moderate stenosis in the intracranial right vertebral artery. There is  moderate stenosis in the proximal P2 segment of the left PCA. There is mild  stenosis in the P2 segment of the right PCA. The basilar artery appear  unremarkable. No intracranial aneurysm. MRA neck:    Common and internal carotid arteries: There is 95% critical stenosis in the  proximal right internal carotid artery. There is 80% stenosis in the  proximal left internal carotid artery. There is mild stenosis in the mid  left common carotid artery. The bilateral common carotid arteries are within  normal limits without flow limiting stenosis. The bilateral internal carotid  arteries are within normal limits without flow limiting stenosis by NASCET  criteria.   There is no dissection or vascular injury. Vertebral arteries: There is right dominance of the vertebral arteries with  hypoplastic left vertebral artery. Impression: Acute to subacute infarctions in the left frontal lobe, increased in size  since MRI brain June 18, 2022. Small acute to subacute infarction along the left external capsule, stable  since June 18, 2022. Old lacunar infarcts in the left central franki. Mild to moderate parenchymal volume loss. Mild chronic microvascular disease. 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 Right dominance of the vertebral arteries with hypoplastic intracranial left  vertebral artery, and superimposed occlusion in the mid to distal  intracranial left vertebral artery. Moderate stenosis in the intracranial right vertebral artery. Moderate stenosis in the proximal P2 segment of the left PCA. Known 95% critical stenosis in the proximal right internal carotid artery. 80% stenosis in the proximal left internal carotid artery. Access  CVC   Arterial          PICC             CVC        CVC Triple Lumen 06/22/22 Right Femoral (Active)   $ Central line insertion $ Yes 06/22/22 1554   Central Line Being Utilized Yes 06/25/22 0600   Criteria for Appropriate Use Hemodynamically unstable, requiring monitoring lines, vasopressors, or volume resuscitation 06/25/22 0600   Site Assessment Clean, dry & intact 06/25/22 0600   Phlebitis Assessment No symptoms 06/25/22 0600   Infiltration Assessment 0 06/25/22 0600   Color/Movement/Sensation Capillary refill less than 3 sec 06/25/22 0600   Proximal Lumen Color/Status White; Infusing;Capped 06/25/22 0600   Medial Lumen Status Blue; Infusing;Capped 06/25/22 0600   Distal Lumen Color/Status Brown; Infusing;Capped 06/25/22 0600   Line Care Connections checked and tightened 06/25/22 0600   Alcohol Cap Used Yes 06/25/22 0600   Date of Last Dressing Change 06/22/22 06/25/22 0600   Dressing Type Transparent; Antimicrobial 06/25/22 0600   Dressing Status Clean, dry & intact 06/25/22 0600   Number of days: 2            Moser  Urinary Catheter 2 Way (Active)   $ Urethral catheter insertion Inserted for procedure 06/22/22 1510   Catheter Indications Need for fluid volume management of the critically ill patient in a critical care setting 06/25/22 0600   Site Assessment No urethral drainage 06/25/22 0600   Urine Color Yellow 06/25/22 0600   Urine Appearance Clear 06/25/22 0600   Collection Container Standard 06/25/22 0600   Securement Method Securing device (Describe) 06/25/22 0600   Catheter Care Completed Yes 06/25/22 0200   Catheter Best Practices  Drainage tube clipped to bed;Catheter secured to thigh; Tamper seal intact; Bag below bladder;Bag not on floor; Lack of dependent loop in tubing;Drainage bag less than half full 06/25/22 0600   Status Draining 06/25/22 0600   Manual Irrigation Volume Input (mL) 0 mL 06/24/22 1000   Output (mL) 155 mL 06/25/22 0600   Number of days: 2         Thank you for this consult,    Andre Lmobardi MD  [facility]monary, Critical Care, and Sleep Medicine

## 2022-06-25 NOTE — CONSULTS
[address]                                  CONSULTATION    PATIENT NAME: Rebekah Lau                        :        1940  MED REC NO:   [id number]                          ROOM:       2104  ACCOUNT NO:   [de-identified]                           ADMIT DATE: 2022  PROVIDER:     Sushant Morfin MD    ONCOLOGY CONSULTATION    CONSULT DATE:  2022    REASON FOR CONSULTATION:  History of hepatocellular carcinoma, in with  respiratory failure. CONSULTING PROVIDER:  Meme Marr MD    HISTORY OF PRESENT ILLNESS:  The patient is an 80-year-old gentleman who  only speaks [place]ic is currently intubated. He has a history of  hepatocellular carcinoma followed by my partner, Dr. Zoltan Bower. He has  been on Avastin and Tecentriq for quite some time. A recent CT during  this admission shows that the tumor in his liver is slowly getting  bigger. In terms of his recent history, he was discharged from [facility] earlier this month after having a left hemispheric stroke. He  was doing well up until the day of admission when he developed nausea. He came to the hospital and was found to be in respiratory distress. He  was diagnosed with aspiration pneumonia and is intubated. Repeat MRI  was done showing an increase in size in his strokes from . His  liver lesion was slightly bigger on the CT of his chest that was done. Therefore, Oncology was consulted. The patient is currently intubated. The history was taken by his grandson who speaks [place]. PAST MEDICAL HISTORY:  1. Hepatocellular carcinoma. He is currently receiving Tecentriq and  Avastin for that. He moved here from [place] and was discovered to have  hepatocellular carcinoma in 2020.   He was initially started on  Lenvima but did not tolerate it and was switched to Avastin and  Tecentriq which he has been on for the last year.  2.  Asthma. 3.  Hypertension. 4.  Hypothyroidism. PAST SURGICAL HISTORY:  None. MEDICATION LIST:  Please see the list in the chart. SOCIAL HISTORY:  He is . He does not drink or smoke. He is  retired. FAMILY HISTORY:  Noncontributory. REVIEW OF SYSTEMS:  Unable to be obtained since he is intubated. PHYSICAL EXAMINATION:  VITAL SIGNS:  He is afebrile with normal vital signs. GENERAL:  He is in no acute distress. NECK:  He has no palpable lymphadenopathy. HEART:  Regular rate and rhythm. LUNGS:  Clear to auscultation bilaterally. ABDOMEN:  Nondistended, nontender with bowel sounds x4. EXTREMITIES:  He has no peripheral clubbing, cyanosis, or edema. NEUROLOGIC EXAM:  Stable. LABORATORY DATA:  White blood cell count 15.2, hemoglobin 10.4,  platelets of 698. ASSESSMENT AND PLAN:  1. Hepatocellular carcinoma. His liver tumor has slowly been  increasing over time. I spoke with his grandson. The grandson  indicates that the family wants to continue to be aggressive. He will  likely need to be switched to another treatment. There are other oral  TKIs that can be used. He might be a candidate for Y90 as well. He is  not a candidate for surgery, however. He would obviously need to  recover from this to be considered for any of these treatments. 2.  Respiratory failure/stroke. He is followed by Pulmonary as well as  Neurology. 3.  Acute kidney injury. Nephrology is following. Thank you for the consultation. We will follow closely. Dr. Mark Kumar is  covering over the weekend.         Mika Schaffer MD    D: 06/24/2022 16:43:21       T: 06/24/2022 19:03:54     [email or web address]  Job#: [id number]     Doc#: [id number]    CC:  Chio Forbes MD

## 2022-06-26 LAB
ANION GAP SERPL CALCULATED.3IONS-SCNC: 11 MMOL/L (ref 3–16)
BASE EXCESS ARTERIAL: -3.1 MMOL/L (ref -3–3)
BASOPHILS ABSOLUTE: 0.1 K/UL (ref 0–0.2)
BASOPHILS RELATIVE PERCENT: 0.4 %
BLOOD CULTURE, ROUTINE: NORMAL
BUN BLDV-MCNC: 31 MG/DL (ref 7–20)
CALCIUM SERPL-MCNC: 8.3 MG/DL (ref 8.3–10.6)
CARBOXYHEMOGLOBIN ARTERIAL: 0.4 % (ref 0–1.5)
CHLORIDE BLD-SCNC: 113 MMOL/L (ref 99–110)
CO2: 20 MMOL/L (ref 21–32)
CREAT SERPL-MCNC: 1.1 MG/DL (ref 0.8–1.3)
CULTURE, BLOOD 2: NORMAL
CULTURE, RESPIRATORY: ABNORMAL
EOSINOPHILS ABSOLUTE: 0 K/UL (ref 0–0.6)
EOSINOPHILS RELATIVE PERCENT: 0.3 %
GFR AFRICAN AMERICAN: >60
GFR NON-AFRICAN AMERICAN: >60
GLUCOSE BLD-MCNC: 157 MG/DL (ref 70–99)
GLUCOSE BLD-MCNC: 159 MG/DL (ref 70–99)
GLUCOSE BLD-MCNC: 164 MG/DL (ref 70–99)
GLUCOSE BLD-MCNC: 164 MG/DL (ref 70–99)
GLUCOSE BLD-MCNC: 174 MG/DL (ref 70–99)
GLUCOSE BLD-MCNC: 193 MG/DL (ref 70–99)
GLUCOSE BLD-MCNC: 206 MG/DL (ref 70–99)
GRAM STAIN RESULT: ABNORMAL
HCO3 ARTERIAL: 21.2 MMOL/L (ref 21–29)
HCT VFR BLD CALC: 34 % (ref 40.5–52.5)
HEMOGLOBIN, ART, EXTENDED: 11.7 G/DL (ref 13.5–17.5)
HEMOGLOBIN: 11.2 G/DL (ref 13.5–17.5)
LYMPHOCYTES ABSOLUTE: 1.5 K/UL (ref 1–5.1)
LYMPHOCYTES RELATIVE PERCENT: 10.7 %
MAGNESIUM: 2 MG/DL (ref 1.8–2.4)
MCH RBC QN AUTO: 28.4 PG (ref 26–34)
MCHC RBC AUTO-ENTMCNC: 33 G/DL (ref 31–36)
MCV RBC AUTO: 86.1 FL (ref 80–100)
METHEMOGLOBIN ARTERIAL: 0.5 %
MONOCYTES ABSOLUTE: 1.2 K/UL (ref 0–1.3)
MONOCYTES RELATIVE PERCENT: 8.5 %
NEUTROPHILS ABSOLUTE: 10.8 K/UL (ref 1.7–7.7)
NEUTROPHILS RELATIVE PERCENT: 80.1 %
O2 SAT, ARTERIAL: 94.7 %
O2 THERAPY: ABNORMAL
ORGANISM: ABNORMAL
ORGANISM: ABNORMAL
PCO2 ARTERIAL: 34.6 MMHG (ref 35–45)
PDW BLD-RTO: 13 % (ref 12.4–15.4)
PERFORMED ON: ABNORMAL
PH ARTERIAL: 7.4 (ref 7.35–7.45)
PHOSPHORUS: 2.5 MG/DL (ref 2.5–4.9)
PLATELET # BLD: 256 K/UL (ref 135–450)
PMV BLD AUTO: 7.3 FL (ref 5–10.5)
PO2 ARTERIAL: 75.1 MMHG (ref 75–108)
POTASSIUM SERPL-SCNC: 4.3 MMOL/L (ref 3.5–5.1)
RBC # BLD: 3.96 M/UL (ref 4.2–5.9)
SODIUM BLD-SCNC: 144 MMOL/L (ref 136–145)
TCO2 ARTERIAL: 22.3 MMOL/L
WBC # BLD: 13.5 K/UL (ref 4–11)

## 2022-06-26 PROCEDURE — 2700000000 HC OXYGEN THERAPY PER DAY

## 2022-06-26 PROCEDURE — 76937 US GUIDE VASCULAR ACCESS: CPT

## 2022-06-26 PROCEDURE — 6370000000 HC RX 637 (ALT 250 FOR IP): Performed by: NURSE PRACTITIONER

## 2022-06-26 PROCEDURE — 6370000000 HC RX 637 (ALT 250 FOR IP): Performed by: INTERNAL MEDICINE

## 2022-06-26 PROCEDURE — C9113 INJ PANTOPRAZOLE SODIUM, VIA: HCPCS | Performed by: NURSE PRACTITIONER

## 2022-06-26 PROCEDURE — 82803 BLOOD GASES ANY COMBINATION: CPT

## 2022-06-26 PROCEDURE — 85025 COMPLETE CBC W/AUTO DIFF WBC: CPT

## 2022-06-26 PROCEDURE — 6360000002 HC RX W HCPCS: Performed by: INTERNAL MEDICINE

## 2022-06-26 PROCEDURE — 2500000003 HC RX 250 WO HCPCS: Performed by: INTERNAL MEDICINE

## 2022-06-26 PROCEDURE — 94003 VENT MGMT INPAT SUBQ DAY: CPT

## 2022-06-26 PROCEDURE — 6360000002 HC RX W HCPCS: Performed by: NURSE PRACTITIONER

## 2022-06-26 PROCEDURE — 2580000003 HC RX 258: Performed by: INTERNAL MEDICINE

## 2022-06-26 PROCEDURE — 99291 CRITICAL CARE FIRST HOUR: CPT | Performed by: INTERNAL MEDICINE

## 2022-06-26 PROCEDURE — 84100 ASSAY OF PHOSPHORUS: CPT

## 2022-06-26 PROCEDURE — 94640 AIRWAY INHALATION TREATMENT: CPT

## 2022-06-26 PROCEDURE — 36569 INSJ PICC 5 YR+ W/O IMAGING: CPT

## 2022-06-26 PROCEDURE — 02HV33Z INSERTION OF INFUSION DEVICE INTO SUPERIOR VENA CAVA, PERCUTANEOUS APPROACH: ICD-10-PCS | Performed by: INTERNAL MEDICINE

## 2022-06-26 PROCEDURE — 83735 ASSAY OF MAGNESIUM: CPT

## 2022-06-26 PROCEDURE — 94761 N-INVAS EAR/PLS OXIMETRY MLT: CPT

## 2022-06-26 PROCEDURE — 2580000003 HC RX 258: Performed by: NURSE PRACTITIONER

## 2022-06-26 PROCEDURE — 2000000000 HC ICU R&B

## 2022-06-26 PROCEDURE — 80048 BASIC METABOLIC PNL TOTAL CA: CPT

## 2022-06-26 RX ORDER — SODIUM CHLORIDE 9 MG/ML
25 INJECTION, SOLUTION INTRAVENOUS PRN
Status: DISCONTINUED | OUTPATIENT
Start: 2022-06-26 | End: 2022-06-26 | Stop reason: SDUPTHER

## 2022-06-26 RX ORDER — SODIUM CHLORIDE 0.9 % (FLUSH) 0.9 %
5-40 SYRINGE (ML) INJECTION EVERY 12 HOURS SCHEDULED
Status: DISCONTINUED | OUTPATIENT
Start: 2022-06-26 | End: 2022-06-26 | Stop reason: SDUPTHER

## 2022-06-26 RX ORDER — IPRATROPIUM BROMIDE AND ALBUTEROL SULFATE 2.5; .5 MG/3ML; MG/3ML
1 SOLUTION RESPIRATORY (INHALATION) EVERY 4 HOURS
Status: DISCONTINUED | OUTPATIENT
Start: 2022-06-26 | End: 2022-07-05

## 2022-06-26 RX ORDER — SODIUM CHLORIDE 9 MG/ML
25 INJECTION, SOLUTION INTRAVENOUS PRN
Status: DISCONTINUED | OUTPATIENT
Start: 2022-06-26 | End: 2022-07-05 | Stop reason: HOSPADM

## 2022-06-26 RX ORDER — ENOXAPARIN SODIUM 100 MG/ML
40 INJECTION SUBCUTANEOUS NIGHTLY
Status: DISCONTINUED | OUTPATIENT
Start: 2022-06-26 | End: 2022-07-05 | Stop reason: HOSPADM

## 2022-06-26 RX ORDER — LIDOCAINE HYDROCHLORIDE 10 MG/ML
5 INJECTION, SOLUTION EPIDURAL; INFILTRATION; INTRACAUDAL; PERINEURAL ONCE
Status: DISCONTINUED | OUTPATIENT
Start: 2022-06-26 | End: 2022-06-27

## 2022-06-26 RX ORDER — SODIUM CHLORIDE 0.9 % (FLUSH) 0.9 %
5-40 SYRINGE (ML) INJECTION PRN
Status: DISCONTINUED | OUTPATIENT
Start: 2022-06-26 | End: 2022-06-26 | Stop reason: SDUPTHER

## 2022-06-26 RX ADMIN — INSULIN LISPRO 3 UNITS: 100 INJECTION, SOLUTION INTRAVENOUS; SUBCUTANEOUS at 16:05

## 2022-06-26 RX ADMIN — CEFEPIME HYDROCHLORIDE 2000 MG: 2 INJECTION, POWDER, FOR SOLUTION INTRAVENOUS at 22:14

## 2022-06-26 RX ADMIN — SODIUM CHLORIDE, PRESERVATIVE FREE 10 ML: 5 INJECTION INTRAVENOUS at 08:42

## 2022-06-26 RX ADMIN — Medication 40 MG: at 08:33

## 2022-06-26 RX ADMIN — POLYETHYLENE GLYCOL 3350 17 G: 17 POWDER, FOR SOLUTION ORAL at 16:04

## 2022-06-26 RX ADMIN — ACETAMINOPHEN 650 MG: 325 TABLET ORAL at 00:00

## 2022-06-26 RX ADMIN — CHLORHEXIDINE GLUCONATE 0.12% ORAL RINSE 15 ML: 1.2 LIQUID ORAL at 20:35

## 2022-06-26 RX ADMIN — ASPIRIN 300 MG: 300 SUPPOSITORY RECTAL at 08:26

## 2022-06-26 RX ADMIN — INSULIN LISPRO 3 UNITS: 100 INJECTION, SOLUTION INTRAVENOUS; SUBCUTANEOUS at 05:40

## 2022-06-26 RX ADMIN — INSULIN LISPRO 3 UNITS: 100 INJECTION, SOLUTION INTRAVENOUS; SUBCUTANEOUS at 08:31

## 2022-06-26 RX ADMIN — IPRATROPIUM BROMIDE AND ALBUTEROL SULFATE 1 AMPULE: .5; 3 SOLUTION RESPIRATORY (INHALATION) at 19:03

## 2022-06-26 RX ADMIN — IPRATROPIUM BROMIDE AND ALBUTEROL SULFATE 1 AMPULE: .5; 3 SOLUTION RESPIRATORY (INHALATION) at 15:54

## 2022-06-26 RX ADMIN — METRONIDAZOLE 500 MG: 500 INJECTION, SOLUTION INTRAVENOUS at 07:00

## 2022-06-26 RX ADMIN — IPRATROPIUM BROMIDE AND ALBUTEROL SULFATE 1 AMPULE: .5; 3 SOLUTION RESPIRATORY (INHALATION) at 12:05

## 2022-06-26 RX ADMIN — SODIUM CHLORIDE, PRESERVATIVE FREE 10 ML: 5 INJECTION INTRAVENOUS at 20:36

## 2022-06-26 RX ADMIN — CHLORHEXIDINE GLUCONATE 0.12% ORAL RINSE 15 ML: 1.2 LIQUID ORAL at 08:32

## 2022-06-26 RX ADMIN — ACETAMINOPHEN 650 MG: 325 TABLET ORAL at 16:04

## 2022-06-26 RX ADMIN — IPRATROPIUM BROMIDE AND ALBUTEROL SULFATE 1 AMPULE: .5; 3 SOLUTION RESPIRATORY (INHALATION) at 23:08

## 2022-06-26 RX ADMIN — INSULIN LISPRO 6 UNITS: 100 INJECTION, SOLUTION INTRAVENOUS; SUBCUTANEOUS at 20:36

## 2022-06-26 RX ADMIN — INSULIN LISPRO 3 UNITS: 100 INJECTION, SOLUTION INTRAVENOUS; SUBCUTANEOUS at 00:01

## 2022-06-26 RX ADMIN — CEFEPIME HYDROCHLORIDE 2000 MG: 2 INJECTION, POWDER, FOR SOLUTION INTRAVENOUS at 12:10

## 2022-06-26 RX ADMIN — ENOXAPARIN SODIUM 40 MG: 100 INJECTION SUBCUTANEOUS at 20:35

## 2022-06-26 RX ADMIN — INSULIN LISPRO 3 UNITS: 100 INJECTION, SOLUTION INTRAVENOUS; SUBCUTANEOUS at 12:15

## 2022-06-26 ASSESSMENT — PULMONARY FUNCTION TESTS
PIF_VALUE: 12
PIF_VALUE: 11
PIF_VALUE: 12
PIF_VALUE: 11
PIF_VALUE: 12
PIF_VALUE: 11
PIF_VALUE: 12
PIF_VALUE: 11
PIF_VALUE: 12
PIF_VALUE: 11
PIF_VALUE: 12

## 2022-06-26 ASSESSMENT — PAIN SCALES - GENERAL
PAINLEVEL_OUTOF10: 0

## 2022-06-26 NOTE — PROGRESS NOTES
Hospitalist Progress Note      PCP: No primary care provider on file. Chief Complaint. Patient is 58-year-old male who presented to hospital for change in mental status. Patient was found unresponsive at home, patient is not able to provide history due to condition, most of the history is from patient's chart. Patient was recently discharged from the rehab facility after he was diagnosed with CVA. Patient was covered in vomiting on arrival of EMS    Date of Admission: 6/22/2022    Subjective: remains on vent. Making eye contact      Medications:  Reviewed    Infusion Medications    sodium chloride      dextrose      sodium chloride       Scheduled Medications    ipratropium-albuterol  1 ampule Inhalation Q4H    enoxaparin  40 mg SubCUTAneous Nightly    cefepime  2,000 mg IntraVENous Q12H    lidocaine 1 % injection  5 mL IntraDERmal Once    chlorhexidine  15 mL Mouth/Throat BID    pantoprazole (PROTONIX) 40 mg injection  40 mg IntraVENous Daily    insulin lispro  0-18 Units SubCUTAneous Q4H    aspirin  300 mg Rectal Daily    sodium chloride flush  10 mL IntraVENous 2 times per day     PRN Meds: sodium chloride, hydrALAZINE, glucose, dextrose bolus **OR** dextrose bolus, glucagon (rDNA), dextrose, sodium chloride flush, sodium chloride, promethazine **OR** ondansetron, polyethylene glycol, acetaminophen **OR** acetaminophen      Intake/Output Summary (Last 24 hours) at 6/26/2022 1619  Last data filed at 6/26/2022 1555  Gross per 24 hour   Intake 3233.27 ml   Output 2855 ml   Net 378.27 ml       Physical Exam Performed:    /80   Pulse (!) 113   Temp (!) 101.5 °F (38.6 °C) (Oral)   Resp (!) 34   Ht 4' 9\" (1.448 m)   Wt 127 lb 6.8 oz (57.8 kg)   SpO2 96%   BMI 27.57 kg/m²     General appearance: Intubated poorly interactive  HEENT:  Conjunctivae/corneas clear. Neck: Supple, with full range of motion. Respiratory:  Normal respiratory effort.  Clear to auscultation, bilaterally volume loss. Mild chronic microvascular disease. 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 Right dominance of the vertebral arteries with hypoplastic intracranial left   vertebral artery, and superimposed occlusion in the mid to distal   intracranial left vertebral artery. Moderate stenosis in the intracranial right vertebral artery. Moderate stenosis in the proximal P2 segment of the left PCA. Known 95% critical stenosis in the proximal right internal carotid artery. 80% stenosis in the proximal left internal carotid artery. MRA HEAD WO CONTRAST   Final Result   Acute to subacute infarctions in the left frontal lobe, increased in size   since MRI brain June 18, 2022. Small acute to subacute infarction along the left external capsule, stable   since June 18, 2022. Old lacunar infarcts in the left central franki. Mild to moderate parenchymal volume loss. Mild chronic microvascular disease. 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 Right dominance of the vertebral arteries with hypoplastic intracranial left   vertebral artery, and superimposed occlusion in the mid to distal   intracranial left vertebral artery. Moderate stenosis in the intracranial right vertebral artery. Moderate stenosis in the proximal P2 segment of the left PCA. Known 95% critical stenosis in the proximal right internal carotid artery. 80% stenosis in the proximal left internal carotid artery. MRI BRAIN WO CONTRAST   Final Result   Acute to subacute infarctions in the left frontal lobe, increased in size   since MRI brain June 18, 2022. Small acute to subacute infarction along the left external capsule, stable   since June 18, 2022. Old lacunar infarcts in the left central franki. Mild to moderate parenchymal volume loss. Mild chronic microvascular disease.       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 Right dominance of the vertebral arteries with hypoplastic intracranial left   vertebral artery, and superimposed occlusion in the mid to distal   intracranial left vertebral artery. Moderate stenosis in the intracranial right vertebral artery. Moderate stenosis in the proximal P2 segment of the left PCA. Known 95% critical stenosis in the proximal right internal carotid artery. 80% stenosis in the proximal left internal carotid artery. CT CHEST PULMONARY EMBOLISM W CONTRAST   Final Result   No evidence of a pulmonary embolus. ET tube and gastric tube in good position. 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 Questionable mucous plugging scattered in the bronchi along the right lung   base which may be due to recent aspiration. Recommend pulmonology follow-up. 7 cm exophytic right hepatic mass which has increased in size. Suggest   PET-CT correlation. Postop changes right apex and upper lobe with fibrosis and scarring   throughout the area which is unchanged.          XR CHEST PORTABLE   Final Result   Unchanged right upper lobe scarring      No acute cardiopulmonary process         CT Head WO Contrast   Final Result   New low-attenuation in the left corona radiata consistent with an evolving   infarct as described on the recent MRI      No acute hemorrhage               Assessment/Plan:    Active Hospital Problems    Diagnosis     Syncope and collapse [R55]      Priority: Medium    Acute respiratory failure (HCC) [J96.00]      Priority: Medium    Aspiration pneumonia ([facility] [J69.0]      Priority: Medium    History of CVA (cerebrovascular accident) [Z86.73]      Priority: Medium    AMS (altered mental status) [R41.82]      Priority: Medium    Acute cerebrovascular accident (CVA) ([facility] [I63.9]      Priority: Medium     Patient is 60-year-old male who presented to hospital for change in mental status. Patient was found unresponsive at home, patient is not able to provide history due to condition, most of the history is from patient's chart. Patient was recently discharged from the rehab facility after he was diagnosed with CVA. Patient was covered in vomiting on arrival of EMS     Assessment  Acute hypoxic respiratory failure s/p intubation  Sepsis present on admission  Suspect aspiration pneumonia  Acute encephalopathy  Recent history of CVA  Lactic acidosis  Acute kidney injury  Hepatic cancer        Plan  Pt with poor neuro status and clearance of secretions is barrier to remain off vent  Trach as possibility for long term  His poor mental status may be a barrier to successful extubation  Continue broad-spectrum antibiotics  He is now off of pressors  Palliative care continue to talk with the family. May benefit from a family meeting with palliative care next week to continue to address goals of care-nurses feel language barrier may be preventing family from truly understanding his condition    Patient needs femoral line removed. He is not an appropriate candidate for hemodialysis (metastatic hepatocellular cancer) and needs midline/picc line access. Risk mitigation strategy needs to be considered. Further deep lines carry risk of pneumothorax and further infection. Picc midline is ideal here. I am fairly confident that family will continue to accept his condition and move toward HOSPICE and PALLIATION. I have asked RN to please reach back out to nephrology to better understand the situation.   I would like this midline/picc access placed today as the Attending Physician of record        DVT prophylaxis-Lovenox  Diet: Diet NPO  ADULT TUBE FEEDING; Orogastric; Diabetic; Continuous; 25; Yes; 25; Q 4 hours; 55; 30; Q 4 hours  Code Status: Full Code    PT/OT Eval Status: ordered    Dispo/Plan of care -continue IV antibiotics, wean ventilator as tolerated, neurology following    Colt Dang MD

## 2022-06-26 NOTE — PROGRESS NOTES
[facility]                                               Progress note    Summary:   Christiana Snell is being seen by nephrology for LUL. Admitted with AMS. He is now intubated for hypoxic respiratory failure. Had recent stroke dx with aspiration pneumonia on ABX. also has metastatic HCC. Interval History  Seen and examined in ICU  Intubated. /59  UO 2370  Wt 127 on admit last was 115 ? Does not appear volume overloaded. Na 134  K4.1  bicarb 21  BUN 38   Cr 1.8-->114   Family here discussed   BP rising  urine 2730  Ok for Midline     Plan:   Renal function improving . Nina Bermudez MD  [facility]  Office: [phone]    Assessment:     #LUL  Baseline Cr   Cr at time of consult  UA  Had septic shock , required pressors. Non oliguric   UA with hyaline catss 2 WBC and 2 RBC  No proteinuria but discrepant values < 4 then 233 on another check  , did not correlate with UA showing albumin > 1000 mg, dl will check again. Had contrast on 6/22  SPEP negative     #acute hypoxic respiratory failure. Aspiration pneumonia  CTPA negative for PE  proca elevated  Blood cxs  Sputum cx  MRSA nares negative   Pro BNP 91  Hyperdynamic EF 70% consistent with septic shock   G1DD    #Mild hyponatremia  Likely due to intravascular volume depletion. #HCC  Prevoius CT Abd / Pelvis with conrast showed hepatic mass with no nmets, moderate stenosis of SMA and 50% narrowing. No mention of renal arteries or stenosis. CT abd without contrast on 6/3/22 showed normal kidneys and no hydro, mass, or stones. ROS:   Unable to assess      PMH:   Past medical history, surgical history, social history, family history are reviewed and updated as appropriate. Reviewed current medication list.   Allergies reviewed and updated as needed. PE:   Vitals:    06/26/22 1700   BP: 90/65   Pulse: (!) 118   Resp: (!) 32   Temp:    SpO2: 95%       General appearance:  in NAD, . Comfortable.    HEENT: EOM intact, no icterus. Trachea is midline. Neck : No masses, appears symmetrical, no JVD  Respiratory: Respiratory effort appears normal, bilateral equal chest rise, no wheeze, no crackles   Cardiovascular: Ausculation shows RRR no edema  Abdomen: No visible mass or tenderness, non distended. Musculoskeletal:  Joints with no swelling or deformity. Skin:no rashes, ulcers, induration, no jaundice.    Neuro: sedated       Lab Results   Component Value Date    CREATININE 1.1 06/26/2022    BUN 31 (H) 06/26/2022     06/26/2022    K 4.3 06/26/2022     (H) 06/26/2022    CO2 20 (L) 06/26/2022      Lab Results   Component Value Date    WBC 13.5 (H) 06/26/2022    HGB 11.2 (L) 06/26/2022    HCT 34.0 (L) 06/26/2022    MCV 86.1 06/26/2022     06/26/2022     Lab Results   Component Value Date    CALCIUM 8.3 06/26/2022    PHOS 2.5 06/26/2022

## 2022-06-26 NOTE — PROGRESS NOTES
Arrived to place Midline in patient with Mikik Alford RN at bedside. Pre procedure and allergies reviewed, no issues accessing basilic vein, pt tolerated well, blood return and flushed well. Pt left in stable condition and bed b raked and in lowest position. Pt call light within reach. Handoff to RN.

## 2022-06-27 ENCOUNTER — APPOINTMENT (OUTPATIENT)
Dept: GENERAL RADIOLOGY | Age: 82
DRG: 720 | End: 2022-06-27
Payer: MEDICAID

## 2022-06-27 LAB
ALBUMIN SERPL-MCNC: 2.2 G/DL (ref 3.1–4.9)
ALPHA-1-GLOBULIN: 0.6 G/DL (ref 0.2–0.4)
ALPHA-2-GLOBULIN: 1.2 G/DL (ref 0.4–1.1)
ANION GAP SERPL CALCULATED.3IONS-SCNC: 13 MMOL/L (ref 3–16)
BASE EXCESS ARTERIAL: -1.9 MMOL/L (ref -3–3)
BASOPHILS ABSOLUTE: 0 K/UL (ref 0–0.2)
BASOPHILS RELATIVE PERCENT: 0 %
BETA GLOBULIN: 1 G/DL (ref 0.9–1.6)
BUN BLDV-MCNC: 39 MG/DL (ref 7–20)
CALCIUM SERPL-MCNC: 8.5 MG/DL (ref 8.3–10.6)
CARBOXYHEMOGLOBIN ARTERIAL: 0.4 % (ref 0–1.5)
CHLORIDE BLD-SCNC: 115 MMOL/L (ref 99–110)
CO2: 18 MMOL/L (ref 21–32)
CREAT SERPL-MCNC: 1.1 MG/DL (ref 0.8–1.3)
EOSINOPHILS ABSOLUTE: 0.3 K/UL (ref 0–0.6)
EOSINOPHILS RELATIVE PERCENT: 2 %
GAMMA GLOBULIN: 0.9 G/DL (ref 0.6–1.8)
GFR AFRICAN AMERICAN: >60
GFR NON-AFRICAN AMERICAN: >60
GLUCOSE BLD-MCNC: 162 MG/DL (ref 70–99)
GLUCOSE BLD-MCNC: 165 MG/DL (ref 70–99)
GLUCOSE BLD-MCNC: 169 MG/DL (ref 70–99)
GLUCOSE BLD-MCNC: 176 MG/DL (ref 70–99)
GLUCOSE BLD-MCNC: 186 MG/DL (ref 70–99)
GLUCOSE BLD-MCNC: 197 MG/DL (ref 70–99)
GLUCOSE BLD-MCNC: 200 MG/DL (ref 70–99)
HCO3 ARTERIAL: 22 MMOL/L (ref 21–29)
HCT VFR BLD CALC: 33.6 % (ref 40.5–52.5)
HEMOGLOBIN, ART, EXTENDED: 11.7 G/DL (ref 13.5–17.5)
HEMOGLOBIN: 11.2 G/DL (ref 13.5–17.5)
LYMPHOCYTES ABSOLUTE: 1.7 K/UL (ref 1–5.1)
LYMPHOCYTES RELATIVE PERCENT: 10 %
MAGNESIUM: 2.5 MG/DL (ref 1.8–2.4)
MCH RBC QN AUTO: 28.5 PG (ref 26–34)
MCHC RBC AUTO-ENTMCNC: 33.3 G/DL (ref 31–36)
MCV RBC AUTO: 85.5 FL (ref 80–100)
METHEMOGLOBIN ARTERIAL: 0.4 %
MONOCYTES ABSOLUTE: 0.2 K/UL (ref 0–1.3)
MONOCYTES RELATIVE PERCENT: 1 %
NEUTROPHILS ABSOLUTE: 14.9 K/UL (ref 1.7–7.7)
NEUTROPHILS RELATIVE PERCENT: 87 %
O2 SAT, ARTERIAL: 93.8 %
O2 THERAPY: ABNORMAL
PCO2 ARTERIAL: 33.8 MMHG (ref 35–45)
PDW BLD-RTO: 13.3 % (ref 12.4–15.4)
PERFORMED ON: ABNORMAL
PH ARTERIAL: 7.42 (ref 7.35–7.45)
PHOSPHORUS: 2 MG/DL (ref 2.5–4.9)
PLATELET # BLD: 251 K/UL (ref 135–450)
PLATELET SLIDE REVIEW: ADEQUATE
PMV BLD AUTO: 7.7 FL (ref 5–10.5)
PO2 ARTERIAL: 70.1 MMHG (ref 75–108)
POTASSIUM SERPL-SCNC: 4.5 MMOL/L (ref 3.5–5.1)
RBC # BLD: 3.93 M/UL (ref 4.2–5.9)
RBC # BLD: NORMAL 10*6/UL
SLIDE REVIEW: ABNORMAL
SMUDGE CELLS: PRESENT
SODIUM BLD-SCNC: 146 MMOL/L (ref 136–145)
SPE/IFE INTERPRETATION: NORMAL
TCO2 ARTERIAL: 23.1 MMOL/L
TOTAL PROTEIN: 5.8 G/DL (ref 6.4–8.2)
WBC # BLD: 17.1 K/UL (ref 4–11)

## 2022-06-27 PROCEDURE — 6360000002 HC RX W HCPCS: Performed by: INTERNAL MEDICINE

## 2022-06-27 PROCEDURE — 85025 COMPLETE CBC W/AUTO DIFF WBC: CPT

## 2022-06-27 PROCEDURE — 6370000000 HC RX 637 (ALT 250 FOR IP): Performed by: NURSE PRACTITIONER

## 2022-06-27 PROCEDURE — 82803 BLOOD GASES ANY COMBINATION: CPT

## 2022-06-27 PROCEDURE — 36600 WITHDRAWAL OF ARTERIAL BLOOD: CPT

## 2022-06-27 PROCEDURE — 2580000003 HC RX 258: Performed by: NURSE PRACTITIONER

## 2022-06-27 PROCEDURE — 6360000002 HC RX W HCPCS: Performed by: NURSE PRACTITIONER

## 2022-06-27 PROCEDURE — 94760 N-INVAS EAR/PLS OXIMETRY 1: CPT

## 2022-06-27 PROCEDURE — 99291 CRITICAL CARE FIRST HOUR: CPT | Performed by: INTERNAL MEDICINE

## 2022-06-27 PROCEDURE — 2700000000 HC OXYGEN THERAPY PER DAY

## 2022-06-27 PROCEDURE — 94003 VENT MGMT INPAT SUBQ DAY: CPT

## 2022-06-27 PROCEDURE — APPNB15 APP NON BILLABLE TIME 0-15 MINS: Performed by: NURSE PRACTITIONER

## 2022-06-27 PROCEDURE — 94640 AIRWAY INHALATION TREATMENT: CPT

## 2022-06-27 PROCEDURE — 6370000000 HC RX 637 (ALT 250 FOR IP): Performed by: INTERNAL MEDICINE

## 2022-06-27 PROCEDURE — 80048 BASIC METABOLIC PNL TOTAL CA: CPT

## 2022-06-27 PROCEDURE — 83735 ASSAY OF MAGNESIUM: CPT

## 2022-06-27 PROCEDURE — 2500000003 HC RX 250 WO HCPCS: Performed by: NURSE PRACTITIONER

## 2022-06-27 PROCEDURE — 36415 COLL VENOUS BLD VENIPUNCTURE: CPT

## 2022-06-27 PROCEDURE — 2580000003 HC RX 258: Performed by: INTERNAL MEDICINE

## 2022-06-27 PROCEDURE — 71045 X-RAY EXAM CHEST 1 VIEW: CPT

## 2022-06-27 PROCEDURE — 84100 ASSAY OF PHOSPHORUS: CPT

## 2022-06-27 PROCEDURE — 2000000000 HC ICU R&B

## 2022-06-27 PROCEDURE — C9113 INJ PANTOPRAZOLE SODIUM, VIA: HCPCS | Performed by: NURSE PRACTITIONER

## 2022-06-27 RX ORDER — SODIUM BICARBONATE 650 MG/1
650 TABLET ORAL 2 TIMES DAILY
Status: DISCONTINUED | OUTPATIENT
Start: 2022-06-27 | End: 2022-07-05 | Stop reason: HOSPADM

## 2022-06-27 RX ORDER — POLYETHYLENE GLYCOL 3350 17 G/17G
17 POWDER, FOR SOLUTION ORAL DAILY
Status: DISCONTINUED | OUTPATIENT
Start: 2022-06-27 | End: 2022-07-03

## 2022-06-27 RX ORDER — INSULIN GLARGINE 100 [IU]/ML
5 INJECTION, SOLUTION SUBCUTANEOUS NIGHTLY
Status: DISCONTINUED | OUTPATIENT
Start: 2022-06-27 | End: 2022-07-05 | Stop reason: HOSPADM

## 2022-06-27 RX ORDER — SENNA LEAF EXTRACT 176MG/5ML
10 SYRUP ORAL NIGHTLY
Status: DISCONTINUED | OUTPATIENT
Start: 2022-06-27 | End: 2022-07-03

## 2022-06-27 RX ORDER — BISACODYL 10 MG
10 SUPPOSITORY, RECTAL RECTAL ONCE
Status: COMPLETED | OUTPATIENT
Start: 2022-06-27 | End: 2022-06-27

## 2022-06-27 RX ADMIN — Medication 40 MG: at 08:13

## 2022-06-27 RX ADMIN — INSULIN LISPRO 3 UNITS: 100 INJECTION, SOLUTION INTRAVENOUS; SUBCUTANEOUS at 12:28

## 2022-06-27 RX ADMIN — INSULIN LISPRO 3 UNITS: 100 INJECTION, SOLUTION INTRAVENOUS; SUBCUTANEOUS at 21:11

## 2022-06-27 RX ADMIN — IPRATROPIUM BROMIDE AND ALBUTEROL SULFATE 1 AMPULE: .5; 3 SOLUTION RESPIRATORY (INHALATION) at 16:06

## 2022-06-27 RX ADMIN — CEFEPIME HYDROCHLORIDE 2000 MG: 2 INJECTION, POWDER, FOR SOLUTION INTRAVENOUS at 23:06

## 2022-06-27 RX ADMIN — DOCUSATE SODIUM 100 MG: 50 LIQUID ORAL at 10:40

## 2022-06-27 RX ADMIN — ENOXAPARIN SODIUM 40 MG: 100 INJECTION SUBCUTANEOUS at 21:07

## 2022-06-27 RX ADMIN — SODIUM CHLORIDE, PRESERVATIVE FREE 10 ML: 5 INJECTION INTRAVENOUS at 09:16

## 2022-06-27 RX ADMIN — ASPIRIN 300 MG: 300 SUPPOSITORY RECTAL at 08:13

## 2022-06-27 RX ADMIN — CEFEPIME HYDROCHLORIDE 2000 MG: 2 INJECTION, POWDER, FOR SOLUTION INTRAVENOUS at 10:39

## 2022-06-27 RX ADMIN — INSULIN LISPRO 6 UNITS: 100 INJECTION, SOLUTION INTRAVENOUS; SUBCUTANEOUS at 00:56

## 2022-06-27 RX ADMIN — IPRATROPIUM BROMIDE AND ALBUTEROL SULFATE 1 AMPULE: .5; 3 SOLUTION RESPIRATORY (INHALATION) at 03:48

## 2022-06-27 RX ADMIN — IPRATROPIUM BROMIDE AND ALBUTEROL SULFATE 1 AMPULE: .5; 3 SOLUTION RESPIRATORY (INHALATION) at 23:13

## 2022-06-27 RX ADMIN — IPRATROPIUM BROMIDE AND ALBUTEROL SULFATE 1 AMPULE: .5; 3 SOLUTION RESPIRATORY (INHALATION) at 07:43

## 2022-06-27 RX ADMIN — POLYETHYLENE GLYCOL 3350 17 G: 17 POWDER, FOR SOLUTION ORAL at 10:40

## 2022-06-27 RX ADMIN — IPRATROPIUM BROMIDE AND ALBUTEROL SULFATE 1 AMPULE: .5; 3 SOLUTION RESPIRATORY (INHALATION) at 19:19

## 2022-06-27 RX ADMIN — SODIUM PHOSPHATE, MONOBASIC, MONOHYDRATE 15 MMOL: 276; 142 INJECTION, SOLUTION INTRAVENOUS at 09:08

## 2022-06-27 RX ADMIN — INSULIN LISPRO 3 UNITS: 100 INJECTION, SOLUTION INTRAVENOUS; SUBCUTANEOUS at 08:10

## 2022-06-27 RX ADMIN — INSULIN LISPRO 3 UNITS: 100 INJECTION, SOLUTION INTRAVENOUS; SUBCUTANEOUS at 05:05

## 2022-06-27 RX ADMIN — CHLORHEXIDINE GLUCONATE 0.12% ORAL RINSE 15 ML: 1.2 LIQUID ORAL at 08:13

## 2022-06-27 RX ADMIN — IPRATROPIUM BROMIDE AND ALBUTEROL SULFATE 1 AMPULE: .5; 3 SOLUTION RESPIRATORY (INHALATION) at 11:52

## 2022-06-27 RX ADMIN — SODIUM CHLORIDE, PRESERVATIVE FREE 10 ML: 5 INJECTION INTRAVENOUS at 21:08

## 2022-06-27 RX ADMIN — SODIUM BICARBONATE 650 MG: 650 TABLET ORAL at 10:40

## 2022-06-27 RX ADMIN — BISACODYL 10 MG: 10 SUPPOSITORY RECTAL at 10:40

## 2022-06-27 RX ADMIN — HYDROCODONE BITARTRATE AND ACETAMINOPHEN 10 ML: 176/5 SOLUTION ORAL at 21:07

## 2022-06-27 RX ADMIN — INSULIN GLARGINE 5 UNITS: 100 INJECTION, SOLUTION SUBCUTANEOUS at 21:08

## 2022-06-27 RX ADMIN — INSULIN LISPRO 3 UNITS: 100 INJECTION, SOLUTION INTRAVENOUS; SUBCUTANEOUS at 17:38

## 2022-06-27 RX ADMIN — SODIUM BICARBONATE 650 MG: 650 TABLET ORAL at 21:07

## 2022-06-27 ASSESSMENT — PULMONARY FUNCTION TESTS
PIF_VALUE: 11
PIF_VALUE: 12
PIF_VALUE: 11
PIF_VALUE: 12
PIF_VALUE: 11
PIF_VALUE: 12
PIF_VALUE: 11
PIF_VALUE: 12
PIF_VALUE: 11
PIF_VALUE: 11

## 2022-06-27 ASSESSMENT — PAIN SCALES - GENERAL
PAINLEVEL_OUTOF10: 0
PAINLEVEL_OUTOF10: 0

## 2022-06-27 NOTE — PROGRESS NOTES
Pulmonary Progress Note    Date of Admission: 6/22/2022   LOS: 5 days       CC:  CVA    Subjective:  No significant events overnight. ROS:       Assessment:     Acute CVA with acute melena Bolick encephalopathy  Aspiration pneumonia with Klebsiella and Enterobacter  Fever  Metastatic hepatocellular carcinoma    Plan: This note may have been transcribed using [id number] [employer]. Please disregard any translational errors. Hospital Day: 5       *Patient starting to follow commands today with a left hand opening left eye. *Given prolonged ventilator, high risk for extubation. Family discussion yesterday. We will again discuss and consider changing CODE STATUS to DNR/DNI and extubation. *Continue cefepime for now. Fever overnight. Neurologic possibility. Follow cultures procalcitonin White count  *Metabolic acidosis with hyperchloremia. Start bicarb twice daily  * increase free water with increased Na. *Place NG tube with anticipation of extubation and difficulty with feeding. * chest X-ray after. * HCC. Not likely treatment candidate at this point   * CVA - some improvement. * continues on be on spontaneous           I spent 34 minutes of critical care time with this patient excluding any procedures. Data:        PHYSICAL EXAM:   Blood pressure 107/69, pulse (!) 111, temperature 99.6 °F (37.6 °C), temperature source Axillary, resp. rate (!) 31, height 4' 9\" (1.448 m), weight 122 lb 12.7 oz (55.7 kg), SpO2 96 %.'  Body mass index is 26.57 kg/m². Gen: No distress. ENT:   Resp: No accessory muscle use. No crackles. No wheezes. No rhonchi. CV: Regular rate. Regular rhythm. No murmur or rub. No edema. Skin: Warm, dry, normal texture and turgor. No nodule on exposed extremities. M/S: No cyanosis. No clubbing. No joint deformity. Psych: wakes to sternal rub .   Able to follow commands on left     Medications:    Scheduled Meds:   insulin glargine  5 Units SubCUTAneous Nightly    sodium phosphate IVPB  15 mmol IntraVENous Once    sodium bicarbonate  650 mg Oral BID    docusate  100 mg Oral Daily    senna  10 mL Oral Nightly    polyethylene glycol  17 g Oral Daily    bisacodyl  10 mg Rectal Once    ipratropium-albuterol  1 ampule Inhalation Q4H    enoxaparin  40 mg SubCUTAneous Nightly    cefepime  2,000 mg IntraVENous Q12H    chlorhexidine  15 mL Mouth/Throat BID    pantoprazole (PROTONIX) 40 mg injection  40 mg IntraVENous Daily    insulin lispro  0-18 Units SubCUTAneous Q4H    aspirin  300 mg Rectal Daily    sodium chloride flush  10 mL IntraVENous 2 times per day       Continuous Infusions:   sodium chloride      dextrose      sodium chloride         PRN Meds:  sodium chloride, hydrALAZINE, glucose, dextrose bolus **OR** dextrose bolus, glucagon (rDNA), dextrose, sodium chloride flush, sodium chloride, promethazine **OR** ondansetron, polyethylene glycol, acetaminophen **OR** acetaminophen    Labs reviewed:  CBC:   Recent Labs     06/25/22 0423 06/26/22  0536 06/27/22  0434   WBC 12.6* 13.5* 17.1*   HGB 10.4* 11.2* 11.2*   HCT 30.9* 34.0* 33.6*   MCV 86.7 86.1 85.5    256 251     BMP:   Recent Labs     06/25/22 0423 06/26/22  0536 06/27/22  0434    144 146*   K 4.2 4.3 4.5   * 113* 115*   CO2 20* 20* 18*   PHOS 1.8* 2.5 2.0*   BUN 41* 31* 39*   CREATININE 1.4* 1.1 1.1     LIVER PROFILE: No results for input(s): AST, ALT, LIPASE, BILIDIR, BILITOT, ALKPHOS in the last 72 hours. Invalid input(s): AMYLASE,  ALB  PT/INR: No results for input(s): PROTIME, INR in the last 72 hours. APTT: No results for input(s): APTT in the last 72 hours. UA:No results for input(s): NITRITE, COLORU, PHUR, LABCAST, WBCUA, RBCUA, MUCUS, TRICHOMONAS, YEAST, BACTERIA, CLARITYU, SPECGRAV, LEUKOCYTESUR, UROBILINOGEN, BILIRUBINUR, BLOODU, GLUCOSEU, AMORPHOUS in the last 72 hours.     Invalid input(s): Zeke Spikes  No results for input(s): PH, PCO2, PO2 in the last 72 hours. Cx:      Films: This note was transcribed using [id number] [employer]. Please disregard any translational errors.       Benita Lopez [facility] [phone]

## 2022-06-27 NOTE — PLAN OF CARE
Problem: Discharge Planning  Goal: Discharge to home or other facility with appropriate resources  Outcome: Progressing     Problem: Pain  Goal: Verbalizes/displays adequate comfort level or baseline comfort level  Outcome: Progressing  Flowsheets (Taken 6/26/2022 1937)  Verbalizes/displays adequate comfort level or baseline comfort level:   Assess pain using appropriate pain scale   Administer analgesics based on type and severity of pain and evaluate response   Implement non-pharmacological measures as appropriate and evaluate response   Consider cultural and social influences on pain and pain management     Problem: Safety - Medical Restraint  Goal: Remains free of injury from restraints (Restraint for Interference with Medical Device)  Description: INTERVENTIONS:  1. Determine that other, less restrictive measures have been tried or would not be effective before applying the restraint  2. Evaluate the patient's condition at the time of restraint application  3. Inform patient/family regarding the reason for restraint  4. Q2H: Monitor safety, psychosocial status, comfort, nutrition and hydration  Outcome: Progressing  Flowsheets (Taken 6/26/2022 2000)  Remains free of injury from restraints (restraint for interference with medical device): Every 2 hours: Monitor safety, psychosocial status, comfort, nutrition and hydration     Problem: Safety - Adult  Goal: Free from fall injury  Outcome: Progressing     Problem: ABCDS Injury Assessment  Goal: Absence of physical injury  Outcome: Progressing     Problem: Skin/Tissue Integrity  Goal: Absence of new skin breakdown  Description: 1. Monitor for areas of redness and/or skin breakdown  2. Assess vascular access sites hourly  3. Every 4-6 hours minimum:  Change oxygen saturation probe site  4. Every 4-6 hours:  If on nasal continuous positive airway pressure, respiratory therapy assess nares and determine need for appliance change or resting period.   Outcome: Progressing     Problem: Nutrition Deficit:  Goal: Optimize nutritional status  Outcome: Progressing

## 2022-06-27 NOTE — PROGRESS NOTES
Hospitalist Progress Note      PCP: No primary care provider on file. Chief Complaint. Patient is 80-year-old male who presented to hospital for change in mental status. Patient was found unresponsive at home, patient is not able to provide history due to condition, most of the history is from patient's chart. Patient was recently discharged from the rehab facility after he was diagnosed with CVA. Patient was covered in vomiting on arrival of EMS    Date of Admission: 6/22/2022    Subjective: remains on vent.  Making eye contact more alert today  Some conversations about possible extubation  Received a PICC line yesterday  Medications:  Reviewed    Infusion Medications    sodium chloride      dextrose      sodium chloride       Scheduled Medications    insulin glargine  5 Units SubCUTAneous Nightly    sodium bicarbonate  650 mg Oral BID    docusate  100 mg Oral Daily    senna  10 mL Oral Nightly    polyethylene glycol  17 g Oral Daily    ipratropium-albuterol  1 ampule Inhalation Q4H    enoxaparin  40 mg SubCUTAneous Nightly    cefepime  2,000 mg IntraVENous Q12H    pantoprazole (PROTONIX) 40 mg injection  40 mg IntraVENous Daily    insulin lispro  0-18 Units SubCUTAneous Q4H    aspirin  300 mg Rectal Daily    sodium chloride flush  10 mL IntraVENous 2 times per day     PRN Meds: sodium chloride, hydrALAZINE, glucose, dextrose bolus **OR** dextrose bolus, glucagon (rDNA), dextrose, sodium chloride flush, sodium chloride, promethazine **OR** ondansetron, polyethylene glycol, acetaminophen **OR** acetaminophen      Intake/Output Summary (Last 24 hours) at 6/27/2022 1528  Last data filed at 6/27/2022 1235  Gross per 24 hour   Intake 981.73 ml   Output 1945 ml   Net -963.27 ml       Physical Exam Performed:    /78   Pulse (!) 121   Temp 98.3 °F (36.8 °C) (Axillary)   Resp (!) 31   Ht 4' 9\" (1.448 m)   Wt 122 lb 12.7 oz (55.7 kg)   SpO2 95%   BMI 26.57 kg/m²     General appearance: Intubated poorly interactive  HEENT:  Conjunctivae/corneas clear. Neck: Supple, with full range of motion. Respiratory:  Normal respiratory effort. Clear to auscultation, bilaterally without Rales/Wheezes/Rhonchi. Cardiovascular: Regular rate and rhythm with normal S1/S2 without murmurs or rubs  Abdomen: Soft, non-tender, non-distended, normal bowel sounds. Musculoskeletal: No cyanosis or edema bilaterally  Neurologic: Intubated and sedated  Psychiatric: Intubated and sedated  Peripheral Pulses: +2 palpable, equal bilaterally, no change today 6/24      Labs:   Recent Labs     06/25/22 0423 06/26/22  0536 06/27/22  0434   WBC 12.6* 13.5* 17.1*   HGB 10.4* 11.2* 11.2*   HCT 30.9* 34.0* 33.6*    256 251     Recent Labs     06/25/22 0423 06/26/22  0536 06/27/22  0434    144 146*   K 4.2 4.3 4.5   * 113* 115*   CO2 20* 20* 18*   BUN 41* 31* 39*   CREATININE 1.4* 1.1 1.1   CALCIUM 7.8* 8.3 8.5   PHOS 1.8* 2.5 2.0*     No results for input(s): AST, ALT, BILIDIR, BILITOT, ALKPHOS in the last 72 hours. No results for input(s): INR in the last 72 hours. No results for input(s): Dorota Nick in the last 72 hours. Urinalysis:      Lab Results   Component Value Date    NITRU Negative 06/22/2022    WBCUA 2 06/22/2022    BACTERIA None Seen 06/22/2022    RBCUA 2 06/22/2022    BLOODU TRACE 06/22/2022    SPECGRAV 1.016 06/22/2022    GLUCOSEU Negative 06/22/2022       Radiology:  XR CHEST PORTABLE   Final Result   1. Enteric tube with tip and side-port in the stomach. 2.  Endotracheal tube tip is 2 cm above the mar. Recommend retraction by   1.5 cm for optimal positioning. 3.  Stable right lung apex consolidation. CT HEAD WO CONTRAST   Final Result   1. Evolution of the known infarcts involving is the left frontal lobe as well   as the left subinsular region. 2. Otherwise, no acute intracranial abnormality.    3. No significant mass effect, midline shift or acute intracranial hemorrhage. 4. Mild global parenchymal volume loss with chronic microvascular ischemic   changes. MRA NECK WO CONTRAST   Final Result   Acute to subacute infarctions in the left frontal lobe, increased in size   since MRI brain June 18, 2022. Small acute to subacute infarction along the left external capsule, stable   since June 18, 2022. Old lacunar infarcts in the left central franki. Mild to moderate parenchymal volume loss. Mild chronic microvascular disease. 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 Right dominance of the vertebral arteries with hypoplastic intracranial left   vertebral artery, and superimposed occlusion in the mid to distal   intracranial left vertebral artery. Moderate stenosis in the intracranial right vertebral artery. Moderate stenosis in the proximal P2 segment of the left PCA. Known 95% critical stenosis in the proximal right internal carotid artery. 80% stenosis in the proximal left internal carotid artery. MRA HEAD WO CONTRAST   Final Result   Acute to subacute infarctions in the left frontal lobe, increased in size   since MRI brain June 18, 2022. Small acute to subacute infarction along the left external capsule, stable   since June 18, 2022. Old lacunar infarcts in the left central franki. Mild to moderate parenchymal volume loss. Mild chronic microvascular disease. 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 Right dominance of the vertebral arteries with hypoplastic intracranial left   vertebral artery, and superimposed occlusion in the mid to distal   intracranial left vertebral artery. Moderate stenosis in the intracranial right vertebral artery. Moderate stenosis in the proximal P2 segment of the left PCA. Known 95% critical stenosis in the proximal right internal carotid artery.       80% stenosis in the proximal left internal carotid artery. MRI BRAIN WO CONTRAST   Final Result   Acute to subacute infarctions in the left frontal lobe, increased in size   since MRI brain June 18, 2022. Small acute to subacute infarction along the left external capsule, stable   since June 18, 2022. Old lacunar infarcts in the left central franki. Mild to moderate parenchymal volume loss. Mild chronic microvascular disease. 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 Right dominance of the vertebral arteries with hypoplastic intracranial left   vertebral artery, and superimposed occlusion in the mid to distal   intracranial left vertebral artery. Moderate stenosis in the intracranial right vertebral artery. Moderate stenosis in the proximal P2 segment of the left PCA. Known 95% critical stenosis in the proximal right internal carotid artery. 80% stenosis in the proximal left internal carotid artery. CT CHEST PULMONARY EMBOLISM W CONTRAST   Final Result   No evidence of a pulmonary embolus. ET tube and gastric tube in good position. 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 Questionable mucous plugging scattered in the bronchi along the right lung   base which may be due to recent aspiration. Recommend pulmonology follow-up. 7 cm exophytic right hepatic mass which has increased in size. Suggest   PET-CT correlation. Postop changes right apex and upper lobe with fibrosis and scarring   throughout the area which is unchanged.          XR CHEST PORTABLE   Final Result   Unchanged right upper lobe scarring      No acute cardiopulmonary process         CT Head WO Contrast   Final Result   New low-attenuation in the left corona radiata consistent with an evolving   infarct as described on the recent MRI      No acute hemorrhage               Assessment/Plan:    Active Hospital Problems    Diagnosis     Syncope and collapse [R55]      Priority: Medium    Acute respiratory failure (HCC) [J96.00]      Priority: Medium    Aspiration pneumonia ([facility] [address] [J69.0]      Priority: Medium    History of CVA (cerebrovascular accident) [Z86.73]      Priority: Medium    AMS (altered mental status) [R41.82]      Priority: Medium    Acute cerebrovascular accident (CVA) ([facility] [address] [I63.9]      Priority: Medium     Patient is 80-year-old male who presented to hospital for change in mental status. Patient was found unresponsive at home, patient is not able to provide history due to condition, most of the history is from patient's chart. Patient was recently discharged from the rehab facility after he was diagnosed with CVA.   Patient was covered in vomiting on arrival of EMS     Assessment  Acute hypoxic respiratory failure s/p intubation  Sepsis present on admission  Suspect aspiration pneumonia  Acute encephalopathy  Recent history of CVA  Lactic acidosis  Acute kidney injury  Hepatic cancer        Plan  Possible extubation today  His poor mental status may be a barrier to successful extubation-hopefully he can stay off the vent  Continue broad-spectrum antibiotics  He is now off of pressors  Spoke with palliative care team they will continue to try to work with the family to come to some decision regarding care moving forward and goals and wishes          DVT prophylaxis-Lovenox  Diet: Diet NPO  ADULT TUBE FEEDING; Orogastric; Diabetic; Continuous; 25; Yes; 25; Q 4 hours; 55; 300; Q 4 hours  Code Status: Limited    PT/OT Eval Status: ordered    Dispo/Plan of care -continue IV antibiotics, wean ventilator as tolerated, neurology following    Radha Lim MD

## 2022-06-27 NOTE — PROGRESS NOTES
56  Sx scant clear   Extubated without trauma   Placed on 4 lpm n/c no distress  No stridor  Rn in room

## 2022-06-27 NOTE — PROGRESS NOTES
Comprehensive Nutrition Assessment    Type and Reason for Visit:  Reassess    Nutrition Recommendations/Plan:   Continue Glucerna 1.5 @ 55 mL/hr. Flush 300 mL q 4 hrs. Bowel regimen. Malnutrition Assessment:  Malnutrition Status:  Insufficient data (06/23/22 1053)      Nutrition Assessment:    Follow-up. Pt remains intubated, on SBT. May possibly need trach d/t  recent CVA and mental status. OG in place with TF running @ goal, tolerating well. Noted no BM since admission. Bowel regimen starting. Will continue current nutrition interventions. Nutrition Related Findings:    No BM or edema; Na 126, Glu 162 Wound Type: None       Current Nutrition Intake & Therapies:    Average Meal Intake: NPO  Average Supplements Intake: NPO  Diet NPO  ADULT TUBE FEEDING; Orogastric; Diabetic; Continuous; 25; Yes; 25; Q 4 hours; 55; 300; Q 4 hours  Current Tube Feeding (TF) Recommendations:  · Goal TF & Flush Orders Provides: Recommend Glucerna 1.5 with goal rate of 55 mL/hr. Flush per provider. TF regimen provides: 1100 mL TV, 1650 kcal, 91 gm pro, 835 mL free water. Anthropometric Measures:  Height: 4' 9\" (144.8 cm)  Ideal Body Weight (IBW): 88 lbs (40 kg)    Admission Body Weight: 127 lb (57.6 kg)  Current Body Weight: 122 lb (55.3 kg), 138.6 % IBW. Weight Source: Bed Scale  Current BMI (kg/m2): 26.4                          BMI Categories: Overweight (BMI 25.0-29. 9)    Estimated Daily Nutrient Needs:        Energy (kcal/day): 3848-6047 kcal (25-30 kcal/kg 56 kg CBW)     Protein (g/day): 51-85 gm (1.2-2 gm/kg IBW)     Fluid (ml/day): per provider    Nutrition Diagnosis:   · Inadequate oral intake related to impaired respiratory function as evidenced by intubation      Nutrition Interventions:   Food and/or Nutrient Delivery: Continue Current Tube Feeding  Nutrition Education/Counseling: No recommendation at this time  Coordination of Nutrition Care: Continue to monitor while inpatient       Goals:  Previous Goal Met: Goal(s) Achieved  Goals: Initiate nutrition support,Tolerate nutrition support at goal rate       Nutrition Monitoring and Evaluation:      Food/Nutrient Intake Outcomes: Enteral Nutrition Intake/Tolerance  Physical Signs/Symptoms Outcomes: Biochemical Data,Weight,Skin,Nutrition Focused Physical Findings,Hemodynamic Status    Discharge Planning:     Too soon to determine     Tony Mayfield RD, LD  Contact: [phone]

## 2022-06-27 NOTE — PROGRESS NOTES
[facility]                                               Progress note    Summary:   Colton Kowalski is being seen by nephrology for LUL. Admitted with AMS. He is now intubated for hypoxic respiratory failure. Had recent stroke dx with aspiration pneumonia on ABX. also has metastatic HCC. Interval History  Seen and examined in ICU  /69 on no pressor support. fio 30 % on vent   UO 1.9 L past day   UO only 310 cc today     Labs reviewed. Na 146   K 4.5  Bicarb 19   BUN 39   Cr 1.1   Mag 2.5  Phos 2  hgb 11.2 wbc 17.1    Plan:   Renal function is at baseline  There are no indications for dialysis  He needs free water with feeds  On 300 cc q 4 hrs    Phos replacement       Stan Arias MD  [facility]  Office: [phone]    Assessment:     #LUL  Baseline Cr   Cr at time of consult  UA  Had septic shock , required pressors. Non oliguric   UA with hyaline catss 2 WBC and 2 RBC  No proteinuria but discrepant values < 4 then 233 on another check  , did not correlate with UA showing albumin > 1000 mg, dl will check again. Had contrast on 6/22  SPEP negative     #acute hypoxic respiratory failure. Aspiration pneumonia  CTPA negative for PE  proca elevated  Blood cxs  Sputum cx  MRSA nares negative   Pro BNP 91  Hyperdynamic EF 70% consistent with septic shock   G1DD    #Mild hyponatremia  Likely due to intravascular volume depletion. #HCC  Prevoius CT Abd / Pelvis with conrast showed hepatic mass with no nmets, moderate stenosis of SMA and 50% narrowing. No mention of renal arteries or stenosis. CT abd without contrast on 6/3/22 showed normal kidneys and no hydro, mass, or stones. ROS:   Unable to assess      PMH:   Past medical history, surgical history, social history, family history are reviewed and updated as appropriate. Reviewed current medication list.   Allergies reviewed and updated as needed.      PE:   Vitals:    06/27/22 0900   BP: 107/69   Pulse: (!) 111 Resp: (!) 31   Temp: 98.6 °F (37 °C)   SpO2: 96%       General appearance:  in NAD, . Comfortable. HEENT: EOM intact, no icterus. Trachea is midline. Neck : No masses, appears symmetrical, no JVD  Respiratory: Respiratory effort appears normal, bilateral equal chest rise, no wheeze, no crackles   Cardiovascular: Ausculation shows RRR no edema  Abdomen: No visible mass or tenderness, non distended. Musculoskeletal:  Joints with no swelling or deformity. Skin:no rashes, ulcers, induration, no jaundice.    Neuro: sedated       Lab Results   Component Value Date    CREATININE 1.1 06/27/2022    BUN 39 (H) 06/27/2022     (H) 06/27/2022    K 4.5 06/27/2022     (H) 06/27/2022    CO2 18 (L) 06/27/2022      Lab Results   Component Value Date    WBC 17.1 (H) 06/27/2022    HGB 11.2 (L) 06/27/2022    HCT 33.6 (L) 06/27/2022    MCV 85.5 06/27/2022     06/27/2022     Lab Results   Component Value Date    CALCIUM 8.5 06/27/2022    PHOS 2.0 (L) 06/27/2022

## 2022-06-27 NOTE — CARE COORDINATION
[facility]  Palliative Care   Progress Note    NAME:  Hussain Hogan  MEDICAL RECORD NUMBER:  [id number]  AGE: 80 y.o. GENDER: male  : 1940  TODAY'S DATE:  2022    Subjective:On vent, follows commands. Objective:    Vitals:    22 1235   BP: 112/63   Pulse: (!) 120   Resp: (!) 34   Temp: 98.3 °F (36.8 °C)   SpO2: 94%     Lab Results   Component Value Date    WBC 17.1 (H) 2022    HGB 11.2 (L) 2022    HCT 33.6 (L) 2022    MCV 85.5 2022     2022     Lab Results   Component Value Date    CREATININE 1.1 2022    BUN 39 (H) 2022     (H) 2022    K 4.5 2022     (H) 2022    CO2 18 (L) 2022     Lab Results   Component Value Date    ALT 67 (H) 2022     (H) 2022    ALKPHOS 122 2022    BILITOT 1.1 (H) 2022       Plan: I used interrupter I Jennifer Cooley # [id number] to speak with patients wife, son and grandson on speaker phone to discuss goals of care. It was explained to family that patient has advancing cancer, has had a stroke and another stroke after that. He has been on ventilator but can be safely extubated today but question asked of them was if patient has resp distress or if his heart stops how aggressive do they want to continue to be. This was clarified several times they expressed understanding and agree they would not want him to be put on ventilator again and if his heart stops to let him die peacefully. They agree to extubation today. Dr Kisha Muse and Dr Ant Medina informed of above orders noted        Code Status: Limited  Discharge Environment:  [] Hospice Consult Agency:  [] Inpatient Hospice    [] Home with [address]   [] ECF with Hospice  [] ECF skilled care with Hospice to follow   [] Other:    Teaching Time:  1hours  30 min     I will continue to follow Mr. Payton Miles care as needed. Thank you for allowing me to participate in the care of Mr. Taylor Dec . Electronically signed by Aidan Evans RN, BSN,CHPN on 6/27/2022 at 2:35 PM  [address] [place] [address]  Office: [phone]

## 2022-06-27 NOTE — PROGRESS NOTES
Hematology Oncology Daily Progress Note    Admit Date: 6/22/2022  Hospital day several    Subjective:     Patient on vent. .   Medication side effects: none    Scheduled Meds:   insulin glargine  5 Units SubCUTAneous Nightly    sodium phosphate IVPB  15 mmol IntraVENous Once    ipratropium-albuterol  1 ampule Inhalation Q4H    enoxaparin  40 mg SubCUTAneous Nightly    cefepime  2,000 mg IntraVENous Q12H    chlorhexidine  15 mL Mouth/Throat BID    pantoprazole (PROTONIX) 40 mg injection  40 mg IntraVENous Daily    insulin lispro  0-18 Units SubCUTAneous Q4H    aspirin  300 mg Rectal Daily    sodium chloride flush  10 mL IntraVENous 2 times per day     Continuous Infusions:   sodium chloride      dextrose      sodium chloride       PRN Meds:sodium chloride, hydrALAZINE, glucose, dextrose bolus **OR** dextrose bolus, glucagon (rDNA), dextrose, sodium chloride flush, sodium chloride, promethazine **OR** ondansetron, polyethylene glycol, acetaminophen **OR** acetaminophen    Review of Systems  Unable to assess    Objective:     Patient Vitals for the past 8 hrs:   BP Temp Temp src Pulse Resp SpO2 Weight   06/27/22 0744 -- -- -- (!) 114 (!) 32 95 % --   06/27/22 0730 -- -- -- (!) 109 25 95 % --   06/27/22 0700 109/74 -- -- (!) 104 28 95 % --   06/27/22 0600 (!) 154/106 -- -- (!) 120 26 93 % --   06/27/22 0500 (!) 140/77 -- -- (!) 124 30 95 % 122 lb 12.7 oz (55.7 kg)   06/27/22 0410 -- -- -- (!) 118 30 96 % --   06/27/22 0400 124/75 -- -- (!) 107 20 95 % --   06/27/22 0348 -- -- -- (!) 112 (!) 31 96 % --   06/27/22 0334 133/83 99.6 °F (37.6 °C) Axillary (!) 112 30 95 % --   06/27/22 0300 112/76 -- -- 99 25 -- --   06/27/22 0200 101/63 -- -- (!) 112 (!) 31 96 % --   06/27/22 0100 (!) 85/59 -- -- (!) 108 29 95 % --     I/O last 3 completed shifts: In: 2538.7 [I.V.:145; NG/GT:2044; IV Piggyback:349.7]  Out: 3830 [Urine:3830]  No intake/output data recorded.     /74   Pulse (!) 114   Temp 99.6 °F (37.6 °C)

## 2022-06-27 NOTE — PROGRESS NOTES
Small bore NG placed per Dr. Michelle Rodrigues d/t possible extubation today. Awaiting chest xray placement verification to use NG and remove OG.

## 2022-06-28 LAB
ANION GAP SERPL CALCULATED.3IONS-SCNC: 11 MMOL/L (ref 3–16)
ANION GAP SERPL CALCULATED.3IONS-SCNC: 7 MMOL/L (ref 3–16)
BASOPHILS ABSOLUTE: 0.1 K/UL (ref 0–0.2)
BASOPHILS RELATIVE PERCENT: 0.5 %
BUN BLDV-MCNC: 40 MG/DL (ref 7–20)
BUN BLDV-MCNC: 42 MG/DL (ref 7–20)
CALCIUM SERPL-MCNC: 8.4 MG/DL (ref 8.3–10.6)
CALCIUM SERPL-MCNC: 8.5 MG/DL (ref 8.3–10.6)
CHLORIDE BLD-SCNC: 112 MMOL/L (ref 99–110)
CHLORIDE BLD-SCNC: 119 MMOL/L (ref 99–110)
CO2: 21 MMOL/L (ref 21–32)
CO2: 23 MMOL/L (ref 21–32)
CREAT SERPL-MCNC: 1 MG/DL (ref 0.8–1.3)
CREAT SERPL-MCNC: 1 MG/DL (ref 0.8–1.3)
EOSINOPHILS ABSOLUTE: 0.1 K/UL (ref 0–0.6)
EOSINOPHILS RELATIVE PERCENT: 0.5 %
GFR AFRICAN AMERICAN: >60
GFR AFRICAN AMERICAN: >60
GFR NON-AFRICAN AMERICAN: >60
GFR NON-AFRICAN AMERICAN: >60
GLUCOSE BLD-MCNC: 134 MG/DL (ref 70–99)
GLUCOSE BLD-MCNC: 164 MG/DL (ref 70–99)
GLUCOSE BLD-MCNC: 187 MG/DL (ref 70–99)
GLUCOSE BLD-MCNC: 187 MG/DL (ref 70–99)
GLUCOSE BLD-MCNC: 199 MG/DL (ref 70–99)
GLUCOSE BLD-MCNC: 207 MG/DL (ref 70–99)
GLUCOSE BLD-MCNC: 210 MG/DL (ref 70–99)
GLUCOSE BLD-MCNC: 217 MG/DL (ref 70–99)
HCT VFR BLD CALC: 31.9 % (ref 40.5–52.5)
HEMOGLOBIN: 10.6 G/DL (ref 13.5–17.5)
LYMPHOCYTES ABSOLUTE: 1.6 K/UL (ref 1–5.1)
LYMPHOCYTES RELATIVE PERCENT: 8.7 %
MAGNESIUM: 2.5 MG/DL (ref 1.8–2.4)
MCH RBC QN AUTO: 28.3 PG (ref 26–34)
MCHC RBC AUTO-ENTMCNC: 33.3 G/DL (ref 31–36)
MCV RBC AUTO: 85.1 FL (ref 80–100)
MONOCYTES ABSOLUTE: 1.5 K/UL (ref 0–1.3)
MONOCYTES RELATIVE PERCENT: 7.9 %
NEUTROPHILS ABSOLUTE: 15.5 K/UL (ref 1.7–7.7)
NEUTROPHILS RELATIVE PERCENT: 82.4 %
PDW BLD-RTO: 13.8 % (ref 12.4–15.4)
PERFORMED ON: ABNORMAL
PHOSPHORUS: 2 MG/DL (ref 2.5–4.9)
PLATELET # BLD: 255 K/UL (ref 135–450)
PMV BLD AUTO: 7.5 FL (ref 5–10.5)
POTASSIUM SERPL-SCNC: 4.3 MMOL/L (ref 3.5–5.1)
POTASSIUM SERPL-SCNC: 4.3 MMOL/L (ref 3.5–5.1)
PROCALCITONIN: 0.4 NG/ML (ref 0–0.15)
RBC # BLD: 3.74 M/UL (ref 4.2–5.9)
SODIUM BLD-SCNC: 142 MMOL/L (ref 136–145)
SODIUM BLD-SCNC: 151 MMOL/L (ref 136–145)
WBC # BLD: 18.9 K/UL (ref 4–11)

## 2022-06-28 PROCEDURE — 92610 EVALUATE SWALLOWING FUNCTION: CPT

## 2022-06-28 PROCEDURE — 2060000000 HC ICU INTERMEDIATE R&B

## 2022-06-28 PROCEDURE — 83735 ASSAY OF MAGNESIUM: CPT

## 2022-06-28 PROCEDURE — 2580000003 HC RX 258: Performed by: INTERNAL MEDICINE

## 2022-06-28 PROCEDURE — 6360000002 HC RX W HCPCS: Performed by: INTERNAL MEDICINE

## 2022-06-28 PROCEDURE — 6370000000 HC RX 637 (ALT 250 FOR IP): Performed by: INTERNAL MEDICINE

## 2022-06-28 PROCEDURE — C9113 INJ PANTOPRAZOLE SODIUM, VIA: HCPCS | Performed by: NURSE PRACTITIONER

## 2022-06-28 PROCEDURE — 84100 ASSAY OF PHOSPHORUS: CPT

## 2022-06-28 PROCEDURE — 6370000000 HC RX 637 (ALT 250 FOR IP): Performed by: NURSE PRACTITIONER

## 2022-06-28 PROCEDURE — 84145 PROCALCITONIN (PCT): CPT

## 2022-06-28 PROCEDURE — 6360000002 HC RX W HCPCS: Performed by: NURSE PRACTITIONER

## 2022-06-28 PROCEDURE — 94640 AIRWAY INHALATION TREATMENT: CPT

## 2022-06-28 PROCEDURE — 2580000003 HC RX 258: Performed by: NURSE PRACTITIONER

## 2022-06-28 PROCEDURE — 2500000003 HC RX 250 WO HCPCS: Performed by: NURSE PRACTITIONER

## 2022-06-28 PROCEDURE — 36415 COLL VENOUS BLD VENIPUNCTURE: CPT

## 2022-06-28 PROCEDURE — 80048 BASIC METABOLIC PNL TOTAL CA: CPT

## 2022-06-28 PROCEDURE — 99233 SBSQ HOSP IP/OBS HIGH 50: CPT | Performed by: INTERNAL MEDICINE

## 2022-06-28 PROCEDURE — 85025 COMPLETE CBC W/AUTO DIFF WBC: CPT

## 2022-06-28 PROCEDURE — 94760 N-INVAS EAR/PLS OXIMETRY 1: CPT

## 2022-06-28 PROCEDURE — 2700000000 HC OXYGEN THERAPY PER DAY

## 2022-06-28 RX ORDER — DEXTROSE MONOHYDRATE 50 MG/ML
INJECTION, SOLUTION INTRAVENOUS CONTINUOUS
Status: DISCONTINUED | OUTPATIENT
Start: 2022-06-28 | End: 2022-06-28

## 2022-06-28 RX ADMIN — INSULIN LISPRO 6 UNITS: 100 INJECTION, SOLUTION INTRAVENOUS; SUBCUTANEOUS at 12:43

## 2022-06-28 RX ADMIN — DOCUSATE SODIUM 100 MG: 50 LIQUID ORAL at 09:17

## 2022-06-28 RX ADMIN — HYDROCODONE BITARTRATE AND ACETAMINOPHEN 10 ML: 176/5 SOLUTION ORAL at 21:56

## 2022-06-28 RX ADMIN — IPRATROPIUM BROMIDE AND ALBUTEROL SULFATE 1 AMPULE: .5; 3 SOLUTION RESPIRATORY (INHALATION) at 15:30

## 2022-06-28 RX ADMIN — CEFEPIME HYDROCHLORIDE 2000 MG: 2 INJECTION, POWDER, FOR SOLUTION INTRAVENOUS at 21:57

## 2022-06-28 RX ADMIN — SODIUM BICARBONATE 650 MG: 650 TABLET ORAL at 09:17

## 2022-06-28 RX ADMIN — SODIUM BICARBONATE 650 MG: 650 TABLET ORAL at 20:42

## 2022-06-28 RX ADMIN — POTASSIUM PHOSPHATE, MONOBASIC POTASSIUM PHOSPHATE, DIBASIC 15 MMOL: 224; 236 INJECTION, SOLUTION, CONCENTRATE INTRAVENOUS at 09:25

## 2022-06-28 RX ADMIN — SODIUM CHLORIDE, PRESERVATIVE FREE 10 ML: 5 INJECTION INTRAVENOUS at 20:43

## 2022-06-28 RX ADMIN — Medication 40 MG: at 09:16

## 2022-06-28 RX ADMIN — IPRATROPIUM BROMIDE AND ALBUTEROL SULFATE 1 AMPULE: .5; 3 SOLUTION RESPIRATORY (INHALATION) at 07:40

## 2022-06-28 RX ADMIN — ASPIRIN 300 MG: 300 SUPPOSITORY RECTAL at 09:17

## 2022-06-28 RX ADMIN — IPRATROPIUM BROMIDE AND ALBUTEROL SULFATE 1 AMPULE: .5; 3 SOLUTION RESPIRATORY (INHALATION) at 11:34

## 2022-06-28 RX ADMIN — INSULIN LISPRO 3 UNITS: 100 INJECTION, SOLUTION INTRAVENOUS; SUBCUTANEOUS at 04:14

## 2022-06-28 RX ADMIN — ONDANSETRON 4 MG: 2 INJECTION INTRAMUSCULAR; INTRAVENOUS at 22:09

## 2022-06-28 RX ADMIN — POLYETHYLENE GLYCOL 3350 17 G: 17 POWDER, FOR SOLUTION ORAL at 09:17

## 2022-06-28 RX ADMIN — SODIUM CHLORIDE, PRESERVATIVE FREE 10 ML: 5 INJECTION INTRAVENOUS at 09:11

## 2022-06-28 RX ADMIN — INSULIN LISPRO 3 UNITS: 100 INJECTION, SOLUTION INTRAVENOUS; SUBCUTANEOUS at 09:14

## 2022-06-28 RX ADMIN — INSULIN GLARGINE 5 UNITS: 100 INJECTION, SOLUTION SUBCUTANEOUS at 20:42

## 2022-06-28 RX ADMIN — ACETAMINOPHEN 650 MG: 325 TABLET ORAL at 17:57

## 2022-06-28 RX ADMIN — ENOXAPARIN SODIUM 40 MG: 100 INJECTION SUBCUTANEOUS at 20:42

## 2022-06-28 RX ADMIN — IPRATROPIUM BROMIDE AND ALBUTEROL SULFATE 1 AMPULE: .5; 3 SOLUTION RESPIRATORY (INHALATION) at 19:37

## 2022-06-28 RX ADMIN — DEXTROSE MONOHYDRATE: 50 INJECTION, SOLUTION INTRAVENOUS at 10:47

## 2022-06-28 RX ADMIN — IPRATROPIUM BROMIDE AND ALBUTEROL SULFATE 1 AMPULE: .5; 3 SOLUTION RESPIRATORY (INHALATION) at 03:56

## 2022-06-28 RX ADMIN — CEFEPIME HYDROCHLORIDE 2000 MG: 2 INJECTION, POWDER, FOR SOLUTION INTRAVENOUS at 10:48

## 2022-06-28 RX ADMIN — INSULIN LISPRO 3 UNITS: 100 INJECTION, SOLUTION INTRAVENOUS; SUBCUTANEOUS at 16:48

## 2022-06-28 ASSESSMENT — PAIN SCALES - GENERAL
PAINLEVEL_OUTOF10: 0

## 2022-06-28 NOTE — PROGRESS NOTES
Hospitalist Progress Note      PCP: No primary care provider on file. Chief complaint: Altered mental status  Hospital course   patient is 49-year-old male who presented to hospital for change in mental status. Patient was found unresponsive at home, patient is not able to provide history due to condition, most of the history is from patient's chart. Patient was recently discharged from the rehab facility after he was diagnosed with CVA. Patient was covered in vomiting on arrival of EMS    Patient has known advanced hepatocellular cancer. Treated in the ICU for aspiration pneumonia  Extubated 6/27/2022  Date of Admission: 6/22/2022    Subjective: Extubated yesterday. Still confused.   Not really following commands  Still has NG tube in place    Medications:  Reviewed    Infusion Medications    dextrose 100 mL/hr at 06/28/22 1047    sodium chloride      dextrose      sodium chloride       Scheduled Medications    insulin glargine  5 Units SubCUTAneous Nightly    sodium bicarbonate  650 mg Oral BID    docusate  100 mg Oral Daily    senna  10 mL Oral Nightly    polyethylene glycol  17 g Oral Daily    ipratropium-albuterol  1 ampule Inhalation Q4H    enoxaparin  40 mg SubCUTAneous Nightly    cefepime  2,000 mg IntraVENous Q12H    pantoprazole (PROTONIX) 40 mg injection  40 mg IntraVENous Daily    insulin lispro  0-18 Units SubCUTAneous Q4H    aspirin  300 mg Rectal Daily    sodium chloride flush  10 mL IntraVENous 2 times per day     PRN Meds: sodium chloride, hydrALAZINE, glucose, dextrose bolus **OR** dextrose bolus, glucagon (rDNA), dextrose, sodium chloride flush, sodium chloride, promethazine **OR** ondansetron, polyethylene glycol, acetaminophen **OR** acetaminophen      Intake/Output Summary (Last 24 hours) at 6/28/2022 1423  Last data filed at 6/28/2022 1154  Gross per 24 hour   Intake 3138.24 ml   Output 2155 ml   Net 983.24 ml       Physical Exam Performed:    BP (!) 146/69 Pulse (!) 107   Temp 99.2 °F (37.3 °C) (Axillary)   Resp 26   Ht 4' 9\" (1.448 m)   Wt 116 lb 10 oz (52.9 kg)   SpO2 93%   BMI 25.24 kg/m²     General appearance: poorly interactive  HEENT:  Conjunctivae/corneas clear. Neck: Supple, with full range of motion. Respiratory:  Normal respiratory effort. Clear to auscultation, bilaterally without Rales/Wheezes/Rhonchi. Cardiovascular: Regular rate and rhythm with normal S1/S2 without murmurs or rubs  Abdomen: Soft, non-tender, non-distended, normal bowel sounds. Musculoskeletal: No cyanosis or edema bilaterally  Neurologic: Moving arms. Expressive and receptive aphasia  Peripheral Pulses: +2 palpable, equal bilaterally, no change today 6/24      Labs:   Recent Labs     06/26/22 0536 06/27/22  0434 06/28/22  0510   WBC 13.5* 17.1* 18.9*   HGB 11.2* 11.2* 10.6*   HCT 34.0* 33.6* 31.9*    251 255     Recent Labs     06/26/22  0536 06/27/22  0434 06/28/22  0510    146* 151*   K 4.3 4.5 4.3   * 115* 119*   CO2 20* 18* 21   BUN 31* 39* 42*   CREATININE 1.1 1.1 1.0   CALCIUM 8.3 8.5 8.4   PHOS 2.5 2.0* 2.0*     No results for input(s): AST, ALT, BILIDIR, BILITOT, ALKPHOS in the last 72 hours. No results for input(s): INR in the last 72 hours. No results for input(s): Alton Shackle in the last 72 hours. Urinalysis:      Lab Results   Component Value Date    NITRU Negative 06/22/2022    WBCUA 2 06/22/2022    BACTERIA None Seen 06/22/2022    RBCUA 2 06/22/2022    BLOODU TRACE 06/22/2022    SPECGRAV 1.016 06/22/2022    GLUCOSEU Negative 06/22/2022       Radiology:  XR CHEST PORTABLE   Final Result   1. Enteric tube with tip and side-port in the stomach. 2.  Endotracheal tube tip is 2 cm above the mar. Recommend retraction by   1.5 cm for optimal positioning. 3.  Stable right lung apex consolidation. CT HEAD WO CONTRAST   Final Result   1.  Evolution of the known infarcts involving is the left frontal lobe as well   as the left subinsular region. 2. Otherwise, no acute intracranial abnormality. 3. No significant mass effect, midline shift or acute intracranial hemorrhage. 4. Mild global parenchymal volume loss with chronic microvascular ischemic   changes. MRA NECK WO CONTRAST   Final Result   Acute to subacute infarctions in the left frontal lobe, increased in size   since MRI brain June 18, 2022. Small acute to subacute infarction along the left external capsule, stable   since June 18, 2022. Old lacunar infarcts in the left central franki. Mild to moderate parenchymal volume loss. Mild chronic microvascular disease. 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 Right dominance of the vertebral arteries with hypoplastic intracranial left   vertebral artery, and superimposed occlusion in the mid to distal   intracranial left vertebral artery. Moderate stenosis in the intracranial right vertebral artery. Moderate stenosis in the proximal P2 segment of the left PCA. Known 95% critical stenosis in the proximal right internal carotid artery. 80% stenosis in the proximal left internal carotid artery. MRA HEAD WO CONTRAST   Final Result   Acute to subacute infarctions in the left frontal lobe, increased in size   since MRI brain June 18, 2022. Small acute to subacute infarction along the left external capsule, stable   since June 18, 2022. Old lacunar infarcts in the left central franki. Mild to moderate parenchymal volume loss. Mild chronic microvascular disease. 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 Right dominance of the vertebral arteries with hypoplastic intracranial left   vertebral artery, and superimposed occlusion in the mid to distal   intracranial left vertebral artery. Moderate stenosis in the intracranial right vertebral artery.       Moderate stenosis in the proximal P2 segment of fibrosis and scarring   throughout the area which is unchanged. XR CHEST PORTABLE   Final Result   Unchanged right upper lobe scarring      No acute cardiopulmonary process         CT Head WO Contrast   Final Result   New low-attenuation in the left corona radiata consistent with an evolving   infarct as described on the recent MRI      No acute hemorrhage               Assessment/Plan:    Active Hospital Problems    Diagnosis     Syncope and collapse [R55]      Priority: Medium    Acute respiratory failure (HCC) [J96.00]      Priority: Medium    Aspiration pneumonia ([facility] [address] [J69.0]      Priority: Medium    History of CVA (cerebrovascular accident) [Z86.73]      Priority: Medium    AMS (altered mental status) [R41.82]      Priority: Medium    Acute cerebrovascular accident (CVA) ([facility] [address] [I63.9]      Priority: Medium     Patient is 80-year-old male who presented to hospital for change in mental status. Patient was found unresponsive at home, patient is not able to provide history due to condition, most of the history is from patient's chart. Patient was recently discharged from the rehab facility after he was diagnosed with CVA. Patient was covered in vomiting on arrival of EMS     Assessment    Acute hypoxic respiratory failure s/p intubation-  Extubated yesterday 6/27/2022  Has remained stable  High risk for aspiration. Speech eval ordered    Sepsis present on admission-improved  Source is likely pneumonia.   Klebsiella and Enterobacter in cultures  Continue cefepime IV    Acute left hemispheric stroke-patient had extensive stroke has very extensive deficits right now with impaired movement and expressive receptive aphasia  Poor neurological report recovery predicted  He has extensive vascular compromise based on CTA imaging      Hepatocellular cancer-advanced metastatic  Prognosis poor    Acute metabolic encephalopathy-possibly related to infection although I think that much of his deficit is all just related to this large stroke  I am pessimistic about recovery  He really needs hospice. Family is having a hard time with all of it especially his wife  They seem to be more focused on the little picture as opposed to really grasping his overall poor condition    I have a feeling this patient is going to end up back in a nursing home           DVT prophylaxis-Lovenox  Diet: Diet NPO  ADULT TUBE FEEDING; Orogastric; Diabetic; Continuous; 25; Yes; 25; Q 4 hours; 55; 300; Other (specify); q2  Code Status: Limited    PT/OT Eval Status: ordered    Dispo/Plan of care -barriers to discharge right now are feeding nutrition currently has an NG tube  ? PEG--> I am not sure he is can adequately be able to take in enough calories by mouth and will need some form of enteral nutrition    I am off tomorrow my hospitalist colleagues will assume care of this patient    Buell Lanes, MD

## 2022-06-28 NOTE — PROGRESS NOTES
[facility]                                               Progress note    Summary:   Emery Milner is being seen by nephrology for LUL. Admitted with AMS. He is now intubated for hypoxic respiratory failure. Had recent stroke dx with aspiration pneumonia on ABX. also has metastatic HCC. Interval History  Seen and examined in ICU  He is off pressors and extubated. Awake but confused    /73  95% on 3 L   UO 2.1 L   + 500 cc   + 2.2 L for the day     Labs reviewed. Na 146 > 151  K 4.5 > 4.3  Bicarb 19 > 21  BUN 39 > 42  Cr 1.1 > 1  Mag 2.5  Phos 2      Plan:   Renal function is at baseline  He needs free water with feeds, increase to 300 cc q 2. He was getting them, no residuals. d5 at 100 cc/hr   Repeat sodium at 2 pm   Phos replacement , ordered      Kirti Morales MD  [facility]  Office: [phone]    Assessment:     #LUL  Baseline Cr   Cr at time of consult  UA  Had septic shock , required pressors. Non oliguric   UA with hyaline catss 2 WBC and 2 RBC  No proteinuria but discrepant values < 4 then 233 on another check  , did not correlate with UA showing albumin > 1000 mg, dl will check again. Had contrast on 6/22  SPEP negative     #acute hypoxic respiratory failure. Aspiration pneumonia  CTPA negative for PE  proca elevated  Blood cxs  Sputum cx  MRSA nares negative   Pro BNP 91  Hyperdynamic EF 70% consistent with septic shock   G1DD    #Mild hyponatremia  Likely due to intravascular volume depletion. #HCC  Prevoius CT Abd / Pelvis with conrast showed hepatic mass with no nmets, moderate stenosis of SMA and 50% narrowing. No mention of renal arteries or stenosis. CT abd without contrast on 6/3/22 showed normal kidneys and no hydro, mass, or stones. ROS:   Unable to assess  confused      PMH:   Past medical history, surgical history, social history, family history are reviewed and updated as appropriate.     Reviewed current medication list.   Allergies reviewed and updated as needed. PE:   Vitals:    06/28/22 0900   BP: 130/73   Pulse: (!) 101   Resp: (!) 31   Temp: 99 °F (37.2 °C)   SpO2: 95%       General appearance:  in NAD, . Comfortable. HEENT: EOM intact, no icterus. Trachea is midline. Neck : No masses, appears symmetrical, no JVD  Respiratory: Respiratory effort appears normal, bilateral equal chest rise, no wheeze, no crackles   Cardiovascular: Ausculation shows RRR no edema  Abdomen: No visible mass or tenderness, non distended. Musculoskeletal:  Joints with no swelling or deformity. Skin:no rashes, ulcers, induration, no jaundice.    Neuro: awake but confused, follows commands      Lab Results   Component Value Date    CREATININE 1.0 06/28/2022    BUN 42 (H) 06/28/2022     (H) 06/28/2022    K 4.3 06/28/2022     (H) 06/28/2022    CO2 21 06/28/2022      Lab Results   Component Value Date    WBC 18.9 (H) 06/28/2022    HGB 10.6 (L) 06/28/2022    HCT 31.9 (L) 06/28/2022    MCV 85.1 06/28/2022     06/28/2022     Lab Results   Component Value Date    CALCIUM 8.4 06/28/2022    PHOS 2.0 (L) 06/28/2022

## 2022-06-28 NOTE — PROGRESS NOTES
Pulmonary Progress Note    Date of Admission: 6/22/2022   LOS: 6 days       CC:  CVA    Subjective:  No significant events overnight. ROS:       Assessment:     Acute CVA with acute melena Bolick encephalopathy  Aspiration pneumonia with Klebsiella and Enterobacter  Fever  Metastatic hepatocellular carcinoma    Plan: This note may have been transcribed using [id number] [employer]. Please disregard any translational errors. Hospital Day: 6       *extubated yesterday. Moving left hand   *Continue cefepime. Stop date placed. procal improving  * trail room air  * Metabolic acidosis with hypernatremia. Per nephrology. *Place NG tube with anticipation of extubation and difficulty with feeding. Transfer to PCU     Will sign off at this time. Thanks for the consult. Please call with questions. Data:        PHYSICAL EXAM:   Blood pressure 136/76, pulse (!) 112, temperature 99 °F (37.2 °C), temperature source Axillary, resp. rate 26, height 4' 9\" (1.448 m), weight 122 lb 12.7 oz (55.7 kg), SpO2 95 %.'    Body mass index is 26.57 kg/m². Gen: No distress. ENT:   Resp: No accessory muscle use. No crackles. No wheezes. No rhonchi. CV: Regular rate. Regular rhythm. No murmur or rub. No edema. Skin: Warm, dry, normal texture and turgor. No nodule on exposed extremities. M/S: No cyanosis. No clubbing. No joint deformity. Psych: wakes to sternal rub .   Able to follow commands on left     Medications:    Scheduled Meds:   potassium phosphate IVPB  15 mmol IntraVENous Once    insulin glargine  5 Units SubCUTAneous Nightly    sodium bicarbonate  650 mg Oral BID    docusate  100 mg Oral Daily    senna  10 mL Oral Nightly    polyethylene glycol  17 g Oral Daily    ipratropium-albuterol  1 ampule Inhalation Q4H    enoxaparin  40 mg SubCUTAneous Nightly    cefepime  2,000 mg IntraVENous Q12H    pantoprazole (PROTONIX) 40 mg injection  40 mg IntraVENous Daily    insulin lispro  0-18 Units SubCUTAneous Q4H    aspirin  300 mg Rectal Daily    sodium chloride flush  10 mL IntraVENous 2 times per day       Continuous Infusions:   dextrose 100 mL/hr at 06/28/22 1047    sodium chloride      dextrose      sodium chloride         PRN Meds:  sodium chloride, hydrALAZINE, glucose, dextrose bolus **OR** dextrose bolus, glucagon (rDNA), dextrose, sodium chloride flush, sodium chloride, promethazine **OR** ondansetron, polyethylene glycol, acetaminophen **OR** acetaminophen    Labs reviewed:  CBC:   Recent Labs     06/26/22  0536 06/27/22  0434 06/28/22  0510   WBC 13.5* 17.1* 18.9*   HGB 11.2* 11.2* 10.6*   HCT 34.0* 33.6* 31.9*   MCV 86.1 85.5 85.1    251 255     BMP:   Recent Labs     06/26/22  0536 06/27/22  0434 06/28/22  0510    146* 151*   K 4.3 4.5 4.3   * 115* 119*   CO2 20* 18* 21   PHOS 2.5 2.0* 2.0*   BUN 31* 39* 42*   CREATININE 1.1 1.1 1.0     LIVER PROFILE: No results for input(s): AST, ALT, LIPASE, BILIDIR, BILITOT, ALKPHOS in the last 72 hours. Invalid input(s): AMYLASE,  ALB  PT/INR: No results for input(s): PROTIME, INR in the last 72 hours. APTT: No results for input(s): APTT in the last 72 hours. UA:No results for input(s): NITRITE, COLORU, PHUR, LABCAST, WBCUA, RBCUA, MUCUS, TRICHOMONAS, YEAST, BACTERIA, CLARITYU, SPECGRAV, LEUKOCYTESUR, UROBILINOGEN, BILIRUBINUR, BLOODU, GLUCOSEU, AMORPHOUS in the last 72 hours. Invalid input(s): Lizeth Castellanos  No results for input(s): PH, PCO2, PO2 in the last 72 hours. Cx:      Films: This note was transcribed using [id number] [facility]. Please disregard any translational errors.       Benita Lopez [facility] [phone]

## 2022-06-28 NOTE — PROGRESS NOTES
Speech Language Pathology  Facility/Department: [facility] 3N ICU  Initial Assessment  DYSPHAGIA BEDSIDE SWALLOW EVALUATION     Patient: Thu Retana   : 1940   MRN: [id number]      Evaluation Date: 2022   Admitting Diagnosis: Syncope and collapse [R55]  Acute respiratory failure, unspecified whether with hypoxia or hypercapnia ([address] [J96.00]  Cerebrovascular accident (CVA), unspecified mechanism ([address] [I63.9]  AMS (altered mental status) [R41.82]  Acute cerebrovascular accident (CVA) ([address] [I63.9]   ·  has a past medical history of Asthma, Diabetes mellitus ([address] Hypertension, and Thyroid disease. · Recent hospital admit [facility] 2022 to 2022 at which time pt was active with SLP. Pt did have a MBSS 2022 with recommendation for dysphagia puree with thin liquids; continued therapy at d/c    Onset :                                              Chart Review:   H&P: Per MD History and Physical documentation:  Chief Complaint: found unresponsive   History Of Present Illness:    Patient is 77-year-old male who presented to hospital for change in mental status. Patient was found unresponsive at home, patient is not able to provide history due to condition, most of the history is from patient's chart. Patient was recently discharged from the rehab facility after he was diagnosed with CVA. Patient was covered in vomiting on arrival of EMS     Current Diet Level (prior to evaluation):  NPO. Pt has OG with tube feeding running   Respiratory Status:   []Room Air   [x]O2 via nasal cannula 2L/min O2 via nasal cannula   []Other:    Imaging:  CT Chest: 2022  Impression   No evidence of a pulmonary embolus. ET tube and gastric tube in good position.    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 Questionable mucous plugging scattered in the bronchi along the right lung   base which may be due to recent aspiration.  Recommend pulmonology follow-up. 7 cm exophytic right hepatic mass which has increased in size.  Suggest   PET-CT correlation. Postop changes right apex and upper lobe with fibrosis and scarring   throughout the area which is unchanged.             MRI:/MRA Head/Neck 6/23/2022  Impression   Acute to subacute infarctions in the left frontal lobe, increased in size   since MRI brain June 18, 2022. Small acute to subacute infarction along the left external capsule, stable   since June 18, 2022. Old lacunar infarcts in the left central franki. Mild to moderate parenchymal volume loss. Mild chronic microvascular disease. 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 Right dominance of the vertebral arteries with hypoplastic intracranial left   vertebral artery, and superimposed occlusion in the mid to distal   intracranial left vertebral artery. Moderate stenosis in the intracranial right vertebral artery. Moderate stenosis in the proximal P2 segment of the left PCA. Known 95% critical stenosis in the proximal right internal carotid artery. 80% stenosis in the proximal left internal carotid artery.        Modified Barium Swallow Study: any recent history in chart review: MBSS 6/20/2022 at [facility] with findings of Oropharyngeal dysphagia; recommendation for dysphagia puree and upgrade to thin liquids from moderately thick liquids    Reason for referral: SLP evaluation orders received due to CVA protocol     History/Prior Level of Function:   Living Status: Lives with family  Baseline diet: per 6/21/2022 [facility] [place] SLP recommended discharge diet: dysphagia puree with thin liquids;meds in [place] (active with SLP 6/18/2022 through 6/21/2022)  Prior Dysphagia History: dysphagia work up at [facility] with [address] 6/20/2022      Dysphagia Impressions/Diagnosis: Oropharyngeal Dysphagia   1. Pt was awake in bed following RN and PCA care.  IPad for [place] utilized. Pt with <25% response to IPad  for [place]e Snare. Limited pt response to family member visiting. Pt localizing to name; but followed <25% of commands with tactile/visual demonstrations. Pt with 2 responses of head nods to indicate Y/N; otherwise no other attempts to communicate with IPad  or 2 family members in the room  · Pre-feeding oral motor exam: left labial / buccal response to tactile stimuli; no right labial/buccal response. No lingual response to tactile stimuli. No oral reflexes achieved (palatal or gag). Audible moan soft/wet in quality. No volitional swallow; no volitional cough     2. OROPHARYNGEAL DYSPHAGIA DIAGNOSIS: Marked Oropharyngeal dysphagia with poor oral motor response; absent swallow. High aspiration risk. No po trials this date; but did trial oral sensory/tactile stimuli. No swallow achieved. During oral care; minimal + amount of thick sputum. This appears to be a significant change from SLP documentation during recent admit 6/17/2022 through to 6/21/2022. · Recommend npo due to high aspiration risk if made po. · Back to MD for oral care ; but would suggest oral suctioning during oral care  · At this time pt is not a candidate for MBSS due to severity of deficits  · SLP dysphagia treatment trial with ongoing assessment for readiness for po or for MBSS  Discussed with RN. Pt does have OG in place with TF running. 3. Mental status; reduced communication ( Ipad for [place] utilized but poor level of pt response). Limited response to family visitor further exacerbates.           Recommended Diet and Intervention 6/28/2022:  Diet Solids Recommendation:  NPO      Continue OG with tube feed as ordered by MD Liquid Consistency Recommendation:  NPO     Continue OG with tube feed as ordered by MD Recommended form of Meds: Via alternative means      Compensatory Swallowing Strategies:  Oral care; oral suctioning in place when providing oral care    SHORT TERM DYSPHAGIA GOALS/PLAN OF CARE: Speech therapy for dysphagia tx 3-5 times per week during acute care stay. Pt will demonstrate improved oral motor response to tactile sensory stimulation with >50% om response/mashing pattern  Pt will demonstrate swallow response to tactile/sensory stimulation >25%  Pt will follow one step commands >25%         Dysphagia Therapeutic Intervention:  Oral Care, Oral Motor Exercises , Therapeutic Trials with SLP , Instrumental assessment of swallow function (Modified Barium Swallow Study) sensory stimulation    Dysphagia Prognosis: [x] good []fair  [x]guarded []poor severity of deficits;medical DX; previous level of functioning        Discharge Recommendations: Discharge recommendations to be determined pending ongoing follow-up during acute care stay      TEST DATA  Vision: inconsistent visual tracking.  Pt did localize to name  Hearing: Pt did localize to name    Consistencies Presented: No po consistencies due to severity of deficits; poor oral motor prep during oral care and no swallow elicited during oral care  I  Cognitive/behavioral:   []orientation [] to self [] place [] date [] reason for admit [] purpose of evaluation  []distractible  []verbally responsive  []follows one step commands  []agitated  []impulsive  [x] other: poor response to stimuli ; <25%      Patient Positioning: Upright in bed ~70 to 80 degrees    Dentition:  []Adequate  []Dentures   [x]Missing Many Teeth  []Edentulous  []Other:    Baseline Vocal Quality:  []Normal  []Dysphonic   []Aphonic   [x]Hoarse  []Wet  [x]Weak  [x]Other: limited to audible moan    Volitional Cough:  []Strong  []Weak  []Wet  [x]Absent  []Congested  []Other:    Volitional Swallow:   [x]Absent   []Delayed     []Adequate     []Required use of drink     Oral Mechanism Exam:  []WFL []Mild   [x] Moderate  [x]Severe  []To be assessed  Impaired:   []Left side      [x]Right side    [x]Labial ROM/Coordination    []Labial Symmetry   [x]Lingual ROM/Coordination   []Lingual Symmetry  [x]Gag  [x]Other: right facial droop without response to tactile stimuli; poor lingual rom to tactile stimulation; absent gag    Oral Phase: []WFL []Mild   [] Moderate  [x]Severe  []To be assessed   []Impaired/Prolonged Mastication:   []Spillage Left:   []Spillage Right:  []Pocketing Left:   []Pocketing Right:   []Decreased Anterior to Posterior Transit:   []Suspected Premature Bolus Loss:   []Lingual/Palatal Residue:   [x]Other: Poor oral motor response to stimuli    Pharyngeal Phase: []WFL []Mild   [] Moderate  [x]Severe  []To be assessed   []Delayed Swallow:   []Suspected Pharyngeal Pooling:   []Decreased Laryngeal Elevation:   [x]Absent Swallow:  []Wet Vocal Quality:   []Throat Clearing-Immediate:   []Throat Clearing-Delayed:   []Cough-Immediate:   []Cough-Delayed:  []Change in Vital Signs:  []Suspected Delayed Pharyngeal Clearing:  [x]Other: No swallow elicited during tactile / sensory stimulation      Eating Assistance: n/a ; not recommending po diet this date/time due to severity of deficits and concern for aspiration        EDUCATION:   Provided education regarding role of SLP, results of assessment, recommendations and general speech pathology plan of care. [] Pt verbalized understanding and agreement   [] Pt requires ongoing learning   [x] No evidence of comprehension     If patient discharges prior to next visit, this note will serve as discharge. Timed Code Minutes: o  Total Treatment Minutes: 25    Electronically signed by: Jennifer Arnold,MS,CCC,SLP [id number]  Speech and Language Pathologist  6/28/2022 1543pm

## 2022-06-28 NOTE — PROGRESS NOTES
Report called and given to [facility], 5th floor RN to resume care. Patients belongings packed. Patients grandson in room and informed of patients transfer to PCU. Informed of new room # of 5102. Patient peñaloza emptied and pumps cleared. Transport request placed. Odalis Saravia, transporter at [facility] with stretcher. This RN, Finas Councilman and Yasmeen PCA pull patient onto stretcher. Patient sent to room 5102 on 2 L nasal cannula. VSS. Belongings sent with eli. Yasmeen to assist with patient on 5th floor and bring ICU monitor back to room. Call back number left with [facility], 5th floor RN in case of questions to call back.

## 2022-06-29 ENCOUNTER — APPOINTMENT (OUTPATIENT)
Dept: GENERAL RADIOLOGY | Age: 82
DRG: 720 | End: 2022-06-29
Payer: MEDICAID

## 2022-06-29 LAB
AMMONIA: 34 UMOL/L (ref 16–60)
ANION GAP SERPL CALCULATED.3IONS-SCNC: 12 MMOL/L (ref 3–16)
BASOPHILS ABSOLUTE: 0.1 K/UL (ref 0–0.2)
BASOPHILS RELATIVE PERCENT: 0.6 %
BUN BLDV-MCNC: 41 MG/DL (ref 7–20)
CALCIUM SERPL-MCNC: 8.6 MG/DL (ref 8.3–10.6)
CHLORIDE BLD-SCNC: 113 MMOL/L (ref 99–110)
CO2: 22 MMOL/L (ref 21–32)
CREAT SERPL-MCNC: 1 MG/DL (ref 0.8–1.3)
EOSINOPHILS ABSOLUTE: 0.2 K/UL (ref 0–0.6)
EOSINOPHILS RELATIVE PERCENT: 0.9 %
GFR AFRICAN AMERICAN: >60
GFR NON-AFRICAN AMERICAN: >60
GLUCOSE BLD-MCNC: 123 MG/DL (ref 70–99)
GLUCOSE BLD-MCNC: 128 MG/DL (ref 70–99)
GLUCOSE BLD-MCNC: 157 MG/DL (ref 70–99)
GLUCOSE BLD-MCNC: 161 MG/DL (ref 70–99)
GLUCOSE BLD-MCNC: 171 MG/DL (ref 70–99)
GLUCOSE BLD-MCNC: 176 MG/DL (ref 70–99)
HCT VFR BLD CALC: 32.6 % (ref 40.5–52.5)
HEMOGLOBIN: 10.7 G/DL (ref 13.5–17.5)
LYMPHOCYTES ABSOLUTE: 1.8 K/UL (ref 1–5.1)
LYMPHOCYTES RELATIVE PERCENT: 8.8 %
MAGNESIUM: 2.5 MG/DL (ref 1.8–2.4)
MCH RBC QN AUTO: 28.4 PG (ref 26–34)
MCHC RBC AUTO-ENTMCNC: 32.9 G/DL (ref 31–36)
MCV RBC AUTO: 86.3 FL (ref 80–100)
MONOCYTES ABSOLUTE: 1.5 K/UL (ref 0–1.3)
MONOCYTES RELATIVE PERCENT: 7.2 %
NEUTROPHILS ABSOLUTE: 17.4 K/UL (ref 1.7–7.7)
NEUTROPHILS RELATIVE PERCENT: 82.5 %
PDW BLD-RTO: 13.9 % (ref 12.4–15.4)
PERFORMED ON: ABNORMAL
PHOSPHORUS: 2.7 MG/DL (ref 2.5–4.9)
PLATELET # BLD: 287 K/UL (ref 135–450)
PMV BLD AUTO: 7.8 FL (ref 5–10.5)
POTASSIUM SERPL-SCNC: 4.5 MMOL/L (ref 3.5–5.1)
PROCALCITONIN: 0.3 NG/ML (ref 0–0.15)
RBC # BLD: 3.77 M/UL (ref 4.2–5.9)
SODIUM BLD-SCNC: 147 MMOL/L (ref 136–145)
WBC # BLD: 21.1 K/UL (ref 4–11)

## 2022-06-29 PROCEDURE — 6360000002 HC RX W HCPCS: Performed by: INTERNAL MEDICINE

## 2022-06-29 PROCEDURE — 94640 AIRWAY INHALATION TREATMENT: CPT

## 2022-06-29 PROCEDURE — 71045 X-RAY EXAM CHEST 1 VIEW: CPT

## 2022-06-29 PROCEDURE — 82140 ASSAY OF AMMONIA: CPT

## 2022-06-29 PROCEDURE — 36415 COLL VENOUS BLD VENIPUNCTURE: CPT

## 2022-06-29 PROCEDURE — 2580000003 HC RX 258: Performed by: NURSE PRACTITIONER

## 2022-06-29 PROCEDURE — 2060000000 HC ICU INTERMEDIATE R&B

## 2022-06-29 PROCEDURE — 6370000000 HC RX 637 (ALT 250 FOR IP): Performed by: NURSE PRACTITIONER

## 2022-06-29 PROCEDURE — 80048 BASIC METABOLIC PNL TOTAL CA: CPT

## 2022-06-29 PROCEDURE — 2580000003 HC RX 258: Performed by: INTERNAL MEDICINE

## 2022-06-29 PROCEDURE — 97129 THER IVNTJ 1ST 15 MIN: CPT

## 2022-06-29 PROCEDURE — 84100 ASSAY OF PHOSPHORUS: CPT

## 2022-06-29 PROCEDURE — 85025 COMPLETE CBC W/AUTO DIFF WBC: CPT

## 2022-06-29 PROCEDURE — 6360000002 HC RX W HCPCS: Performed by: NURSE PRACTITIONER

## 2022-06-29 PROCEDURE — C9113 INJ PANTOPRAZOLE SODIUM, VIA: HCPCS | Performed by: NURSE PRACTITIONER

## 2022-06-29 PROCEDURE — 83735 ASSAY OF MAGNESIUM: CPT

## 2022-06-29 PROCEDURE — 92526 ORAL FUNCTION THERAPY: CPT

## 2022-06-29 PROCEDURE — 6370000000 HC RX 637 (ALT 250 FOR IP): Performed by: INTERNAL MEDICINE

## 2022-06-29 PROCEDURE — 84145 PROCALCITONIN (PCT): CPT

## 2022-06-29 PROCEDURE — 94761 N-INVAS EAR/PLS OXIMETRY MLT: CPT

## 2022-06-29 RX ADMIN — PIPERACILLIN AND TAZOBACTAM 3375 MG: 3; .375 INJECTION, POWDER, LYOPHILIZED, FOR SOLUTION INTRAVENOUS at 17:33

## 2022-06-29 RX ADMIN — PIPERACILLIN AND TAZOBACTAM 3375 MG: 3; .375 INJECTION, POWDER, LYOPHILIZED, FOR SOLUTION INTRAVENOUS at 23:44

## 2022-06-29 RX ADMIN — IPRATROPIUM BROMIDE AND ALBUTEROL SULFATE 1 AMPULE: .5; 3 SOLUTION RESPIRATORY (INHALATION) at 19:40

## 2022-06-29 RX ADMIN — Medication 40 MG: at 10:16

## 2022-06-29 RX ADMIN — SODIUM BICARBONATE 650 MG: 650 TABLET ORAL at 21:17

## 2022-06-29 RX ADMIN — INSULIN GLARGINE 5 UNITS: 100 INJECTION, SOLUTION SUBCUTANEOUS at 21:17

## 2022-06-29 RX ADMIN — IPRATROPIUM BROMIDE AND ALBUTEROL SULFATE 1 AMPULE: .5; 3 SOLUTION RESPIRATORY (INHALATION) at 00:15

## 2022-06-29 RX ADMIN — SODIUM CHLORIDE 5 ML: 9 INJECTION, SOLUTION INTRAVENOUS at 10:51

## 2022-06-29 RX ADMIN — INSULIN LISPRO 3 UNITS: 100 INJECTION, SOLUTION INTRAVENOUS; SUBCUTANEOUS at 10:16

## 2022-06-29 RX ADMIN — DOCUSATE SODIUM 100 MG: 50 LIQUID ORAL at 10:16

## 2022-06-29 RX ADMIN — IPRATROPIUM BROMIDE AND ALBUTEROL SULFATE 1 AMPULE: .5; 3 SOLUTION RESPIRATORY (INHALATION) at 03:00

## 2022-06-29 RX ADMIN — ASPIRIN 300 MG: 300 SUPPOSITORY RECTAL at 10:16

## 2022-06-29 RX ADMIN — INSULIN LISPRO 3 UNITS: 100 INJECTION, SOLUTION INTRAVENOUS; SUBCUTANEOUS at 21:17

## 2022-06-29 RX ADMIN — HYDROCODONE BITARTRATE AND ACETAMINOPHEN 10 ML: 176/5 SOLUTION ORAL at 21:18

## 2022-06-29 RX ADMIN — IPRATROPIUM BROMIDE AND ALBUTEROL SULFATE 1 AMPULE: .5; 3 SOLUTION RESPIRATORY (INHALATION) at 11:33

## 2022-06-29 RX ADMIN — PIPERACILLIN AND TAZOBACTAM 4500 MG: 4; .5 INJECTION, POWDER, LYOPHILIZED, FOR SOLUTION INTRAVENOUS at 10:45

## 2022-06-29 RX ADMIN — SODIUM CHLORIDE, PRESERVATIVE FREE 10 ML: 5 INJECTION INTRAVENOUS at 10:38

## 2022-06-29 RX ADMIN — SODIUM BICARBONATE 650 MG: 650 TABLET ORAL at 10:15

## 2022-06-29 RX ADMIN — ACETAMINOPHEN 650 MG: 325 TABLET ORAL at 17:25

## 2022-06-29 RX ADMIN — INSULIN LISPRO 3 UNITS: 100 INJECTION, SOLUTION INTRAVENOUS; SUBCUTANEOUS at 17:20

## 2022-06-29 RX ADMIN — SODIUM CHLORIDE, PRESERVATIVE FREE 10 ML: 5 INJECTION INTRAVENOUS at 21:18

## 2022-06-29 RX ADMIN — IPRATROPIUM BROMIDE AND ALBUTEROL SULFATE 1 AMPULE: .5; 3 SOLUTION RESPIRATORY (INHALATION) at 23:26

## 2022-06-29 RX ADMIN — POLYETHYLENE GLYCOL 3350 17 G: 17 POWDER, FOR SOLUTION ORAL at 10:16

## 2022-06-29 RX ADMIN — IPRATROPIUM BROMIDE AND ALBUTEROL SULFATE 1 AMPULE: .5; 3 SOLUTION RESPIRATORY (INHALATION) at 16:28

## 2022-06-29 RX ADMIN — SODIUM CHLORIDE 5 ML: 9 INJECTION, SOLUTION INTRAVENOUS at 10:42

## 2022-06-29 RX ADMIN — IPRATROPIUM BROMIDE AND ALBUTEROL SULFATE 1 AMPULE: .5; 3 SOLUTION RESPIRATORY (INHALATION) at 07:45

## 2022-06-29 RX ADMIN — INSULIN LISPRO 3 UNITS: 100 INJECTION, SOLUTION INTRAVENOUS; SUBCUTANEOUS at 12:36

## 2022-06-29 RX ADMIN — INSULIN LISPRO 6 UNITS: 100 INJECTION, SOLUTION INTRAVENOUS; SUBCUTANEOUS at 01:32

## 2022-06-29 RX ADMIN — ENOXAPARIN SODIUM 40 MG: 100 INJECTION SUBCUTANEOUS at 21:17

## 2022-06-29 ASSESSMENT — PAIN SCALES - GENERAL: PAINLEVEL_OUTOF10: 0

## 2022-06-29 ASSESSMENT — PAIN SCALES - WONG BAKER: WONGBAKER_NUMERICALRESPONSE: 0

## 2022-06-29 NOTE — PLAN OF CARE
Problem: Discharge Planning  Goal: Discharge to home or other facility with appropriate resources  6/29/2022 1134 by Angelita Mooney RN  Outcome: Progressing     Problem: Pain  Goal: Verbalizes/displays adequate comfort level or baseline comfort level  6/29/2022 1134 by Angelita Mooney RN  Outcome: Progressing     Problem: Safety - Adult  Goal: Free from fall injury  6/29/2022 1134 by Angelita Mooney RN  Outcome: Progressing     Problem: ABCDS Injury Assessment  Goal: Absence of physical injury  6/29/2022 1134 by Angelita Mooney RN  Outcome: Progressing     Problem: Skin/Tissue Integrity  Goal: Absence of new skin breakdown  Description: 1. Monitor for areas of redness and/or skin breakdown  2. Assess vascular access sites hourly  3. Every 4-6 hours minimum:  Change oxygen saturation probe site  4. Every 4-6 hours:  If on nasal continuous positive airway pressure, respiratory therapy assess nares and determine need for appliance change or resting period.   6/29/2022 1134 by Angelita Mooney RN  Outcome: Progressing     Problem: Nutrition Deficit:  Goal: Optimize nutritional status  6/29/2022 1134 by Angelita Mooney RN  Outcome: Progressing     Problem: Neurosensory - Adult  Goal: Achieves stable or improved neurological status  6/29/2022 1134 by Angelita Mooney RN  Outcome: Progressing     Problem: Neurosensory - Adult  Goal: Achieves maximal functionality and self care  6/29/2022 1134 by Angelita Mooney RN  Outcome: Progressing

## 2022-06-29 NOTE — PROGRESS NOTES
[facility]                                               Progress note    Summary:   Colton Kwoalski is being seen by nephrology for LUL. Admitted with AMS. He is now intubated for hypoxic respiratory failure. Had recent stroke dx with aspiration pneumonia on ABX. also has metastatic HCC. Interval History  Patient was seen and examined in his room with family at bedside  He is lethargic but awake  Offers no complaints  Has no signs of volume overload      /61  Sat 96% on 2 L  Transferred out of CICU  Urine output 1.8 L  +3.2 L for admission      Labs reviewed. Na 146 > 151?  142 > 147   K 4.5 > 4.3> 4.5  Bicarb 22  BUN 41  Cr 1.1 > 1  Mag 2.5  Phos 2.7      Plan:   Renal function is at baseline   increase free water to 300 cc q 2. Stan Arias MD  [facility]  Office: [phone]    Assessment:     #LUL  Baseline Cr   Cr at time of consult  UA  Had septic shock , required pressors. Non oliguric   UA with hyaline catss 2 WBC and 2 RBC  No proteinuria but discrepant values < 4 then 233 on another check  , did not correlate with UA showing albumin > 1000 mg, dl will check again. Had contrast on 6/22  SPEP negative     #acute hypoxic respiratory failure. Aspiration pneumonia  CTPA negative for PE  proca elevated  Blood cxs  Sputum cx  MRSA nares negative   Pro BNP 91  Hyperdynamic EF 70% consistent with septic shock   G1DD    #Mild hyponatremia  Likely due to intravascular volume depletion. #HCC  Prevoius CT Abd / Pelvis with conrast showed hepatic mass with no nmets, moderate stenosis of SMA and 50% narrowing. No mention of renal arteries or stenosis. CT abd without contrast on 6/3/22 showed normal kidneys and no hydro, mass, or stones. ROS:   Unable to assess  confused      PMH:   Past medical history, surgical history, social history, family history are reviewed and updated as appropriate.     Reviewed current medication list.   Allergies reviewed and updated as

## 2022-06-29 NOTE — CARE COORDINATION
[facility]  Palliative Care   Progress Note    NAME:  Lorrie Lantigua  MEDICAL RECORD NUMBER:  [id number]  AGE: 80 y.o. GENDER: male  : 1940  TODAY'S DATE:  2022    Subjective: Patient resting in bed, still not interacting much. Objective:    Vitals:    22 1133   BP:    Pulse: (!) 106   Resp: (!) 31   Temp:    SpO2: 95%     Lab Results   Component Value Date    WBC 21.1 (H) 2022    HGB 10.7 (L) 2022    HCT 32.6 (L) 2022    MCV 86.3 2022     2022     Lab Results   Component Value Date    CREATININE 1.0 2022    BUN 41 (H) 2022     (H) 2022    K 4.5 2022     (H) 2022    CO2 22 2022     Lab Results   Component Value Date    ALT 67 (H) 2022     (H) 2022    ALKPHOS 122 2022    BILITOT 1.1 (H) 2022       Plan: I met with his wife and grand daughter along with Mario HARMON to discuss tube feeding.  I pad used Trever Rehman [id number]. PEG placement explained by Sarah HARMON, family having difficulty understanding current barriers to treatment. It was decided his sons would come in tomorrow and I will met with them to have discussion with their mother present to help her decide next steps in treating patient, he continues to be a high aspiration risk if PO tried. Code Status: Limited  Discharge Environment:  [] Hospice Consult Agency:  [] Inpatient Hospice    [] Home with  [address]   [] ECF with Hospice  [] ECF skilled care with Hospice to follow   [] Other:    Teaching Time:  1hours      I will continue to follow Mr. Argentina Bautista care as needed. Thank you for allowing me to participate in the care of Mr. Rebekah Hough .      Electronically signed by Stoney Patel RN, BSN,CHPN on 2022 at 3:02 PM  [address] [place] [address]  Office: [phone]

## 2022-06-29 NOTE — PROGRESS NOTES
NG securement device found to have come off the nose, dislodging the NG tube. Tube feed paused. CXR ordered to verify placement.     Electronically signed by Brandy Lawrence RN on 6/29/2022 at 3:27 PM

## 2022-06-29 NOTE — PLAN OF CARE
Problem: Discharge Planning  Goal: Discharge to home or other facility with appropriate resources  Outcome: Progressing     Problem: Pain  Goal: Verbalizes/displays adequate comfort level or baseline comfort level  Outcome: Progressing     Problem: Safety - Adult  Goal: Free from fall injury  Outcome: Progressing  Flowsheets (Taken 6/28/2022 1518 by Anna Gonzalez RN)  Free From Fall Injury: Instruct family/caregiver on patient safety     Problem: ABCDS Injury Assessment  Goal: Absence of physical injury  Outcome: Progressing  Flowsheets (Taken 6/28/2022 1518 by Anna Gonzalez RN)  Absence of Physical Injury: Implement safety measures based on patient assessment     Problem: Skin/Tissue Integrity  Goal: Absence of new skin breakdown  Description: 1. Monitor for areas of redness and/or skin breakdown  2. Assess vascular access sites hourly  3. Every 4-6 hours minimum:  Change oxygen saturation probe site  4. Every 4-6 hours:  If on nasal continuous positive airway pressure, respiratory therapy assess nares and determine need for appliance change or resting period.   Outcome: Progressing     Problem: Nutrition Deficit:  Goal: Optimize nutritional status  Outcome: Progressing     Problem: Neurosensory - Adult  Goal: Achieves stable or improved neurological status  Outcome: Progressing  Flowsheets (Taken 6/29/2022 0155)  Achieves stable or improved neurological status: Assess for and report changes in neurological status  Goal: Achieves maximal functionality and self care  Outcome: Progressing  Flowsheets (Taken 6/29/2022 0155)  Achieves maximal functionality and self care: Monitor swallowing and airway patency with patient fatigue and changes in neurological status     Problem: Respiratory - Adult  Goal: Achieves optimal ventilation and oxygenation  Outcome: Progressing

## 2022-06-29 NOTE — PROGRESS NOTES
[facility]   Speech Therapy  Daily Dysphagia Treatment Note    Fabiola Tsai  AGE: 80 y.o. GENDER: male  : 1940  [id number]  EPISODE DATE:  2022     Patient Active Problem List   Diagnosis    Intractable vomiting with nausea    Intractable vomiting    Nausea vomiting and diarrhea    Aortic arch aneurysm (HCC)    HTN (hypertension), benign    RUQ pain    Cholelithiasis    DM2 (diabetes mellitus, type 2) ([address]    Hypothyroid    Diarrhea    Hepatocellular carcinoma (HCC)    Paresthesia    Facial droop    Arterial ischemic stroke, ICA, left, acute ([address]    Carotid artery stenosis with cerebral infarction ([address]    DM type 2, controlled, with complication ([address]    AMS (altered mental status)    Acute cerebrovascular accident (CVA) ([address]    Syncope and collapse    Acute respiratory failure ([address]    Aspiration pneumonia ([address]    History of CVA (cerebrovascular accident)     No Known Allergies    Treatment Diagnosis: Dysphagia     CHART REVIEW:  2022 admitted with c/o unresponsiveness  MD ADMISSION H&P HPI: Patient is 26-year-old male who presented to hospital for change in mental status. Patient was found unresponsive at home, patient is not able to provide history due to condition, most of the history is from patient's chart. Patient was recently discharged from the rehab facility after he was diagnosed with CVA. Patient was covered in vomiting on arrival of EMS    Active consults: Neuro, Oncology, Pulmonology, Nephrology, Palliative Care    Imaging:  CT Chest: 2022  Impression   No evidence of a pulmonary embolus. ET tube and gastric tube in good position.    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 Questionable mucous plugging scattered in the bronchi along the right lung   base which may be due to recent aspiration.  Recommend pulmonology follow-up. 7 cm exophytic right hepatic mass which has increased in size.  Suggest   PET-CT correlation. Postop changes right apex and upper lobe with fibrosis and scarring   throughout the area which is unchanged.          MRI:/MRA Head/Neck 6/23/2022  Impression   Acute to subacute infarctions in the left frontal lobe, increased in size   since MRI brain June 18, 2022. Small acute to subacute infarction along the left external capsule, stable   since June 18, 2022. Old lacunar infarcts in the left central franki. Mild to moderate parenchymal volume loss. Mild chronic microvascular disease. 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 Right dominance of the vertebral arteries with hypoplastic intracranial left   vertebral artery, and superimposed occlusion in the mid to distal   intracranial left vertebral artery. Moderate stenosis in the intracranial right vertebral artery. Moderate stenosis in the proximal P2 segment of the left PCA. Known 95% critical stenosis in the proximal right internal carotid artery. 80% stenosis in the proximal left internal carotid artery.      Recent hospital admit [facility] 6/17/2022 to 6/21/2022 at which time pt was active with SLP.  Pt did have a MBSS 6/20/2022 with recommendation for dysphagia puree with thin liquids; continued therapy at d/c      Subjective:     Current diet: NPO; NGT in place - appears to have dislodged - RN notified and addressed    Comments regarding tolerating Current Diet: Family eager for PO in the AM; RN reports concern for persistent diminished level of alertness for safe PO consideration      Objective:     Pain   Appears comfortable without pain    Cognitive/Behavior · Patient lethargic; awake upon SLP entry to room; remains awake during pre-feeding assessment but lethargic with need for arousal prior to PO trial attempt; iPad interpretation service utilized -  report babbling and does not follow dx or answer questions  · Patient did respond appropriately x1 during session to wife asking if he waned to eat some rice:  reports patient clearly replied \"no\"    Positioning   Fully upright in bed    Pre-feeding assessment:  · Despite wakeful state upon SLP entry to room, patient continues to present with significant lethargy and generalized weakness with concern for poor level of alertness for consideration for safe PO  · Tolerated oral stim with absent labial response and severely diminished lingual response; weak gag; no swallow response    PO Trials:  · Ice chip via teaspoon: upon presentation of ice chip via teaspoon, patient with no response for initiation for oral prep; trial removed and PO presentations discontinued  · Thin Liquids: DNT  · Nectar thick liquids: DNT  · Honey Thick liquids: DNT  · Puree: DNT   · Soft food: DNT  · Regular food: DNT    Dysphagia Tx:   · Direct Dysphagia tx: oral stim and PO trials as described; severe penetration/aspiration risk - continue NPO  · Dysphagia ex: unable to complete d/t language imps and cog status  · Training in compensatory strategies: NA  · Pt response to ex/training: education on dysphagia, concern for poor PO safety d/t cog status and severity of OM weakness for safe oral prep/control, impact of CVA on current dysphagia status and poor potential for PO safety, and current rec to continue NPO d/t severe penetration/aspiration risk d/t severity of impairments completed via video ; patient with no evidence of learning; wife and granddaughter at bedside - concern for limited evidence of learning for wife and concern for need for continued education/reinforcement for granddaughter.  Despite use of video , there is concern for potential language barrier as a limitation to family education? Goals:   Pt will demonstrate improved oral motor response to tactile sensory stimulation with >50% om response/mashing pattern not met  Pt will demonstrate swallow response to tactile/sensory stimulation >25% not met  Pt will follow one step commands >25% not met    Assessment:   Impressions:   Marked Oropharyngeal dysphagia with poor oral motor response and absent swallow. · Patient lethargic; awake upon SLP entry to room; remains awake during pre-feeding assessment but lethargic with need for arousal prior to PO trial attempt  · iPad interpretation service utilized -  report babbling and does not follow dx or answer questions. Patient did respond appropriately x1 during session to wife asking if he waned to eat some rice:  reports patient clearly replied \"no\"  · Despite wakeful state upon SLP entry to room, patient continues to present with significant lethargy and generalized weakness with concern for poor level of alertness for consideration for safe PO  · Tolerated oral stim with absent labial response and severely diminished lingual response; weak gag; no swallow response  · Patient does not initiate oral prep to PO trial presentations. · Concern for severe penetration/aspiration d/t poor level of alertness and severity of weakness/impairments. · education on dysphagia, concern for poor PO safety d/t cog status and severity of OM weakness for safe oral prep/control, impact of CVA on current dysphagia status and poor potential for PO safety, and current rec to continue NPO d/t severe penetration/aspiration risk d/t severity of impairments completed via video ; patient with no evidence of learning; wife and granddaughter at bedside - concern for limited evidence of learning for wife and concern for need for continued education/reinforcement for granddaughter.  Despite use of video , there is concern for potential language barrier as a limitation to family education? · Recommend npo due to high aspiration risk if PO. Recommend oral suctioning during oral care. · At this time pt is not a candidate for MBSS due to severity of deficits    · ST to continue as tolerated. Diet Recommendations:  NPO - consider long-term alternate nutrition vs hospice d/t concern for poor dysphagia prognosis d/t severity of impairments    Education:  Provided education regarding role of SLP, results of assessment, recommendations and general speech pathology plan of care. []? Pt verbalized understanding and agreement   [x]? Family requires ongoing learning   [x]? No evidence of patient comprehension     Dysphagia Prognosis:   Poor d/t severity of impairments    Plan:     Continue Dysphagia Therapy: YES    Interventions: Oral Care, Oral Motor Exercises , Therapeutic Trials with SLP , Instrumental assessment of swallow function (Modified Barium Swallow Study) sensory stimulation  Duration/Frequency of therapy while on unit: Speech therapy for dysphagia tx 3-5 times per week during acute care stay. Discharge Instructions:   Anticipate NEED for further skilled Speech Therapy for Dysphagia at discharge    This note serves as a D/C Summary in the event that this patient is discharged prior to the next therapy session.     Coded treatment time: 10  Total treatment time: 36    Electronically signed by QASIM Hudson on 6/29/2022 at 3:41 PM

## 2022-06-29 NOTE — PROGRESS NOTES
This RN went in to assess pt. Pt's NG tube pulled out to 25 cm fabian. This RN turned off the tube feed, advanced the tube back to 55cm. NP notified and order placed for chest XR to confirm placement.    Electronically signed by Lia Hinds RN on 6/29/2022 at 3:07 AM

## 2022-06-29 NOTE — PROGRESS NOTES
Hospitalist Progress Note      PCP: No primary care provider on file. Date of Admission: 6/22/2022    Chief complaint: Altered mental status    Hospital course   patient is 80-year-old male who presented to hospital for change in mental status. Patient was found unresponsive at home, patient is not able to provide history due to condition, most of the history is from patient's chart. Patient was recently discharged from the rehab facility after he was diagnosed with CVA. Patient was covered in vomiting on arrival of EMS    Patient has known advanced hepatocellular cancer. Treated in the ICU for aspiration pneumonia    6/27  Extubated    6/29  Patient remains lethargic, with NG tube. His prognosis appears to be grim/poor, mainly due to his advanced age, underlying diagnosis of hepatocellular carcinoma complicated with acute left ischemic CVA. Speech therapy has recommended a strict n.p.o. We will consult GI for possible PEG tube placement. Patient may benefit from hospice. Lavon at bedside was present during my evaluation.     Subjective: as above    Medications:  Reviewed    Infusion Medications    sodium chloride      dextrose      sodium chloride 5 mL (06/29/22 1051)     Scheduled Medications    piperacillin-tazobactam  3,375 mg IntraVENous Q8H    insulin glargine  5 Units SubCUTAneous Nightly    sodium bicarbonate  650 mg Oral BID    docusate  100 mg Oral Daily    senna  10 mL Oral Nightly    polyethylene glycol  17 g Oral Daily    ipratropium-albuterol  1 ampule Inhalation Q4H    enoxaparin  40 mg SubCUTAneous Nightly    pantoprazole (PROTONIX) 40 mg injection  40 mg IntraVENous Daily    insulin lispro  0-18 Units SubCUTAneous Q4H    aspirin  300 mg Rectal Daily    sodium chloride flush  10 mL IntraVENous 2 times per day     PRN Meds: sodium chloride, hydrALAZINE, glucose, dextrose bolus **OR** dextrose bolus, glucagon (rDNA), dextrose, sodium chloride flush, sodium chloride, promethazine **OR** ondansetron, polyethylene glycol, acetaminophen **OR** acetaminophen      Intake/Output Summary (Last 24 hours) at 6/29/2022 1316  Last data filed at 6/29/2022 1101  Gross per 24 hour   Intake 2538.42 ml   Output 1200 ml   Net 1338.42 ml       Physical Exam Performed:    /61   Pulse (!) 106   Temp 98.7 °F (37.1 °C) (Oral)   Resp (!) 31   Ht 4' 9\" (1.448 m)   Wt 126 lb 15.8 oz (57.6 kg)   SpO2 95%   BMI 27.48 kg/m²     General appearance: poorly interactive  HEENT:  Conjunctivae/corneas clear. Neck: Supple, with full range of motion. Respiratory:  Normal respiratory effort. Clear to auscultation, bilaterally without Rales/Wheezes/Rhonchi. Cardiovascular: Regular rate and rhythm with normal S1/S2 without murmurs or rubs  Abdomen: Soft, non-tender, non-distended, normal bowel sounds. Musculoskeletal: No cyanosis or edema bilaterally  Neurologic: Moving arms. Expressive and receptive aphasia  Peripheral Pulses: +2 palpable, equal bilaterally, no change today 6/24      Labs:   Recent Labs     06/27/22  0434 06/28/22  0510 06/29/22  0427   WBC 17.1* 18.9* 21.1*   HGB 11.2* 10.6* 10.7*   HCT 33.6* 31.9* 32.6*    255 287     Recent Labs     06/27/22  0434 06/27/22  0434 06/28/22  0510 06/28/22  1512 06/29/22  0427   *   < > 151* 142 147*   K 4.5   < > 4.3 4.3 4.5   *   < > 119* 112* 113*   CO2 18*   < > 21 23 22   BUN 39*   < > 42* 40* 41*   CREATININE 1.1   < > 1.0 1.0 1.0   CALCIUM 8.5   < > 8.4 8.5 8.6   PHOS 2.0*  --  2.0*  --  2.7    < > = values in this interval not displayed. No results for input(s): AST, ALT, BILIDIR, BILITOT, ALKPHOS in the last 72 hours. No results for input(s): INR in the last 72 hours. No results for input(s): Kourtney Comber in the last 72 hours.     Urinalysis:      Lab Results   Component Value Date    NITRU Negative 06/22/2022    WBCUA 2 06/22/2022    BACTERIA None Seen 06/22/2022    RBCUA 2 06/22/2022    BLOODU TRACE 06/22/2022    SPECGRAV 1.016 06/22/2022    GLUCOSEU Negative 06/22/2022       Radiology:  XR CHEST PORTABLE   Preliminary Result   Enteric tube tip projects in the expected location of the stomach. Similar opacification of the right upper lung, which could represent right   upper lobe atelectasis. XR CHEST PORTABLE   Final Result   1. Enteric tube with tip and side-port in the stomach. 2.  Endotracheal tube tip is 2 cm above the mar. Recommend retraction by   1.5 cm for optimal positioning. 3.  Stable right lung apex consolidation. CT HEAD WO CONTRAST   Final Result   1. Evolution of the known infarcts involving is the left frontal lobe as well   as the left subinsular region. 2. Otherwise, no acute intracranial abnormality. 3. No significant mass effect, midline shift or acute intracranial hemorrhage. 4. Mild global parenchymal volume loss with chronic microvascular ischemic   changes. MRA NECK WO CONTRAST   Final Result   Acute to subacute infarctions in the left frontal lobe, increased in size   since MRI brain June 18, 2022. Small acute to subacute infarction along the left external capsule, stable   since June 18, 2022. Old lacunar infarcts in the left central franki. Mild to moderate parenchymal volume loss. Mild chronic microvascular disease. 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 Right dominance of the vertebral arteries with hypoplastic intracranial left   vertebral artery, and superimposed occlusion in the mid to distal   intracranial left vertebral artery. Moderate stenosis in the intracranial right vertebral artery. Moderate stenosis in the proximal P2 segment of the left PCA. Known 95% critical stenosis in the proximal right internal carotid artery. 80% stenosis in the proximal left internal carotid artery.          MRA HEAD WO CONTRAST   Final Result   Acute to subacute infarctions in the left frontal lobe, increased in size   since MRI brain June 18, 2022. Small acute to subacute infarction along the left external capsule, stable   since June 18, 2022. Old lacunar infarcts in the left central franki. Mild to moderate parenchymal volume loss. Mild chronic microvascular disease. 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 Right dominance of the vertebral arteries with hypoplastic intracranial left   vertebral artery, and superimposed occlusion in the mid to distal   intracranial left vertebral artery. Moderate stenosis in the intracranial right vertebral artery. Moderate stenosis in the proximal P2 segment of the left PCA. Known 95% critical stenosis in the proximal right internal carotid artery. 80% stenosis in the proximal left internal carotid artery. MRI BRAIN WO CONTRAST   Final Result   Acute to subacute infarctions in the left frontal lobe, increased in size   since MRI brain June 18, 2022. Small acute to subacute infarction along the left external capsule, stable   since June 18, 2022. Old lacunar infarcts in the left central franki. Mild to moderate parenchymal volume loss. Mild chronic microvascular disease. 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 Right dominance of the vertebral arteries with hypoplastic intracranial left   vertebral artery, and superimposed occlusion in the mid to distal   intracranial left vertebral artery. Moderate stenosis in the intracranial right vertebral artery. Moderate stenosis in the proximal P2 segment of the left PCA. Known 95% critical stenosis in the proximal right internal carotid artery. 80% stenosis in the proximal left internal carotid artery. CT CHEST PULMONARY EMBOLISM W CONTRAST   Final Result   No evidence of a pulmonary embolus. ET tube and gastric tube in good position.       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 Questionable mucous plugging scattered in the bronchi along the right lung   base which may be due to recent aspiration. Recommend pulmonology follow-up. 7 cm exophytic right hepatic mass which has increased in size. Suggest   PET-CT correlation. Postop changes right apex and upper lobe with fibrosis and scarring   throughout the area which is unchanged. XR CHEST PORTABLE   Final Result   Unchanged right upper lobe scarring      No acute cardiopulmonary process         CT Head WO Contrast   Final Result   New low-attenuation in the left corona radiata consistent with an evolving   infarct as described on the recent MRI      No acute hemorrhage               Assessment/Plan:    Active Hospital Problems    Diagnosis     Syncope and collapse [R55]      Priority: Medium    Acute respiratory failure (HCC) [J96.00]      Priority: Medium    Aspiration pneumonia ([address] [J69.0]      Priority: Medium    History of CVA (cerebrovascular accident) [Z86.73]      Priority: Medium    AMS (altered mental status) [R41.82]      Priority: Medium    Acute cerebrovascular accident (CVA) ([address] [I63.9]      Priority: Medium     Patient is 51-year-old male who presented to hospital for change in mental status. Patient was found unresponsive at home, patient is not able to provide history due to condition, most of the history is from patient's chart. Patient was recently discharged from the rehab facility after he was diagnosed with CVA.   Patient was covered in vomiting on arrival of EMS     Assessment    Acute hypoxic respiratory failure s/p intubation  Extubated yesterday 6/27/2022  Has remained stable  Continue scheduled bronchodilator  Currently treating for aspiration pneumonia    Sepsis present on admission  Aspiration pneumonia  Source is likely pneumonia. Klebsiella and Enterobacter in cultures  Switch to Zosyn to cover for anaerobes    Acute left ischemic hemispheric stroke-patient had extensive stroke has very extensive deficits right now with impaired movement and expressive receptive aphasia  Poor neurological report recovery predicted  He has extensive vascular compromise based on CTA imaging    Hepatocellular cancer-advanced metastatic  Prognosis poor  Heme-onc recommendations noted, patient and family members requesting aggressive treatment    Acute metabolic encephalopathy  CT of the head on 625 with no acute finding  Check ammonia level    Poor prognosis  Patient new diagnosis of acute ischemic left-sided CVA, is a without doubt, complicating his overall prognosis.  Given his underlying malignancy, his life expectancy is less than 6 months  I highly suggest returning home with hospice, grandson was the only family member at bedside  Patient does not speak [place]  We will consult palliative care to discuss goals of care with family members      DVT prophylaxis-Lovenox  Diet: Diet NPO  ADULT TUBE FEEDING; Orogastric; Diabetic; Continuous; 25; Yes; 25; Q 4 hours; 55; 300; Q 4 hours  Code Status: Limited    PT/OT Eval Status: ordered    Dispo: Hospice highly recommended    Gypsy Taylor MD

## 2022-06-29 NOTE — CONSULTS
GASTROENTEROLOGY INPATIENT CONSULTATION        IDENTIFYING DATA/REASON FOR CONSULTATION   PATIENT:  Yani Guillermo  MRN:  [id number]  ADMIT DATE: 2022  TIME OF EVALUATION: 2022 2:06 PM  HOSPITAL STAY:   LOS: 7 days     REASON FOR CONSULTATION:  PEG tube    HISTORY OF PRESENT ILLNESS   Yani Guillermo is a 80 y.o. male with a PMH of [address] recent CVA, HTN, DM who presented on 2022 with altered mental status. He was recently discharged from [facility] after being treated for acute L hemispheric stroke. CTA noted significant cerebrovascular disease. He was evaluated by Vascular Surgery and managed conservatively started on ASA. He was discharged on 22 but then returned to [facility] 22 after family found pt unresponsive and covered in vomit. Repeat CT scan showed evolving CVA. CT chest showed evidence of aspiration pneumonia. He required intubation for airway protection. He was extubated . He failed swallow eval due to confusion, no following commands. NG placed for tfing and medication. We are consulted regarding PEG placement. PAST MEDICAL, SURGICAL, FAMILY, and SOCIAL HISTORY     Past Medical History:   Diagnosis Date    Asthma     Diabetes mellitus ([address]     Hypertension     Thyroid disease      No past surgical history on file. No family history on file.   Social History     Socioeconomic History    Marital status:      Spouse name: Not on file    Number of children: Not on file    Years of education: Not on file    Highest education level: Not on file   Occupational History    Not on file   Tobacco Use    Smoking status: Former Smoker     Packs/day: 1.00     Years: 15.00     Pack years: 15.00     Types: Cigarettes     Quit date: 1980     Years since quittin.5    Smokeless tobacco: Never Used    Tobacco comment: smoked 3 cigaretts a day   Vaping Use    Vaping Use: Never used   Substance and Sexual Activity    Alcohol use: Never  Drug use: Never    Sexual activity: Not on file   Other Topics Concern    Not on file   Social History Narrative    Not on file     Social Determinants of Health     Financial Resource Strain:     Difficulty of Paying Living Expenses: Not on file   Food Insecurity:     Worried About Running Out of Food in the Last Year: Not on file    Mati of Food in the Last Year: Not on file   Transportation Needs:     Lack of Transportation (Medical): Not on file    Lack of Transportation (Non-Medical):  Not on file   Physical Activity:     Days of Exercise per Week: Not on file    Minutes of Exercise per Session: Not on file   Stress:     Feeling of Stress : Not on file   Social Connections:     Frequency of Communication with Friends and Family: Not on file    Frequency of Social Gatherings with Friends and Family: Not on file    Attends [employer] Services: Not on file    Active Member of [id number] or Organizations: Not on file    Attends Club or Organization Meetings: Not on file    Marital Status: Not on file   Intimate Partner Violence:     Fear of Current or Ex-Partner: Not on file    Emotionally Abused: Not on file    Physically Abused: Not on file    Sexually Abused: Not on file   Housing Stability:     Unable to Pay for Housing in the Last Year: Not on file    Number of Places Lived in the Last Year: Not on file    Unstable Housing in the Last Year: Not on file       MEDICATIONS   SCHEDULED:  piperacillin-tazobactam, 3,375 mg, Q8H  insulin glargine, 5 Units, Nightly  sodium bicarbonate, 650 mg, BID  docusate, 100 mg, Daily  senna, 10 mL, Nightly  polyethylene glycol, 17 g, Daily  ipratropium-albuterol, 1 ampule, Q4H  enoxaparin, 40 mg, Nightly  pantoprazole (PROTONIX) 40 mg injection, 40 mg, Daily  insulin lispro, 0-18 Units, Q4H  aspirin, 300 mg, Daily  sodium chloride flush, 10 mL, 2 times per day      FLUIDS/DRIPS:     sodium chloride      dextrose      sodium chloride 5 mL (06/29/22 3798) the last 72 hours. No results for input(s): LIPASE, AMYLASE in the last 72 hours. No results for input(s): PROTIME, INR in the last 72 hours. Imaging  XR CHEST PORTABLE   Preliminary Result   Enteric tube tip projects in the expected location of the stomach. Similar opacification of the right upper lung, which could represent right   upper lobe atelectasis. XR CHEST PORTABLE   Final Result   1. Enteric tube with tip and side-port in the stomach. 2.  Endotracheal tube tip is 2 cm above the mar. Recommend retraction by   1.5 cm for optimal positioning. 3.  Stable right lung apex consolidation. CT HEAD WO CONTRAST   Final Result   1. Evolution of the known infarcts involving is the left frontal lobe as well   as the left subinsular region. 2. Otherwise, no acute intracranial abnormality. 3. No significant mass effect, midline shift or acute intracranial hemorrhage. 4. Mild global parenchymal volume loss with chronic microvascular ischemic   changes. MRA NECK WO CONTRAST   Final Result   Acute to subacute infarctions in the left frontal lobe, increased in size   since MRI brain June 18, 2022. Small acute to subacute infarction along the left external capsule, stable   since June 18, 2022. Old lacunar infarcts in the left central franki. Mild to moderate parenchymal volume loss. Mild chronic microvascular disease. 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 Right dominance of the vertebral arteries with hypoplastic intracranial left   vertebral artery, and superimposed occlusion in the mid to distal   intracranial left vertebral artery. Moderate stenosis in the intracranial right vertebral artery. Moderate stenosis in the proximal P2 segment of the left PCA. Known 95% critical stenosis in the proximal right internal carotid artery. 80% stenosis in the proximal left internal carotid artery.          MRA HEAD WO CONTRAST   Final Result   Acute to subacute infarctions in the left frontal lobe, increased in size   since MRI brain June 18, 2022. Small acute to subacute infarction along the left external capsule, stable   since June 18, 2022. Old lacunar infarcts in the left central franki. Mild to moderate parenchymal volume loss. Mild chronic microvascular disease. 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 Right dominance of the vertebral arteries with hypoplastic intracranial left   vertebral artery, and superimposed occlusion in the mid to distal   intracranial left vertebral artery. Moderate stenosis in the intracranial right vertebral artery. Moderate stenosis in the proximal P2 segment of the left PCA. Known 95% critical stenosis in the proximal right internal carotid artery. 80% stenosis in the proximal left internal carotid artery. MRI BRAIN WO CONTRAST   Final Result   Acute to subacute infarctions in the left frontal lobe, increased in size   since MRI brain June 18, 2022. Small acute to subacute infarction along the left external capsule, stable   since June 18, 2022. Old lacunar infarcts in the left central franki. Mild to moderate parenchymal volume loss. Mild chronic microvascular disease. 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 Right dominance of the vertebral arteries with hypoplastic intracranial left   vertebral artery, and superimposed occlusion in the mid to distal   intracranial left vertebral artery. Moderate stenosis in the intracranial right vertebral artery. Moderate stenosis in the proximal P2 segment of the left PCA. Known 95% critical stenosis in the proximal right internal carotid artery. 80% stenosis in the proximal left internal carotid artery. CT CHEST PULMONARY EMBOLISM W CONTRAST   Final Result   No evidence of a pulmonary embolus. ET tube and gastric tube in good position. 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 Questionable mucous plugging scattered in the bronchi along the right lung   base which may be due to recent aspiration. Recommend pulmonology follow-up. 7 cm exophytic right hepatic mass which has increased in size. Suggest   PET-CT correlation. Postop changes right apex and upper lobe with fibrosis and scarring   throughout the area which is unchanged. XR CHEST PORTABLE   Final Result   Unchanged right upper lobe scarring      No acute cardiopulmonary process         CT Head WO Contrast   Final Result   New low-attenuation in the left corona radiata consistent with an evolving   infarct as described on the recent MRI      No acute hemorrhage               ASSESSMENT AND RECOMMENDATIONS   80 y.o. male with a PMH of [facility] [address] recent CVA, HTN, DM who presented on 6/22/2022 with altered mental status. Admitted with evolving CVA and aspiration pneumonia. intubated 6/23- 6/27. Failed swallow eval due to confusion/not following commands. We have been consulted regarding PEG placement    IMPRESSION:  1. Oropharyngeal dysphagia due to CVA and metabolic encephalopathy. NPO. NGT in place. 2. CVA   3. Significant cerebrovascular disease. MRA with 95% critical stenosis right ICA, 80% stenosis left ICA, severe stenosis left MCA, occlusion left vertebral artery    4. Aspiration pneumonia  5. Acute metabolic encephalopathy. Ammonia level normal   6. Hepatocellular carcinoma. diagnosed on 5/1/2020. Has been on Tecentriq and Avastin.  Recent imaging shows progressive disease. RECOMMENDATIONS:    Pt wife at bedside at time of visit. Ipad  used to discuss risk/benefits of EGD with PEG placement. No decision made at this time and plan is for further discussion with palliative care RN once pt's son is available. Given recent stroke and significant cerebrovascular disease pt is likely high risk for sedation. If family wishes to pursue PEG we will request clearance from Neurology and Anesthesiology. Would also obtain US to assure no ascites (as it would be contraindicated) given his history of HCC. Will follow up with family's wishes. If you have any questions or need any further information, please feel free to contact our consult team.  Thank you for allowing us to participate in the care of Janay Fuentes. The note was completed using Dragon voice recognition transcription. Every effort was made to ensure accuracy; however, inadvertent transcription errors may be present despite my best efforts to edit errors.       Elise Jimenez PA-C

## 2022-06-29 NOTE — PROGRESS NOTES
Hematology Oncology Daily Progress Note    Admit Date: 6/22/2022  Hospital day several    Subjective:     Patient lethargic. His grandson is acting as an . He is still short of breath. He is now extubated. .. Medication side effects: none    Scheduled Meds:   insulin glargine  5 Units SubCUTAneous Nightly    sodium bicarbonate  650 mg Oral BID    docusate  100 mg Oral Daily    senna  10 mL Oral Nightly    polyethylene glycol  17 g Oral Daily    ipratropium-albuterol  1 ampule Inhalation Q4H    enoxaparin  40 mg SubCUTAneous Nightly    cefepime  2,000 mg IntraVENous Q12H    pantoprazole (PROTONIX) 40 mg injection  40 mg IntraVENous Daily    insulin lispro  0-18 Units SubCUTAneous Q4H    aspirin  300 mg Rectal Daily    sodium chloride flush  10 mL IntraVENous 2 times per day     Continuous Infusions:   sodium chloride      dextrose      sodium chloride       PRN Meds:sodium chloride, hydrALAZINE, glucose, dextrose bolus **OR** dextrose bolus, glucagon (rDNA), dextrose, sodium chloride flush, sodium chloride, promethazine **OR** ondansetron, polyethylene glycol, acetaminophen **OR** acetaminophen    Review of Systems  Pertinent items are noted in HPI. REVIEW OF SYSTEMS:         · Constitutional: Denies fever, sweats, weight loss     · Eyes: No visual changes or diplopia. No scleral icterus. · ENT: No Headaches, hearing loss or vertigo. No mouth sores or sore throat. · Cardiovascular: No chest pain, dyspnea on exertion, palpitations or loss of consciousness. · Respiratory: No cough or wheezing, no sputum production. No hemoptysis. .    · Gastrointestinal: No abdominal pain, appetite loss, blood in stools. No change in bowel habits. · Genitourinary: No dysuria, trouble voiding, or hematuria. · Musculoskeletal:  Generalized weakness. No joint complaints. · Integumentary: No rash or pruritis. · Neurological: No headache, diplopia. No change in gait, balance, or coordination.  No paresthesias. · Endocrine: No temperature intolerance. No excessive thirst, fluid intake, or urination. · Hematologic/Lymphatic: No abnormal bruising or ecchymoses, blood clots or swollen lymph nodes. · Allergic/Immunologic: No nasal congestion or hives. ·     Objective:     Patient Vitals for the past 8 hrs:   BP Temp Temp src Pulse Resp SpO2 Weight   06/29/22 0745 -- -- -- (!) 102 29 96 % --   06/29/22 0502 -- -- -- -- -- -- 126 lb 15.8 oz (57.6 kg)   06/29/22 0331 (!) 110/57 99.1 °F (37.3 °C) Oral 93 27 94 % --   06/29/22 0015 -- -- -- 94 26 94 % --     I/O last 3 completed shifts: In: 3528.6 [I.V.:610.1; NG/GT:2570; IV Piggyback:348.5]  Out: 7781 [Urine:2905]  No intake/output data recorded.     BP (!) 110/57   Pulse (!) 102   Temp 99.1 °F (37.3 °C) (Oral)   Resp 29   Ht 4' 9\" (1.448 m)   Wt 126 lb 15.8 oz (57.6 kg)   SpO2 96%   BMI 27.48 kg/m²         Head:    Normocephalic, without obvious abnormality, atraumatic   Eyes:    PERRL, conjunctiva/corneas clear, EOM's intact, fundi     benign, both eyes        Ears:    Normal TM's and external ear canals, both ears   Nose:   Nares normal, septum midline, mucosa normal, no drainage    or sinus tenderness   Throat:   Lips, mucosa, and tongue normal; teeth and gums normal   Neck:   Supple, symmetrical, trachea midline, no adenopathy;        thyroid:  No enlargement/tenderness/nodules; no carotid    bruit or JVD   Back:     Symmetric, no curvature, ROM normal, no CVA tenderness   Lungs:     Clear to auscultation bilaterally, respirations unlabored   Chest wall:    No tenderness or deformity   Heart:    Regular rate and rhythm, S1 and S2 normal, no murmur, rub   or gallop   Abdomen:     Soft, non-tender, bowel sounds active all four quadrants,     no masses, no organomegaly           Extremities:   Extremities normal, atraumatic, no cyanosis or edema   Pulses:   2+ and symmetric all extremities   Skin:   Skin color, texture, turgor normal, no rashes or lesions   Lymph nodes:   Cervical, supraclavicular, and axillary nodes normal   Neurologic:   Stable         Data Review  CBC:   Lab Results   Component Value Date    WBC 21.1 06/29/2022    RBC 3.77 06/29/2022       Assessment:     Principal Problem:    AMS (altered mental status)  Active Problems:    Acute cerebrovascular accident (CVA) ([facility] [address]    Syncope and collapse    Acute respiratory failure ([facility] [address]    Aspiration pneumonia (HCC)    History of CVA (cerebrovascular accident)  Resolved Problems:    * No resolved hospital problems. *      Plan:     1. Hepatocellular carcinoma. He is followed by Dr. Tati Tejeda. He and his family want to continue to be aggressive. He is progressing on Avastin and Tecentriq. There are a few other options. He cannot be considered for further options until he recovers from his stroke and pneumonia. He should follow-up with Dr. Margaret Rouse upon discharge.         Electronically signed by Yarelis Joseph MD on 6/29/2022 at 8:14 AM

## 2022-06-29 NOTE — PROGRESS NOTES
Zosyn order per hospital policy    Give Zosyn 0798 mg ivpb  X  1  Then Zosyn 3.375 g ivpb  q  8  h  Extended infusion

## 2022-06-29 NOTE — PROGRESS NOTES
Speech Language Pathology    Dysphagia treatment/re-assessment attempted. Patient sleeping upon SLP entry to room. Briefly barely awakens to tactile and auditory stim, but quickly returns back to sleep despite SLP as well as family attempts to awaken. ST to re-attempt as schedule permits unless otherwise notified of improved level of alertness. Thank you. Mady Geronimo [facility], [address] [facility], [id number]  Speech-Language Pathologist  Portable phone: [phone]

## 2022-06-30 ENCOUNTER — APPOINTMENT (OUTPATIENT)
Dept: GENERAL RADIOLOGY | Age: 82
DRG: 720 | End: 2022-06-30
Payer: MEDICAID

## 2022-06-30 LAB
ALBUMIN SERPL-MCNC: 2.6 G/DL (ref 3.4–5)
ALP BLD-CCNC: 257 U/L (ref 40–129)
ALT SERPL-CCNC: 59 U/L (ref 10–40)
ANION GAP SERPL CALCULATED.3IONS-SCNC: 14 MMOL/L (ref 3–16)
AST SERPL-CCNC: 94 U/L (ref 15–37)
BASOPHILS ABSOLUTE: 0.1 K/UL (ref 0–0.2)
BASOPHILS RELATIVE PERCENT: 0.7 %
BILIRUB SERPL-MCNC: 0.4 MG/DL (ref 0–1)
BILIRUBIN DIRECT: <0.2 MG/DL (ref 0–0.3)
BILIRUBIN, INDIRECT: ABNORMAL MG/DL (ref 0–1)
BUN BLDV-MCNC: 43 MG/DL (ref 7–20)
CALCIUM SERPL-MCNC: 8.7 MG/DL (ref 8.3–10.6)
CHLORIDE BLD-SCNC: 110 MMOL/L (ref 99–110)
CO2: 23 MMOL/L (ref 21–32)
CREAT SERPL-MCNC: 1.1 MG/DL (ref 0.8–1.3)
EOSINOPHILS ABSOLUTE: 0.2 K/UL (ref 0–0.6)
EOSINOPHILS RELATIVE PERCENT: 1.6 %
GFR AFRICAN AMERICAN: >60
GFR NON-AFRICAN AMERICAN: >60
GLUCOSE BLD-MCNC: 149 MG/DL (ref 70–99)
GLUCOSE BLD-MCNC: 150 MG/DL (ref 70–99)
GLUCOSE BLD-MCNC: 160 MG/DL (ref 70–99)
GLUCOSE BLD-MCNC: 161 MG/DL (ref 70–99)
GLUCOSE BLD-MCNC: 165 MG/DL (ref 70–99)
GLUCOSE BLD-MCNC: 167 MG/DL (ref 70–99)
GLUCOSE BLD-MCNC: 181 MG/DL (ref 70–99)
HCT VFR BLD CALC: 29.6 % (ref 40.5–52.5)
HEMOGLOBIN: 9.9 G/DL (ref 13.5–17.5)
LYMPHOCYTES ABSOLUTE: 1.8 K/UL (ref 1–5.1)
LYMPHOCYTES RELATIVE PERCENT: 12.7 %
MAGNESIUM: 2.8 MG/DL (ref 1.8–2.4)
MCH RBC QN AUTO: 28.7 PG (ref 26–34)
MCHC RBC AUTO-ENTMCNC: 33.4 G/DL (ref 31–36)
MCV RBC AUTO: 85.8 FL (ref 80–100)
MONOCYTES ABSOLUTE: 0.9 K/UL (ref 0–1.3)
MONOCYTES RELATIVE PERCENT: 6.4 %
NEUTROPHILS ABSOLUTE: 11.3 K/UL (ref 1.7–7.7)
NEUTROPHILS RELATIVE PERCENT: 78.6 %
PDW BLD-RTO: 13.7 % (ref 12.4–15.4)
PERFORMED ON: ABNORMAL
PHOSPHORUS: 2.9 MG/DL (ref 2.5–4.9)
PLATELET # BLD: 275 K/UL (ref 135–450)
PMV BLD AUTO: 8.3 FL (ref 5–10.5)
POTASSIUM SERPL-SCNC: 4.8 MMOL/L (ref 3.5–5.1)
PROCALCITONIN: 0.25 NG/ML (ref 0–0.15)
RBC # BLD: 3.45 M/UL (ref 4.2–5.9)
SODIUM BLD-SCNC: 147 MMOL/L (ref 136–145)
TOTAL PROTEIN: 6.5 G/DL (ref 6.4–8.2)
WBC # BLD: 14.3 K/UL (ref 4–11)

## 2022-06-30 PROCEDURE — 84100 ASSAY OF PHOSPHORUS: CPT

## 2022-06-30 PROCEDURE — 6360000002 HC RX W HCPCS: Performed by: INTERNAL MEDICINE

## 2022-06-30 PROCEDURE — 2700000000 HC OXYGEN THERAPY PER DAY

## 2022-06-30 PROCEDURE — 92526 ORAL FUNCTION THERAPY: CPT

## 2022-06-30 PROCEDURE — 6370000000 HC RX 637 (ALT 250 FOR IP): Performed by: NURSE PRACTITIONER

## 2022-06-30 PROCEDURE — 80076 HEPATIC FUNCTION PANEL: CPT

## 2022-06-30 PROCEDURE — 80048 BASIC METABOLIC PNL TOTAL CA: CPT

## 2022-06-30 PROCEDURE — A4216 STERILE WATER/SALINE, 10 ML: HCPCS | Performed by: NURSE PRACTITIONER

## 2022-06-30 PROCEDURE — 83735 ASSAY OF MAGNESIUM: CPT

## 2022-06-30 PROCEDURE — 2580000003 HC RX 258: Performed by: INTERNAL MEDICINE

## 2022-06-30 PROCEDURE — 94760 N-INVAS EAR/PLS OXIMETRY 1: CPT

## 2022-06-30 PROCEDURE — 36415 COLL VENOUS BLD VENIPUNCTURE: CPT

## 2022-06-30 PROCEDURE — 94640 AIRWAY INHALATION TREATMENT: CPT

## 2022-06-30 PROCEDURE — 71045 X-RAY EXAM CHEST 1 VIEW: CPT

## 2022-06-30 PROCEDURE — 84145 PROCALCITONIN (PCT): CPT

## 2022-06-30 PROCEDURE — 2580000003 HC RX 258: Performed by: NURSE PRACTITIONER

## 2022-06-30 PROCEDURE — 85025 COMPLETE CBC W/AUTO DIFF WBC: CPT

## 2022-06-30 PROCEDURE — 6360000002 HC RX W HCPCS: Performed by: NURSE PRACTITIONER

## 2022-06-30 PROCEDURE — 6370000000 HC RX 637 (ALT 250 FOR IP): Performed by: INTERNAL MEDICINE

## 2022-06-30 PROCEDURE — C9113 INJ PANTOPRAZOLE SODIUM, VIA: HCPCS | Performed by: NURSE PRACTITIONER

## 2022-06-30 PROCEDURE — 2060000000 HC ICU INTERMEDIATE R&B

## 2022-06-30 RX ORDER — DEXTROSE MONOHYDRATE 50 MG/ML
INJECTION, SOLUTION INTRAVENOUS CONTINUOUS
Status: DISCONTINUED | OUTPATIENT
Start: 2022-06-30 | End: 2022-06-30

## 2022-06-30 RX ORDER — ATORVASTATIN CALCIUM 20 MG/1
20 TABLET, FILM COATED ORAL NIGHTLY
Status: DISCONTINUED | OUTPATIENT
Start: 2022-06-30 | End: 2022-07-05 | Stop reason: HOSPADM

## 2022-06-30 RX ADMIN — INSULIN LISPRO 3 UNITS: 100 INJECTION, SOLUTION INTRAVENOUS; SUBCUTANEOUS at 13:19

## 2022-06-30 RX ADMIN — INSULIN LISPRO 3 UNITS: 100 INJECTION, SOLUTION INTRAVENOUS; SUBCUTANEOUS at 22:46

## 2022-06-30 RX ADMIN — ASPIRIN 300 MG: 300 SUPPOSITORY RECTAL at 09:47

## 2022-06-30 RX ADMIN — POLYETHYLENE GLYCOL 3350 17 G: 17 POWDER, FOR SOLUTION ORAL at 09:47

## 2022-06-30 RX ADMIN — SODIUM BICARBONATE 650 MG: 650 TABLET ORAL at 23:00

## 2022-06-30 RX ADMIN — Medication 40 MG: at 09:47

## 2022-06-30 RX ADMIN — INSULIN GLARGINE 5 UNITS: 100 INJECTION, SOLUTION SUBCUTANEOUS at 22:45

## 2022-06-30 RX ADMIN — PIPERACILLIN AND TAZOBACTAM 3375 MG: 3; .375 INJECTION, POWDER, LYOPHILIZED, FOR SOLUTION INTRAVENOUS at 09:49

## 2022-06-30 RX ADMIN — IPRATROPIUM BROMIDE AND ALBUTEROL SULFATE 1 AMPULE: .5; 3 SOLUTION RESPIRATORY (INHALATION) at 15:40

## 2022-06-30 RX ADMIN — DOCUSATE SODIUM 100 MG: 50 LIQUID ORAL at 09:47

## 2022-06-30 RX ADMIN — SODIUM BICARBONATE 650 MG: 650 TABLET ORAL at 09:47

## 2022-06-30 RX ADMIN — IPRATROPIUM BROMIDE AND ALBUTEROL SULFATE 1 AMPULE: .5; 3 SOLUTION RESPIRATORY (INHALATION) at 23:53

## 2022-06-30 RX ADMIN — SODIUM CHLORIDE, PRESERVATIVE FREE 10 ML: 5 INJECTION INTRAVENOUS at 10:10

## 2022-06-30 RX ADMIN — HYDROCODONE BITARTRATE AND ACETAMINOPHEN 10 ML: 176/5 SOLUTION ORAL at 23:04

## 2022-06-30 RX ADMIN — IPRATROPIUM BROMIDE AND ALBUTEROL SULFATE 1 AMPULE: .5; 3 SOLUTION RESPIRATORY (INHALATION) at 11:49

## 2022-06-30 RX ADMIN — ENOXAPARIN SODIUM 40 MG: 100 INJECTION SUBCUTANEOUS at 22:45

## 2022-06-30 RX ADMIN — IPRATROPIUM BROMIDE AND ALBUTEROL SULFATE 1 AMPULE: .5; 3 SOLUTION RESPIRATORY (INHALATION) at 19:43

## 2022-06-30 RX ADMIN — INSULIN LISPRO 3 UNITS: 100 INJECTION, SOLUTION INTRAVENOUS; SUBCUTANEOUS at 17:27

## 2022-06-30 RX ADMIN — ACETAMINOPHEN 650 MG: 325 TABLET ORAL at 23:00

## 2022-06-30 RX ADMIN — INSULIN LISPRO 3 UNITS: 100 INJECTION, SOLUTION INTRAVENOUS; SUBCUTANEOUS at 10:01

## 2022-06-30 RX ADMIN — INSULIN LISPRO 3 UNITS: 100 INJECTION, SOLUTION INTRAVENOUS; SUBCUTANEOUS at 00:14

## 2022-06-30 RX ADMIN — INSULIN LISPRO 3 UNITS: 100 INJECTION, SOLUTION INTRAVENOUS; SUBCUTANEOUS at 04:35

## 2022-06-30 RX ADMIN — IPRATROPIUM BROMIDE AND ALBUTEROL SULFATE 1 AMPULE: .5; 3 SOLUTION RESPIRATORY (INHALATION) at 03:48

## 2022-06-30 RX ADMIN — IPRATROPIUM BROMIDE AND ALBUTEROL SULFATE 1 AMPULE: .5; 3 SOLUTION RESPIRATORY (INHALATION) at 07:31

## 2022-06-30 RX ADMIN — ATORVASTATIN CALCIUM 20 MG: 20 TABLET, FILM COATED ORAL at 23:00

## 2022-06-30 RX ADMIN — PIPERACILLIN AND TAZOBACTAM 3375 MG: 3; .375 INJECTION, POWDER, LYOPHILIZED, FOR SOLUTION INTRAVENOUS at 16:57

## 2022-06-30 ASSESSMENT — PAIN SCALES - GENERAL
PAINLEVEL_OUTOF10: 0
PAINLEVEL_OUTOF10: 1
PAINLEVEL_OUTOF10: 0
PAINLEVEL_OUTOF10: 0

## 2022-06-30 ASSESSMENT — PAIN SCALES - WONG BAKER
WONGBAKER_NUMERICALRESPONSE: 0

## 2022-06-30 NOTE — PROGRESS NOTES
[facility]   Speech Therapy  Daily Dysphagia Treatment Note    Stacia Tsai  AGE: 80 y.o. GENDER: male  : 1940  [id number]  EPISODE DATE:  2022     Patient Active Problem List   Diagnosis    Intractable vomiting with nausea    Intractable vomiting    Nausea vomiting and diarrhea    Aortic arch aneurysm (HCC)    HTN (hypertension), benign    RUQ pain    Cholelithiasis    DM2 (diabetes mellitus, type 2) ([address]    Hypothyroid    Diarrhea    Hepatocellular carcinoma (HCC)    Paresthesia    Facial droop    Arterial ischemic stroke, ICA, left, acute ([address]    Carotid artery stenosis with cerebral infarction ([address]    DM type 2, controlled, with complication ([address]    AMS (altered mental status)    Acute cerebrovascular accident (CVA) ([address]    Syncope and collapse    Acute respiratory failure ([address]    Aspiration pneumonia ([address]    History of CVA (cerebrovascular accident)     No Known Allergies    Treatment Diagnosis: Dysphagia     CHART REVIEW:  2022 admitted with c/o unresponsiveness  MD ADMISSION H&P HPI: Patient is 66-year-old male who presented to hospital for change in mental status. Patient was found unresponsive at home, patient is not able to provide history due to condition, most of the history is from patient's chart. Patient was recently discharged from the rehab facility after he was diagnosed with CVA. Patient was covered in vomiting on arrival of EMS    Active consults: Neuro, Oncology, Pulmonology, Nephrology, Palliative Care    IMAGING:  CT Chest: 2022  Impression   No evidence of a pulmonary embolus. ET tube and gastric tube in good position.    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 Questionable mucous plugging scattered in the bronchi along the right lung   base which may be due to recent aspiration.  Recommend pulmonology follow-up. 7 cm exophytic right hepatic mass which has increased in size.  Suggest   PET-CT correlation. Postop changes right apex and upper lobe with fibrosis and scarring   throughout the area which is unchanged.          MRI:/MRA Head/Neck 6/23/2022  Impression   Acute to subacute infarctions in the left frontal lobe, increased in size   since MRI brain June 18, 2022. Small acute to subacute infarction along the left external capsule, stable   since June 18, 2022. Old lacunar infarcts in the left central franki. Mild to moderate parenchymal volume loss. Mild chronic microvascular disease. 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 Right dominance of the vertebral arteries with hypoplastic intracranial left   vertebral artery, and superimposed occlusion in the mid to distal   intracranial left vertebral artery. Moderate stenosis in the intracranial right vertebral artery. Moderate stenosis in the proximal P2 segment of the left PCA. Known 95% critical stenosis in the proximal right internal carotid artery. 80% stenosis in the proximal left internal carotid artery.      DYSPHAGIA HISTORY:  Recent hospital admit [facility] 6/17/2022 to 6/21/2022 at which time pt was active with SLP.  Pt did have a MBSS 6/20/2022 with recommendation for dysphagia puree with thin liquids; continued therapy at d/c      Subjective:     Current diet: NPO; NGT in place    Comments regarding tolerating Current Diet:   RN reports patient with improved level of alertness; reports MD requests re-assessment this date      Objective:     Pain   Appears comfortable without pain    Cognitive/Behavior   Patient alert but with apparent generalized weakness; awake upon SLP entry to room; maintained wakefulness throughout session; iPad interpretation service utilized -  report babbling and patient does not follow dx or answer questions; patient repeatedly waving to     Positioning   Fully upright in bed    Pre-feeding assessment:  · Despite wakeful state upon SLP entry to room, patient continues to present with significant generalized weakness with concern for poor endurance and rapid mechanism fatigue  · Tolerated oral stim with weak, disorganized lingual response; weak gag; delayed, weak swallow response    PO Trials:  · Ice chip via teaspoon: DNT  · Thin Liquids: DNT  · Nectar thick liquids: DNT  · Honey Thick liquids VIA TEASPOON x2: prolonged bolus prep; oral holding ~30-45 seconds; concern for reduced bolus control; suspect premature bolus loss to the pharynx; delayed swallow; decreased laryngeal elevation; wet vocal quality post-swallow 1/2  · Puree VIA TEASPOON x5: prolonged bolus prep; oral holding ~30-45 seconds; concern for reduced bolus control; suspect premature bolus loss to the pharynx; delayed swallow; decreased laryngeal elevation; no cough, throat clear, or vocal quality changes; respiratory rate increases for 26 to 34 with trials - concern for rapid mechanism fatigue and increased labor/work/effort with prep/swallow; appears at high risk for penetration/aspiration  · Soft food: DNT  · Regular food: DNT    Dysphagia Tx:   · Direct Dysphagia tx: oral stim and PO trials as described; severe penetration/aspiration risk persist despite improving mental status and oral prep - continue NPO; MBS could be attempted if MD deems indicated, but patient's variable level of alertness is a barrier to the diagnostic reliability  · Dysphagia ex: unable to complete d/t language imps and cog status  · Training in compensatory strategies: NA  · Pt response to ex/training: granddaughter present upon initiation of session but stepped out of room to take a phone call as SLP initiated session; family did not return to bedside during session; video  utilized, but suspect limited/poor comprehension for education on current results/recs? Goals:   Pt will demonstrate improved oral motor response to tactile sensory stimulation with >50% om response/mashing pattern continue  Pt will demonstrate swallow response to tactile/sensory stimulation >25% continue  Pt will follow one step commands >25% not met    Assessment:   Impressions:   Marked Oropharyngeal dysphagia characterized by poor lingual manipulation, delayed swallow and decreased laryngeal elevation. Patient with prolonged oral prep, oral holding, concern for reduced bolus control and high risk for premature bolus loss to the pharynx. Wet vocal quality with honey via teaspoon. No overt signs/symptoms of penetration/aspiration with puree but there is concern for vital sign changes to indicate excess effort/work/labor with concern for rapid fatigue and increased penetration/aspiration risk. · Patient alert but with apparent generalized weakness; awake upon SLP entry to room; maintained wakefulness throughout session. Improved level of alertness observed and reported, but there is persistent concern for variable levels of alertness with report of frequent lethargy despite improved level of alertness this AM. Lethargy remains a significant concern for PO safety. · iPad interpretation service utilized -  report babbling and patient does not follow dx or answer questions; patient repeatedly waving to   · Tolerated oral stim with weak, disorganized lingual response; weak gag; delayed, weak swallow response  · severe penetration/aspiration risk persist despite improving mental status and oral prep - continue NPO.    · granddaughter present upon initiation of session but stepped out of room to take a phone call as SLP initiated session; family did not return to bedside during session; video  utilized, but suspect limited/poor comprehension for education on current results/recs? Recommend continue NPO due to concern for persistent high aspiration risk if PO. MBS could be attempted with MD order, but patient's variable level of alertness is likely a barrier to the diagnostic reliability. Suspect monitoring for consistently improved level of alertness prior to pursuing MBS may be most beneficial for improved MBS diagnostic reliability. ST to defer decision to MD at this time. ST to continue as tolerated. Diet Recommendations:  NPO - consider long-term alternate nutrition vs hospice d/t concern for poor dysphagia prognosis d/t severity of impairments    Education:  Provided education regarding role of SLP, results of assessment, recommendations and general speech pathology plan of care. []? Pt verbalized understanding and agreement   [x]? Family requires ongoing learning   [x]? No evidence of patient comprehension     Dysphagia Prognosis:   Poor d/t severity of impairments and medical comorbidities    Plan:     Continue Dysphagia Therapy: YES    Interventions: Oral Care, Oral Motor Exercises , Therapeutic Trials with SLP , Instrumental assessment of swallow function (Modified Barium Swallow Study) sensory stimulation  Duration/Frequency of therapy while on unit: Speech therapy for dysphagia tx 3-5 times per week during acute care stay. Discharge Instructions:   Anticipate NEED for further skilled Speech Therapy for Dysphagia at discharge    This note serves as a D/C Summary in the event that this patient is discharged prior to the next therapy session.     Coded treatment time: 0  Total treatment time: 30    Electronically signed by QASIM Saenz on 6/30/2022 at 11:29 AM

## 2022-06-30 NOTE — PLAN OF CARE
Problem: Neurosensory - Adult  Goal: Achieves stable or improved neurological status  6/30/2022 0128 by Kashif Balderrama RN  Outcome: Not Progressing  Flowsheets (Taken 6/30/2022 0128)  Achieves stable or improved neurological status:   Initiate measures to prevent increased intracranial pressure   Assess for and report changes in neurological status     Problem: Neurosensory - Adult  Goal: Achieves maximal functionality and self care  6/30/2022 0128 by Kashif Balderrama RN  Outcome: Not Progressing  Flowsheets (Taken 6/30/2022 0128)  Achieves maximal functionality and self care:   Monitor swallowing and airway patency with patient fatigue and changes in neurological status   Encourage and assist patient to increase activity and self care with guidance from physical therapy/occupational therapy     Problem: Discharge Planning  Goal: Discharge to home or other facility with appropriate resources  6/30/2022 0128 by Kashif Balderrama RN  Outcome: Progressing  Flowsheets (Taken 6/30/2022 0128)  Discharge to home or other facility with appropriate resources:   Identify barriers to discharge with patient and caregiver   Arrange for needed discharge resources and transportation as appropriate   Identify discharge learning needs (meds, wound care, etc)     Problem: Pain  Goal: Verbalizes/displays adequate comfort level or baseline comfort level  6/30/2022 0128 by Kashif Balderrama RN  Outcome: Progressing  Flowsheets (Taken 6/30/2022 0128)  Verbalizes/displays adequate comfort level or baseline comfort level:   Encourage patient to monitor pain and request assistance   Assess pain using appropriate pain scale   Administer analgesics based on type and severity of pain and evaluate response     Problem: Safety - Adult  Goal: Free from fall injury  6/30/2022 0128 by Kashif Balderrama RN  Outcome: Progressing  Flowsheets  Taken 6/30/2022 0128 by Kashif Balderrama RN  Free From Fall Injury: Instruct family/caregiver on patient safety  Taken 6/29/2022 1140 by Marlo Styles RN  Free From Fall Injury: Instruct family/caregiver on patient safety     Problem: ABCDS Injury Assessment  Goal: Absence of physical injury  6/30/2022 0128 by Toi Storey RN  Outcome: Progressing  Flowsheets  Taken 6/30/2022 0128 by Toi Storey RN  Absence of Physical Injury: Implement safety measures based on patient assessment  Taken 6/29/2022 1140 by Marlo Styles RN  Absence of Physical Injury: Implement safety measures based on patient assessment     Problem: Nutrition Deficit:  Goal: Optimize nutritional status  6/30/2022 0128 by Toi Storey RN  Outcome: Progressing  Flowsheets (Taken 6/30/2022 0128)  Nutrient intake appropriate for improving, restoring, or maintaining nutritional needs:   Assess nutritional status and recommend course of action   Monitor oral intake, labs, and treatment plans   Recommend appropriate diets, oral nutritional supplements, and vitamin/mineral supplements     Problem: Respiratory - Adult  Goal: Achieves optimal ventilation and oxygenation  6/30/2022 0128 by Toi Storey RN  Outcome: Progressing  Flowsheets (Taken 6/30/2022 0128)  Achieves optimal ventilation and oxygenation:   Assess for changes in respiratory status   Assess for changes in mentation and behavior   Position to facilitate oxygenation and minimize respiratory effort   Initiate smoking cessation protocol as indicated   Oxygen supplementation based on oxygen saturation or arterial blood gases

## 2022-06-30 NOTE — PROGRESS NOTES
[facility]                                               Progress note    Summary:   Cha Salinas is being seen by nephrology for LUL. Admitted with AMS. He is now intubated for hypoxic respiratory failure. Had recent stroke dx with aspiration pneumonia on ABX. also has metastatic HCC. Interval History  Seen and examined at bedside. Patient is not alert, not interacting. Family member at bedside also says he has not spoken to her   He was supposed to be on 300 cc q 2 hr yesterday water but is still on q 4    /83  98% on 2 L     Labs reviewed. Na 146 > 151 > 142 > 147       Plan:   Renal function is at baseline   free water  300 cc q 2. Looks like he was still on q 4 hrs. RN changed it to q 2 while I was in the room. Cleveland Huffman MD  [facility]  Office: [phone]    Assessment:     #LUL  Baseline Cr   Cr at time of consult  UA  Had septic shock , required pressors. Non oliguric   UA with hyaline catss 2 WBC and 2 RBC  No proteinuria but discrepant values < 4 then 233 on another check  , did not correlate with UA showing albumin > 1000 mg, dl will check again. Had contrast on 6/22  SPEP negative     #acute hypoxic respiratory failure. Aspiration pneumonia  CTPA negative for PE  proca elevated  Blood cxs  Sputum cx  MRSA nares negative   Pro BNP 91  Hyperdynamic EF 70% consistent with septic shock   G1DD    #Mild hyponatremia  Likely due to intravascular volume depletion. #HCC  Prevoius CT Abd / Pelvis with conrast showed hepatic mass with no nmets, moderate stenosis of SMA and 50% narrowing. No mention of renal arteries or stenosis. CT abd without contrast on 6/3/22 showed normal kidneys and no hydro, mass, or stones. ROS:   Unable to assess  confused      PMH:   Past medical history, surgical history, social history, family history are reviewed and updated as appropriate.     Reviewed current medication list.   Allergies reviewed and updated as needed. PE:   Vitals:    06/30/22 0911   BP: 135/82   Pulse: 100   Resp: 27   Temp: 98.3 °F (36.8 °C)   SpO2: 93%       General appearance:  in NAD, . Comfortable. HEENT: EOM intact, no icterus. Trachea is midline. Neck : No masses, appears symmetrical, no JVD  Respiratory: Respiratory effort appears normal, bilateral equal chest rise, no wheeze, no crackles   Cardiovascular: Ausculation shows RRR no edema  Abdomen: No visible mass or tenderness, non distended. Musculoskeletal:  Joints with no swelling or deformity. Skin:no rashes, ulcers, induration, no jaundice.    Neuro: awake but confused, follows commands      Lab Results   Component Value Date    CREATININE 1.1 06/30/2022    BUN 43 (H) 06/30/2022     (H) 06/30/2022    K 4.8 06/30/2022     06/30/2022    CO2 23 06/30/2022      Lab Results   Component Value Date    WBC 14.3 (H) 06/30/2022    HGB 9.9 (L) 06/30/2022    HCT 29.6 (L) 06/30/2022    MCV 85.8 06/30/2022     06/30/2022     Lab Results   Component Value Date    CALCIUM 8.7 06/30/2022    PHOS 2.9 06/30/2022

## 2022-06-30 NOTE — PROGRESS NOTES
Hospitalist Progress Note      PCP: No primary care provider on file. Date of Admission: 6/22/2022    Chief complaint: Altered mental status    Hospital course   patient is 80-year-old male who presented to hospital for change in mental status. Patient was found unresponsive at home, patient is not able to provide history due to condition, most of the history is from patient's chart. Patient was recently discharged from the rehab facility after he was diagnosed with CVA. Patient was covered in vomiting on arrival of EMS    Patient has known advanced hepatocellular cancer. Treated in the ICU for aspiration pneumonia    6/27  Extubated    6/29  Patient remains lethargic, with NG tube. His prognosis appears to be grim/poor, mainly due to his advanced age, underlying diagnosis of hepatocellular carcinoma complicated with acute left ischemic CVA. Speech therapy has recommended a strict n.p.o. We will consult GI for possible PEG tube placement. Patient may benefit from hospice. Grandson at bedside was present during my evaluation. 6/30  Patient appears to be more alert, following commands. On exam he has profound right-sided hemiparesis. Granddaughter at bedside able to translate.   Family, appears to be undecided after meeting with palliative care team.  Speech therapy has recommended strict n.p.o. for now, possible with a barium swallow test for tomorrow if mentation is better    Subjective: as above    Medications:  Reviewed    Infusion Medications    dextrose 100 mL/hr at 06/30/22 1116    sodium chloride      dextrose      sodium chloride Stopped (06/29/22 1733)     Scheduled Medications    piperacillin-tazobactam  3,375 mg IntraVENous Q8H    insulin glargine  5 Units SubCUTAneous Nightly    sodium bicarbonate  650 mg Oral BID    docusate  100 mg Oral Daily    senna  10 mL Oral Nightly    polyethylene glycol  17 g Oral Daily    ipratropium-albuterol  1 ampule Inhalation Q4H    displayed. Recent Labs     06/30/22  0444   AST 94*   ALT 59*   BILIDIR <0.2   BILITOT 0.4   ALKPHOS 257*     No results for input(s): INR in the last 72 hours. No results for input(s): Ella Chaves in the last 72 hours. Urinalysis:      Lab Results   Component Value Date/Time    NITRU Negative 06/22/2022 02:22 PM    [address] 06/22/2022 02:22 PM    BACTERIA None Seen 06/22/2022 02:22 PM    RBCUA 2 06/22/2022 02:22 PM    BLOODU TRACE 06/22/2022 02:22 PM    SPECGRAV 1.016 06/22/2022 02:22 PM    GLUCOSEU Negative 06/22/2022 02:22 PM       Radiology:  XR CHEST PORTABLE   Final Result   Feeding tube identified tip in the stomach. Continued effacement is   recommended for post pyloric placement. XR CHEST PORTABLE   Preliminary Result   Enteric tube tip projects in the expected location of the stomach. Similar opacification of the right upper lung, which could represent right   upper lobe atelectasis. XR CHEST PORTABLE   Final Result   1. Enteric tube with tip and side-port in the stomach. 2.  Endotracheal tube tip is 2 cm above the mar. Recommend retraction by   1.5 cm for optimal positioning. 3.  Stable right lung apex consolidation. CT HEAD WO CONTRAST   Final Result   1. Evolution of the known infarcts involving is the left frontal lobe as well   as the left subinsular region. 2. Otherwise, no acute intracranial abnormality. 3. No significant mass effect, midline shift or acute intracranial hemorrhage. 4. Mild global parenchymal volume loss with chronic microvascular ischemic   changes. MRA NECK WO CONTRAST   Final Result   Acute to subacute infarctions in the left frontal lobe, increased in size   since MRI brain June 18, 2022. Small acute to subacute infarction along the left external capsule, stable   since June 18, 2022. Old lacunar infarcts in the left central franki. Mild to moderate parenchymal volume loss.       Mild chronic microvascular disease. 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 Right dominance of the vertebral arteries with hypoplastic intracranial left   vertebral artery, and superimposed occlusion in the mid to distal   intracranial left vertebral artery. Moderate stenosis in the intracranial right vertebral artery. Moderate stenosis in the proximal P2 segment of the left PCA. Known 95% critical stenosis in the proximal right internal carotid artery. 80% stenosis in the proximal left internal carotid artery. MRA HEAD WO CONTRAST   Final Result   Acute to subacute infarctions in the left frontal lobe, increased in size   since MRI brain June 18, 2022. Small acute to subacute infarction along the left external capsule, stable   since June 18, 2022. Old lacunar infarcts in the left central franki. Mild to moderate parenchymal volume loss. Mild chronic microvascular disease. 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 Right dominance of the vertebral arteries with hypoplastic intracranial left   vertebral artery, and superimposed occlusion in the mid to distal   intracranial left vertebral artery. Moderate stenosis in the intracranial right vertebral artery. Moderate stenosis in the proximal P2 segment of the left PCA. Known 95% critical stenosis in the proximal right internal carotid artery. 80% stenosis in the proximal left internal carotid artery. MRI BRAIN WO CONTRAST   Final Result   Acute to subacute infarctions in the left frontal lobe, increased in size   since MRI brain June 18, 2022. Small acute to subacute infarction along the left external capsule, stable   since June 18, 2022. Old lacunar infarcts in the left central franki. Mild to moderate parenchymal volume loss. Mild chronic microvascular disease.       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      Right dominance of the vertebral arteries with hypoplastic intracranial left   vertebral artery, and superimposed occlusion in the mid to distal   intracranial left vertebral artery. Moderate stenosis in the intracranial right vertebral artery. Moderate stenosis in the proximal P2 segment of the left PCA. Known 95% critical stenosis in the proximal right internal carotid artery. 80% stenosis in the proximal left internal carotid artery. CT CHEST PULMONARY EMBOLISM W CONTRAST   Final Result   No evidence of a pulmonary embolus. ET tube and gastric tube in good position. 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 Questionable mucous plugging scattered in the bronchi along the right lung   base which may be due to recent aspiration. Recommend pulmonology follow-up. 7 cm exophytic right hepatic mass which has increased in size. Suggest   PET-CT correlation. Postop changes right apex and upper lobe with fibrosis and scarring   throughout the area which is unchanged.          XR CHEST PORTABLE   Final Result   Unchanged right upper lobe scarring      No acute cardiopulmonary process         CT Head WO Contrast   Final Result   New low-attenuation in the left corona radiata consistent with an evolving   infarct as described on the recent MRI      No acute hemorrhage               Assessment/Plan:    Active Hospital Problems    Diagnosis     Syncope and collapse [R55]      Priority: Medium    Acute respiratory failure (HCC) [J96.00]      Priority: Medium    Aspiration pneumonia ([address] [J69.0]      Priority: Medium    History of CVA (cerebrovascular accident) [Z86.73]      Priority: Medium    AMS (altered mental status) [R41.82]      Priority: Medium    Acute cerebrovascular accident (CVA) ([facility] [I63.9]      Priority: Medium       Oropharyngeal Dysphagea  Currently NPO per ST recommendation  Getting trophics feedings per NG tube    Acute hypoxic respiratory failure s/p intubation  Extubated yesterday 6/27/2022  Has remained stable  Continue scheduled bronchodilator  Currently treating for aspiration pneumonia    Sepsis present on admission  Aspiration pneumonia  Klebsiella and Enterobacter in cultures  Switch to Zosyn to cover for anaerobes for additional 5 days; stop 7/4    Acute left ischemic hemispheric stroke-patient had extensive stroke has very extensive deficits right now with right hemiparesis and expressive receptive aphasia  Poor neurological report recovery predicted  He has extensive vascular compromise based on CTA imaging    Hepatocellular cancer-advanced metastatic  Prognosis poor  Heme-onc recommendations noted, patient and family members requesting aggressive treatment    Acute metabolic encephalopathy  CT of the head on 625 with no acute finding  Check ammonia level    Poor prognosis  Patient new diagnosis of acute ischemic left-sided CVA, is  without a doubt, complicating his overall prognosis. Given his underlying malignancy, his life expectancy is less than 6 months. I highly suggest returning home with hospice, grandson was the only family member at bedside  Patient does not speak [place]  Ongoing conversations with family and palliative care to discuss goals of care       DVT prophylaxis-Lovenox  Diet: Diet NPO  ADULT TUBE FEEDING; Orogastric; Diabetic; Continuous; 25; Yes; 25;  Other (specify); q 2; 55; 300; Q 4 hours  Code Status: Limited    PT/OT Eval Status: ordered    Dispo: Hospice highly recommended    Sandra Mcdonald MD

## 2022-07-01 ENCOUNTER — APPOINTMENT (OUTPATIENT)
Dept: GENERAL RADIOLOGY | Age: 82
DRG: 720 | End: 2022-07-01
Payer: MEDICAID

## 2022-07-01 LAB
ALBUMIN SERPL-MCNC: 2.4 G/DL (ref 3.4–5)
ALP BLD-CCNC: 249 U/L (ref 40–129)
ALT SERPL-CCNC: 56 U/L (ref 10–40)
ANION GAP SERPL CALCULATED.3IONS-SCNC: 10 MMOL/L (ref 3–16)
AST SERPL-CCNC: 80 U/L (ref 15–37)
BASOPHILS ABSOLUTE: 0.1 K/UL (ref 0–0.2)
BASOPHILS RELATIVE PERCENT: 0.8 %
BILIRUB SERPL-MCNC: 0.4 MG/DL (ref 0–1)
BILIRUBIN DIRECT: <0.2 MG/DL (ref 0–0.3)
BILIRUBIN, INDIRECT: ABNORMAL MG/DL (ref 0–1)
BUN BLDV-MCNC: 41 MG/DL (ref 7–20)
CALCIUM SERPL-MCNC: 8.6 MG/DL (ref 8.3–10.6)
CHLORIDE BLD-SCNC: 108 MMOL/L (ref 99–110)
CO2: 23 MMOL/L (ref 21–32)
CREAT SERPL-MCNC: 1 MG/DL (ref 0.8–1.3)
EOSINOPHILS ABSOLUTE: 0.2 K/UL (ref 0–0.6)
EOSINOPHILS RELATIVE PERCENT: 1.5 %
ESTIMATED AVERAGE GLUCOSE: 139.9 MG/DL
GFR AFRICAN AMERICAN: >60
GFR NON-AFRICAN AMERICAN: >60
GLUCOSE BLD-MCNC: 124 MG/DL (ref 70–99)
GLUCOSE BLD-MCNC: 127 MG/DL (ref 70–99)
GLUCOSE BLD-MCNC: 141 MG/DL (ref 70–99)
GLUCOSE BLD-MCNC: 151 MG/DL (ref 70–99)
GLUCOSE BLD-MCNC: 166 MG/DL (ref 70–99)
GLUCOSE BLD-MCNC: 173 MG/DL (ref 70–99)
GLUCOSE BLD-MCNC: 181 MG/DL (ref 70–99)
GLUCOSE BLD-MCNC: 189 MG/DL (ref 70–99)
HBA1C MFR BLD: 6.5 %
HCT VFR BLD CALC: 29.3 % (ref 40.5–52.5)
HEMOGLOBIN: 9.7 G/DL (ref 13.5–17.5)
LYMPHOCYTES ABSOLUTE: 1.6 K/UL (ref 1–5.1)
LYMPHOCYTES RELATIVE PERCENT: 13 %
MCH RBC QN AUTO: 28.3 PG (ref 26–34)
MCHC RBC AUTO-ENTMCNC: 33 G/DL (ref 31–36)
MCV RBC AUTO: 85.6 FL (ref 80–100)
MONOCYTES ABSOLUTE: 0.8 K/UL (ref 0–1.3)
MONOCYTES RELATIVE PERCENT: 6.4 %
NEUTROPHILS ABSOLUTE: 9.7 K/UL (ref 1.7–7.7)
NEUTROPHILS RELATIVE PERCENT: 78.3 %
PDW BLD-RTO: 13.9 % (ref 12.4–15.4)
PERFORMED ON: ABNORMAL
PLATELET # BLD: 284 K/UL (ref 135–450)
PMV BLD AUTO: 8.1 FL (ref 5–10.5)
POTASSIUM SERPL-SCNC: 4.7 MMOL/L (ref 3.5–5.1)
RBC # BLD: 3.43 M/UL (ref 4.2–5.9)
SODIUM BLD-SCNC: 141 MMOL/L (ref 136–145)
TOTAL PROTEIN: 6.8 G/DL (ref 6.4–8.2)
WBC # BLD: 12.4 K/UL (ref 4–11)

## 2022-07-01 PROCEDURE — 6360000002 HC RX W HCPCS: Performed by: INTERNAL MEDICINE

## 2022-07-01 PROCEDURE — C9113 INJ PANTOPRAZOLE SODIUM, VIA: HCPCS | Performed by: NURSE PRACTITIONER

## 2022-07-01 PROCEDURE — 92526 ORAL FUNCTION THERAPY: CPT

## 2022-07-01 PROCEDURE — 94640 AIRWAY INHALATION TREATMENT: CPT

## 2022-07-01 PROCEDURE — 97130 THER IVNTJ EA ADDL 15 MIN: CPT

## 2022-07-01 PROCEDURE — 6370000000 HC RX 637 (ALT 250 FOR IP): Performed by: NURSE PRACTITIONER

## 2022-07-01 PROCEDURE — 85025 COMPLETE CBC W/AUTO DIFF WBC: CPT

## 2022-07-01 PROCEDURE — 36415 COLL VENOUS BLD VENIPUNCTURE: CPT

## 2022-07-01 PROCEDURE — 92611 MOTION FLUOROSCOPY/SWALLOW: CPT

## 2022-07-01 PROCEDURE — 2060000000 HC ICU INTERMEDIATE R&B

## 2022-07-01 PROCEDURE — 83036 HEMOGLOBIN GLYCOSYLATED A1C: CPT

## 2022-07-01 PROCEDURE — 80048 BASIC METABOLIC PNL TOTAL CA: CPT

## 2022-07-01 PROCEDURE — 2580000003 HC RX 258: Performed by: INTERNAL MEDICINE

## 2022-07-01 PROCEDURE — 97129 THER IVNTJ 1ST 15 MIN: CPT

## 2022-07-01 PROCEDURE — 94760 N-INVAS EAR/PLS OXIMETRY 1: CPT

## 2022-07-01 PROCEDURE — 6370000000 HC RX 637 (ALT 250 FOR IP): Performed by: INTERNAL MEDICINE

## 2022-07-01 PROCEDURE — 2700000000 HC OXYGEN THERAPY PER DAY

## 2022-07-01 PROCEDURE — 74230 X-RAY XM SWLNG FUNCJ C+: CPT

## 2022-07-01 PROCEDURE — 80076 HEPATIC FUNCTION PANEL: CPT

## 2022-07-01 PROCEDURE — 2580000003 HC RX 258: Performed by: NURSE PRACTITIONER

## 2022-07-01 PROCEDURE — 6360000002 HC RX W HCPCS: Performed by: NURSE PRACTITIONER

## 2022-07-01 RX ADMIN — PIPERACILLIN AND TAZOBACTAM 3375 MG: 3; .375 INJECTION, POWDER, LYOPHILIZED, FOR SOLUTION INTRAVENOUS at 09:18

## 2022-07-01 RX ADMIN — INSULIN LISPRO 3 UNITS: 100 INJECTION, SOLUTION INTRAVENOUS; SUBCUTANEOUS at 09:20

## 2022-07-01 RX ADMIN — IPRATROPIUM BROMIDE AND ALBUTEROL SULFATE 1 AMPULE: .5; 3 SOLUTION RESPIRATORY (INHALATION) at 07:31

## 2022-07-01 RX ADMIN — SODIUM CHLORIDE, PRESERVATIVE FREE 10 ML: 5 INJECTION INTRAVENOUS at 09:26

## 2022-07-01 RX ADMIN — INSULIN LISPRO 3 UNITS: 100 INJECTION, SOLUTION INTRAVENOUS; SUBCUTANEOUS at 13:15

## 2022-07-01 RX ADMIN — IPRATROPIUM BROMIDE AND ALBUTEROL SULFATE 1 AMPULE: .5; 3 SOLUTION RESPIRATORY (INHALATION) at 11:31

## 2022-07-01 RX ADMIN — DOCUSATE SODIUM 100 MG: 50 LIQUID ORAL at 09:20

## 2022-07-01 RX ADMIN — PIPERACILLIN AND TAZOBACTAM 3375 MG: 3; .375 INJECTION, POWDER, LYOPHILIZED, FOR SOLUTION INTRAVENOUS at 15:39

## 2022-07-01 RX ADMIN — IPRATROPIUM BROMIDE AND ALBUTEROL SULFATE 1 AMPULE: .5; 3 SOLUTION RESPIRATORY (INHALATION) at 23:15

## 2022-07-01 RX ADMIN — ASPIRIN 300 MG: 300 SUPPOSITORY RECTAL at 09:20

## 2022-07-01 RX ADMIN — ENOXAPARIN SODIUM 40 MG: 100 INJECTION SUBCUTANEOUS at 21:06

## 2022-07-01 RX ADMIN — IPRATROPIUM BROMIDE AND ALBUTEROL SULFATE 1 AMPULE: .5; 3 SOLUTION RESPIRATORY (INHALATION) at 19:46

## 2022-07-01 RX ADMIN — Medication 40 MG: at 09:19

## 2022-07-01 RX ADMIN — INSULIN LISPRO 3 UNITS: 100 INJECTION, SOLUTION INTRAVENOUS; SUBCUTANEOUS at 00:45

## 2022-07-01 RX ADMIN — SODIUM CHLORIDE, PRESERVATIVE FREE 10 ML: 5 INJECTION INTRAVENOUS at 21:06

## 2022-07-01 RX ADMIN — PIPERACILLIN AND TAZOBACTAM 3375 MG: 3; .375 INJECTION, POWDER, LYOPHILIZED, FOR SOLUTION INTRAVENOUS at 00:42

## 2022-07-01 RX ADMIN — SODIUM BICARBONATE 650 MG: 650 TABLET ORAL at 09:20

## 2022-07-01 RX ADMIN — INSULIN LISPRO 3 UNITS: 100 INJECTION, SOLUTION INTRAVENOUS; SUBCUTANEOUS at 18:47

## 2022-07-01 RX ADMIN — IPRATROPIUM BROMIDE AND ALBUTEROL SULFATE 1 AMPULE: .5; 3 SOLUTION RESPIRATORY (INHALATION) at 15:51

## 2022-07-01 RX ADMIN — INSULIN LISPRO 3 UNITS: 100 INJECTION, SOLUTION INTRAVENOUS; SUBCUTANEOUS at 04:26

## 2022-07-01 RX ADMIN — PIPERACILLIN AND TAZOBACTAM 3375 MG: 3; .375 INJECTION, POWDER, LYOPHILIZED, FOR SOLUTION INTRAVENOUS at 23:44

## 2022-07-01 RX ADMIN — INSULIN GLARGINE 5 UNITS: 100 INJECTION, SOLUTION SUBCUTANEOUS at 21:17

## 2022-07-01 ASSESSMENT — PAIN SCALES - WONG BAKER
WONGBAKER_NUMERICALRESPONSE: 0

## 2022-07-01 ASSESSMENT — PAIN SCALES - GENERAL
PAINLEVEL_OUTOF10: 0

## 2022-07-01 NOTE — PLAN OF CARE
Problem: Discharge Planning  Goal: Discharge to home or other facility with appropriate resources  Outcome: Progressing     Problem: Pain  Goal: Verbalizes/displays adequate comfort level or baseline comfort level  Outcome: Progressing     Problem: Safety - Adult  Goal: Free from fall injury  Outcome: Progressing     Problem: ABCDS Injury Assessment  Goal: Absence of physical injury  Outcome: Progressing     Problem: Skin/Tissue Integrity  Goal: Absence of new skin breakdown  Description: 1. Monitor for areas of redness and/or skin breakdown  2. Assess vascular access sites hourly  3. Every 4-6 hours minimum:  Change oxygen saturation probe site  4. Every 4-6 hours:  If on nasal continuous positive airway pressure, respiratory therapy assess nares and determine need for appliance change or resting period.   Outcome: Progressing     Problem: Nutrition Deficit:  Goal: Optimize nutritional status  Outcome: Progressing     Problem: Neurosensory - Adult  Goal: Achieves stable or improved neurological status  Outcome: Progressing  Goal: Achieves maximal functionality and self care  Outcome: Progressing     Problem: Respiratory - Adult  Goal: Achieves optimal ventilation and oxygenation  Outcome: Progressing

## 2022-07-01 NOTE — PROGRESS NOTES
Notified by primary service that the patient's family has declined placement of a PEG tube. We will sign off at this time. Please notify us if there is a change in clinical status.

## 2022-07-01 NOTE — PROGRESS NOTES
Received order per MD to remove NG tube. Education provided to patient and family with assistance of . All questions answered by RN. NG tube removed without complications. Patient now advanced to pureed diet with nectar thick liquids per MD order.     Electronically signed by Janelle Werner on 7/1/2022 at 3:58 PM

## 2022-07-01 NOTE — PLAN OF CARE
Problem: Discharge Planning  Goal: Discharge to home or other facility with appropriate resources  Outcome: Not Progressing     Problem: Pain  Goal: Verbalizes/displays adequate comfort level or baseline comfort level  Outcome: Not Progressing     Problem: Safety - Adult  Goal: Free from fall injury  Outcome: Not Progressing     Problem: ABCDS Injury Assessment  Goal: Absence of physical injury  Outcome: Not Progressing     Problem: Skin/Tissue Integrity  Goal: Absence of new skin breakdown  Description: 1. Monitor for areas of redness and/or skin breakdown  2. Assess vascular access sites hourly  3. Every 4-6 hours minimum:  Change oxygen saturation probe site  4. Every 4-6 hours:  If on nasal continuous positive airway pressure, respiratory therapy assess nares and determine need for appliance change or resting period.   Outcome: Not Progressing     Problem: Nutrition Deficit:  Goal: Optimize nutritional status  Outcome: Not Progressing  Flowsheets (Taken 7/1/2022 1345 by Lauro Thomas, RD, LD)  Nutrient intake appropriate for improving, restoring, or maintaining nutritional needs:   Assess nutritional status and recommend course of action   Monitor oral intake, labs, and treatment plans   Recommend appropriate diets, oral nutritional supplements, and vitamin/mineral supplements     Problem: Neurosensory - Adult  Goal: Achieves stable or improved neurological status  Outcome: Not Progressing     Problem: Neurosensory - Adult  Goal: Achieves maximal functionality and self care  Outcome: Not Progressing     Problem: Respiratory - Adult  Goal: Achieves optimal ventilation and oxygenation  Outcome: Not Progressing

## 2022-07-01 NOTE — PROGRESS NOTES
Speech Language Pathology  Facility/Department: [address] PROGRESSIVE CARE  MODIFIED BARIUM SWALLOW EVALUATION    Patient: Christiana Snell   : 1940   MRN: [id number]      Evaluation Date: 2022   Admitting Diagnosis:  Syncope and collapse [R55]  Acute respiratory failure, unspecified whether with hypoxia or hypercapnia ([address] [J96.00]  Cerebrovascular accident (CVA), unspecified mechanism ([address] [I63.9]  AMS (altered mental status) [R41.82]  Acute cerebrovascular accident (CVA) ([address] [I63.9]    Treatment Diagnosis: Dysphagia   Pain: 0                         Ordering MD: Patrick Celestin  Radiologist: Georgette Case    Date of Onset: 2022    Date of Evaluation: 2022   Type of Study: Modified Barium Swallowing Study (MBS)  Diet Prior to Study: NPO    CHART REVIEW:  2022 admitted with c/o unresponsiveness  MD ADMISSION H&P HPI: Patient is 19-year-old male who presented to hospital for change in mental status. Betzy Grant was found unresponsive at home, patient is not able to provide history due to condition, most of the history is from patient's chart. Betzy Grant was recently discharged from the rehab facility after he was diagnosed with CVA.  Patient was covered in vomiting on arrival of EMS     Active consults: Neuro, Oncology, Pulmonology, Nephrology, Palliative Care     IMAGING:  CT Chest: 2022  Impression   No evidence of a pulmonary embolus. ET tube and gastric tube in good position. 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 Questionable mucous plugging scattered in the bronchi along the right lung   base which may be due to recent aspiration.  Recommend pulmonology follow-up. 7 cm exophytic right hepatic mass which has increased in size.  Suggest   PET-CT correlation. Postop changes right apex and upper lobe with fibrosis and scarring   throughout the area which is unchanged.          MRI:/MRA Head/Neck 6/23/2022  Impression   Acute to subacute infarctions in the left frontal lobe, increased in size   since MRI brain June 18, 2022. Small acute to subacute infarction along the left external capsule, stable   since June 18, 2022. Old lacunar infarcts in the left central franki. Mild to moderate parenchymal volume loss. Mild chronic microvascular disease. 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 Right dominance of the vertebral arteries with hypoplastic intracranial left   vertebral artery, and superimposed occlusion in the mid to distal   intracranial left vertebral artery. Moderate stenosis in the intracranial right vertebral artery. Moderate stenosis in the proximal P2 segment of the left PCA. Known 95% critical stenosis in the proximal right internal carotid artery. 80% stenosis in the proximal left internal carotid artery.      DYSPHAGIA HISTORY:  Recent hospital admit [facility] 6/17/2022 to 6/21/2022 at which time pt was active with SLP. Pt did have a MBSS 6/20/2022 with recommendation for dysphagia puree with thin liquids; continued therapy at d/c      MBSS Assessment Impression:  Accepted and tolerated limited MBS in fluoroscopy suite. Patient with distaste for barium which limited acceptance for PO trials. Patient alert but with apparent generalized weakness; awake upon arrival to fluoroscopy and maintained wakeful state throughout study. iPad interpretation service utilized - no verbal responses to ; patient does not follow dx; unreliable yes/no via head shake/nod; waves to staff.     Patient with distaste for barium: puree trial initially presented but held orally until removed via toothette; honey via teaspoon then presented with comparable tolerance; accepted thin via tsp but unable to orally control; then accepted nectar thick via teaspoon followed by nectar via cup with tolerance of puree when re-administered. Severe oral stage dysphagia characterized by poor lingual manipulation for bolus formation, movement, and control. · Oral holding with all trials is most apparent with denser presentations. Patient does exhibit facial grimace with oral holding with trials indicating distaste. · Poor oral control for thin via tsp with repeated 100% anterior loss of trial when presented. · Prolonged, disorganized bolus formation and movement with nectar and puree with variable oral holding. · Premature bolus loss to the pharynx with all. · Significant concern for potential for poor ability to maintain adequate level of PO nutrition d/t severity of weakness/impairments. Moderate pharyngeal stage dysphagia characterized by delayed swallow ,decreased laryngeal elevation, and decreased pharyngeal peristalsis. · Premature spillage to the valleculae with accepted PO trials (puree, nectar teaspoon, and nectar cup). · No significant residue. No penetration/aspiration with puree or nectar trials. Thin not visualized d/t severity of oral phase impairments. If PO diet is pursued, it appears a puree diet texture with nectar thick liquids could be considered. Recommend CLOSE monitor for tolerance with discontinuation of PO diet should concern for reduced tolerance arises. Due to severity of impairments, patient with high risk for inability to maintain adequate level of nutrition via PO with need for consideration for long-term alternate nutrition vs review of goal of care with pleasure feeds. Aspiration/Penetration Risk:  continues at increased risk d/t variable cog status    Recommendations:    Solids: IDDSI 4 Puree Solids;    Liquids: IDDSI 2 Mildly Thick (nectar) Liquids;  Meds: Meds crushed in puree as able    Strategies: Upright as possible with all PO intake , No straws , Small bites/sips , Eat/feed slowly, Remain upright 30-45 min , Total Feed     Treatments: Ongoing dysphagia therapy with SLP   Goals:   Pt will demonstrate improved oral motor response to tactile sensory stimulation with >50% om response/mashing pattern continue  Pt will demonstrate swallow response to tactile/sensory stimulation >25% continue  Pt will follow one step commands >25% not met  Pt will tolerate recommended diet without overt signs/symptoms of penetration/aspiration using recommended comp strats new goal    Dysphagia Prognosis: Poor d/t severity of imps and medical comorbidities    TEST DATA:  Vision: adequate for assessment needs  Hearing: GUILLERMO    Behavioral/ cognitive/communication: Patient alert but with apparent generalized weakness; awake upon arrival to fluoroscopy and maintained wakeful state throughout study. iPad interpretation service utilized - no verbal responses to ; patient does not follow dx; unreliable yes/no via head shake/nod; waves to staff    Positioning: Upright in VSE chair  Consistencies given: Patient with distaste for barium: puree trial initially presented but held orally until removed via toothette; honey via teaspoon then presented with comparable tolerance; accepted thin via tsp but unable to orally control; then accepted nectar thick via teaspoon followed by nectar via cup with tolerance of puree when re-administered.      Oral Preparation / Oral Phase  Weak Lingual Manipulation: all  Reduced Posterior Propulsion: all  Holding Of Bolus: all  Reduced Bolus Control: all  Premature Bolus Loss to Pharynx: all  Reduced Tongue Base Retraction: all     Pharyngeal Phase  Delayed Swallow Initiation: all  Premature Spillage to Valleculae: all  Reduced Pharyngeal Peristalsis: all  Reduced Laryngeal Elevation: all     Upper Esophageal Phase  GUILLERMO     Following Evaluation:  Provided education regarding role of SLP, results of assessment, recommendations and general speech pathology plan of care. [] Pt verbalized understanding and agreement   [] Pt requires ongoing learning   [x] No evidence of patient comprehension     11:40 AM - 12:30 PM  50\"    Signature:  Brooke Cadet.  Ana Hein, #[id number]  Speech-Language Pathologist  Portable phone: [phone]

## 2022-07-01 NOTE — PROGRESS NOTES
[facility]   Speech Therapy  Daily Dysphagia Treatment Note    Jen Tsai  AGE: 80 y.o. GENDER: male  : 1940  [id number]  EPISODE DATE:  2022     Patient Active Problem List   Diagnosis    Intractable vomiting with nausea    Intractable vomiting    Nausea vomiting and diarrhea    Aortic arch aneurysm (HCC)    HTN (hypertension), benign    RUQ pain    Cholelithiasis    DM2 (diabetes mellitus, type 2) ([address]    Hypothyroid    Diarrhea    Hepatocellular carcinoma (HCC)    Paresthesia    Facial droop    Arterial ischemic stroke, ICA, left, acute ([address]    Carotid artery stenosis with cerebral infarction ([address]    DM type 2, controlled, with complication ([address]    AMS (altered mental status)    Acute cerebrovascular accident (CVA) ([address]    Syncope and collapse    Acute respiratory failure ([address]    Aspiration pneumonia ([address]    History of CVA (cerebrovascular accident)     No Known Allergies    Treatment Diagnosis: Dysphagia     CHART REVIEW:  2022 admitted with c/o unresponsiveness  MD ADMISSION H&P HPI: Patient is 80-year-old male who presented to hospital for change in mental status. Patient was found unresponsive at home, patient is not able to provide history due to condition, most of the history is from patient's chart. Patient was recently discharged from the rehab facility after he was diagnosed with CVA. Patient was covered in vomiting on arrival of EMS    Active consults: Neuro, Oncology, Pulmonology, Nephrology, Palliative Care    IMAGING:  CT Chest: 2022  Impression   No evidence of a pulmonary embolus. ET tube and gastric tube in good position.    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 Questionable mucous plugging scattered in the bronchi along the right lung   base which may be due to recent aspiration.  Recommend pulmonology follow-up. 7 cm exophytic right hepatic mass which has increased in size.  Suggest   PET-CT correlation. Postop changes right apex and upper lobe with fibrosis and scarring   throughout the area which is unchanged.          MRI:/MRA Head/Neck 6/23/2022  Impression   Acute to subacute infarctions in the left frontal lobe, increased in size   since MRI brain June 18, 2022. Small acute to subacute infarction along the left external capsule, stable   since June 18, 2022. Old lacunar infarcts in the left central franki. Mild to moderate parenchymal volume loss. Mild chronic microvascular disease. 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 Right dominance of the vertebral arteries with hypoplastic intracranial left   vertebral artery, and superimposed occlusion in the mid to distal   intracranial left vertebral artery. Moderate stenosis in the intracranial right vertebral artery. Moderate stenosis in the proximal P2 segment of the left PCA. Known 95% critical stenosis in the proximal right internal carotid artery. 80% stenosis in the proximal left internal carotid artery.      DYSPHAGIA HISTORY:  Recent hospital admit [facility] 6/17/2022 to 6/21/2022 at which time pt was active with SLP.  Pt did have a MBSS 6/20/2022 with recommendation for dysphagia puree with thin liquids; continued therapy at d/c      Subjective:     Current diet: NPO; NGT in place    Comments regarding tolerating Current Diet:   RN reports patient with improved level of alertness; reports MD requests re-assessment this date      Objective:     Pain   Appears comfortable without pain    Cognitive/Behavior   Patient alert but with apparent generalized weakness; awake upon SLP entry to room; maintained wakefulness throughout session; iPad interpretation service utilized -  reports babbling and patient does not follow dx or answer questions    Positioning   Fully upright in bed    Pre-feeding assessment:  · Despite wakeful state upon SLP entry to room, patient continues to present with significant generalized weakness with concern for poor endurance and rapid mechanism fatigue  · Tolerated oral stim with weak, disorganized lingual response; weak gag; delayed, weak swallow response    PO Trials:  · Ice chip via teaspoon: DNT  · Thin Liquids: DNT  · Nectar thick liquids: DNT  · Honey Thick liquids VIA TEASPOON x1: prolonged bolus prep; oral holding; concern for reduced bolus control; suspect premature bolus loss to the pharynx; delayed swallow; decreased laryngeal elevation; wet vocal quality post-swallow  · Puree VIA TEASPOON x5: prolonged bolus prep; oral holding; concern for reduced bolus control; suspect premature bolus loss to the pharynx; delayed swallow; decreased laryngeal elevation; no cough, throat clear, or vocal quality changes; persistent concern for rapid mechanism fatigue and increased labor/work/effort with prep/swallow; appears at high risk for penetration/aspiration  · Soft food: DNT  · Regular food: DNT    Dysphagia Tx:   · Direct Dysphagia tx: oral stim and PO trials as described; severe penetration/aspiration risk persist despite improving mental status and oral prep - continue NPO; appears appropriate for MBS consideration d/t consistently adequate level of alertness  · Dysphagia ex: unable to complete d/t language imps and cog status  · Training in compensatory strategies: NA  · Pt response to ex/training: family asleep during session; concern for reduced patient comprehension    Goals:   Pt will demonstrate improved oral motor response to tactile sensory stimulation with >50% om response/mashing pattern continue  Pt will demonstrate swallow response to tactile/sensory stimulation >25% continue  Pt will follow one step commands >25% not met    Assessment:   Impressions:   Marked Oropharyngeal dysphagia characterized by poor lingual manipulation, delayed swallow and decreased laryngeal elevation. Patient with prolonged oral prep, oral holding, concern for reduced bolus control and high risk for premature bolus loss to the pharynx. Wet vocal quality with honey via teaspoon. No overt signs/symptoms of penetration/aspiration with puree but there is concern for increased potential for rapid fatigue and increased penetration/aspiration risk. · Patient alert but with apparent generalized weakness; awake upon SLP entry to room; maintained wakefulness throughout session. Consistent improved level of alertness observed and reported. · iPad interpretation service utilized -  reports babbling and patient does not follow dx or answer questions  · Tolerated oral stim with weak, disorganized lingual response; weak gag; delayed, weak swallow response  · severe penetration/aspiration risk persist despite improving mental status and oral prep - continue NPO. Recommend continue NPO pending MBS. Patient appears appropriate for MBS with consistent improved level of alertness. Telephone order received. ST to continue as tolerated. Diet Recommendations:  NPO pending MBS    Education:  Provided education regarding role of SLP, results of assessment, recommendations and general speech pathology plan of care. []? Pt verbalized understanding and agreement   [x]? Family requires ongoing learning   [x]?  No evidence of patient comprehension     Dysphagia Prognosis:   Poor d/t severity of impairments and medical comorbidities    Plan:     Continue Dysphagia Therapy: YES    Interventions: Oral Care, Oral Motor Exercises , Therapeutic Trials with SLP , Instrumental assessment of swallow function (Modified Barium Swallow Study) sensory stimulation  Duration/Frequency of therapy while on unit: Speech therapy for dysphagia tx 3-5 times per week during acute care stay. Discharge Instructions:   Anticipate NEED for further skilled Speech Therapy for Dysphagia at discharge    This note serves as a D/C Summary in the event that this patient is discharged prior to the next therapy session.     Coded treatment time: 0  Total treatment time: 15    Electronically signed by QASIM Figueroa on 7/1/2022 at 8:37 AM

## 2022-07-01 NOTE — PROGRESS NOTES
NAD, . Comfortable. HEENT: EOM intact, no icterus. Trachea is midline. Neck : No masses, appears symmetrical, no JVD  Respiratory: Respiratory effort appears normal, bilateral equal chest rise, no wheeze, no crackles   Cardiovascular: Ausculation shows RRR no edema  Abdomen: No visible mass or tenderness, non distended. Musculoskeletal:  Joints with no swelling or deformity. Skin:no rashes, ulcers, induration, no jaundice.    Neuro: awake but confused, follows commands      Lab Results   Component Value Date    CREATININE 1.0 07/01/2022    BUN 41 (H) 07/01/2022     07/01/2022    K 4.7 07/01/2022     07/01/2022    CO2 23 07/01/2022      Lab Results   Component Value Date    WBC 12.4 (H) 07/01/2022    HGB 9.7 (L) 07/01/2022    HCT 29.3 (L) 07/01/2022    MCV 85.6 07/01/2022     07/01/2022     Lab Results   Component Value Date    CALCIUM 8.6 07/01/2022    PHOS 2.9 06/30/2022

## 2022-07-01 NOTE — PROGRESS NOTES
Comprehensive Nutrition Assessment    Type and Reason for Visit:  Reassess    Nutrition Recommendations/Plan:   1. Texture/liquid level per SLP  2. Will trial ONS when po resumes      Malnutrition Assessment:  Malnutrition Status:  Insufficient data (06/23/22 1053)      Nutrition Assessment:    Follow-up. Pt with MBS today, spoke with SLP who reported pt with limited cooperation during study. She is defering diet order to MD, but did report pt could probably tolerate Pureed with NTL. Per EMR, family has declined PEG. Per MD note, NG to be dc and diet will be tried. Noted hospice is recommended. Will trial supplements when diet in place, suspect pt will not be able meet nutrition needs. Nutrition Related Findings:    Reviewed labs Wound Type: None       Current Nutrition Intake & Therapies:    Average Meal Intake: NPO  Average Supplements Intake: NPO  Diet NPO  ADULT TUBE FEEDING; Orogastric; Diabetic; Continuous; 25; Yes; 25; Other (specify); q 2; 55; 300; Other (specify); Q 2 Hour    Anthropometric Measures:  Height: 4' 9\" (144.8 cm)  Ideal Body Weight (IBW): 88 lbs (40 kg)    Admission Body Weight: 127 lb (57.6 kg)  Current Body Weight: 122 lb (55.3 kg), 138.6 % IBW. Weight Source: Bed Scale  Current BMI (kg/m2): 26.4                          BMI Categories: Overweight (BMI 25.0-29. 9)    Estimated Daily Nutrient Needs:        Energy (kcal/day): 0412-1377 kcal (25-30 kcal/kg 56 kg CBW)     Protein (g/day): 51-85 gm (1.2-2 gm/kg IBW)     Fluid (ml/day): per provider    Nutrition Diagnosis:   · Inadequate oral intake related to swallowing difficulty as evidenced by NPO or clear liquid status due to medical condition      Nutrition Interventions:   Food and/or Nutrient Delivery: Start Oral Diet,Start Oral Nutrition Supplement  Nutrition Education/Counseling: No recommendation at this time  Coordination of Nutrition Care: Continue to monitor while inpatient       Goals:  Previous Goal Met: Goal(s) Achieved  Goals: Meet at least 75% of estimated needs (new goal)       Nutrition Monitoring and Evaluation:   Behavioral-Environmental Outcomes: None Identified  Food/Nutrient Intake Outcomes: Food and Nutrient Intake,Supplement Intake  Physical Signs/Symptoms Outcomes: Biochemical Data,Weight,Skin,Nutrition Focused Physical Findings,Hemodynamic Status    Discharge Planning:     Too soon to determine     Aruna Ford, [address] [place] [address],   Contact: [phone]

## 2022-07-01 NOTE — PROGRESS NOTES
Hematology Oncology Daily Progress Note    Admit Date: 6/22/2022  Hospital day several    Subjective:     Patient has complaints of weakness--denies sob/cp. Medication side effects: none    Scheduled Meds:   atorvastatin  20 mg Oral Nightly    piperacillin-tazobactam  3,375 mg IntraVENous Q8H    insulin glargine  5 Units SubCUTAneous Nightly    sodium bicarbonate  650 mg Oral BID    docusate  100 mg Oral Daily    senna  10 mL Oral Nightly    polyethylene glycol  17 g Oral Daily    ipratropium-albuterol  1 ampule Inhalation Q4H    enoxaparin  40 mg SubCUTAneous Nightly    pantoprazole (PROTONIX) 40 mg injection  40 mg IntraVENous Daily    insulin lispro  0-18 Units SubCUTAneous Q4H    aspirin  300 mg Rectal Daily    sodium chloride flush  10 mL IntraVENous 2 times per day     Continuous Infusions:   sodium chloride      dextrose      sodium chloride Stopped (06/29/22 1733)     PRN Meds:sodium chloride, hydrALAZINE, glucose, dextrose bolus **OR** dextrose bolus, glucagon (rDNA), dextrose, sodium chloride flush, sodium chloride, promethazine **OR** ondansetron, polyethylene glycol, acetaminophen **OR** acetaminophen    Review of Systems  No new issues    Objective:     Patient Vitals for the past 8 hrs:   BP Temp Temp src Pulse Resp SpO2 Weight   07/01/22 0809 (!) 126/58 98.4 °F (36.9 °C) Axillary 86 24 97 % --   07/01/22 0730 -- -- -- 90 27 96 % --   07/01/22 0429 -- -- -- 89 26 96 % --   07/01/22 0420 -- -- -- -- -- -- 122 lb 5.7 oz (55.5 kg)   07/01/22 0400 109/67 97.9 °F (36.6 °C) Axillary 84 22 97 % --     I/O last 3 completed shifts: In: 4617.7 [IH/IN:0317; IV Piggyback:479.7]  Out: 8585 [Urine:3525]  No intake/output data recorded.     BP (!) 126/58   Pulse 86   Temp 98.4 °F (36.9 °C) (Axillary)   Resp 24   Ht 4' 9\" (1.448 m)   Wt 122 lb 5.7 oz (55.5 kg)   SpO2 97%   BMI 26.48 kg/m²         Head:    Normocephalic, without obvious abnormality, atraumatic   Eyes:    PERRL, conjunctiva/corneas clear, EOM's intact, fundi     benign, both eyes        Ears:    Normal TM's and external ear canals, both ears   Nose:   Nares normal, septum midline, mucosa normal, no drainage    or sinus tenderness   Throat:   Lips, mucosa, and tongue normal; teeth and gums normal   Neck:   Supple, symmetrical, trachea midline, no adenopathy;        thyroid:  No enlargement/tenderness/nodules; no carotid    bruit or JVD   Back:     Symmetric, no curvature, ROM normal, no CVA tenderness   Lungs:     Clear to auscultation bilaterally, respirations unlabored   Chest wall:    No tenderness or deformity   Heart:    Regular rate and rhythm, S1 and S2 normal, no murmur, rub   or gallop   Abdomen:     Soft, non-tender, bowel sounds active all four quadrants,     no masses, no organomegaly           Extremities:   Extremities normal, atraumatic, no cyanosis or edema   Pulses:   2+ and symmetric all extremities   Skin:   Skin color, texture, turgor normal, no rashes or lesions   Lymph nodes:   Cervical, supraclavicular, and axillary nodes normal   Neurologic:   Stable       Data Review  CBC:   Lab Results   Component Value Date/Time    WBC 12.4 07/01/2022 04:39 AM    RBC 3.43 07/01/2022 04:39 AM       Assessment:     Principal Problem:    AMS (altered mental status)  Active Problems:    Acute cerebrovascular accident (CVA) ([address]    Syncope and collapse    Acute respiratory failure (HCC)    Aspiration pneumonia (HCC)    History of CVA (cerebrovascular accident)  Resolved Problems:    * No resolved hospital problems. *      Plan:     1. Hepatocellular carcinoma. He recently progressed on Tecentriq and Avastin. He is followed by Dr. Barroso Members at the [facility] office. Dr. Mark Kumar met with the family last weekend and they want to continue to be aggressive and declined hospice. He does have a few other chemotherapy options. Hospice would also be appropriate if they change their mind. 2.  Stroke\aspiration pneumonia. I will defer to the primary team.    Will sign off. He should see Dr. Alexandria Johnston upon discharge. Please re-consult if questions arise.     Electronically signed by Ricky Mercer MD on 7/1/2022 at 9:10 AM

## 2022-07-01 NOTE — PROGRESS NOTES
Hospitalist Progress Note      PCP: No primary care provider on file. Date of Admission: 6/22/2022    Chief complaint: Altered mental status    Hospital course   patient is 19-year-old male who presented to hospital for change in mental status. Patient was found unresponsive at home, patient is not able to provide history due to condition, most of the history is from patient's chart. Patient was recently discharged from the rehab facility after he was diagnosed with CVA. Patient was covered in vomiting on arrival of EMS    Patient has known advanced hepatocellular cancer. Treated in the ICU for aspiration pneumonia    6/27  Extubated    6/29  Patient remains lethargic, with NG tube. His prognosis appears to be grim/poor, mainly due to his advanced age, underlying diagnosis of hepatocellular carcinoma complicated with acute left ischemic CVA. Speech therapy has recommended a strict n.p.o. We will consult GI for possible PEG tube placement. Patient may benefit from hospice. Grandson at bedside was present during my evaluation. 6/30  Patient appears to be more alert, following commands. On exam he has profound right-sided hemiparesis. Granddaughter at bedside able to translate. Family, appears to be undecided after meeting with palliative care team.  Speech therapy has recommended strict n.p.o. for now, possible with a barium swallow test for tomorrow if mentation is better    7/1  Unchanged clinical status.   Speech therapy could not complete MBS test. Family refusing PEG    Subjective: as above    Medications:  Reviewed    Infusion Medications    sodium chloride      dextrose      sodium chloride Stopped (06/29/22 7670)     Scheduled Medications    atorvastatin  20 mg Oral Nightly    piperacillin-tazobactam  3,375 mg IntraVENous Q8H    insulin glargine  5 Units SubCUTAneous Nightly    sodium bicarbonate  650 mg Oral BID    docusate  100 mg Oral Daily    senna  10 mL Oral Nightly    80*   ALT 59* 56*   BILIDIR <0.2 <0.2   BILITOT 0.4 0.4   ALKPHOS 257* 249*     No results for input(s): INR in the last 72 hours. No results for input(s): Kareen Height in the last 72 hours. Urinalysis:      Lab Results   Component Value Date/Time    NITRU Negative 06/22/2022 02:22 PM    [address] 06/22/2022 02:22 PM    BACTERIA None Seen 06/22/2022 02:22 PM    RBCUA 2 06/22/2022 02:22 PM    BLOODU TRACE 06/22/2022 02:22 PM    SPECGRAV 1.016 06/22/2022 02:22 PM    GLUCOSEU Negative 06/22/2022 02:22 PM       Radiology:  XR CHEST PORTABLE   Final Result   Feeding tube tip projects at the gastric body. XR CHEST PORTABLE   Final Result   Feeding tube identified tip in the stomach. Continued effacement is   recommended for post pyloric placement. XR CHEST PORTABLE   Final Result   Enteric tube tip projects in the expected location of the stomach. Similar opacification of the right upper lung, which could represent right   upper lobe atelectasis. XR CHEST PORTABLE   Final Result   1. Enteric tube with tip and side-port in the stomach. 2.  Endotracheal tube tip is 2 cm above the mar. Recommend retraction by   1.5 cm for optimal positioning. 3.  Stable right lung apex consolidation. CT HEAD WO CONTRAST   Final Result   1. Evolution of the known infarcts involving is the left frontal lobe as well   as the left subinsular region. 2. Otherwise, no acute intracranial abnormality. 3. No significant mass effect, midline shift or acute intracranial hemorrhage. 4. Mild global parenchymal volume loss with chronic microvascular ischemic   changes. MRA NECK WO CONTRAST   Final Result   Acute to subacute infarctions in the left frontal lobe, increased in size   since MRI brain June 18, 2022. Small acute to subacute infarction along the left external capsule, stable   since June 18, 2022. Old lacunar infarcts in the left central franki.       Mild to severe stenosis in a proximal M2 branch of the left MCA. Right dominance of the vertebral arteries with hypoplastic intracranial left   vertebral artery, and superimposed occlusion in the mid to distal   intracranial left vertebral artery. Moderate stenosis in the intracranial right vertebral artery. Moderate stenosis in the proximal P2 segment of the left PCA. Known 95% critical stenosis in the proximal right internal carotid artery. 80% stenosis in the proximal left internal carotid artery. CT CHEST PULMONARY EMBOLISM W CONTRAST   Final Result   No evidence of a pulmonary embolus. ET tube and gastric tube in good position. 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 Questionable mucous plugging scattered in the bronchi along the right lung   base which may be due to recent aspiration. Recommend pulmonology follow-up. 7 cm exophytic right hepatic mass which has increased in size. Suggest   PET-CT correlation. Postop changes right apex and upper lobe with fibrosis and scarring   throughout the area which is unchanged.          XR CHEST PORTABLE   Final Result   Unchanged right upper lobe scarring      No acute cardiopulmonary process         CT Head WO Contrast   Final Result   New low-attenuation in the left corona radiata consistent with an evolving   infarct as described on the recent MRI      No acute hemorrhage         Fluoroscopy modified barium swallow with video    (Results Pending)         Assessment/Plan:    Active Hospital Problems    Diagnosis     Syncope and collapse [R55]      Priority: Medium  Acute respiratory failure (HCC) [J96.00]      Priority: Medium    Aspiration pneumonia (HCC) [J69.0]      Priority: Medium    History of CVA (cerebrovascular accident) [Z86.73]      Priority: Medium    AMS (altered mental status) [R41.82]      Priority: Medium    Acute cerebrovascular accident (CVA) ([facility] [I63.9]      Priority: Medium       Oropharyngeal Dysphagea  Speech therapy could not complete MBS test. Family refusing PEG  Will take NG tube out  Resume diet recommended by ST    Acute hypoxic respiratory failure s/p intubation  Extubated yesterday 6/27/2022  Has remained stable  Continue scheduled bronchodilator  Currently treating for aspiration pneumonia    Sepsis present on admission  Aspiration pneumonia  Klebsiella and Enterobacter in cultures  Switch to Zosyn to cover for anaerobes for additional 5 days; stop 7/4    Acute left ischemic hemispheric stroke-patient had extensive stroke has very extensive deficits right now with right hemiparesis and expressive receptive aphasia  Poor neurological report recovery predicted  He has extensive vascular compromise based on CTA imaging    Transaminitis  Hepatocellular cancer stage IV  Prognosis poor  Heme-onc recommendations noted, patient and family members requesting aggressive treatment    Acute metabolic encephalopathy  CT of the head on 625 with no acute finding  Check ammonia level    Poor prognosis  Patient with  new diagnosis of acute ischemic left-sided CVA, is  without a doubt, complicating his overall prognosis. Given his underlying malignancy, his life expectancy is less than 6 months. I highly suggest returning home with hospice, grandson was the only family member at bedside  Patient does not speak [place]  Ongoing conversations with family and palliative care to discuss goals of care       DVT prophylaxis-Lovenox  Diet: Diet NPO  ADULT TUBE FEEDING; Orogastric; Diabetic; Continuous; 25; Yes; 25; Other (specify); q 2; 55; 300;  Other (specify);  Q 2 Hour  Code Status: Limited    PT/OT Eval Status: ordered    Dispo: Hospice highly recommended    Salty Gutierrez MD

## 2022-07-01 NOTE — PROGRESS NOTES
Upon initial assessment NG tube found to be at 50cm, per report NG supposed to be marked at the 55cm. RN stoped continuous TF immediately, readvanced NG to previous reported fabian. RN placed order for stat portable CXR per Payton Rubio NP. Patient laying comfortably, no notable respiratory distress at all.

## 2022-07-02 LAB
ALBUMIN SERPL-MCNC: 2.7 G/DL (ref 3.4–5)
ALP BLD-CCNC: 234 U/L (ref 40–129)
ALT SERPL-CCNC: 52 U/L (ref 10–40)
ANION GAP SERPL CALCULATED.3IONS-SCNC: 12 MMOL/L (ref 3–16)
AST SERPL-CCNC: 58 U/L (ref 15–37)
BASOPHILS ABSOLUTE: 0.1 K/UL (ref 0–0.2)
BASOPHILS RELATIVE PERCENT: 0.6 %
BILIRUB SERPL-MCNC: 0.6 MG/DL (ref 0–1)
BILIRUBIN DIRECT: <0.2 MG/DL (ref 0–0.3)
BILIRUBIN, INDIRECT: ABNORMAL MG/DL (ref 0–1)
BUN BLDV-MCNC: 38 MG/DL (ref 7–20)
CALCIUM SERPL-MCNC: 8.4 MG/DL (ref 8.3–10.6)
CHLORIDE BLD-SCNC: 109 MMOL/L (ref 99–110)
CO2: 23 MMOL/L (ref 21–32)
CREAT SERPL-MCNC: 0.9 MG/DL (ref 0.8–1.3)
EOSINOPHILS ABSOLUTE: 0.2 K/UL (ref 0–0.6)
EOSINOPHILS RELATIVE PERCENT: 1.9 %
GFR AFRICAN AMERICAN: >60
GFR NON-AFRICAN AMERICAN: >60
GLUCOSE BLD-MCNC: 123 MG/DL (ref 70–99)
GLUCOSE BLD-MCNC: 127 MG/DL (ref 70–99)
GLUCOSE BLD-MCNC: 127 MG/DL (ref 70–99)
GLUCOSE BLD-MCNC: 141 MG/DL (ref 70–99)
GLUCOSE BLD-MCNC: 150 MG/DL (ref 70–99)
GLUCOSE BLD-MCNC: 152 MG/DL (ref 70–99)
GLUCOSE BLD-MCNC: 211 MG/DL (ref 70–99)
HCT VFR BLD CALC: 29.3 % (ref 40.5–52.5)
HEMOGLOBIN: 9.5 G/DL (ref 13.5–17.5)
LYMPHOCYTES ABSOLUTE: 1.2 K/UL (ref 1–5.1)
LYMPHOCYTES RELATIVE PERCENT: 10.6 %
MCH RBC QN AUTO: 28.4 PG (ref 26–34)
MCHC RBC AUTO-ENTMCNC: 32.5 G/DL (ref 31–36)
MCV RBC AUTO: 87.2 FL (ref 80–100)
MONOCYTES ABSOLUTE: 0.6 K/UL (ref 0–1.3)
MONOCYTES RELATIVE PERCENT: 5.4 %
NEUTROPHILS ABSOLUTE: 9.5 K/UL (ref 1.7–7.7)
NEUTROPHILS RELATIVE PERCENT: 81.5 %
PDW BLD-RTO: 13.4 % (ref 12.4–15.4)
PERFORMED ON: ABNORMAL
PLATELET # BLD: 300 K/UL (ref 135–450)
PMV BLD AUTO: 8.1 FL (ref 5–10.5)
POTASSIUM SERPL-SCNC: 5.1 MMOL/L (ref 3.5–5.1)
RBC # BLD: 3.36 M/UL (ref 4.2–5.9)
SODIUM BLD-SCNC: 144 MMOL/L (ref 136–145)
TOTAL PROTEIN: 5.8 G/DL (ref 6.4–8.2)
WBC # BLD: 11.6 K/UL (ref 4–11)

## 2022-07-02 PROCEDURE — 80048 BASIC METABOLIC PNL TOTAL CA: CPT

## 2022-07-02 PROCEDURE — 94640 AIRWAY INHALATION TREATMENT: CPT

## 2022-07-02 PROCEDURE — C9113 INJ PANTOPRAZOLE SODIUM, VIA: HCPCS | Performed by: NURSE PRACTITIONER

## 2022-07-02 PROCEDURE — 94761 N-INVAS EAR/PLS OXIMETRY MLT: CPT

## 2022-07-02 PROCEDURE — 6360000002 HC RX W HCPCS: Performed by: INTERNAL MEDICINE

## 2022-07-02 PROCEDURE — 36415 COLL VENOUS BLD VENIPUNCTURE: CPT

## 2022-07-02 PROCEDURE — 92526 ORAL FUNCTION THERAPY: CPT

## 2022-07-02 PROCEDURE — 6370000000 HC RX 637 (ALT 250 FOR IP): Performed by: NURSE PRACTITIONER

## 2022-07-02 PROCEDURE — 2580000003 HC RX 258: Performed by: NURSE PRACTITIONER

## 2022-07-02 PROCEDURE — 2580000003 HC RX 258: Performed by: INTERNAL MEDICINE

## 2022-07-02 PROCEDURE — 6360000002 HC RX W HCPCS: Performed by: NURSE PRACTITIONER

## 2022-07-02 PROCEDURE — 1200000000 HC SEMI PRIVATE

## 2022-07-02 PROCEDURE — 2700000000 HC OXYGEN THERAPY PER DAY

## 2022-07-02 PROCEDURE — 85025 COMPLETE CBC W/AUTO DIFF WBC: CPT

## 2022-07-02 PROCEDURE — 80076 HEPATIC FUNCTION PANEL: CPT

## 2022-07-02 RX ORDER — INSULIN LISPRO 100 [IU]/ML
0-6 INJECTION, SOLUTION INTRAVENOUS; SUBCUTANEOUS
Status: DISCONTINUED | OUTPATIENT
Start: 2022-07-03 | End: 2022-07-05 | Stop reason: HOSPADM

## 2022-07-02 RX ORDER — INSULIN LISPRO 100 [IU]/ML
0-3 INJECTION, SOLUTION INTRAVENOUS; SUBCUTANEOUS NIGHTLY
Status: DISCONTINUED | OUTPATIENT
Start: 2022-07-02 | End: 2022-07-05 | Stop reason: HOSPADM

## 2022-07-02 RX ADMIN — IPRATROPIUM BROMIDE AND ALBUTEROL SULFATE 1 AMPULE: .5; 3 SOLUTION RESPIRATORY (INHALATION) at 07:31

## 2022-07-02 RX ADMIN — INSULIN GLARGINE 5 UNITS: 100 INJECTION, SOLUTION SUBCUTANEOUS at 21:53

## 2022-07-02 RX ADMIN — IPRATROPIUM BROMIDE AND ALBUTEROL SULFATE 1 AMPULE: .5; 3 SOLUTION RESPIRATORY (INHALATION) at 03:21

## 2022-07-02 RX ADMIN — IPRATROPIUM BROMIDE AND ALBUTEROL SULFATE 1 AMPULE: .5; 3 SOLUTION RESPIRATORY (INHALATION) at 23:46

## 2022-07-02 RX ADMIN — IPRATROPIUM BROMIDE AND ALBUTEROL SULFATE 1 AMPULE: .5; 3 SOLUTION RESPIRATORY (INHALATION) at 19:28

## 2022-07-02 RX ADMIN — PIPERACILLIN AND TAZOBACTAM 3375 MG: 3; .375 INJECTION, POWDER, LYOPHILIZED, FOR SOLUTION INTRAVENOUS at 10:00

## 2022-07-02 RX ADMIN — IPRATROPIUM BROMIDE AND ALBUTEROL SULFATE 1 AMPULE: .5; 3 SOLUTION RESPIRATORY (INHALATION) at 11:43

## 2022-07-02 RX ADMIN — ENOXAPARIN SODIUM 40 MG: 100 INJECTION SUBCUTANEOUS at 21:53

## 2022-07-02 RX ADMIN — PIPERACILLIN AND TAZOBACTAM 3375 MG: 3; .375 INJECTION, POWDER, LYOPHILIZED, FOR SOLUTION INTRAVENOUS at 16:32

## 2022-07-02 RX ADMIN — INSULIN LISPRO 1 UNITS: 100 INJECTION, SOLUTION INTRAVENOUS; SUBCUTANEOUS at 21:54

## 2022-07-02 RX ADMIN — Medication 40 MG: at 10:00

## 2022-07-02 RX ADMIN — PIPERACILLIN AND TAZOBACTAM 3375 MG: 3; .375 INJECTION, POWDER, LYOPHILIZED, FOR SOLUTION INTRAVENOUS at 23:41

## 2022-07-02 RX ADMIN — SODIUM CHLORIDE, PRESERVATIVE FREE 10 ML: 5 INJECTION INTRAVENOUS at 21:53

## 2022-07-02 RX ADMIN — IPRATROPIUM BROMIDE AND ALBUTEROL SULFATE 1 AMPULE: .5; 3 SOLUTION RESPIRATORY (INHALATION) at 16:26

## 2022-07-02 ASSESSMENT — PAIN SCALES - WONG BAKER: WONGBAKER_NUMERICALRESPONSE: 0

## 2022-07-02 ASSESSMENT — PAIN SCALES - GENERAL
PAINLEVEL_OUTOF10: 0

## 2022-07-02 NOTE — PROGRESS NOTES
[facility]   Speech Therapy  Daily Dysphagia Treatment Note    Nubia Tsai  AGE: 80 y.o. GENDER: male  : 1940  [id number]  EPISODE DATE:  2022     Patient Active Problem List   Diagnosis    Intractable vomiting with nausea    Intractable vomiting    Nausea vomiting and diarrhea    Aortic arch aneurysm (HCC)    HTN (hypertension), benign    RUQ pain    Cholelithiasis    DM2 (diabetes mellitus, type 2) ([address]    Hypothyroid    Diarrhea    Hepatocellular carcinoma (HCC)    Paresthesia    Facial droop    Arterial ischemic stroke, ICA, left, acute ([address]    Carotid artery stenosis with cerebral infarction ([address]    DM type 2, controlled, with complication ([address]    AMS (altered mental status)    Acute cerebrovascular accident (CVA) ([address]    Syncope and collapse    Acute respiratory failure ([address]    Aspiration pneumonia ([address]    History of CVA (cerebrovascular accident)     No Known Allergies    Treatment Diagnosis: Dysphagia     CHART REVIEW:  2022 admitted with c/o unresponsiveness  MD ADMISSION H&P HPI: Patient is 43-year-old male who presented to hospital for change in mental status. Patient was found unresponsive at home, patient is not able to provide history due to condition, most of the history is from patient's chart. Patient was recently discharged from the rehab facility after he was diagnosed with CVA. Patient was covered in vomiting on arrival of EMS    Active consults: Neuro, Oncology, Pulmonology, Nephrology, Palliative Care    IMAGING:  CT Chest: 2022  Impression   No evidence of a pulmonary embolus. ET tube and gastric tube in good position.    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 Questionable mucous plugging scattered in the bronchi along the right lung   base which may be due to recent aspiration.  Recommend pulmonology follow-up. 7 cm exophytic right hepatic mass which has increased in size.  Suggest   PET-CT correlation. Postop changes right apex and upper lobe with fibrosis and scarring   throughout the area which is unchanged.          MRI:/MRA Head/Neck 6/23/2022  Impression   Acute to subacute infarctions in the left frontal lobe, increased in size   since MRI brain June 18, 2022. Small acute to subacute infarction along the left external capsule, stable   since June 18, 2022. Old lacunar infarcts in the left central franki. Mild to moderate parenchymal volume loss. Mild chronic microvascular disease. 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 Right dominance of the vertebral arteries with hypoplastic intracranial left   vertebral artery, and superimposed occlusion in the mid to distal   intracranial left vertebral artery. Moderate stenosis in the intracranial right vertebral artery. Moderate stenosis in the proximal P2 segment of the left PCA. Known 95% critical stenosis in the proximal right internal carotid artery. 80% stenosis in the proximal left internal carotid artery.      DYSPHAGIA HISTORY:  Recent hospital admit [facility] 6/17/2022 to 6/21/2022 at which time pt was active with SLP. Pt did have a MBSS 6/20/2022 with recommendation for dysphagia puree with thin liquids; continued therapy at d/c    MBS: 7/1/2022  Accepted and tolerated limited MBS in fluoroscopy suite.  Patient with distaste for barium which limited acceptance for PO trials.     Patient alert but with apparent generalized weakness; awake upon arrival to fluoroscopy and maintained wakeful state throughout study.      iPad interpretation service utilized - no verbal responses to ; patient does not follow dx; unreliable yes/no via head shake/nod; waves to staff.     Patient with distaste for barium: puree trial initially presented but held orally until removed via toothette; honey via teaspoon then presented with comparable tolerance; accepted thin via tsp but unable to orally control; then accepted nectar thick via teaspoon followed by nectar via cup with tolerance of puree when re-administered.      Severe oral stage dysphagia characterized by poor lingual manipulation for bolus formation, movement, and control. · Oral holding with all trials is most apparent with denser presentations. Patient does exhibit facial grimace with oral holding with trials indicating distaste. · Poor oral control for thin via tsp with repeated 100% anterior loss of trial when presented. · Prolonged, disorganized bolus formation and movement with nectar and puree with variable oral holding. · Premature bolus loss to the pharynx with all. · Significant concern for potential for poor ability to maintain adequate level of PO nutrition d/t severity of weakness/impairments.     Moderate pharyngeal stage dysphagia characterized by delayed swallow ,decreased laryngeal elevation, and decreased pharyngeal peristalsis. · Premature spillage to the valleculae with accepted PO trials (puree, nectar teaspoon, and nectar cup). · No significant residue. No penetration/aspiration with puree or nectar trials. Thin not visualized d/t severity of oral phase impairments.      If PO diet is pursued, it appears a puree diet texture with nectar thick liquids could be considered.  Recommend CLOSE monitor for tolerance with discontinuation of PO diet should concern for reduced tolerance arises.     Due to severity of impairments, patient with high risk for inability to maintain adequate level of nutrition via PO with need for consideration for long-term alternate nutrition vs review of goal of care with pleasure feeds.     Subjective:     Current diet: Puree/Nectar    Comments regarding tolerating Current Diet:   Limited intake reported      Objective:     Pain   Appears comfortable without pain    Cognitive/Behavior   Patient alert but with apparent generalized weakness; awake upon SLP entry to room; maintained wakefulness throughout session    Positioning   Fully upright in bed    Pre-feeding assessment:  · Despite wakeful state upon SLP entry to room, patient continues to present with significant generalized weakness with concern for poor endurance and rapid mechanism fatigue  · Tolerated oral stim with weak, disorganized lingual response; weak gag; delayed, weak swallow response    PO Trials:  · Ice chip via teaspoon: DNT  · Thin Liquids: DNT  · Nectar thick liquids VIA TEASPOON: prolonged bolus prep; oral holding ~30-45 seconds; concern for reduced bolus control; suspect premature bolus loss to the pharynx; delayed swallow; decreased laryngeal elevation; no overt signs/symptoms  · Honey Thick liquids: DNT  · Puree VIA TEASPOON: prolonged bolus prep; oral holding ~30-45 seconds; concern for reduced bolus control; suspect premature bolus loss to the pharynx; delayed swallow; decreased laryngeal elevation; no cough, throat clear, or vocal quality changes  · Soft food: DNT  · Regular food: DNT    Dysphagia Tx:   · Direct Dysphagia tx: tolerating puree/nectar without overt signs/symptoms; PERSISTENT CONCERN FOR LIMITED ABILITY TO MAINTAIN ADEQUATE NUTRITION D/T SEVERITY OF WEAKNESS/IMPAIRMENTS RESULTING IN POOR INTAKE  · Dysphagia ex: unable to complete d/t language imps and cog status  · Training in compensatory strategies: NA  · Pt response to ex/training: granddaughter and grandson present - ed completed on concern for poor nutrition d/t severity of imps; suspect continued reinforcement needed    Goals:   Pt will demonstrate improved oral motor response to tactile sensory stimulation with >50% om response/mashing pattern continue  Pt will demonstrate swallow response to tactile/sensory stimulation >25% continue  Pt will follow one step commands >25% not met  Pt will tolerate recommended diet without overt signs/symptoms of penetration/aspiration using recommended comp strats continue    Assessment:   Impressions:   Suspect swallow function comparable to above noted MBS. · Patient alert but with apparent generalized weakness; awake upon SLP entry to room; maintained wakefulness throughout session. · Tolerated oral stim with weak, disorganized lingual response; weak gag; delayed, weak swallow response  · tolerating puree/nectar without overt signs/symptoms;   · PERSISTENT CONCERN FOR LIMITED ABILITY TO MAINTAIN ADEQUATE NUTRITION D/T SEVERITY OF WEAKNESS/IMPAIRMENTS RESULTING IN POOR INTAKE  · granddaughter and grandson present - ed completed on concern for poor nutrition d/t severity of imps; suspect continued reinforcement needed    Recommend continue puree/nectar as tolerated. Due to severity of impairments, patient with high risk for inability to maintain adequate level of nutrition via PO with need for consideration for long-term alternate nutrition vs review of goal of care with pleasure feeds. ST to continue as tolerated. Diet Recommendations:  Solids: IDDSI 4 Puree Solids; Liquids: IDDSI 2 Mildly Thick (nectar) Liquids;  Meds: Meds crushed in puree as able  Strategies: Upright as possible with all PO intake , No straws , Small bites/sips , Eat/feed slowly, Remain upright 30-45 min , Total Feed     Education:  Provided education regarding role of SLP, results of assessment, recommendations and general speech pathology plan of care. []? Pt verbalized understanding and agreement   [x]? Family requires ongoing learning   [x]?  No evidence of patient comprehension     Dysphagia Prognosis:   Poor d/t severity of impairments and medical comorbidities    Plan:     Continue Dysphagia Therapy: YES    Interventions: Oral Care, Oral Motor Exercises , Therapeutic Trials with SLP , Instrumental assessment of swallow function (Modified Barium Swallow Study) sensory stimulation  Duration/Frequency of therapy while on unit: Speech therapy for dysphagia tx 3-5 times per week during acute care stay. Discharge Instructions:   Anticipate NEED for further skilled Speech Therapy for Dysphagia at discharge    This note serves as a D/C Summary in the event that this patient is discharged prior to the next therapy session.     Coded treatment time: 0  Total treatment time: 25    Electronically signed by QASIM Rainey on 7/2/2022 at 1:36 PM

## 2022-07-02 NOTE — PROGRESS NOTES
Hospitalist Progress Note      PCP: No primary care provider on file. Date of Admission: 6/22/2022    Chief complaint: Altered mental status    Hospital course   patient is 60-year-old male who presented to hospital for change in mental status. Patient was found unresponsive at home, patient is not able to provide history due to condition, most of the history is from patient's chart. Patient was recently discharged from the rehab facility after he was diagnosed with CVA. Patient was covered in vomiting on arrival of EMS    Patient has known advanced hepatocellular cancer. Treated in the ICU for aspiration pneumonia    6/27  Extubated    6/29  Patient remains lethargic, with NG tube. His prognosis appears to be grim/poor, mainly due to his advanced age, underlying diagnosis of hepatocellular carcinoma complicated with acute left ischemic CVA. Speech therapy has recommended a strict n.p.o. We will consult GI for possible PEG tube placement. Patient may benefit from hospice. Grandson at bedside was present during my evaluation. 6/30  Patient appears to be more alert, following commands. On exam he has profound right-sided hemiparesis. Granddaughter at bedside able to translate. Family, appears to be undecided after meeting with palliative care team.  Speech therapy has recommended strict n.p.o. for now, possible with a barium swallow test for tomorrow if mentation is better    7/1  Unchanged clinical status. Speech therapy could not complete MBS test. Family refusing PEG    7/2  Patient remains lethargic, opening his eyes only to sternal rub. Nursing staff, reporting patient refusing to eat or drink anything. I had a long conversation with [employer], patient's nephew. He appears to be the decision maker and the  for the family members. I explained his overall poor prognosis given the fact that the patient is not eating. Zee Richmond, nurse at bedside.   She advised family member to bring food from time to incentivize patients to eat. I explained to MrTristen Moya that if patient did not responded well over the weekend It would make sense to go home with hospice. Inpatient hospice agency will be consulted to see if patient qualifies for inpatient services. Subjective: as above    Medications:  Reviewed    Infusion Medications    sodium chloride      dextrose      sodium chloride Stopped (06/29/22 9443)     Scheduled Medications    atorvastatin  20 mg Oral Nightly    piperacillin-tazobactam  3,375 mg IntraVENous Q8H    insulin glargine  5 Units SubCUTAneous Nightly    sodium bicarbonate  650 mg Oral BID    docusate  100 mg Oral Daily    senna  10 mL Oral Nightly    polyethylene glycol  17 g Oral Daily    ipratropium-albuterol  1 ampule Inhalation Q4H    enoxaparin  40 mg SubCUTAneous Nightly    pantoprazole (PROTONIX) 40 mg injection  40 mg IntraVENous Daily    insulin lispro  0-18 Units SubCUTAneous Q4H    aspirin  300 mg Rectal Daily    sodium chloride flush  10 mL IntraVENous 2 times per day     PRN Meds: sodium chloride, hydrALAZINE, glucose, dextrose bolus **OR** dextrose bolus, glucagon (rDNA), dextrose, sodium chloride flush, sodium chloride, promethazine **OR** ondansetron, polyethylene glycol, acetaminophen **OR** acetaminophen      Intake/Output Summary (Last 24 hours) at 7/2/2022 1154  Last data filed at 7/2/2022 0515  Gross per 24 hour   Intake 940.38 ml   Output 1500 ml   Net -559.62 ml       Physical Exam Performed:    BP (!) 140/89   Pulse 85   Temp 98.1 °F (36.7 °C) (Axillary)   Resp 26   Ht 4' 9\" (1.448 m)   Wt 123 lb 10.9 oz (56.1 kg)   SpO2 95%   BMI 26.76 kg/m²     General appearance: poorly interactive  HEENT:  Conjunctivae/corneas clear. Neck: Supple, with full range of motion. Respiratory:  Normal respiratory effort. Clear to auscultation, bilaterally without Rales/Wheezes/Rhonchi.   Cardiovascular: Regular rate and rhythm with normal S1/S2 without murmurs or rubs  Abdomen: Soft, non-tender, non-distended, normal bowel sounds. Musculoskeletal: No cyanosis or edema bilaterally  Neurologic: Moving arms. Expressive and receptive aphasia  Peripheral Pulses: +2 palpable, equal bilaterally, no change today 6/24      Labs:   Recent Labs     06/30/22 0444 07/01/22 0439 07/02/22 0422   WBC 14.3* 12.4* 11.6*   HGB 9.9* 9.7* 9.5*   HCT 29.6* 29.3* 29.3*    284 300     Recent Labs     06/30/22 0444 07/01/22 0439 07/02/22 0422   * 141 144   K 4.8 4.7 5.1    108 109   CO2 23 23 23   BUN 43* 41* 38*   CREATININE 1.1 1.0 0.9   CALCIUM 8.7 8.6 8.4   PHOS 2.9  --   --      Recent Labs     06/30/22 0444 07/01/22 0439 07/02/22 0422   AST 94* 80* 58*   ALT 59* 56* 52*   BILIDIR <0.2 <0.2 <0.2   BILITOT 0.4 0.4 0.6   ALKPHOS 257* 249* 234*     No results for input(s): INR in the last 72 hours. No results for input(s): Magdalene Flower in the last 72 hours. Urinalysis:      Lab Results   Component Value Date/Time    NITRU Negative 06/22/2022 02:22 PM    [address] 06/22/2022 02:22 PM    BACTERIA None Seen 06/22/2022 02:22 PM    RBCUA 2 06/22/2022 02:22 PM    BLOODU TRACE 06/22/2022 02:22 PM    SPECGRAV 1.016 06/22/2022 02:22 PM    GLUCOSEU Negative 06/22/2022 02:22 PM       Radiology:  Fluoroscopy modified barium swallow with video   Final Result   Limited exam as above without kala aspiration with limited trials of nectar   consistency. Please see separate speech pathology report for full discussion of findings   and recommendations. XR CHEST PORTABLE   Final Result   Feeding tube tip projects at the gastric body. XR CHEST PORTABLE   Final Result   Feeding tube identified tip in the stomach. Continued effacement is   recommended for post pyloric placement. XR CHEST PORTABLE   Final Result   Enteric tube tip projects in the expected location of the stomach.       Similar opacification of the right upper lung, which could represent right   upper lobe atelectasis. XR CHEST PORTABLE   Final Result   1. Enteric tube with tip and side-port in the stomach. 2.  Endotracheal tube tip is 2 cm above the mar. Recommend retraction by   1.5 cm for optimal positioning. 3.  Stable right lung apex consolidation. CT HEAD WO CONTRAST   Final Result   1. Evolution of the known infarcts involving is the left frontal lobe as well   as the left subinsular region. 2. Otherwise, no acute intracranial abnormality. 3. No significant mass effect, midline shift or acute intracranial hemorrhage. 4. Mild global parenchymal volume loss with chronic microvascular ischemic   changes. MRA NECK WO CONTRAST   Final Result   Acute to subacute infarctions in the left frontal lobe, increased in size   since MRI brain June 18, 2022. Small acute to subacute infarction along the left external capsule, stable   since June 18, 2022. Old lacunar infarcts in the left central franki. Mild to moderate parenchymal volume loss. Mild chronic microvascular disease. 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 Right dominance of the vertebral arteries with hypoplastic intracranial left   vertebral artery, and superimposed occlusion in the mid to distal   intracranial left vertebral artery. Moderate stenosis in the intracranial right vertebral artery. Moderate stenosis in the proximal P2 segment of the left PCA. Known 95% critical stenosis in the proximal right internal carotid artery. 80% stenosis in the proximal left internal carotid artery. MRA HEAD WO CONTRAST   Final Result   Acute to subacute infarctions in the left frontal lobe, increased in size   since MRI brain June 18, 2022. Small acute to subacute infarction along the left external capsule, stable   since June 18, 2022. Old lacunar infarcts in the left central franki.       Mild to moderate parenchymal volume loss. Mild chronic microvascular disease. 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 Right dominance of the vertebral arteries with hypoplastic intracranial left   vertebral artery, and superimposed occlusion in the mid to distal   intracranial left vertebral artery. Moderate stenosis in the intracranial right vertebral artery. Moderate stenosis in the proximal P2 segment of the left PCA. Known 95% critical stenosis in the proximal right internal carotid artery. 80% stenosis in the proximal left internal carotid artery. MRI BRAIN WO CONTRAST   Final Result   Acute to subacute infarctions in the left frontal lobe, increased in size   since MRI brain June 18, 2022. Small acute to subacute infarction along the left external capsule, stable   since June 18, 2022. Old lacunar infarcts in the left central franki. Mild to moderate parenchymal volume loss. Mild chronic microvascular disease. 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 Right dominance of the vertebral arteries with hypoplastic intracranial left   vertebral artery, and superimposed occlusion in the mid to distal   intracranial left vertebral artery. Moderate stenosis in the intracranial right vertebral artery. Moderate stenosis in the proximal P2 segment of the left PCA. Known 95% critical stenosis in the proximal right internal carotid artery. 80% stenosis in the proximal left internal carotid artery. CT CHEST PULMONARY EMBOLISM W CONTRAST   Final Result   No evidence of a pulmonary embolus. ET tube and gastric tube in good position.       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 Questionable mucous plugging scattered in the bronchi along the right lung   base which may be due to recent aspiration. Recommend pulmonology follow-up. 7 cm exophytic right hepatic mass which has increased in size. Suggest   PET-CT correlation. Postop changes right apex and upper lobe with fibrosis and scarring   throughout the area which is unchanged.          XR CHEST PORTABLE   Final Result   Unchanged right upper lobe scarring      No acute cardiopulmonary process         CT Head WO Contrast   Final Result   New low-attenuation in the left corona radiata consistent with an evolving   infarct as described on the recent MRI      No acute hemorrhage               Assessment/Plan:    Active Hospital Problems    Diagnosis     Syncope and collapse [R55]      Priority: Medium    Acute respiratory failure (HCC) [J96.00]      Priority: Medium    Aspiration pneumonia (HCC) [J69.0]      Priority: Medium    History of CVA (cerebrovascular accident) [Z86.73]      Priority: Medium    AMS (altered mental status) [R41.82]      Priority: Medium    Acute cerebrovascular accident (CVA) ([address] [I63.9]      Priority: Medium       Oropharyngeal Dysphagea  Speech therapy could not complete MBS test. Family refusing PEG  Will take NG tube out  Resume diet recommended by ST vs pleasure feedings    Acute hypoxic respiratory failure s/p intubation  Extubated yesterday 6/27/2022  Has remained stable  Continue scheduled bronchodilator  Currently treating for aspiration pneumonia    Sepsis present on admission  Aspiration pneumonia  Klebsiella and Enterobacter in cultures  Switch to Zosyn to cover for anaerobes for additional 5 days; stop 7/4    Acute left ischemic hemispheric stroke-patient had extensive stroke has very extensive deficits right now with right hemiparesis and expressive receptive aphasia  Poor neurological report recovery predicted  He has extensive vascular compromise based on CTA imaging    Transaminitis  Hepatocellular cancer stage IV  Prognosis poor  Heme-onc recommendations noted, patient and family members requesting aggressive treatment    Acute metabolic encephalopathy  CT of the head on 625 with no acute finding  Check ammonia level    Poor prognosis  Patient with  new diagnosis of acute ischemic left-sided CVA, is  without a doubt, complicating his overall prognosis. Given his underlying malignancy, his life expectancy is less than 6 months. I highly suggest returning home with hospice, grandson was the only family member at bedside  Patient does not speak English  Ongoing conversations with family and palliative care to discuss goals of care       DVT prophylaxis-Lovenox  Diet: ADULT DIET;  Dysphagia - Pureed; Mildly Thick (Nectar)  Code Status: Limited    PT/OT Eval Status: ordered    Dispo: Hospice highly recommended    Daxa Diaz MD

## 2022-07-02 NOTE — PROGRESS NOTES
[facility]                                               Progress note    Summary:   Rajat Abebe is being seen by nephrology for LUL. Admitted with AMS. He was initially intubated for hypoxic respiratory failure. Had recent stroke dx with aspiration pneumonia on ABX. also has metastatic HCC. Interval History  Seen at bedside  Hypernatremia resolved with free water. BP acceptable  Renal function stable. Good urine output    Plan:   Renal function is at baseline   continue free water  300 cc q 2. Na level is now acceptable. Marycruz Gil MD  [facility]  Office: [phone]    Assessment:     #LUL  Baseline Cr   Cr at time of consult  UA  Had septic shock , required pressors. Non oliguric   UA with hyaline catss 2 WBC and 2 RBC  No proteinuria but discrepant values < 4 then 233 on another check  , did not correlate with UA showing albumin > 1000 mg, dl will check again. Had contrast on 6/22  SPEP negative     #acute hypoxic respiratory failure. Aspiration pneumonia  CTPA negative for PE  proca elevated  Blood cxs  Sputum cx  MRSA nares negative   Pro BNP 91  Hyperdynamic EF 70% consistent with septic shock   G1DD    #Mild hyponatremia  Likely due to intravascular volume depletion. #HCC  Prevoius CT Abd / Pelvis with conrast showed hepatic mass with no nmets, moderate stenosis of SMA and 50% narrowing. No mention of renal arteries or stenosis. CT abd without contrast on 6/3/22 showed normal kidneys and no hydro, mass, or stones. ROS:   Unable to assess  confused      PMH:   Past medical history, surgical history, social history, family history are reviewed and updated as appropriate. Reviewed current medication list.   Allergies reviewed and updated as needed. PE:   Vitals:    07/02/22 1630   BP:    Pulse: 93   Resp: 27   Temp: 98 °F (36.7 °C)   SpO2: 98%       General appearance:  in NAD, . Comfortable. HEENT: EOM intact, no icterus.  Trachea is midline. Neck : No masses, appears symmetrical, no JVD  Respiratory: Respiratory effort appears normal, bilateral equal chest rise, no wheeze, no crackles   Cardiovascular: Ausculation shows RRR no edema  Abdomen: No visible mass or tenderness, non distended. Musculoskeletal:  Joints with no swelling or deformity. Skin:no rashes, ulcers, induration, no jaundice.    Neuro: awake but confused, follows commands      Lab Results   Component Value Date    CREATININE 0.9 07/02/2022    BUN 38 (H) 07/02/2022     07/02/2022    K 5.1 07/02/2022     07/02/2022    CO2 23 07/02/2022      Lab Results   Component Value Date    WBC 11.6 (H) 07/02/2022    HGB 9.5 (L) 07/02/2022    HCT 29.3 (L) 07/02/2022    MCV 87.2 07/02/2022     07/02/2022     Lab Results   Component Value Date    CALCIUM 8.4 07/02/2022    PHOS 2.9 06/30/2022

## 2022-07-02 NOTE — PROGRESS NOTES
Branson Bumpers RN with [facility]  spoke to patient's family at bedside, per son he wants to have a large family meeting tomorrow 07/03/22 time is currently undecided. He will call this RN later this evening at [phone] to advise the meeting time.   Thanks for the referral.

## 2022-07-02 NOTE — PROGRESS NOTES
RN contacted Gretel Rivera NP via secure message to verify it was still okay to give 5 units Lantus insulin with the NG out and not receiving tube feeds and not really taking food/drinks orally. Jordana Rodriguez NP stated that it was still okay to give 5 units Lantus insulin.  See [facility]  Electronically signed by Ronel Hurt RN on 7/1/2022 at 9:15 PM

## 2022-07-02 NOTE — PLAN OF CARE
Problem: Discharge Planning  Goal: Discharge to home or other facility with appropriate resources  7/2/2022 0159 by Luis Carlos Restrepo RN  Outcome: Progressing  Flowsheets (Taken 7/1/2022 2006)  Discharge to home or other facility with appropriate resources: Identify barriers to discharge with patient and caregiver     Problem: Pain  Goal: Verbalizes/displays adequate comfort level or baseline comfort level  7/2/2022 0159 by Luis Carlos Restrepo RN  Outcome: Progressing  Flowsheets (Taken 7/2/2022 0159)  Verbalizes/displays adequate comfort level or baseline comfort level:   Encourage patient to monitor pain and request assistance   Assess pain using appropriate pain scale   Administer analgesics based on type and severity of pain and evaluate response   Implement non-pharmacological measures as appropriate and evaluate response     Problem: Safety - Adult  Goal: Free from fall injury  7/2/2022 0159 by Luis Carlos Restrepo RN  Outcome: Progressing  Note: Patient assessed for fall risk; fall precautions initiated. Patient and family instructed about safety devices. Environment kept free of clutter and adequate lighting provided. Bed locked and in lowest position. Call light within reach. Will continue to monitor. Problem: ABCDS Injury Assessment  Goal: Absence of physical injury  7/2/2022 0159 by Luis Carlos Restrepo RN  Outcome: Progressing  Flowsheets (Taken 7/2/2022 0158)  Absence of Physical Injury: Implement safety measures based on patient assessment     Problem: Skin/Tissue Integrity  Goal: Absence of new skin breakdown  Description: 1. Monitor for areas of redness and/or skin breakdown  2. Assess vascular access sites hourly  3. Every 4-6 hours minimum:  Change oxygen saturation probe site  4. Every 4-6 hours:  If on nasal continuous positive airway pressure, respiratory therapy assess nares and determine need for appliance change or resting period.   7/2/2022 0159 by Luis Carlos Restrepo RN  Outcome: Progressing  Note: Skin assessment completed every shift. Pt assessed for incontinence, appropriate barrier cream applied prn. Pt encouraged to turn/rotate every 2 hours. Assistance provided if pt unable to do so themselves. Problem: Nutrition Deficit:  Goal: Optimize nutritional status  7/2/2022 0159 by Melita Pak RN  Outcome: Progressing  Flowsheets (Taken 7/2/2022 0159)  Nutrient intake appropriate for improving, restoring, or maintaining nutritional needs: Monitor oral intake, labs, and treatment plans     Problem: Neurosensory - Adult  Goal: Achieves stable or improved neurological status  7/2/2022 0159 by Melita Pak RN  Outcome: Progressing  Flowsheets (Taken 7/1/2022 2006)  Achieves stable or improved neurological status:   Assess for and report changes in neurological status   Maintain blood pressure and fluid volume within ordered parameters to optimize cerebral perfusion and minimize risk of hemorrhage   Monitor temperature, glucose, and sodium.  Initiate appropriate interventions as ordered     Problem: Neurosensory - Adult  Goal: Achieves maximal functionality and self care  7/2/2022 0159 by Melita Pak RN  Outcome: Progressing  Flowsheets (Taken 7/1/2022 2006)  Achieves maximal functionality and self care: Monitor swallowing and airway patency with patient fatigue and changes in neurological status     Problem: Respiratory - Adult  Goal: Achieves optimal ventilation and oxygenation  7/2/2022 0159 by Melita Pak RN  Outcome: Progressing  Flowsheets (Taken 7/2/2022 0159)  Achieves optimal ventilation and oxygenation:   Assess for changes in respiratory status   Assess for changes in mentation and behavior   Position to facilitate oxygenation and minimize respiratory effort   Oxygen supplementation based on oxygen saturation or arterial blood gases   Encourage broncho-pulmonary hygiene including cough, deep breathe, incentive spirometry   Assess and instruct to report shortness of breath or any respiratory difficulty

## 2022-07-03 LAB
GLUCOSE BLD-MCNC: 121 MG/DL (ref 70–99)
GLUCOSE BLD-MCNC: 131 MG/DL (ref 70–99)
GLUCOSE BLD-MCNC: 139 MG/DL (ref 70–99)
GLUCOSE BLD-MCNC: 216 MG/DL (ref 70–99)
PERFORMED ON: ABNORMAL

## 2022-07-03 PROCEDURE — 2580000003 HC RX 258: Performed by: INTERNAL MEDICINE

## 2022-07-03 PROCEDURE — 6360000002 HC RX W HCPCS: Performed by: NURSE PRACTITIONER

## 2022-07-03 PROCEDURE — 1200000000 HC SEMI PRIVATE

## 2022-07-03 PROCEDURE — 6360000002 HC RX W HCPCS: Performed by: INTERNAL MEDICINE

## 2022-07-03 PROCEDURE — 6370000000 HC RX 637 (ALT 250 FOR IP): Performed by: NURSE PRACTITIONER

## 2022-07-03 PROCEDURE — 2700000000 HC OXYGEN THERAPY PER DAY

## 2022-07-03 PROCEDURE — 2580000003 HC RX 258: Performed by: NURSE PRACTITIONER

## 2022-07-03 PROCEDURE — 94640 AIRWAY INHALATION TREATMENT: CPT

## 2022-07-03 PROCEDURE — 94761 N-INVAS EAR/PLS OXIMETRY MLT: CPT

## 2022-07-03 PROCEDURE — C9113 INJ PANTOPRAZOLE SODIUM, VIA: HCPCS | Performed by: NURSE PRACTITIONER

## 2022-07-03 RX ADMIN — SODIUM CHLORIDE, PRESERVATIVE FREE 10 ML: 5 INJECTION INTRAVENOUS at 22:05

## 2022-07-03 RX ADMIN — IPRATROPIUM BROMIDE AND ALBUTEROL SULFATE 1 AMPULE: .5; 3 SOLUTION RESPIRATORY (INHALATION) at 07:41

## 2022-07-03 RX ADMIN — IPRATROPIUM BROMIDE AND ALBUTEROL SULFATE 1 AMPULE: .5; 3 SOLUTION RESPIRATORY (INHALATION) at 19:09

## 2022-07-03 RX ADMIN — PIPERACILLIN AND TAZOBACTAM 3375 MG: 3; .375 INJECTION, POWDER, LYOPHILIZED, FOR SOLUTION INTRAVENOUS at 23:51

## 2022-07-03 RX ADMIN — PIPERACILLIN AND TAZOBACTAM 3375 MG: 3; .375 INJECTION, POWDER, LYOPHILIZED, FOR SOLUTION INTRAVENOUS at 09:50

## 2022-07-03 RX ADMIN — IPRATROPIUM BROMIDE AND ALBUTEROL SULFATE 1 AMPULE: .5; 3 SOLUTION RESPIRATORY (INHALATION) at 23:26

## 2022-07-03 RX ADMIN — IPRATROPIUM BROMIDE AND ALBUTEROL SULFATE 1 AMPULE: .5; 3 SOLUTION RESPIRATORY (INHALATION) at 15:39

## 2022-07-03 RX ADMIN — INSULIN GLARGINE 5 UNITS: 100 INJECTION, SOLUTION SUBCUTANEOUS at 22:04

## 2022-07-03 RX ADMIN — IPRATROPIUM BROMIDE AND ALBUTEROL SULFATE 1 AMPULE: .5; 3 SOLUTION RESPIRATORY (INHALATION) at 03:56

## 2022-07-03 RX ADMIN — ENOXAPARIN SODIUM 40 MG: 100 INJECTION SUBCUTANEOUS at 22:05

## 2022-07-03 RX ADMIN — IPRATROPIUM BROMIDE AND ALBUTEROL SULFATE 1 AMPULE: .5; 3 SOLUTION RESPIRATORY (INHALATION) at 11:24

## 2022-07-03 RX ADMIN — PIPERACILLIN AND TAZOBACTAM 3375 MG: 3; .375 INJECTION, POWDER, LYOPHILIZED, FOR SOLUTION INTRAVENOUS at 18:22

## 2022-07-03 RX ADMIN — Medication 40 MG: at 09:49

## 2022-07-03 ASSESSMENT — PAIN SCALES - GENERAL
PAINLEVEL_OUTOF10: 0

## 2022-07-03 ASSESSMENT — PAIN SCALES - WONG BAKER
WONGBAKER_NUMERICALRESPONSE: 0

## 2022-07-03 NOTE — PROGRESS NOTES
Hospitalist Progress Note      PCP: No primary care provider on file. Date of Admission: 6/22/2022    Chief complaint: Altered mental status    Hospital course   patient is 80-year-old male who presented to hospital for change in mental status. Patient was found unresponsive at home, patient is not able to provide history due to condition, most of the history is from patient's chart. Patient was recently discharged from the rehab facility after he was diagnosed with CVA. Patient was covered in vomiting on arrival of EMS    Patient has known advanced hepatocellular cancer. Treated in the ICU for aspiration pneumonia    6/27  Extubated    6/29  Patient remains lethargic, with NG tube. His prognosis appears to be grim/poor, mainly due to his advanced age, underlying diagnosis of hepatocellular carcinoma complicated with acute left ischemic CVA. Speech therapy has recommended a strict n.p.o. We will consult GI for possible PEG tube placement. Patient may benefit from hospice. Grandson at bedside was present during my evaluation. 6/30  Patient appears to be more alert, following commands. On exam he has profound right-sided hemiparesis. Granddaughter at bedside able to translate. Family, appears to be undecided after meeting with palliative care team.  Speech therapy has recommended strict n.p.o. for now, possible with a barium swallow test for tomorrow if mentation is better    7/1  Unchanged clinical status. Speech therapy could not complete MBS test. Family refusing PEG    7/2  Patient remains lethargic, opening his eyes only to sternal rub. Nursing staff, reporting patient refusing to eat or drink anything. I had a long conversation with Marisel Dietrich, patient's nephew. He appears to be the decision maker and the  for the family members. I explained his overall poor prognosis given the fact that the patient is not eating. Kehinde Jones, nurse at bedside.   She advised family member to bring food from time to incentivize patients to eat. I explained to Mr. Juanpablo Haile that if patient did not responded well over the weekend It would make sense to go home with hospice. Inpatient hospice agency will be consulted to see if patient qualifies for inpatient services. 7/3  Patient is reportedly eating without difficulty. Family member did have a meeting with hospice agency. Anticipating discharge within the next 24 to 48 hours. Subjective: as above    Medications:  Reviewed    Infusion Medications    sodium chloride      dextrose      sodium chloride Stopped (06/29/22 9031)     Scheduled Medications    insulin lispro  0-6 Units SubCUTAneous TID WC    insulin lispro  0-3 Units SubCUTAneous Nightly    atorvastatin  20 mg Oral Nightly    piperacillin-tazobactam  3,375 mg IntraVENous Q8H    insulin glargine  5 Units SubCUTAneous Nightly    sodium bicarbonate  650 mg Oral BID    ipratropium-albuterol  1 ampule Inhalation Q4H    enoxaparin  40 mg SubCUTAneous Nightly    pantoprazole (PROTONIX) 40 mg injection  40 mg IntraVENous Daily    aspirin  300 mg Rectal Daily    sodium chloride flush  10 mL IntraVENous 2 times per day     PRN Meds: sodium chloride, hydrALAZINE, glucose, dextrose bolus **OR** dextrose bolus, glucagon (rDNA), dextrose, sodium chloride flush, sodium chloride, promethazine **OR** ondansetron, polyethylene glycol, acetaminophen **OR** acetaminophen      Intake/Output Summary (Last 24 hours) at 7/3/2022 1325  Last data filed at 7/3/2022 8933  Gross per 24 hour   Intake 304.8 ml   Output 1575 ml   Net -1270.2 ml       Physical Exam Performed:    /79   Pulse 76   Temp 98.2 °F (36.8 °C)   Resp 20   Ht 4' 9\" (1.448 m)   Wt 122 lb 5.7 oz (55.5 kg)   SpO2 97%   BMI 26.48 kg/m²     General appearance: poorly interactive  HEENT:  Conjunctivae/corneas clear. Neck: Supple, with full range of motion. Respiratory:  Normal respiratory effort.  Clear to auscultation, Mild chronic microvascular disease. 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 Right dominance of the vertebral arteries with hypoplastic intracranial left   vertebral artery, and superimposed occlusion in the mid to distal   intracranial left vertebral artery. Moderate stenosis in the intracranial right vertebral artery. Moderate stenosis in the proximal P2 segment of the left PCA. Known 95% critical stenosis in the proximal right internal carotid artery. 80% stenosis in the proximal left internal carotid artery. MRI BRAIN WO CONTRAST   Final Result   Acute to subacute infarctions in the left frontal lobe, increased in size   since MRI brain June 18, 2022. Small acute to subacute infarction along the left external capsule, stable   since June 18, 2022. Old lacunar infarcts in the left central franki. Mild to moderate parenchymal volume loss. Mild chronic microvascular disease. 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 Right dominance of the vertebral arteries with hypoplastic intracranial left   vertebral artery, and superimposed occlusion in the mid to distal   intracranial left vertebral artery. Moderate stenosis in the intracranial right vertebral artery. Moderate stenosis in the proximal P2 segment of the left PCA. Known 95% critical stenosis in the proximal right internal carotid artery. 80% stenosis in the proximal left internal carotid artery. CT CHEST PULMONARY EMBOLISM W CONTRAST   Final Result   No evidence of a pulmonary embolus. ET tube and gastric tube in good position.       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 Questionable mucous plugging scattered in the bronchi along the right lung   base which may be due to recent aspiration. Recommend pulmonology follow-up. 7 cm exophytic right hepatic mass which has increased in size. Suggest   PET-CT correlation. Postop changes right apex and upper lobe with fibrosis and scarring   throughout the area which is unchanged.          XR CHEST PORTABLE   Final Result   Unchanged right upper lobe scarring      No acute cardiopulmonary process         CT Head WO Contrast   Final Result   New low-attenuation in the left corona radiata consistent with an evolving   infarct as described on the recent MRI      No acute hemorrhage               Assessment/Plan:    Active Hospital Problems    Diagnosis     Syncope and collapse [R55]      Priority: Medium    Acute respiratory failure (HCC) [J96.00]      Priority: Medium    Aspiration pneumonia (HCC) [J69.0]      Priority: Medium    History of CVA (cerebrovascular accident) [Z86.73]      Priority: Medium    AMS (altered mental status) [R41.82]      Priority: Medium    Acute cerebrovascular accident (CVA) ([address] [I63.9]      Priority: Medium       Oropharyngeal Dysphagea  Speech therapy could not complete MBS test. Family refusing PEG  Will take NG tube out  Resume diet recommended by ST vs pleasure feedings    Acute hypoxic respiratory failure s/p intubation  Extubated yesterday 6/27/2022  Has remained stable  Continue scheduled bronchodilator  Currently treating for aspiration pneumonia    Sepsis present on admission  Aspiration pneumonia  Klebsiella and Enterobacter in cultures  Switch to Zosyn to cover for anaerobes for additional 5 days; stop 7/4    Acute left ischemic hemispheric stroke-patient had extensive stroke has very extensive deficits right now with right hemiparesis and expressive receptive aphasia  Poor neurological report recovery predicted  He has extensive vascular compromise based on CTA imaging    Transaminitis  Hepatocellular cancer stage IV  Prognosis poor  Heme-onc recommendations noted, patient and family members requesting aggressive treatment    Acute metabolic encephalopathy  CT of the head on 625 with no acute finding  Check ammonia level    Poor prognosis  Patient with  new diagnosis of acute ischemic left-sided CVA, is  without a doubt, complicating his overall prognosis. Given his underlying malignancy, his life expectancy is less than 6 months. I highly suggest returning home with hospice, grandson was the only family member at bedside  Patient does not speak English  Ongoing conversations with family and palliative care to discuss goals of care       DVT prophylaxis-Lovenox  Diet: ADULT DIET;  Dysphagia - Pureed; Mildly Thick (Nectar)  Code Status: Limited    PT/OT Eval Status: ordered    Dispo: Hospice highly recommended    Florence Pickard MD

## 2022-07-03 NOTE — PROGRESS NOTES
Feed per staff , Ate a whole bowl full to the top of cream of wheat with some brown sugar  And a few spoon fulls of apple juice  Tolerated well

## 2022-07-03 NOTE — PLAN OF CARE
Problem: Discharge Planning  Goal: Discharge to home or other facility with appropriate resources  Outcome: Progressing  Flowsheets (Taken 7/2/2022 1934)  Discharge to home or other facility with appropriate resources: Identify barriers to discharge with patient and caregiver     Problem: Pain  Goal: Verbalizes/displays adequate comfort level or baseline comfort level  Outcome: Progressing  Flowsheets (Taken 7/2/2022 0159)  Verbalizes/displays adequate comfort level or baseline comfort level:   Encourage patient to monitor pain and request assistance   Assess pain using appropriate pain scale   Administer analgesics based on type and severity of pain and evaluate response   Implement non-pharmacological measures as appropriate and evaluate response     Problem: Safety - Adult  Goal: Free from fall injury  Outcome: Progressing  Note: Patient assessed for fall risk; fall precautions initiated. Patient and family instructed about safety devices. Environment kept free of clutter and adequate lighting provided. Bed locked and in lowest position. Call light within reach. Will continue to monitor. Problem: ABCDS Injury Assessment  Goal: Absence of physical injury  Outcome: Progressing  Flowsheets (Taken 7/3/2022 0005)  Absence of Physical Injury: Implement safety measures based on patient assessment     Problem: Skin/Tissue Integrity  Goal: Absence of new skin breakdown  Description: 1. Monitor for areas of redness and/or skin breakdown  2. Assess vascular access sites hourly  3. Every 4-6 hours minimum:  Change oxygen saturation probe site  4. Every 4-6 hours:  If on nasal continuous positive airway pressure, respiratory therapy assess nares and determine need for appliance change or resting period. Outcome: Progressing  Note: Skin assessment completed every shift. Pt assessed for incontinence, appropriate barrier cream applied prn. Pt encouraged to turn/rotate every 2 hours.  Assistance provided if pt unable to do so themselves.          Problem: Nutrition Deficit:  Goal: Optimize nutritional status  Outcome: Progressing  Flowsheets (Taken 7/2/2022 0159)  Nutrient intake appropriate for improving, restoring, or maintaining nutritional needs: Monitor oral intake, labs, and treatment plans     Problem: Neurosensory - Adult  Goal: Achieves stable or improved neurological status  Outcome: Progressing  Flowsheets (Taken 7/2/2022 1934)  Achieves stable or improved neurological status: Assess for and report changes in neurological status  Goal: Achieves maximal functionality and self care  Outcome: Progressing  Flowsheets (Taken 7/2/2022 1934)  Achieves maximal functionality and self care: Monitor swallowing and airway patency with patient fatigue and changes in neurological status     Problem: Respiratory - Adult  Goal: Achieves optimal ventilation and oxygenation  Outcome: Progressing  Flowsheets (Taken 7/2/2022 1934)  Achieves optimal ventilation and oxygenation:   Assess for changes in respiratory status   Assess for changes in mentation and behavior   Position to facilitate oxygenation and minimize respiratory effort   Oxygen supplementation based on oxygen saturation or arterial blood gases   Encourage broncho-pulmonary hygiene including cough, deep breathe, incentive spirometry

## 2022-07-04 LAB
ANION GAP SERPL CALCULATED.3IONS-SCNC: 15 MMOL/L (ref 3–16)
BASOPHILS ABSOLUTE: 0.1 K/UL (ref 0–0.2)
BASOPHILS RELATIVE PERCENT: 0.7 %
BUN BLDV-MCNC: 31 MG/DL (ref 7–20)
CALCIUM SERPL-MCNC: 8.7 MG/DL (ref 8.3–10.6)
CHLORIDE BLD-SCNC: 110 MMOL/L (ref 99–110)
CO2: 21 MMOL/L (ref 21–32)
CREAT SERPL-MCNC: 0.9 MG/DL (ref 0.8–1.3)
EOSINOPHILS ABSOLUTE: 0.1 K/UL (ref 0–0.6)
EOSINOPHILS RELATIVE PERCENT: 0.9 %
GFR AFRICAN AMERICAN: >60
GFR NON-AFRICAN AMERICAN: >60
GLUCOSE BLD-MCNC: 121 MG/DL (ref 70–99)
GLUCOSE BLD-MCNC: 125 MG/DL (ref 70–99)
GLUCOSE BLD-MCNC: 205 MG/DL (ref 70–99)
GLUCOSE BLD-MCNC: 208 MG/DL (ref 70–99)
GLUCOSE BLD-MCNC: 212 MG/DL (ref 70–99)
HCT VFR BLD CALC: 30.6 % (ref 40.5–52.5)
HEMOGLOBIN: 10.1 G/DL (ref 13.5–17.5)
LYMPHOCYTES ABSOLUTE: 1.6 K/UL (ref 1–5.1)
LYMPHOCYTES RELATIVE PERCENT: 11.5 %
MCH RBC QN AUTO: 28.6 PG (ref 26–34)
MCHC RBC AUTO-ENTMCNC: 32.8 G/DL (ref 31–36)
MCV RBC AUTO: 87.2 FL (ref 80–100)
MONOCYTES ABSOLUTE: 0.6 K/UL (ref 0–1.3)
MONOCYTES RELATIVE PERCENT: 4.5 %
NEUTROPHILS ABSOLUTE: 11.1 K/UL (ref 1.7–7.7)
NEUTROPHILS RELATIVE PERCENT: 82.4 %
PDW BLD-RTO: 13.2 % (ref 12.4–15.4)
PERFORMED ON: ABNORMAL
PLATELET # BLD: 397 K/UL (ref 135–450)
PMV BLD AUTO: 7.7 FL (ref 5–10.5)
POTASSIUM SERPL-SCNC: 4.5 MMOL/L (ref 3.5–5.1)
RBC # BLD: 3.51 M/UL (ref 4.2–5.9)
SODIUM BLD-SCNC: 146 MMOL/L (ref 136–145)
WBC # BLD: 13.5 K/UL (ref 4–11)

## 2022-07-04 PROCEDURE — 6360000002 HC RX W HCPCS: Performed by: NURSE PRACTITIONER

## 2022-07-04 PROCEDURE — 2580000003 HC RX 258: Performed by: INTERNAL MEDICINE

## 2022-07-04 PROCEDURE — 6370000000 HC RX 637 (ALT 250 FOR IP): Performed by: NURSE PRACTITIONER

## 2022-07-04 PROCEDURE — 94640 AIRWAY INHALATION TREATMENT: CPT

## 2022-07-04 PROCEDURE — 6360000002 HC RX W HCPCS: Performed by: INTERNAL MEDICINE

## 2022-07-04 PROCEDURE — 6370000000 HC RX 637 (ALT 250 FOR IP): Performed by: INTERNAL MEDICINE

## 2022-07-04 PROCEDURE — 94761 N-INVAS EAR/PLS OXIMETRY MLT: CPT

## 2022-07-04 PROCEDURE — 85025 COMPLETE CBC W/AUTO DIFF WBC: CPT

## 2022-07-04 PROCEDURE — 2580000003 HC RX 258: Performed by: NURSE PRACTITIONER

## 2022-07-04 PROCEDURE — 36415 COLL VENOUS BLD VENIPUNCTURE: CPT

## 2022-07-04 PROCEDURE — 1200000000 HC SEMI PRIVATE

## 2022-07-04 PROCEDURE — 80048 BASIC METABOLIC PNL TOTAL CA: CPT

## 2022-07-04 PROCEDURE — 92526 ORAL FUNCTION THERAPY: CPT

## 2022-07-04 PROCEDURE — 2700000000 HC OXYGEN THERAPY PER DAY

## 2022-07-04 PROCEDURE — C9113 INJ PANTOPRAZOLE SODIUM, VIA: HCPCS | Performed by: NURSE PRACTITIONER

## 2022-07-04 RX ORDER — DEXTROSE MONOHYDRATE 50 MG/ML
INJECTION, SOLUTION INTRAVENOUS CONTINUOUS
Status: DISCONTINUED | OUTPATIENT
Start: 2022-07-04 | End: 2022-07-05

## 2022-07-04 RX ADMIN — DEXTROSE MONOHYDRATE: 50 INJECTION, SOLUTION INTRAVENOUS at 13:16

## 2022-07-04 RX ADMIN — IPRATROPIUM BROMIDE AND ALBUTEROL SULFATE 1 AMPULE: .5; 3 SOLUTION RESPIRATORY (INHALATION) at 23:38

## 2022-07-04 RX ADMIN — SODIUM BICARBONATE 650 MG: 650 TABLET ORAL at 09:59

## 2022-07-04 RX ADMIN — PIPERACILLIN AND TAZOBACTAM 3375 MG: 3; .375 INJECTION, POWDER, LYOPHILIZED, FOR SOLUTION INTRAVENOUS at 10:15

## 2022-07-04 RX ADMIN — ASPIRIN 300 MG: 300 SUPPOSITORY RECTAL at 10:01

## 2022-07-04 RX ADMIN — SODIUM CHLORIDE, PRESERVATIVE FREE 10 ML: 5 INJECTION INTRAVENOUS at 10:20

## 2022-07-04 RX ADMIN — IPRATROPIUM BROMIDE AND ALBUTEROL SULFATE 1 AMPULE: .5; 3 SOLUTION RESPIRATORY (INHALATION) at 03:56

## 2022-07-04 RX ADMIN — SODIUM CHLORIDE, PRESERVATIVE FREE 10 ML: 5 INJECTION INTRAVENOUS at 22:39

## 2022-07-04 RX ADMIN — IPRATROPIUM BROMIDE AND ALBUTEROL SULFATE 1 AMPULE: .5; 3 SOLUTION RESPIRATORY (INHALATION) at 19:38

## 2022-07-04 RX ADMIN — INSULIN LISPRO 1 UNITS: 100 INJECTION, SOLUTION INTRAVENOUS; SUBCUTANEOUS at 21:37

## 2022-07-04 RX ADMIN — IPRATROPIUM BROMIDE AND ALBUTEROL SULFATE 1 AMPULE: .5; 3 SOLUTION RESPIRATORY (INHALATION) at 08:55

## 2022-07-04 RX ADMIN — INSULIN LISPRO 2 UNITS: 100 INJECTION, SOLUTION INTRAVENOUS; SUBCUTANEOUS at 17:07

## 2022-07-04 RX ADMIN — IPRATROPIUM BROMIDE AND ALBUTEROL SULFATE 1 AMPULE: .5; 3 SOLUTION RESPIRATORY (INHALATION) at 16:40

## 2022-07-04 RX ADMIN — Medication 40 MG: at 09:59

## 2022-07-04 RX ADMIN — IPRATROPIUM BROMIDE AND ALBUTEROL SULFATE 1 AMPULE: .5; 3 SOLUTION RESPIRATORY (INHALATION) at 12:20

## 2022-07-04 RX ADMIN — INSULIN GLARGINE 5 UNITS: 100 INJECTION, SOLUTION SUBCUTANEOUS at 21:37

## 2022-07-04 RX ADMIN — ENOXAPARIN SODIUM 40 MG: 100 INJECTION SUBCUTANEOUS at 21:38

## 2022-07-04 RX ADMIN — INSULIN LISPRO 2 UNITS: 100 INJECTION, SOLUTION INTRAVENOUS; SUBCUTANEOUS at 13:17

## 2022-07-04 ASSESSMENT — PAIN SCALES - GENERAL
PAINLEVEL_OUTOF10: 0

## 2022-07-04 NOTE — PROGRESS NOTES
[facility]   Speech Therapy  Daily Dysphagia Treatment Note    Amparo Tsai  AGE: 80 y.o. GENDER: male  : 1940  [id number]  EPISODE DATE:  2022     Patient Active Problem List   Diagnosis    Intractable vomiting with nausea    Intractable vomiting    Nausea vomiting and diarrhea    Aortic arch aneurysm (HCC)    HTN (hypertension), benign    RUQ pain    Cholelithiasis    DM2 (diabetes mellitus, type 2) ([address]    Hypothyroid    Diarrhea    Hepatocellular carcinoma (HCC)    Paresthesia    Facial droop    Arterial ischemic stroke, ICA, left, acute ([address]    Carotid artery stenosis with cerebral infarction ([address]    DM type 2, controlled, with complication ([address]    AMS (altered mental status)    Acute cerebrovascular accident (CVA) ([address]    Syncope and collapse    Acute respiratory failure ([address]    Aspiration pneumonia ([address]    History of CVA (cerebrovascular accident)     No Known Allergies    Treatment Diagnosis: Dysphagia     CHART REVIEW:  2022 admitted with c/o unresponsiveness  MD ADMISSION H&P HPI: Patient is 63-year-old male who presented to hospital for change in mental status. Patient was found unresponsive at home, patient is not able to provide history due to condition, most of the history is from patient's chart. Patient was recently discharged from the rehab facility after he was diagnosed with CVA. Patient was covered in vomiting on arrival of EMS    Active consults: Neuro, Oncology, Pulmonology, Nephrology, Palliative Care    IMAGING:  CT Chest: 2022  Impression   No evidence of a pulmonary embolus. ET tube and gastric tube in good position.    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 Questionable mucous plugging scattered in the bronchi along the right lung   base which may be due to recent aspiration.  Recommend pulmonology follow-up. 7 cm exophytic right hepatic mass which has increased in size.  Suggest   PET-CT correlation. Postop changes right apex and upper lobe with fibrosis and scarring   throughout the area which is unchanged.          MRI:/MRA Head/Neck 6/23/2022  Impression   Acute to subacute infarctions in the left frontal lobe, increased in size   since MRI brain June 18, 2022. Small acute to subacute infarction along the left external capsule, stable   since June 18, 2022. Old lacunar infarcts in the left central franki. Mild to moderate parenchymal volume loss. Mild chronic microvascular disease. 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 Right dominance of the vertebral arteries with hypoplastic intracranial left   vertebral artery, and superimposed occlusion in the mid to distal   intracranial left vertebral artery. Moderate stenosis in the intracranial right vertebral artery. Moderate stenosis in the proximal P2 segment of the left PCA. Known 95% critical stenosis in the proximal right internal carotid artery. 80% stenosis in the proximal left internal carotid artery.      DYSPHAGIA HISTORY:  Recent hospital admit [facility] 6/17/2022 to 6/21/2022 at which time pt was active with SLP. Pt did have a MBSS 6/20/2022 with recommendation for dysphagia puree with thin liquids; continued therapy at d/c    MBS: 7/1/2022  Accepted and tolerated limited MBS in fluoroscopy suite.  Patient with distaste for barium which limited acceptance for PO trials.     Patient alert but with apparent generalized weakness; awake upon arrival to fluoroscopy and maintained wakeful state throughout study.      iPad interpretation service utilized - no verbal responses to ; patient does not follow dx; unreliable yes/no via head shake/nod; waves to staff.     Patient with distaste for barium: puree trial initially presented but held orally until removed via toothette; honey via teaspoon then presented with comparable tolerance; accepted thin via tsp but unable to orally control; then accepted nectar thick via teaspoon followed by nectar via cup with tolerance of puree when re-administered.      Severe oral stage dysphagia characterized by poor lingual manipulation for bolus formation, movement, and control. · Oral holding with all trials is most apparent with denser presentations. Patient does exhibit facial grimace with oral holding with trials indicating distaste. · Poor oral control for thin via tsp with repeated 100% anterior loss of trial when presented. · Prolonged, disorganized bolus formation and movement with nectar and puree with variable oral holding. · Premature bolus loss to the pharynx with all. · Significant concern for potential for poor ability to maintain adequate level of PO nutrition d/t severity of weakness/impairments.     Moderate pharyngeal stage dysphagia characterized by delayed swallow ,decreased laryngeal elevation, and decreased pharyngeal peristalsis. · Premature spillage to the valleculae with accepted PO trials (puree, nectar teaspoon, and nectar cup). · No significant residue. No penetration/aspiration with puree or nectar trials. Thin not visualized d/t severity of oral phase impairments.      If PO diet is pursued, it appears a puree diet texture with nectar thick liquids could be considered.  Recommend CLOSE monitor for tolerance with discontinuation of PO diet should concern for reduced tolerance arises.     Due to severity of impairments, patient with high risk for inability to maintain adequate level of nutrition via PO with need for consideration for long-term alternate nutrition vs review of goal of care pattern continue  Pt will demonstrate swallow response to tactile/sensory stimulation >25% continue  Pt will follow one step commands >25% not met  Pt will tolerate recommended diet without overt signs/symptoms of penetration/aspiration using recommended comp strats continue    Assessment:   Impressions:   Suspect swallow function comparable to above noted MBS. · Patient alert but with apparent generalized weakness; awake upon SLP entry to room; maintained wakefulness throughout session. · Tolerated oral stim with weak, disorganized lingual response; weak gag; delayed, weak swallow response  · tolerating puree/nectar without overt signs/symptoms;   · PERSISTENT CONCERN FOR LIMITED ABILITY TO MAINTAIN ADEQUATE NUTRITION D/T SEVERITY OF WEAKNESS/IMPAIRMENTS RESULTING IN POOR INTAKE  · son present - ed completed on current diet recs as well as methods for preparing/providing modified diet/liquids at home; suspect continued reinforcement needed    Recommend continue puree/nectar as tolerated. Due to severity of impairments, patient with high risk for inability to maintain adequate level of nutrition via PO with need for consideration for long-term alternate nutrition vs review of goal of care with pleasure feeds. ST to continue as tolerated. Diet Recommendations:  Solids: IDDSI 4 Puree Solids; Liquids: IDDSI 2 Mildly Thick (nectar) Liquids;  Meds: Meds crushed in puree as able  Strategies: Upright as possible with all PO intake , No straws , Small bites/sips , Eat/feed slowly, Remain upright 30-45 min , Total Feed     Education:  Provided education regarding role of SLP, results of assessment, recommendations and general speech pathology plan of care. []? Pt verbalized understanding and agreement   [x]? Family requires ongoing learning   [x]?  No evidence of patient comprehension     Dysphagia Prognosis:   Poor d/t severity of impairments and medical comorbidities    Plan:     Continue Dysphagia Therapy: YES    Interventions: Oral Care, Oral Motor Exercises , Therapeutic Trials with SLP , Instrumental assessment of swallow function (Modified Barium Swallow Study) sensory stimulation  Duration/Frequency of therapy while on unit: Speech therapy for dysphagia tx 3-5 times per week during acute care stay. Discharge Instructions:   Anticipate NEED for further skilled Speech Therapy for Dysphagia at discharge    This note serves as a D/C Summary in the event that this patient is discharged prior to the next therapy session.     Coded treatment time: 0  Total treatment time: 25    Electronically signed by QASIM Arteaga on 7/4/2022 at 9:26 AM

## 2022-07-04 NOTE — PLAN OF CARE
Problem: Discharge Planning  Goal: Discharge to home or other facility with appropriate resources  Outcome: Progressing  Flowsheets (Taken 7/3/2022 2003)  Discharge to home or other facility with appropriate resources: Identify barriers to discharge with patient and caregiver     Problem: Pain  Goal: Verbalizes/displays adequate comfort level or baseline comfort level  Outcome: Progressing  Flowsheets (Taken 7/3/2022 2003)  Verbalizes/displays adequate comfort level or baseline comfort level:   Encourage patient to monitor pain and request assistance   Assess pain using appropriate pain scale   Administer analgesics based on type and severity of pain and evaluate response   Implement non-pharmacological measures as appropriate and evaluate response     Problem: Safety - Adult  Goal: Free from fall injury  Outcome: Progressing  Flowsheets (Taken 7/4/2022 0003)  Free From Fall Injury: Instruct family/caregiver on patient safety  Note: Patient assessed for fall risk; fall precautions initiated. Patient and family instructed about safety devices. Environment kept free of clutter and adequate lighting provided. Bed locked and in lowest position. Call light within reach. Will continue to monitor. Problem: ABCDS Injury Assessment  Goal: Absence of physical injury  Outcome: Progressing  Flowsheets (Taken 7/4/2022 0003)  Absence of Physical Injury: Implement safety measures based on patient assessment     Problem: Skin/Tissue Integrity  Goal: Absence of new skin breakdown  Description: 1. Monitor for areas of redness and/or skin breakdown  2. Assess vascular access sites hourly  3. Every 4-6 hours minimum:  Change oxygen saturation probe site  4. Every 4-6 hours:  If on nasal continuous positive airway pressure, respiratory therapy assess nares and determine need for appliance change or resting period. Outcome: Progressing  Note: Skin assessment completed every shift.  Pt assessed for incontinence, appropriate barrier cream applied prn. Pt encouraged to turn/rotate every 2 hours. Assistance provided if pt unable to do so themselves.          Problem: Nutrition Deficit:  Goal: Optimize nutritional status  Outcome: Progressing  Flowsheets (Taken 7/4/2022 0003)  Nutrient intake appropriate for improving, restoring, or maintaining nutritional needs: Monitor oral intake, labs, and treatment plans     Problem: Neurosensory - Adult  Goal: Achieves stable or improved neurological status  Outcome: Progressing  Flowsheets (Taken 7/4/2022 0003)  Achieves stable or improved neurological status: Assess for and report changes in neurological status  Goal: Achieves maximal functionality and self care  Outcome: Progressing  Flowsheets (Taken 7/4/2022 0003)  Achieves maximal functionality and self care: Monitor swallowing and airway patency with patient fatigue and changes in neurological status     Problem: Respiratory - Adult  Goal: Achieves optimal ventilation and oxygenation  Outcome: Progressing  Flowsheets (Taken 7/3/2022 2003)  Achieves optimal ventilation and oxygenation:   Assess for changes in respiratory status   Assess for changes in mentation and behavior   Position to facilitate oxygenation and minimize respiratory effort   Oxygen supplementation based on oxygen saturation or arterial blood gases   Encourage broncho-pulmonary hygiene including cough, deep breathe, incentive spirometry   Assess and instruct to report shortness of breath or any respiratory difficulty

## 2022-07-04 NOTE — PROGRESS NOTES
Hospitalist Progress Note      PCP: No primary care provider on file. Date of Admission: 6/22/2022    Chief complaint: Altered mental status    Hospital course   patient is 80-year-old male who presented to hospital for change in mental status. Patient was found unresponsive at home, patient is not able to provide history due to condition, most of the history is from patient's chart. Patient was recently discharged from the rehab facility after he was diagnosed with CVA. Patient was covered in vomiting on arrival of EMS    Patient has known advanced hepatocellular cancer. Treated in the ICU for aspiration pneumonia    6/27  Extubated    6/29  Patient remains lethargic, with NG tube. His prognosis appears to be grim/poor, mainly due to his advanced age, underlying diagnosis of hepatocellular carcinoma complicated with acute left ischemic CVA. Speech therapy has recommended a strict n.p.o. We will consult GI for possible PEG tube placement. Patient may benefit from hospice. Grandson at bedside was present during my evaluation. 6/30  Patient appears to be more alert, following commands. On exam he has profound right-sided hemiparesis. Granddaughter at bedside able to translate. Family, appears to be undecided after meeting with palliative care team.  Speech therapy has recommended strict n.p.o. for now, possible with a barium swallow test for tomorrow if mentation is better    7/1  Unchanged clinical status. Speech therapy could not complete MBS test. Family refusing PEG    7/2  Patient remains lethargic, opening his eyes only to sternal rub. Nursing staff, reporting patient refusing to eat or drink anything. I had a long conversation with Segun Knutson, patient's nephew. He appears to be the decision maker and the  for the family members. I explained his overall poor prognosis given the fact that the patient is not eating. William Bae, nurse at bedside.   She advised family member to bring food from time to incentivize patients to eat. I explained to Mr. Humphrey Bautista that if patient did not responded well over the weekend It would make sense to go home with hospice. Inpatient hospice agency will be consulted to see if patient qualifies for inpatient services. 7/3  Patient is reportedly eating without difficulty. Family member did have a meeting with hospice agency. Anticipating discharge within the next 24 to 48 hours. 7/4  Uneventful night. Laboratory studies with mild hypernatremia mostly related  to lack of adequate oral intake. I had a long conversation with son at bedside. I told him that medically he was stable to be discharged despite his severe disability after recent CVA. No acute medical care was needed anymore. Reminding him that his overall prognosis is poor due to his underlying liver malignancy,  his life expectancy is short, less than 6 months. Patient's son reported that they are getting ready to take him home tomorrow. I told him that going home with hospice was going to be really helpful. After meeting, I immediately contacted  on call today, Mrs. Kyung Kim; Indicating patient's needs and plans for discharge tomorrow with home with hospice.     Subjective: as above    Medications:  Reviewed    Infusion Medications    dextrose      sodium chloride      dextrose      sodium chloride Stopped (06/29/22 9144)     Scheduled Medications    insulin lispro  0-6 Units SubCUTAneous TID WC    insulin lispro  0-3 Units SubCUTAneous Nightly    atorvastatin  20 mg Oral Nightly    piperacillin-tazobactam  3,375 mg IntraVENous Q8H    insulin glargine  5 Units SubCUTAneous Nightly    sodium bicarbonate  650 mg Oral BID    ipratropium-albuterol  1 ampule Inhalation Q4H    enoxaparin  40 mg SubCUTAneous Nightly    pantoprazole (PROTONIX) 40 mg injection  40 mg IntraVENous Daily    aspirin  300 mg Rectal Daily    sodium chloride flush  10 mL IntraVENous 2 times per day     PRN Meds: sodium chloride, hydrALAZINE, glucose, dextrose bolus **OR** dextrose bolus, glucagon (rDNA), dextrose, sodium chloride flush, sodium chloride, promethazine **OR** ondansetron, polyethylene glycol, acetaminophen **OR** acetaminophen      Intake/Output Summary (Last 24 hours) at 7/4/2022 1206  Last data filed at 7/4/2022 6339  Gross per 24 hour   Intake 332.13 ml   Output 1150 ml   Net -817.87 ml       Physical Exam Performed:    /79   Pulse 84   Temp 97.5 °F (36.4 °C) (Axillary)   Resp 20   Ht 4' 9\" (1.448 m)   Wt 121 lb 14.6 oz (55.3 kg)   SpO2 93%   BMI 26.38 kg/m²     General appearance: poorly interactive  HEENT:  Conjunctivae/corneas clear. Neck: Supple, with full range of motion. Respiratory:  Normal respiratory effort. Clear to auscultation, bilaterally without Rales/Wheezes/Rhonchi. Cardiovascular: Regular rate and rhythm with normal S1/S2 without murmurs or rubs  Abdomen: Soft, non-tender, non-distended, normal bowel sounds. Musculoskeletal: No cyanosis or edema bilaterally  Neurologic: Moving arms. Expressive and receptive aphasia  Peripheral Pulses: +2 palpable, equal bilaterally, no change today 6/24      Labs:   Recent Labs     07/02/22 0422 07/04/22 0446   WBC 11.6* 13.5*   HGB 9.5* 10.1*   HCT 29.3* 30.6*    397     Recent Labs     07/02/22 0422 07/04/22 0446    146*   K 5.1 4.5    110   CO2 23 21   BUN 38* 31*   CREATININE 0.9 0.9   CALCIUM 8.4 8.7     Recent Labs     07/02/22 0422   AST 58*   ALT 52*   BILIDIR <0.2   BILITOT 0.6   ALKPHOS 234*     No results for input(s): INR in the last 72 hours. No results for input(s): Agata Horsfall in the last 72 hours.     Urinalysis:      Lab Results   Component Value Date/Time    NITRU Negative 06/22/2022 02:22 PM    [address] 06/22/2022 02:22 PM    BACTERIA None Seen 06/22/2022 02:22 PM    RBCUA 2 06/22/2022 02:22 PM    BLOODU TRACE 06/22/2022 02:22 PM    SPECGRAV 1.016 06/22/2022 02:22 PM    GLUCOSEU Negative 06/22/2022 02:22 PM       Radiology:  Fluoroscopy modified barium swallow with video   Final Result   Limited exam as above without kala aspiration with limited trials of nectar   consistency. Please see separate speech pathology report for full discussion of findings   and recommendations. XR CHEST PORTABLE   Final Result   Feeding tube tip projects at the gastric body. XR CHEST PORTABLE   Final Result   Feeding tube identified tip in the stomach. Continued effacement is   recommended for post pyloric placement. XR CHEST PORTABLE   Final Result   Enteric tube tip projects in the expected location of the stomach. Similar opacification of the right upper lung, which could represent right   upper lobe atelectasis. XR CHEST PORTABLE   Final Result   1. Enteric tube with tip and side-port in the stomach. 2.  Endotracheal tube tip is 2 cm above the mar. Recommend retraction by   1.5 cm for optimal positioning. 3.  Stable right lung apex consolidation. CT HEAD WO CONTRAST   Final Result   1. Evolution of the known infarcts involving is the left frontal lobe as well   as the left subinsular region. 2. Otherwise, no acute intracranial abnormality. 3. No significant mass effect, midline shift or acute intracranial hemorrhage. 4. Mild global parenchymal volume loss with chronic microvascular ischemic   changes. MRA NECK WO CONTRAST   Final Result   Acute to subacute infarctions in the left frontal lobe, increased in size   since MRI brain June 18, 2022. Small acute to subacute infarction along the left external capsule, stable   since June 18, 2022. Old lacunar infarcts in the left central franki. Mild to moderate parenchymal volume loss. Mild chronic microvascular disease. 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       Right dominance of the vertebral arteries with hypoplastic intracranial left   vertebral artery, and superimposed occlusion in the mid to distal   intracranial left vertebral artery. Moderate stenosis in the intracranial right vertebral artery. Moderate stenosis in the proximal P2 segment of the left PCA. Known 95% critical stenosis in the proximal right internal carotid artery. 80% stenosis in the proximal left internal carotid artery. MRA HEAD WO CONTRAST   Final Result   Acute to subacute infarctions in the left frontal lobe, increased in size   since MRI brain June 18, 2022. Small acute to subacute infarction along the left external capsule, stable   since June 18, 2022. Old lacunar infarcts in the left central frakni. Mild to moderate parenchymal volume loss. Mild chronic microvascular disease. 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 Right dominance of the vertebral arteries with hypoplastic intracranial left   vertebral artery, and superimposed occlusion in the mid to distal   intracranial left vertebral artery. Moderate stenosis in the intracranial right vertebral artery. Moderate stenosis in the proximal P2 segment of the left PCA. Known 95% critical stenosis in the proximal right internal carotid artery. 80% stenosis in the proximal left internal carotid artery. MRI BRAIN WO CONTRAST   Final Result   Acute to subacute infarctions in the left frontal lobe, increased in size   since MRI brain June 18, 2022. Small acute to subacute infarction along the left external capsule, stable   since June 18, 2022. Old lacunar infarcts in the left central franki. Mild to moderate parenchymal volume loss. Mild chronic microvascular disease. 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       Right dominance of the vertebral arteries with hypoplastic intracranial left   vertebral artery, and superimposed occlusion in the mid to distal   intracranial left vertebral artery. Moderate stenosis in the intracranial right vertebral artery. Moderate stenosis in the proximal P2 segment of the left PCA. Known 95% critical stenosis in the proximal right internal carotid artery. 80% stenosis in the proximal left internal carotid artery. CT CHEST PULMONARY EMBOLISM W CONTRAST   Final Result   No evidence of a pulmonary embolus. ET tube and gastric tube in good position. 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 Questionable mucous plugging scattered in the bronchi along the right lung   base which may be due to recent aspiration. Recommend pulmonology follow-up. 7 cm exophytic right hepatic mass which has increased in size. Suggest   PET-CT correlation. Postop changes right apex and upper lobe with fibrosis and scarring   throughout the area which is unchanged.          XR CHEST PORTABLE   Final Result   Unchanged right upper lobe scarring      No acute cardiopulmonary process         CT Head WO Contrast   Final Result   New low-attenuation in the left corona radiata consistent with an evolving   infarct as described on the recent MRI      No acute hemorrhage               Assessment/Plan:    Active Hospital Problems    Diagnosis     Syncope and collapse [R55]      Priority: Medium    Acute respiratory failure (HCC) [J96.00]      Priority: Medium    Aspiration pneumonia ([address] [J69.0]      Priority: Medium    History of CVA (cerebrovascular accident) [Z86.73]      Priority: Medium    AMS (altered mental status) [R41.82]      Priority: Medium  Acute cerebrovascular accident (CVA) ([facility] [facility] [I63.9]      Priority: Medium       Oropharyngeal Dysphagea  Speech therapy could not complete MBS test. Family refusing PEG  Will take NG tube out  Resume diet recommended by ST vs pleasure feedings    Acute hypoxic respiratory failure s/p intubation  Extubated yesterday 6/27/2022  Has remained stable  Continue scheduled bronchodilator  Currently treating for aspiration pneumonia    Sepsis present on admission  Aspiration pneumonia  Klebsiella and Enterobacter in cultures  Switch to Zosyn to cover for anaerobes for additional 5 days; stop 7/4    Acute left ischemic hemispheric stroke-patient had extensive stroke has very extensive deficits right now with right hemiparesis and expressive receptive aphasia  Poor neurological report recovery predicted  He has extensive vascular compromise based on CTA imaging    Transaminitis  Hepatocellular cancer stage IV  Prognosis poor  Heme-onc recommendations noted, patient and family members requesting aggressive treatment    Acute metabolic encephalopathy  CT of the head on 625 with no acute finding  Check ammonia level    Poor prognosis  Patient with  new diagnosis of acute ischemic left-sided CVA, is  without a doubt, complicating his overall prognosis. Given his underlying malignancy, his life expectancy is less than 6 months. I highly suggest returning home with hospice, grandson was the only family member at bedside  Patient does not speak English  Ongoing conversations with family and palliative care to discuss goals of care       DVT prophylaxis-Lovenox  Diet: ADULT DIET;  Dysphagia - Pureed; Mildly Thick (Nectar)  Code Status: Limited    PT/OT Eval Status: ordered    Dispo: Hospice highly recommended    Jose Guo MD

## 2022-07-05 VITALS
TEMPERATURE: 97.3 F | BODY MASS INDEX: 20.78 KG/M2 | HEIGHT: 60 IN | WEIGHT: 105.82 LBS | HEART RATE: 80 BPM | SYSTOLIC BLOOD PRESSURE: 124 MMHG | OXYGEN SATURATION: 93 % | RESPIRATION RATE: 21 BRPM | DIASTOLIC BLOOD PRESSURE: 79 MMHG

## 2022-07-05 LAB
GLUCOSE BLD-MCNC: 108 MG/DL (ref 70–99)
GLUCOSE BLD-MCNC: 141 MG/DL (ref 70–99)
GLUCOSE BLD-MCNC: 199 MG/DL (ref 70–99)
PERFORMED ON: ABNORMAL

## 2022-07-05 PROCEDURE — 2580000003 HC RX 258: Performed by: INTERNAL MEDICINE

## 2022-07-05 PROCEDURE — C9113 INJ PANTOPRAZOLE SODIUM, VIA: HCPCS | Performed by: NURSE PRACTITIONER

## 2022-07-05 PROCEDURE — 6370000000 HC RX 637 (ALT 250 FOR IP): Performed by: NURSE PRACTITIONER

## 2022-07-05 PROCEDURE — 6360000002 HC RX W HCPCS: Performed by: NURSE PRACTITIONER

## 2022-07-05 PROCEDURE — 94640 AIRWAY INHALATION TREATMENT: CPT

## 2022-07-05 PROCEDURE — 6370000000 HC RX 637 (ALT 250 FOR IP): Performed by: INTERNAL MEDICINE

## 2022-07-05 PROCEDURE — 94760 N-INVAS EAR/PLS OXIMETRY 1: CPT

## 2022-07-05 PROCEDURE — 2580000003 HC RX 258: Performed by: NURSE PRACTITIONER

## 2022-07-05 RX ORDER — MORPHINE SULFATE 10 MG/.5ML
5 SOLUTION ORAL
Status: DISCONTINUED | OUTPATIENT
Start: 2022-07-05 | End: 2022-07-05 | Stop reason: HOSPADM

## 2022-07-05 RX ORDER — IPRATROPIUM BROMIDE AND ALBUTEROL SULFATE 2.5; .5 MG/3ML; MG/3ML
1 SOLUTION RESPIRATORY (INHALATION) EVERY 4 HOURS PRN
Status: DISCONTINUED | OUTPATIENT
Start: 2022-07-05 | End: 2022-07-05 | Stop reason: HOSPADM

## 2022-07-05 RX ORDER — LORAZEPAM 2 MG/ML
0.5 CONCENTRATE ORAL
Status: DISCONTINUED | OUTPATIENT
Start: 2022-07-05 | End: 2022-07-05 | Stop reason: HOSPADM

## 2022-07-05 RX ORDER — LORAZEPAM 2 MG/ML
0.5 CONCENTRATE ORAL
Qty: 1 EACH | Refills: 0 | Status: SHIPPED | OUTPATIENT
Start: 2022-07-05 | End: 2022-07-19

## 2022-07-05 RX ORDER — MORPHINE SULFATE 100 MG/5ML
5 SOLUTION ORAL
Qty: 1 EACH | Refills: 0 | Status: SHIPPED | OUTPATIENT
Start: 2022-07-05 | End: 2022-07-08

## 2022-07-05 RX ORDER — MORPHINE SULFATE 100 MG/5ML
5 SOLUTION ORAL
Qty: 1 EACH | Refills: 0 | Status: SHIPPED | OUTPATIENT
Start: 2022-07-05 | End: 2022-07-05

## 2022-07-05 RX ORDER — LORAZEPAM 2 MG/ML
0.5 CONCENTRATE ORAL
Qty: 1 EACH | Refills: 0 | Status: SHIPPED | OUTPATIENT
Start: 2022-07-05 | End: 2022-07-05

## 2022-07-05 RX ADMIN — INSULIN LISPRO 1 UNITS: 100 INJECTION, SOLUTION INTRAVENOUS; SUBCUTANEOUS at 12:05

## 2022-07-05 RX ADMIN — IPRATROPIUM BROMIDE AND ALBUTEROL SULFATE 1 AMPULE: .5; 3 SOLUTION RESPIRATORY (INHALATION) at 08:38

## 2022-07-05 RX ADMIN — IPRATROPIUM BROMIDE AND ALBUTEROL SULFATE 1 AMPULE: .5; 3 SOLUTION RESPIRATORY (INHALATION) at 03:45

## 2022-07-05 RX ADMIN — SODIUM CHLORIDE, PRESERVATIVE FREE 10 ML: 5 INJECTION INTRAVENOUS at 09:33

## 2022-07-05 RX ADMIN — INSULIN LISPRO 1 UNITS: 100 INJECTION, SOLUTION INTRAVENOUS; SUBCUTANEOUS at 09:14

## 2022-07-05 RX ADMIN — DEXTROSE MONOHYDRATE: 50 INJECTION, SOLUTION INTRAVENOUS at 02:48

## 2022-07-05 RX ADMIN — Medication 40 MG: at 09:14

## 2022-07-05 RX ADMIN — SODIUM BICARBONATE 650 MG: 650 TABLET ORAL at 09:14

## 2022-07-05 RX ADMIN — ASPIRIN 300 MG: 300 SUPPOSITORY RECTAL at 09:14

## 2022-07-05 ASSESSMENT — PAIN SCALES - GENERAL
PAINLEVEL_OUTOF10: 0
PAINLEVEL_OUTOF10: 0

## 2022-07-05 NOTE — DISCHARGE SUMMARY
Hospital Medicine Discharge Summary    Patient ID: Celeste Watts      Patient's PCP: No primary care provider on file. Admit Date: 6/22/2022     Discharge Date:   07/05/22    Admitting Provider: Meme Marr MD     Discharge Provider: Florence Pickard MD     Discharge Diagnoses: Active Hospital Problems    Diagnosis     Syncope and collapse [R55]      Priority: Medium    Acute respiratory failure (HCC) [J96.00]      Priority: Medium    Aspiration pneumonia ([address] [J69.0]      Priority: Medium    History of CVA (cerebrovascular accident) [Z86.73]      Priority: Medium    AMS (altered mental status) [R41.82]      Priority: Medium    Acute cerebrovascular accident (CVA) ([facility] [I63.9]      Priority: Medium       The patient was seen and examined on day of discharge and this discharge summary is in conjunction with any daily progress note from day of discharge. Hospital Course:     patient is 19-year-old male who presented to hospital for change in mental status. Yani Sy was found unresponsive at home, patient is not able to provide history due to condition, most of the history is from patient's chart. Yani Sy was recently discharged from the rehab facility after he was diagnosed with CVA. Yani Sy was covered in vomiting on arrival of EMS     Patient has known advanced hepatocellular cancer. Treated in the ICU for aspiration pneumonia     6/27  Extubated     6/29  Patient remains lethargic, with NG tube. His prognosis appears to be grim/poor, mainly due to his advanced age, underlying diagnosis of hepatocellular carcinoma complicated with acute left ischemic CVA. Speech therapy has recommended a strict n.p.o. We will consult GI for possible PEG tube placement. Patient may benefit from hospice. Grandson at bedside was present during my evaluation.     6/30  Patient appears to be more alert, following commands. On exam he has profound right-sided hemiparesis.   Granddaughter at bedside able to translate. Family, appears to be undecided after meeting with palliative care team.  Speech therapy has recommended strict n.p.o. for now, possible with a barium swallow test for tomorrow if mentation is better     7/1  Unchanged clinical status. Speech therapy could not complete MBS test. Family refusing PEG     7/2  Patient remains lethargic, opening his eyes only to sternal rub. Nursing staff, reporting patient refusing to eat or drink anything. I had a long conversation with Saulo Car, patient's nephew. He appears to be the decision maker and the  for the family members. I explained his overall poor prognosis given the fact that the patient is not eating.  Concepción Sherwood, nurse at bedside. She advised family member to bring food from time to incentivize patients to eat. I explained to Mr. Bindu Olguin that if patient did not responded well over the weekend It would make sense to go home with hospice. Inpatient hospice agency will be consulted to see if patient qualifies for inpatient services.     7/3  Patient is reportedly eating without difficulty. Family member did have a meeting with hospice agency. Anticipating discharge within the next 24 to 48 hours.     7/4  Uneventful night. Laboratory studies with mild hypernatremia mostly related  to lack of adequate oral intake. I had a long conversation with son at bedside.       I told him that medically he was stable to be discharged despite his severe disability after recent CVA. No acute medical care was needed anymore. Reminding him that his overall prognosis is poor due to his underlying liver malignancy,  his life expectancy is short, less than 6 months.     Patient's son reported that they are getting ready to take him home tomorrow. I told him that going home with hospice was going to be really helpful. After meeting, I immediately contacted  on call today, Mrs. Fallon Vicente;  Indicating patient's needs and plans for discharge tomorrow with home with hospice. 7/5  Patient will be discharging home with hospice today. Patient's A1c was 6.1. His echocardiogram was negative for structural abnormalities. He will be discharged on aspirin and statin. Physical Exam Performed:     BP (!) 140/84   Pulse 79   Temp 97.6 °F (36.4 °C) (Axillary)   Resp 23   Ht 4' 9\" (1.448 m)   Wt 105 lb 13.1 oz (48 kg)   SpO2 92%   BMI 22.90 kg/m²       General appearance: Comfortable  HEENT:  Normal cephalic, atraumatic without obvious deformity. Pupils equal, round, and reactive to light. Extra ocular muscles intact. Conjunctivae/corneas clear. Neck: Supple, with full range of motion. No jugular venous distention. Trachea midline. Respiratory:  Normal respiratory effort. Clear to auscultation, bilaterally without Rales/Wheezes/Rhonchi. Cardiovascular:  Regular rate and rhythm with normal S1/S2 without murmurs, rubs or gallops. Abdomen: Soft, non-tender, non-distended with normal bowel sounds. Musculoskeletal:  No clubbing, cyanosis or edema bilaterally. Full range of motion without deformity. Skin: Skin color, texture, turgor normal.  No rashes or lesions. Neurologic: Dysarthria, left-sided hemiparesis  Psychiatric:  Alert and oriented, thought content appropriate, normal insight  Capillary Refill: Brisk,< 3 seconds   Peripheral Pulses: +2 palpable, equal bilaterally       Labs:  For convenience and continuity at follow-up the following most recent labs are provided:      CBC:    Lab Results   Component Value Date/Time    WBC 13.5 07/04/2022 04:46 AM    HGB 10.1 07/04/2022 04:46 AM    HCT 30.6 07/04/2022 04:46 AM     07/04/2022 04:46 AM       Renal:    Lab Results   Component Value Date/Time     07/04/2022 04:46 AM    K 4.5 07/04/2022 04:46 AM    K 4.6 06/23/2022 03:48 AM     07/04/2022 04:46 AM    CO2 21 07/04/2022 04:46 AM    BUN 31 07/04/2022 04:46 AM    CREATININE 0.9 07/04/2022 04:46 AM    CALCIUM 8.7 07/04/2022 04:46 AM    PHOS 2.9 06/30/2022 04:44 AM         Significant Diagnostic Studies    Radiology:   Fluoroscopy modified barium swallow with video   Final Result   Limited exam as above without kala aspiration with limited trials of nectar   consistency. Please see separate speech pathology report for full discussion of findings   and recommendations. XR CHEST PORTABLE   Final Result   Feeding tube tip projects at the gastric body. XR CHEST PORTABLE   Final Result   Feeding tube identified tip in the stomach. Continued effacement is   recommended for post pyloric placement. XR CHEST PORTABLE   Final Result   Enteric tube tip projects in the expected location of the stomach. Similar opacification of the right upper lung, which could represent right   upper lobe atelectasis. XR CHEST PORTABLE   Final Result   1. Enteric tube with tip and side-port in the stomach. 2.  Endotracheal tube tip is 2 cm above the mar. Recommend retraction by   1.5 cm for optimal positioning. 3.  Stable right lung apex consolidation. CT HEAD WO CONTRAST   Final Result   1. Evolution of the known infarcts involving is the left frontal lobe as well   as the left subinsular region. 2. Otherwise, no acute intracranial abnormality. 3. No significant mass effect, midline shift or acute intracranial hemorrhage. 4. Mild global parenchymal volume loss with chronic microvascular ischemic   changes. MRA NECK WO CONTRAST   Final Result   Acute to subacute infarctions in the left frontal lobe, increased in size   since MRI brain June 18, 2022. Small acute to subacute infarction along the left external capsule, stable   since June 18, 2022. Old lacunar infarcts in the left central franki. Mild to moderate parenchymal volume loss. Mild chronic microvascular disease. 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       Right dominance of the vertebral arteries with hypoplastic intracranial left   vertebral artery, and superimposed occlusion in the mid to distal   intracranial left vertebral artery. Moderate stenosis in the intracranial right vertebral artery. Moderate stenosis in the proximal P2 segment of the left PCA. Known 95% critical stenosis in the proximal right internal carotid artery. 80% stenosis in the proximal left internal carotid artery. MRA HEAD WO CONTRAST   Final Result   Acute to subacute infarctions in the left frontal lobe, increased in size   since MRI brain June 18, 2022. Small acute to subacute infarction along the left external capsule, stable   since June 18, 2022. Old lacunar infarcts in the left central franki. Mild to moderate parenchymal volume loss. Mild chronic microvascular disease. 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 Right dominance of the vertebral arteries with hypoplastic intracranial left   vertebral artery, and superimposed occlusion in the mid to distal   intracranial left vertebral artery. Moderate stenosis in the intracranial right vertebral artery. Moderate stenosis in the proximal P2 segment of the left PCA. Known 95% critical stenosis in the proximal right internal carotid artery. 80% stenosis in the proximal left internal carotid artery. MRI BRAIN WO CONTRAST   Final Result   Acute to subacute infarctions in the left frontal lobe, increased in size   since MRI brain June 18, 2022. Small acute to subacute infarction along the left external capsule, stable   since June 18, 2022. Old lacunar infarcts in the left central franki. Mild to moderate parenchymal volume loss. Mild chronic microvascular disease. 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       Right dominance of the vertebral arteries with hypoplastic intracranial left   vertebral artery, and superimposed occlusion in the mid to distal   intracranial left vertebral artery. Moderate stenosis in the intracranial right vertebral artery. Moderate stenosis in the proximal P2 segment of the left PCA. Known 95% critical stenosis in the proximal right internal carotid artery. 80% stenosis in the proximal left internal carotid artery. CT CHEST PULMONARY EMBOLISM W CONTRAST   Final Result   No evidence of a pulmonary embolus. ET tube and gastric tube in good position. 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 Questionable mucous plugging scattered in the bronchi along the right lung   base which may be due to recent aspiration. Recommend pulmonology follow-up. 7 cm exophytic right hepatic mass which has increased in size. Suggest   PET-CT correlation. Postop changes right apex and upper lobe with fibrosis and scarring   throughout the area which is unchanged.          XR CHEST PORTABLE   Final Result   Unchanged right upper lobe scarring      No acute cardiopulmonary process         CT Head WO Contrast   Final Result   New low-attenuation in the left corona radiata consistent with an evolving   infarct as described on the recent MRI      No acute hemorrhage                Consults:     IP CONSULT TO CRITICAL CARE  IP CONSULT TO NEUROLOGY  IP CONSULT TO PALLIATIVE CARE  IP CONSULT TO DIETITIAN  IP CONSULT TO NEPHROLOGY  IP CONSULT TO ONCOLOGY  IP CONSULT TO GI  IP CONSULT TO SOCIAL WORK  IP CONSULT TO HOSPICE    Disposition:  Home with hospice     Condition at Discharge: Stable    Discharge

## 2022-07-05 NOTE — PLAN OF CARE
Nutrition Problem #1: Inadequate oral intake  Intervention: Food and/or Nutrient Delivery:  (current nutrition plan of care per MD)

## 2022-07-05 NOTE — PLAN OF CARE
Problem: Discharge Planning  Goal: Discharge to home or other facility with appropriate resources  Outcome: Progressing  Flowsheets (Taken 7/4/2022 1928)  Discharge to home or other facility with appropriate resources:   Identify barriers to discharge with patient and caregiver   Arrange for needed discharge resources and transportation as appropriate     Problem: Pain  Goal: Verbalizes/displays adequate comfort level or baseline comfort level  Outcome: Progressing  Flowsheets  Taken 7/5/2022 0504 by Jamie Patel RN  Verbalizes/displays adequate comfort level or baseline comfort level:   Encourage patient to monitor pain and request assistance   Assess pain using appropriate pain scale  Taken 7/4/2022 1623 by Riki Pollock RN  Verbalizes/displays adequate comfort level or baseline comfort level: Encourage patient to monitor pain and request assistance     Problem: Safety - Adult  Goal: Free from fall injury  Outcome: Progressing  Flowsheets (Taken 7/5/2022 0504)  Free From Fall Injury: Instruct family/caregiver on patient safety  Note: Bed alarm on, bed in lowest position, wheels locked. Fall risk assessment completed every shift. Nonskid socks on, fall sign posted. Pt educated on use of call light, unable to use call light. Family at bedside. Problem: ABCDS Injury Assessment  Goal: Absence of physical injury  Outcome: Progressing  Flowsheets (Taken 7/5/2022 0504)  Absence of Physical Injury: Implement safety measures based on patient assessment  Note: No physical injury this shift. Problem: Skin/Tissue Integrity  Goal: Absence of new skin breakdown  Description: 1. Monitor for areas of redness and/or skin breakdown  2. Assess vascular access sites hourly  3. Every 4-6 hours minimum:  Change oxygen saturation probe site  4. Every 4-6 hours:  If on nasal continuous positive airway pressure, respiratory therapy assess nares and determine need for appliance change or resting period.   Outcome: Progressing  Note: Skin assessment completed every shift. Pt assessed for incontinence, appropriate barrier cream applied prn. Pt encouraged to turn/rotate every 2 hours. Assistance provided if pt unable to do so themselves.          Problem: Nutrition Deficit:  Goal: Optimize nutritional status  Outcome: Progressing  Flowsheets (Taken 7/5/2022 0504)  Nutrient intake appropriate for improving, restoring, or maintaining nutritional needs:   Assess nutritional status and recommend course of action   Monitor oral intake, labs, and treatment plans     Problem: Neurosensory - Adult  Goal: Achieves stable or improved neurological status  Outcome: Progressing  Flowsheets (Taken 7/4/2022 1928)  Achieves stable or improved neurological status:   Assess for and report changes in neurological status   Initiate measures to prevent increased intracranial pressure  Goal: Achieves maximal functionality and self care  Outcome: Progressing  Flowsheets (Taken 7/4/2022 1928)  Achieves maximal functionality and self care: Monitor swallowing and airway patency with patient fatigue and changes in neurological status     Problem: Respiratory - Adult  Goal: Achieves optimal ventilation and oxygenation  Outcome: Progressing  Flowsheets (Taken 7/5/2022 0504)  Achieves optimal ventilation and oxygenation:   Assess for changes in respiratory status   Assess for changes in mentation and behavior   Position to facilitate oxygenation and minimize respiratory effort

## 2022-07-05 NOTE — RT PROTOCOL NOTE
RT Inhaler-Nebulizer Bronchodilator Protocol Note    There is a bronchodilator order in the chart from a provider indicating to follow the RT Bronchodilator Protocol and there is an Initiate RT Inhaler-Nebulizer Bronchodilator Protocol order as well (see protocol at bottom of note). CXR Findings:  No results found. The findings from the last RT Protocol Assessment were as follows:   History Pulmonary Disease: None or smoker <15 pack years  Respiratory Pattern: Regular pattern and RR 12-20 bpm  Breath Sounds: Slightly diminished and/or crackles  Cough: Strong, spontaneous, non-productive  Indication for Bronchodilator Therapy: Decreased or absent breath sounds  Bronchodilator Assessment Score: 2    Aerosolized bronchodilator medication orders have been revised according to the RT Inhaler-Nebulizer Bronchodilator Protocol below. Respiratory Therapist to perform RT Therapy Protocol Assessment initially then follow the protocol. Repeat RT Therapy Protocol Assessment PRN for score 0-3 or on second treatment, BID, and PRN for scores above 3. No Indications - adjust the frequency to every 6 hours PRN wheezing or bronchospasm, if no treatments needed after 48 hours then discontinue using Per Protocol order mode. If indication present, adjust the RT bronchodilator orders based on the Bronchodilator Assessment Score as indicated below. Use Inhaler orders unless patient has one or more of the following: on home nebulizer, not able to hold breath for 10 seconds, is not alert and oriented, cannot activate and use MDI correctly, or respiratory rate 25 breaths per minute or more, then use the equivalent nebulizer order(s) with same Frequency and PRN reasons based on the score. If a patient is on this medication at home then do not decrease Frequency below that used at home.     0-3 - enter or revise RT bronchodilator order(s) to equivalent RT Bronchodilator order with Frequency of every 4 hours PRN for wheezing or increased work of breathing using Per Protocol order mode. 4-6 - enter or revise RT Bronchodilator order(s) to two equivalent RT bronchodilator orders with one order with BID Frequency and one order with Frequency of every 4 hours PRN wheezing or increased work of breathing using Per Protocol order mode. 7-10 - enter or revise RT Bronchodilator order(s) to two equivalent RT bronchodilator orders with one order with TID Frequency and one order with Frequency of every 4 hours PRN wheezing or increased work of breathing using Per Protocol order mode. 11-13 - enter or revise RT Bronchodilator order(s) to one equivalent RT bronchodilator order with QID Frequency and an Albuterol order with Frequency of every 4 hours PRN wheezing or increased work of breathing using Per Protocol order mode. Greater than 13 - enter or revise RT Bronchodilator order(s) to one equivalent RT bronchodilator order with every 4 hours Frequency and an Albuterol order with Frequency of every 2 hours PRN wheezing or increased work of breathing using Per Protocol order mode. RT to enter RT Home Evaluation for COPD & MDI Assessment order using Per Protocol order mode.     Electronically signed by Hunter Thacker RCP on 7/5/2022 at 8:48 AM

## 2022-07-05 NOTE — PROGRESS NOTES
Comprehensive Nutrition Assessment    Type and Reason for Visit:  Reassess    Nutrition Recommendations/Plan:   1. Nutrition per MD plan of care     Malnutrition Assessment:  Malnutrition Status:  Insufficient data (06/23/22 1053)        Nutrition Assessment:    Follow-up. Po remains down, accepting some of the of Pureed / Nectar thick Liquids. Family continues to decline alternate nutrition. Decision made per family to discharge home this date with hospice services. Nutrition Related Findings:    Reviwed labs. Noted BM on 7/1. Noted no edema. Wound Type: None       Current Nutrition Intake & Therapies:    Average Meal Intake: Unable to assess  Average Supplements Intake: NPO  ADULT DIET; Dysphagia - Pureed; Mildly Thick (Nectar)    Anthropometric Measures:  Height: 4' 9\" (144.8 cm)  Ideal Body Weight (IBW): 88 lbs (40 kg)    Admission Body Weight: 127 lb (57.6 kg)  Current Body Weight: 105 lb (47.6 kg), 119.3 % IBW.  Weight Source: Bed Scale  Current BMI (kg/m2): 22.7                          BMI Categories: Normal Weight (BMI 22.0 to 24.9) age over 72    Estimated Daily Nutrient Needs:        Energy (kcal/day): 5675-6907 kcal (25-30 kcal/kg 56 kg CBW)     Protein (g/day): 51-85 gm (1.2-2 gm/kg IBW)     Fluid (ml/day): per provider    Nutrition Diagnosis:   · Inadequate oral intake related to cognitive or neurological impairment as evidenced by other (comment) (poor intake)      Nutrition Interventions:   Food and/or Nutrient Delivery:  (current nutrition plan of care per MD)  Nutrition Education/Counseling: Education not indicated  Coordination of Nutrition Care: No recommendation at this time       Goals:  Previous Goal Met: No Progress toward Goal(s)  Goals: Meet at least 75% of estimated needs (new goal)       Nutrition Monitoring and Evaluation:   Behavioral-Environmental Outcomes: None Identified  Food/Nutrient Intake Outcomes:  (nutrition plan of care per MD)  Physical Signs/Symptoms Outcomes:  (per MD plan of care)    Discharge Planning:     (nutrition plan of care per MD)     Suzanne Saunders, [address] [place] [address], LD  Contact: [phone]

## 2022-07-05 NOTE — CARE COORDINATION
Discharge Planning:   Discharge order acknowledged. [facility]: Called to [place] # [phone] and [place] [phone] with [address] to follow up regarding discharge plan for patient. Called to patient's grandson, William Abreu #[phone], left voicemail requesting return phone call regarding patient's discharge plan. TODD Mar LSW, Social Work/Case Management   [phone]  Electronically signed by TODD Mar LSW on 7/5/2022 at 9:35 AM    Spoke to patient's son, Amisha Kennedy, confirmed he and family will be visiting the hospital around 1150/1200. Notified Daniel Montano with [facility], confirmed they will be present for meeting per family request of more information regarding hospice services. Electronically signed by TODD Mar LSW on 7/5/2022 at 11:34 AM    Met with patient, sons (Amisha Kennedy and Brendon Rudolph), and [facility] [address] [place] [address] and Dnaiel Montano at bedside. Discussed discharge plan. Verified demographics. Confirmed the plan is for patient to return home with family support 24/7. Awaiting confirmation of DME delivery from [address] [address]. Electronically signed by TODD Mar LSW on 7/5/2022 at 1:12 PM    Spoke to Daniel Montano with [address] Confirmed plan is for patient to return home via [facility] transport at 9:00 PM.   DME to be delivered today to patient's home.    Electronically signed by TODD Mar LSW on 7/5/2022 at 2:41 PM

## 2022-07-05 NOTE — DISCHARGE INSTR - COC
Resolved Resolved By    None active    Resolved    COVID-19 (Rule Out) 12/27/21 12/27/21 12/27/21 COVID-19, Rapid (Ordered)   12/27/21 Rule-Out Test Resulted    C-diff Rule Out 05/01/20 05/01/20 05/01/20 Clostridium difficile toxin/antigen (Ordered)   06/20/22 Lynne Swift RN    From previous admission in 2020            Nurse Assessment:  Last Vital Signs: BP (!) 140/84   Pulse 79   Temp 97.6 °F (36.4 °C) (Axillary)   Resp 23   Ht 4' 9\" (1.448 m)   Wt 105 lb 13.1 oz (48 kg)   SpO2 92%   BMI 22.90 kg/m²     Last documented pain score (0-10 scale): Pain Level: 0  Last Weight:   Wt Readings from Last 1 Encounters:   07/05/22 105 lb 13.1 oz (48 kg)     Mental Status:  {IP PT MENTAL STATUS:20030}    IV Access:  { MERON IV ACCESS:249209010}    Nursing Mobility/ADLs:  Walking   {P DME LWCD:285954893}  Transfer  {P DME DGNN:401638695}  Bathing  {CHP DME VMOP:783573261}  Dressing  {CHP DME OBRK:484243137}  Toileting  {CHP DME ZLKD:636027648}  Feeding  {P DME BJLA:900678898}  Med Admin  {P DME IGAW:907752591}  Med Delivery   { MERON MED Delivery:571645284}    Wound Care Documentation and Therapy:        Elimination:  Continence: Bowel: {YES / KS:80362}  Bladder: {YES / CN:68556}  Urinary Catheter: {Urinary Catheter:170883477}   Colostomy/Ileostomy/Ileal Conduit: {YES / UE:57925}       Date of Last BM: ***    Intake/Output Summary (Last 24 hours) at 7/5/2022 0925  Last data filed at 7/5/2022 5119  Gross per 24 hour   Intake 200 ml   Output 1100 ml   Net -900 ml     I/O last 3 completed shifts:   In: 412.1 [P.O.:200; IV Piggyback:212.1]  Out: 7366 [Urine:1750]    Safety Concerns:     [address] MERON Safety Concerns:638854497}    Impairments/Disabilities:      [address] Sarah MERON Impairments/Disabilities:834776583}    Nutrition Therapy:  Current Nutrition Therapy:   [address] Tyrel MERON Diet List:034540069}    Routes of Feeding: {CHP DME Other Feedings:377047630}  Liquids: {Slp liquid thickness:30144}  Daily Fluid Restriction: {CHP DME Yes amt example:945075679}  Last Modified Barium Swallow with Video (Video Swallowing Test): {Done Not Done RCFP:964072745}    Treatments at the Time of Hospital Discharge:   Respiratory Treatments: ***  Oxygen Therapy:  {Therapy; copd oxygen:47225}  Ventilator:    { CC Vent NWSE:506777321}    Rehab Therapies: {THERAPEUTIC INTERVENTION:7407654944}  Weight Bearing Status/Restrictions: { CC Weight Bearin}  Other Medical Equipment (for information only, NOT a DME order):  {EQUIPMENT:969396390}  Other Treatments: ***    Patient's personal belongings (please select all that are sent with patient):  {CHP DME Belongings:563885853}    RN SIGNATURE:  {Esignature:746066631}    CASE MANAGEMENT/SOCIAL WORK SECTION    Inpatient Status Date: ***    Readmission Risk Assessment Score:  Readmission Risk              Risk of Unplanned Readmission:  23           Discharging to Facility/ Agency   Name:   Address:  Phone:  Fax:    Dialysis Facility (if applicable)   Name:  Address:  Dialysis Schedule:  Phone:  Fax:    / signature: {Esignature:921227114}    PHYSICIAN SECTION    Prognosis: Guarded    Condition at Discharge: Stable    Rehab Potential (if transferring to Rehab): Guarded    Recommended Labs or Other Treatments After Discharge: Follow-up with PCP within 7 days from discharge, not doing so could have life-threatening consequences. Take medication as instructed;  return to the emergency department if persistent symptoms, experiencing side effects from medication including nausea vomiting or unable to keep medications down. Follow up with oncologist and neurologist     Physician Certification: I certify the above information and transfer of Dianne Roe  is necessary for the continuing treatment of the diagnosis listed and that he requires Hospice for greater 30 days.      Update Admission H&P: No change in H&P    PHYSICIAN SIGNATURE:  Electronically signed by Inés Coreas MD on 7/5/22 at 9:26 AM EDT

## 2022-07-05 NOTE — CARE COORDINATION
CASE MANAGEMENT DISCHARGE SUMMARY:    DISCHARGE DATE: 7/5/2022    DISCHARGED TO: Home with [facility]                REPORT NUMBER: [phone]             FAX NUMBER: [phone]    TRANSPORTATION: [facility]              TIME: 9:00 PM     TODD Conde, ANDRES, Social Work/Case Management   [phone]  Electronically signed by TODD Conde, TRUEW on 7/5/2022 at 4:14 PM

## 2023-01-25 ENCOUNTER — APPOINTMENT (OUTPATIENT)
Dept: GENERAL RADIOLOGY | Age: 83
End: 2023-01-25
Payer: MEDICAID

## 2023-01-25 ENCOUNTER — HOSPITAL ENCOUNTER (EMERGENCY)
Age: 83
Discharge: HOME | End: 2023-01-25
Payer: MEDICAID

## 2023-01-25 VITALS
TEMPERATURE: 99 F | RESPIRATION RATE: 15 BRPM | HEIGHT: 60 IN | WEIGHT: 120 LBS | OXYGEN SATURATION: 94 % | HEART RATE: 74 BPM | SYSTOLIC BLOOD PRESSURE: 75 MMHG | DIASTOLIC BLOOD PRESSURE: 57 MMHG | BODY MASS INDEX: 23.56 KG/M2

## 2023-01-25 DIAGNOSIS — S52.124A CLOSED NONDISPLACED FRACTURE OF HEAD OF RIGHT RADIUS, INITIAL ENCOUNTER: Primary | ICD-10-CM

## 2023-01-25 PROCEDURE — 73070 X-RAY EXAM OF ELBOW: CPT

## 2023-01-25 PROCEDURE — 99283 EMERGENCY DEPT VISIT LOW MDM: CPT

## 2023-01-26 NOTE — ED NOTES
[facility] made aware of low BP.  Pt hospice pt, no concern at this time       Clari Rios RN  01/25/23 1730

## 2023-01-26 NOTE — ED NOTES
Report given to [address] [place] [address] Rd[facility] [facility]. Pt transferred via EMS to home.       Eva Dumont RN  01/25/23 6636

## 2023-01-26 NOTE — ED PROVIDER NOTES
[facility]        Pt Name: Elise Loza  MRN: [id number]  [place] 1940  Date of evaluation: 1/25/2023  Provider: Leonel Nazario PA-C  PCP: No primary care provider on file. Note Started: 7:10 PM EST 1/25/23      MIGUEL. I have evaluated this patient. My supervising physician was available for consultation. CHIEF COMPLAINT       Chief Complaint   Patient presents with    Arm Swelling     Pt arrives from Home via [facility] where hospice nurse called d/t pt having increased arm swelling. Per grandson pt baseline is minimal talking, not always alert. HISTORY OF PRESENT ILLNESS: 1 or more Elements     History From: grandson  Limitations to history : Language Kazakh    Elise Loza is a 80 y.o. male who presents for evaluation of right arm injury status post fall that occurred 2 days ago. Patient is on hospice, history of prior CVA with right-sided paralysis and had a mechanical fall out of bed 2 days ago which he landed on his right side. They were evaluated by hospice nurse at that time but there is no evidence of injury, however, has had increasing swelling and bruising about the right elbow and they wanted him checked out. No other known injuries, complaints or concerns at this time    Nursing Notes were all reviewed and agreed with or any disagreements were addressed in the HPI. REVIEW OF SYSTEMS :      Review of Systems   Unable to perform ROS: Other   Musculoskeletal:  Positive for joint swelling (R elbow). Positives and Pertinent negatives as per HPI. SURGICAL HISTORY   No past surgical history on file.     CURRENTMEDICATIONS       Previous Medications    ALBUTEROL SULFATE HFA (VENTOLIN HFA) 108 (90 BASE) MCG/ACT INHALER    Inhale 2 puffs into the lungs 4 times daily as needed for Wheezing    AMLODIPINE (NORVASC) 5 MG TABLET    Take 1 tablet by mouth daily    ASPIRIN 81 MG EC TABLET    Take 81 mg by mouth daily BUDESONIDE-FORMOTEROL (SYMBICORT) 160-4.5 MCG/ACT AERO    Inhale 2 puffs into the lungs 2 times daily    LENVATINIB MESYLATE (LENVATINIB, 8 MG DAILY DOSE, PO)    Take by mouth    LEVOTHYROXINE (SYNTHROID) 100 MCG TABLET    Take 100 mcg by mouth Daily    METFORMIN (GLUCOPHAGE) 500 MG TABLET    Take 500 mg by mouth daily (with breakfast)     OMEPRAZOLE (PRILOSEC) 40 MG DELAYED RELEASE CAPSULE    Take 40 mg by mouth daily     ONDANSETRON (ZOFRAN ODT) 4 MG DISINTEGRATING TABLET    Take 1-2 tablets by mouth every 12 hours as needed for Nausea    PROCHLORPERAZINE (COMPAZINE) 10 MG TABLET    Take 1 tablet by mouth every 6 hours as needed (pain, nausea)    ROSUVASTATIN (CRESTOR) 40 MG TABLET    Take 1 tablet by mouth nightly       ALLERGIES     Patient has no known allergies. FAMILYHISTORY     No family history on file. SOCIAL HISTORY       Social History     Tobacco Use    Smoking status: Former     Packs/day: 1.00     Years: 15.00     Pack years: 15.00     Types: Cigarettes     Quit date: 1980     Years since quittin.0    Smokeless tobacco: Never    Tobacco comments:     smoked 3 cigaretts a day   Vaping Use    Vaping Use: Never used   Substance Use Topics    Alcohol use: Never    Drug use: Never       SCREENINGS        [place] Coma Scale  Eye Opening: To speech  Best Verbal Response: Oriented  Best Motor Response: Localizes pain  Kb Coma Scale Score: 13                CIWA Assessment  BP: (!) 114/90  Heart Rate: 74           PHYSICAL EXAM  1 or more Elements     ED Triage Vitals [23 1844]   BP Temp Temp src Heart Rate Resp SpO2 Height Weight   (!) 114/90 99 °F (37.2 °C) -- 74 15 96 % 4' 9\" (1.448 m) 120 lb (54.4 kg)       Physical Exam  Vitals and nursing note reviewed. Constitutional:       Appearance: He is well-developed. He is not diaphoretic. HENT:      Head: Normocephalic and atraumatic.       Right Ear: External ear normal.      Left Ear: External ear normal.      Nose: Nose normal. Eyes:      General:         Right eye: No discharge. Left eye: No discharge. Cardiovascular:      Pulses: Normal pulses. Pulmonary:      Effort: Pulmonary effort is normal. No respiratory distress. Musculoskeletal:      Right elbow: Swelling and effusion present. Decreased range of motion. Cervical back: Normal range of motion and neck supple. Skin:     General: Skin is warm and dry. Neurological:      Mental Status: He is alert and oriented to person, place, and time. Psychiatric:         Behavior: Behavior normal.         DIAGNOSTIC RESULTS   LABS:    Labs Reviewed - No data to display    When ordered only abnormal lab results are displayed. All other labs were within normal range or not returned as of this dictation. EKG: When ordered, EKG's are interpreted by the Emergency Department Physician in the absence of a cardiologist.  Please see their note for interpretation of EKG. RADIOLOGY:   Non-plain film images such as CT, Ultrasound and MRI are read by the radiologist. Plain radiographic images are visualized and preliminarily interpreted by the ED Provider with the below findings:    Interpretation per the Radiologist below, if available at the time of this note:    XR ELBOW RIGHT (2 VIEWS)   Final Result   Technically limited exam.  Small ossific density anterior to the elbow joint   may be degenerative or related to small fracture fragment. Soft tissue   swelling. No results found. No results found. PROCEDURES   Unless otherwise noted below, none     Procedures    CRITICAL CARE TIME (.cctime)       PAST MEDICAL HISTORY      has a past medical history of Asthma, Diabetes mellitus ([address] Hypertension, and Thyroid disease.      EMERGENCY DEPARTMENT COURSE and DIFFERENTIAL DIAGNOSIS/MDM:   Vitals:    Vitals:    01/25/23 1844   BP: (!) 114/90   Pulse: 74   Resp: 15   Temp: 99 °F (37.2 °C)   SpO2: 96%   Weight: 120 lb (54.4 kg)   Height: 4' 9\" (1.448 m)       Patient was given the following medications:  Medications - No data to display          Is this patient to be included in the SEP-1 Core Measure due to severe sepsis or septic shock? No   Exclusion criteria - the patient is NOT to be included for SEP-1 Core Measure due to: Infection is not suspected    Chronic Conditions affecting care:    has a past medical history of Asthma, Diabetes mellitus ([address] Hypertension, and Thyroid disease. CONSULTS: (Who and What was discussed)  None      Social Determinants : None    Records Reviewed (Source): n/a    CC/HPI Summary, DDx, ED Course, and Reassessment: Patient presents for evaluation of right arm swelling. On exam, he is chronically ill-appearing but no acute distress and nontoxic. Vitals are stable and he is afebrile. He has significant swelling and ecchymoses about the right elbow with effusion. Range of motion slightly decreased. Pulses intact distally. History of paralysis. Disposition Considerations (tests considered but not done, Admit vs D/C, Shared Decision Making, Pt Expectation of Test or Tx.): X-ray of the right elbow was technically limited, however, there is a small ossific density anterior to the elbow joint adjacent to the radial head. Will be treated for suspected nondisplaced radial head fracture. Given Ace wrap and sling. Symptomatic and supportive care discussed including rest, ice and elevation. Given orthopedic contact information for reevaluation as needed but this will likely heal up on its own while he is not likely to be candidate for any intervention due to hospice status. I estimate there is LOW risk for COMPARTMENT SYNDROME, DEEP VENOUS THROMBOSIS, SEPTIC ARTHRITIS, TENDON OR NEUROVASCULAR INJURY, thus I consider the discharge disposition reasonable. Conditions return to the ED were discussed such as any new or worsening symptoms or signs of neurovascular compromise, intractable pain or infection.   Family is agreeable to this plan the patient is stable for discharge at this time. I am the Primary Clinician of Record. FINAL IMPRESSION      1.  Closed nondisplaced fracture of head of right radius, initial encounter          DISPOSITION/PLAN     DISPOSITION Decision To Discharge 01/25/2023 07:49:40 PM      PATIENT REFERRED TO:  Chon Newman MD  [address] [place] [address]  [phone]    Schedule an appointment as soon as possible for a visit       [facility] Emergency Department  [address] [place] [address]  [phone]  Go to   If symptoms worsen    DISCHARGE MEDICATIONS:  New Prescriptions    No medications on file       DISCONTINUED MEDICATIONS:  Discontinued Medications    No medications on file              (Please note that portions of this note were completed with a voice recognition program.  Efforts were made to edit the dictations but occasionally words are mis-transcribed.)    Jamie Peoples PA-C (electronically signed)            Rosemary CindyTimblin, [place]  01/25/23 1953

## 2023-09-29 ENCOUNTER — HOSPITAL ENCOUNTER (OUTPATIENT)
Dept: CT IMAGING | Age: 83
Discharge: HOME | End: 2023-09-29
Payer: MEDICAID

## 2023-09-29 DIAGNOSIS — C22.0 HEPATIC CARCINOMA (HCC): ICD-10-CM

## 2023-09-29 PROCEDURE — 74176 CT ABD & PELVIS W/O CONTRAST: CPT

## 2024-05-26 NOTE — PROGRESS NOTES
Martha Molina received a viral test for COVID-19. They were educated on isolation and quarantine as appropriate. For any symptoms, they were directed to seek care from their PCP, given contact information to establish with a doctor, directed to an urgent care or the emergency room. No

## 2024-07-22 NOTE — PROGRESS NOTES
Patient Notified. Patient verbalized understanding of information given.  Pulmonary Progress Note    Date of Admission: 6/22/2022   LOS: 4 days     CC:  Chief Complaint   Patient presents with    Altered Mental Status     Patient arrives via ems with c/o AMS. Physician immediately called into room. Pt unresponsive and vomiting. Patient just discharged with stroke. Patient LKW 15 minutes prior to ems arrival           Assessment/Plan     Acute hypoxemic respiratory failure with SPO2 less than 90% on room air  -Full vent support, Wean supplemental oxygen to goal saturation of >90%  -Doing okay on SBT but decreased responsiveness  -Have serious concerns regarding liberation from the ventilator. Even if successful remains incredibly high risk for reintubation due to chronic aspiration and new stroke and altered sensorium. If aggressive measures are pursued indefinitely all certainly will end up with a tracheostomy. Will discuss with family today to give long-term perspective     Acute metabolic encephalopathy  -multifactorial, infectious/Stroke  -Avoid sedating medications  -Slightly more responsive today but not following commands, CT head continues to show evolution of stroke     Stroke  -Recent stroke, CT evidence of evolving CVA, MRA with significant cerebrovascular disease  -Neurology following     Aspiration pneumonia, due to Klebsiella and Enterobacter  -chronic component as well  -Cefepime     Metastatic HCC  -Follows with [facility]  -Agree with palliative care consult     PPI  Lovenox  Peridex  Tube feed    Due to the immediate potential for life-threatening deterioration due to respiratory failure , I spent 33 minutes providing critical care. This time is excluding time spent performing separately billable procedures. HPI/Subjective  No acute events overnight. Slightly more responsive. Still not following commands.     ROS:   Unable to obtain due to mechanical ventilation      Intake/Output Summary (Last 24 hours) at 6/26/2022 0956  Last data filed at 6/26/2022 0800  Gross per 24 hour   Intake 2387.54 ml   Output 2965 ml   Net -577.46 ml         PHYSICAL EXAM:   Blood pressure (!) 140/95, pulse 95, temperature 99.3 °F (37.4 °C), temperature source Oral, resp. rate 28, height 4' 9\" (1.448 m), weight 127 lb 6.8 oz (57.8 kg), SpO2 96 %.'  Gen:  No acute distress. Eyes: PERRL. Anicteric sclera. No conjunctival injection. ENT: No discharge. OP with ETT external appearance of ears and nose normal.  Neck: Trachea midline. No mass   Resp:  No crackles. No wheezes. No rhonchi. No dullness on percussion. CV: Regular rate. Regular rhythm. No murmur or rub. No edema. GI: Soft, Non-tender. Non-distended. +BS  Skin: Warm, dry, w/o erythema. Lymph: No cervical or supraclavicular LAD. M/S: No cyanosis. No clubbing. Neuro: Intubated. Minimal right-sided movement.   Decreased responsiveness today      Medications:    Scheduled Meds:   ipratropium-albuterol  1 ampule Inhalation Q4H    enoxaparin  40 mg SubCUTAneous Nightly    cefepime  2,000 mg IntraVENous Q12H    chlorhexidine  15 mL Mouth/Throat BID    pantoprazole (PROTONIX) 40 mg injection  40 mg IntraVENous Daily    insulin lispro  0-18 Units SubCUTAneous Q4H    aspirin  300 mg Rectal Daily    sodium chloride flush  10 mL IntraVENous 2 times per day       Continuous Infusions:   dextrose      sodium chloride         PRN Meds:  hydrALAZINE, glucose, dextrose bolus **OR** dextrose bolus, glucagon (rDNA), dextrose, sodium chloride flush, sodium chloride, promethazine **OR** ondansetron, polyethylene glycol, acetaminophen **OR** acetaminophen    Labs reviewed:  CBC:   Recent Labs     06/24/22 0409 06/25/22 0423 06/26/22  0536   WBC 15.2* 12.6* 13.5*   HGB 10.4* 10.4* 11.2*   HCT 31.3* 30.9* 34.0*   MCV 85.4 86.7 86.1    255 256     BMP:   Recent Labs     06/24/22 0409 06/25/22 0423 06/26/22  0536   * 142 144   K 4.1 4.2 4.3    111* 113*   CO2 21 20* 20*   PHOS 3.5 1.8* 2.5   BUN 38* 41* 31*   CREATININE 1.8* 1.4* 1.1     LIVER PROFILE:   No results for input(s): AST, ALT, LIPASE, BILIDIR, BILITOT, ALKPHOS in the last 72 hours. Invalid input(s): AMYLASE,  ALB  PT/INR: No results for input(s): PROTIME, INR in the last 72 hours. APTT: No results for input(s): APTT in the last 72 hours. UA:  No results for input(s): NITRITE, COLORU, PHUR, LABCAST, WBCUA, RBCUA, MUCUS, TRICHOMONAS, YEAST, BACTERIA, CLARITYU, SPECGRAV, LEUKOCYTESUR, UROBILINOGEN, BILIRUBINUR, BLOODU, GLUCOSEU, AMORPHOUS in the last 72 hours. Invalid input(s): [place]  No results for input(s): PH, PCO2, PO2 in the last 72 hours. Films:  Radiology Review:  Pertinent images / reports were reviewed as a part of this visit. CT HEAD WO CONTRAST  Narrative: EXAMINATION:  CT OF THE HEAD WITHOUT CONTRAST  6/25/2022 11:04 am    TECHNIQUE:  CT of the head was performed without the administration of intravenous  contrast. Automated exposure control, iterative reconstruction, and/or weight  based adjustment of the mA/kV was utilized to reduce the radiation dose to as  low as reasonably achievable. COMPARISON:  CT 06/22/2022. MRI 06/23/2022. HISTORY:  ORDERING SYSTEM PROVIDED HISTORY: previous CVA  TECHNOLOGIST PROVIDED HISTORY:  Reason for exam:->previous CVA  Has a \"code stroke\" or \"stroke alert\" been called? ->No  Reason for Exam: Altered Mental Status    Initial evaluation. FINDINGS:  BRAIN/VENTRICLES: There are areas of decreased attenuation involving the left  frontal lobe as well as the left subinsular region compatible with evolution  of the known infarcts. Otherwise, the gray-white differentiation appears  maintained. No acute intracranial hemorrhage. No mass effect or midline  shift. There are areas of hypoattenuation in the periventricular and  subcortical white matter, which is nonspecific, but may represent chronic  microvasvular ischemic change. There is mild global parenchymal volume loss. No evidence of hydrocephalus. Atherosclerosis of the intracranial  vasculature. ORBITS: The visualized portion of the orbits demonstrate no acute abnormality. SINUSES: The visualized paranasal sinuses and mastoid air cells demonstrate  no acute abnormality. SOFT TISSUES/SKULL:  No acute abnormality of the visualized skull or soft  tissues. Impression: 1. Evolution of the known infarcts involving is the left frontal lobe as well  as the left subinsular region. 2. Otherwise, no acute intracranial abnormality. 3. No significant mass effect, midline shift or acute intracranial hemorrhage. 4. Mild global parenchymal volume loss with chronic microvascular ischemic  changes. Access  CVC   Arterial          PICC             CVC        CVC Triple Lumen 06/22/22 Right Femoral (Active)   $ Central line insertion $ Yes 06/22/22 1554   Central Line Being Utilized Yes 06/26/22 0800   Criteria for Appropriate Use Hemodynamically unstable, requiring monitoring lines, vasopressors, or volume resuscitation 06/26/22 0800   Site Assessment Clean, dry & intact 06/26/22 0800   Phlebitis Assessment No symptoms 06/26/22 0800   Infiltration Assessment 0 06/26/22 0800   Color/Movement/Sensation Capillary refill less than 3 sec 06/26/22 0800   Proximal Lumen Color/Status White; Infusing; Alcohol cap applied 06/26/22 0800   Medial Lumen Status Blue;Alcohol cap applied;Normal saline locked; Blood return noted; Flushed 06/26/22 0800   Distal Lumen Color/Status Brown;Alcohol cap applied;Normal saline locked;Brisk blood return;Flushed 06/26/22 0800   Line Care Connections checked and tightened 06/26/22 0800   Alcohol Cap Used Yes 06/26/22 0800   Date of Last Dressing Change 06/22/22 06/26/22 0800   Dressing Type Transparent; Antimicrobial 06/26/22 0800   Dressing Status Clean, dry & intact 06/26/22 0800   Number of days: 3            [place]  Urinary Catheter 2 Way (Active)   $ Urethral catheter insertion Inserted for procedure 06/22/22 1510   Catheter Indications Need for fluid volume management of the critically ill patient in a critical care setting 06/26/22 0400   Site Assessment No urethral drainage 06/26/22 0800   Urine Color Yellow 06/26/22 0800   Urine Appearance Clear 06/26/22 0800   Collection Container Standard 06/26/22 0400   Securement Method Securing device (Describe) 06/26/22 0400   Catheter Care Completed Yes 06/26/22 0400   Catheter Best Practices  Drainage tube clipped to bed;Catheter secured to thigh; Tamper seal intact; Bag below bladder;Bag not on floor; Lack of dependent loop in tubing 06/26/22 0400   Status Draining 06/26/22 0400   Manual Irrigation Volume Input (mL) 0 mL 06/26/22 0400   Output (mL) 135 mL 06/26/22 0800   Number of days: 3         Thank you for this consult,    Ashley Gr MD  [facility] Pulmonary, Critical Care, and Sleep Medicine